# Patient Record
Sex: FEMALE | Race: WHITE | NOT HISPANIC OR LATINO | Employment: UNEMPLOYED | URBAN - NONMETROPOLITAN AREA
[De-identification: names, ages, dates, MRNs, and addresses within clinical notes are randomized per-mention and may not be internally consistent; named-entity substitution may affect disease eponyms.]

---

## 2017-06-14 ENCOUNTER — APPOINTMENT (EMERGENCY)
Dept: CT IMAGING | Facility: HOSPITAL | Age: 42
End: 2017-06-14

## 2017-06-14 ENCOUNTER — HOSPITAL ENCOUNTER (EMERGENCY)
Facility: HOSPITAL | Age: 42
Discharge: HOME | End: 2017-06-14
Attending: EMERGENCY MEDICINE | Admitting: EMERGENCY MEDICINE

## 2017-06-14 VITALS
HEART RATE: 67 BPM | RESPIRATION RATE: 17 BRPM | TEMPERATURE: 100.6 F | OXYGEN SATURATION: 99 % | BODY MASS INDEX: 28.73 KG/M2 | SYSTOLIC BLOOD PRESSURE: 118 MMHG | WEIGHT: 170 LBS | DIASTOLIC BLOOD PRESSURE: 74 MMHG

## 2017-06-14 DIAGNOSIS — N12 PYELONEPHRITIS: Primary | ICD-10-CM

## 2017-06-14 LAB
ALBUMIN SERPL BCP-MCNC: 3.4 G/DL (ref 3.5–5)
ALP SERPL-CCNC: 57 U/L (ref 46–116)
ALT SERPL W P-5'-P-CCNC: 18 U/L (ref 12–78)
ANION GAP SERPL CALCULATED.3IONS-SCNC: 9 MMOL/L (ref 4–13)
AST SERPL W P-5'-P-CCNC: 17 U/L (ref 5–45)
BACTERIA UR QL AUTO: ABNORMAL /HPF
BASOPHILS # BLD AUTO: 0.03 THOUSANDS/ΜL (ref 0–0.1)
BASOPHILS NFR BLD AUTO: 0 % (ref 0–1)
BILIRUB SERPL-MCNC: 0.4 MG/DL (ref 0.2–1)
BILIRUB UR QL STRIP: NEGATIVE
BUN SERPL-MCNC: 11 MG/DL (ref 5–25)
CALCIUM SERPL-MCNC: 8.5 MG/DL (ref 8.3–10.1)
CHLORIDE SERPL-SCNC: 107 MMOL/L (ref 100–108)
CLARITY UR: ABNORMAL
CO2 SERPL-SCNC: 26 MMOL/L (ref 21–32)
COLOR UR: ABNORMAL
CREAT SERPL-MCNC: 0.87 MG/DL (ref 0.6–1.3)
EOSINOPHIL # BLD AUTO: 0.1 THOUSAND/ΜL (ref 0–0.61)
EOSINOPHIL NFR BLD AUTO: 1 % (ref 0–6)
ERYTHROCYTE [DISTWIDTH] IN BLOOD BY AUTOMATED COUNT: 15 % (ref 11.6–15.1)
GFR SERPL CREATININE-BSD FRML MDRD: >60 ML/MIN/1.73SQ M
GLUCOSE SERPL-MCNC: 86 MG/DL (ref 65–140)
GLUCOSE UR STRIP-MCNC: NEGATIVE MG/DL
HCG UR QL: NEGATIVE
HCT VFR BLD AUTO: 39.7 % (ref 34.8–46.1)
HGB BLD-MCNC: 12.8 G/DL (ref 11.5–15.4)
HGB UR QL STRIP.AUTO: ABNORMAL
KETONES UR STRIP-MCNC: NEGATIVE MG/DL
LACTATE SERPL-SCNC: 0.8 MMOL/L (ref 0.5–2)
LEUKOCYTE ESTERASE UR QL STRIP: ABNORMAL
LIPASE SERPL-CCNC: 154 U/L (ref 73–393)
LYMPHOCYTES # BLD AUTO: 1.62 THOUSANDS/ΜL (ref 0.6–4.47)
LYMPHOCYTES NFR BLD AUTO: 14 % (ref 14–44)
MCH RBC QN AUTO: 27.6 PG (ref 26.8–34.3)
MCHC RBC AUTO-ENTMCNC: 32.2 G/DL (ref 31.4–37.4)
MCV RBC AUTO: 86 FL (ref 82–98)
MONOCYTES # BLD AUTO: 0.71 THOUSAND/ΜL (ref 0.17–1.22)
MONOCYTES NFR BLD AUTO: 6 % (ref 4–12)
NEUTROPHILS # BLD AUTO: 9.09 THOUSANDS/ΜL (ref 1.85–7.62)
NEUTS SEG NFR BLD AUTO: 79 % (ref 43–75)
NITRITE UR QL STRIP: NEGATIVE
NON-SQ EPI CELLS URNS QL MICRO: ABNORMAL /HPF
PH UR STRIP.AUTO: 7 [PH] (ref 4.5–8)
PLATELET # BLD AUTO: 234 THOUSANDS/UL (ref 149–390)
PMV BLD AUTO: 10.1 FL (ref 8.9–12.7)
POTASSIUM SERPL-SCNC: 3.4 MMOL/L (ref 3.5–5.3)
PROT SERPL-MCNC: 6.4 G/DL (ref 6.4–8.2)
PROT UR STRIP-MCNC: >=300 MG/DL
RBC # BLD AUTO: 4.64 MILLION/UL (ref 3.81–5.12)
RBC #/AREA URNS AUTO: ABNORMAL /HPF
SODIUM SERPL-SCNC: 142 MMOL/L (ref 136–145)
SP GR UR STRIP.AUTO: 1.02 (ref 1–1.03)
UROBILINOGEN UR QL STRIP.AUTO: 0.2 E.U./DL
WBC # BLD AUTO: 11.55 THOUSAND/UL (ref 4.31–10.16)
WBC #/AREA URNS AUTO: ABNORMAL /HPF

## 2017-06-14 PROCEDURE — 83690 ASSAY OF LIPASE: CPT | Performed by: EMERGENCY MEDICINE

## 2017-06-14 PROCEDURE — 80053 COMPREHEN METABOLIC PANEL: CPT | Performed by: EMERGENCY MEDICINE

## 2017-06-14 PROCEDURE — 83605 ASSAY OF LACTIC ACID: CPT | Performed by: EMERGENCY MEDICINE

## 2017-06-14 PROCEDURE — 99284 EMERGENCY DEPT VISIT MOD MDM: CPT

## 2017-06-14 PROCEDURE — 85025 COMPLETE CBC W/AUTO DIFF WBC: CPT | Performed by: EMERGENCY MEDICINE

## 2017-06-14 PROCEDURE — 81025 URINE PREGNANCY TEST: CPT | Performed by: EMERGENCY MEDICINE

## 2017-06-14 PROCEDURE — 81001 URINALYSIS AUTO W/SCOPE: CPT | Performed by: EMERGENCY MEDICINE

## 2017-06-14 PROCEDURE — 36415 COLL VENOUS BLD VENIPUNCTURE: CPT | Performed by: EMERGENCY MEDICINE

## 2017-06-14 PROCEDURE — 74176 CT ABD & PELVIS W/O CONTRAST: CPT

## 2017-06-14 RX ORDER — CEPHALEXIN 500 MG/1
500 CAPSULE ORAL 4 TIMES DAILY
Qty: 40 CAPSULE | Refills: 0 | Status: SHIPPED | OUTPATIENT
Start: 2017-06-14 | End: 2017-06-24

## 2017-06-14 RX ORDER — ONDANSETRON 2 MG/ML
4 INJECTION INTRAMUSCULAR; INTRAVENOUS ONCE
Status: DISCONTINUED | OUTPATIENT
Start: 2017-06-14 | End: 2017-06-14 | Stop reason: HOSPADM

## 2017-06-14 RX ORDER — PHENAZOPYRIDINE HYDROCHLORIDE 100 MG/1
100 TABLET, FILM COATED ORAL ONCE
Status: DISCONTINUED | OUTPATIENT
Start: 2017-06-14 | End: 2017-06-14 | Stop reason: HOSPADM

## 2017-06-14 RX ORDER — PHENAZOPYRIDINE HYDROCHLORIDE 200 MG/1
200 TABLET, FILM COATED ORAL 3 TIMES DAILY
Qty: 6 TABLET | Refills: 0 | Status: SHIPPED | OUTPATIENT
Start: 2017-06-14 | End: 2017-11-01 | Stop reason: ALTCHOICE

## 2017-06-26 ENCOUNTER — ALLSCRIPTS OFFICE VISIT (OUTPATIENT)
Dept: OTHER | Facility: OTHER | Age: 42
End: 2017-06-26

## 2017-10-24 ENCOUNTER — GENERIC CONVERSION - ENCOUNTER (OUTPATIENT)
Dept: OTHER | Facility: OTHER | Age: 42
End: 2017-10-24

## 2017-10-27 ENCOUNTER — GENERIC CONVERSION - ENCOUNTER (OUTPATIENT)
Dept: OTHER | Facility: OTHER | Age: 42
End: 2017-10-27

## 2017-11-01 ENCOUNTER — GENERIC CONVERSION - ENCOUNTER (OUTPATIENT)
Dept: OTHER | Facility: OTHER | Age: 42
End: 2017-11-01

## 2017-11-01 ENCOUNTER — HOSPITAL ENCOUNTER (EMERGENCY)
Facility: HOSPITAL | Age: 42
Discharge: HOME | End: 2017-11-02
Attending: EMERGENCY MEDICINE | Admitting: EMERGENCY MEDICINE
Payer: COMMERCIAL

## 2017-11-01 ENCOUNTER — ALLSCRIPTS OFFICE VISIT (OUTPATIENT)
Dept: PERINATAL CARE | Facility: CLINIC | Age: 42
End: 2017-11-01
Payer: COMMERCIAL

## 2017-11-01 VITALS
HEIGHT: 65 IN | SYSTOLIC BLOOD PRESSURE: 107 MMHG | RESPIRATION RATE: 18 BRPM | HEART RATE: 81 BPM | WEIGHT: 162 LBS | TEMPERATURE: 97.8 F | DIASTOLIC BLOOD PRESSURE: 64 MMHG | OXYGEN SATURATION: 99 % | BODY MASS INDEX: 26.99 KG/M2

## 2017-11-01 DIAGNOSIS — J06.9 UPPER RESPIRATORY TRACT INFECTION, UNSPECIFIED TYPE: Primary | ICD-10-CM

## 2017-11-01 PROCEDURE — 76801 OB US < 14 WKS SINGLE FETUS: CPT | Performed by: OBSTETRICS & GYNECOLOGY

## 2017-11-02 LAB
FLUAV AG SPEC QL IA: NEGATIVE
FLUAV AG SPEC QL: NORMAL
FLUBV AG SPEC QL IA: NEGATIVE
FLUBV AG SPEC QL: NORMAL
RSV B RNA SPEC QL NAA+PROBE: NORMAL

## 2017-11-02 PROCEDURE — 87798 DETECT AGENT NOS DNA AMP: CPT | Performed by: EMERGENCY MEDICINE

## 2017-11-02 PROCEDURE — 87400 INFLUENZA A/B EACH AG IA: CPT | Performed by: EMERGENCY MEDICINE

## 2017-11-02 PROCEDURE — 99283 EMERGENCY DEPT VISIT LOW MDM: CPT

## 2017-11-02 RX ORDER — ACETAMINOPHEN 325 MG/1
650 TABLET ORAL EVERY 4 HOURS PRN
Qty: 1 BOTTLE | Refills: 0 | Status: SHIPPED | OUTPATIENT
Start: 2017-11-02 | End: 2018-05-05 | Stop reason: HOSPADM

## 2017-11-02 RX ORDER — ALBUTEROL SULFATE 90 UG/1
AEROSOL, METERED RESPIRATORY (INHALATION)
Qty: 1 INHALER | Refills: 0 | Status: ON HOLD | OUTPATIENT
Start: 2017-11-02 | End: 2017-11-27 | Stop reason: ALTCHOICE

## 2017-11-02 RX ORDER — ALBUTEROL SULFATE 90 UG/1
2 AEROSOL, METERED RESPIRATORY (INHALATION) ONCE
Status: COMPLETED | OUTPATIENT
Start: 2017-11-02 | End: 2017-11-02

## 2017-11-02 RX ADMIN — ALBUTEROL SULFATE 2 PUFF: 90 AEROSOL, METERED RESPIRATORY (INHALATION) at 00:43

## 2017-11-02 NOTE — ED PROVIDER NOTES
History  Chief Complaint   Patient presents with    Cough     Patient states that her right ear and throat hurt, and she has a cough  patient is 11 weeks pregant and concerned that she may have the flu  Patient: Mario Alegria y o /female  YOB: 1975  MRN: [id number]  PCP: Erasmo Cohen  Date of evaluation: 17    (N B  Dictation software may have been used in the preparation of this document )    4d cough, right ear pain, throat pain  She is pregnant   with flu-like symptoms            Prior to Admission Medications   Prescriptions Last Dose Informant Patient Reported? Taking? Multiple Vitamins-Minerals (BARIATRIC FUSION PO)   Yes Yes   Sig: Take 1 tablet by mouth 4 (four) times a day  Prenatal MV-Min-Fe Fum-FA-DHA (PRENATAL 1 PO)   Yes Yes   Sig: Take by mouth   Pyridoxine HCl (VITAMIN B-6) 500 MG tablet   Yes Yes   Sig: Take 500 mg by mouth daily   Specialty Vitamins Products (MAGNESIUM, AMINO ACID CHELATE,) 133 MG tablet   Yes Yes   Sig: Take 1 tablet by mouth daily  calcium citrate (CALCITRATE) 950 MG tablet   Yes Yes   Sig: Take 2 tablets by mouth daily  cyanocobalamin (VITAMIN B-12) 1,000 mcg tablet   Yes Yes   Sig: Take 1,000 mcg by mouth daily      Facility-Administered Medications: None       Past Medical History:   Diagnosis Date    History of gastric bypass 2014       Past Surgical History:   Procedure Laterality Date     SECTION      CHOLECYSTECTOMY      GASTRIC BYPASS  2014       History reviewed  No pertinent family history  I have reviewed and agree with the history as documented  Social History   Substance Use Topics    Smoking status: Current Some Day Smoker     Packs/day: 0 25    Smokeless tobacco: Never Used    Alcohol use No        Review of Systems   Constitutional: Negative for chills and fever  HENT: Positive for ear pain and sore throat   Negative for ear discharge, hearing loss, trouble swallowing and voice change  Eyes: Negative for pain, redness and visual disturbance  Respiratory: Positive for cough  Negative for shortness of breath  Cardiovascular: Negative for chest pain and palpitations  Gastrointestinal: Negative for abdominal pain, constipation, diarrhea, nausea and vomiting  Genitourinary: Negative for dysuria, frequency, hematuria, urgency, vaginal bleeding and vaginal discharge  Musculoskeletal: Negative for back pain, gait problem and neck pain  Skin: Negative for color change and rash  Neurological: Negative for weakness and light-headedness  Psychiatric/Behavioral: Negative for confusion and decreased concentration  The patient is not nervous/anxious  All other systems reviewed and are negative  Physical Exam  ED Triage Vitals [11/01/17 2324]   Temperature Pulse Respirations Blood Pressure SpO2   97 8 °F (36 6 °C) 81 18 107/64 99 %      Temp Source Heart Rate Source Patient Position - Orthostatic VS BP Location FiO2 (%)   Temporal Monitor Lying Left arm --      Pain Score       2           Orthostatic Vital Signs  Vitals:    11/01/17 2324   BP: 107/64   Pulse: 81   Patient Position - Orthostatic VS: Lying       Physical Exam   Constitutional: She is oriented to person, place, and time  She appears well-developed and well-nourished  HENT:   Mouth/Throat: Oropharynx is clear and moist and mucous membranes are normal    Voice normal   Eyes: EOM are normal  Pupils are equal, round, and reactive to light  Cardiovascular: Normal rate and regular rhythm  Pulmonary/Chest: Effort normal    Abdominal: Soft  Bowel sounds are normal    Neurological: She is alert and oriented to person, place, and time  GCS eye subscore is 4  GCS verbal subscore is 5  GCS motor subscore is 6  Skin: Skin is warm and dry  Psychiatric: She has a normal mood and affect  Her speech is normal and behavior is normal    Nursing note and vitals reviewed        ED Medications  Medications   albuterol (PROVENTIL HFA,VENTOLIN HFA) inhaler 2 puff (2 puffs Inhalation Given 11/2/17 0043)       Diagnostic Studies  Results Reviewed     Procedure Component Value Units Date/Time    Influenza A/B and RSV by PCR (Indicated for patients > 2 mo of age) [[id number]]  (Normal) Collected:  11/02/17 0032    Lab Status:  Final result Specimen:  Nasopharyngeal from Nasopharyngeal Swab Updated:  11/02/17 0938     INFLU A PCR None Detected     INFLU B PCR None Detected     RSV PCR None Detected    Rapid Influenza Screen with Reflex PCR (indicated for patients <2mo of age) [[id number]]  (Normal) Collected:  11/02/17 0032    Lab Status:  Final result Specimen:  Nasopharyngeal from Nasopharyngeal Swab Updated:  11/02/17 0058     Rapid Influenza A Ag Negative     Rapid Influenza B Ag Negative                 No orders to display              Procedures  Procedures       Phone Contacts  ED Phone Contact    ED Course  ED Course                                MDM  CritCare Time    Disposition  Final diagnoses:   Upper respiratory tract infection, unspecified type     Time reflects when diagnosis was documented in both MDM as applicable and the Disposition within this note     Time User Action Codes Description Comment    11/2/2017 12:23 AM Liane HERNANDEZ Add [J06 9] Upper respiratory tract infection, unspecified type       ED Disposition     ED Disposition Condition Comment    Discharge  Alejandro Guardian discharge to home/self care  Condition at discharge: Good        Follow-up Information     Follow up With Specialties Details Why [address], [address] Call in 1 day Tell about this ER visit   [address] [place] [address] Rd  [phone]          Discharge Medication List as of 11/2/2017 12:25 AM      START taking these medications    Details   acetaminophen (TYLENOL) 325 mg tablet Take 2 tablets by mouth every 4 (four) hours as needed (pain), Starting Thu 11/2/2017, Print      albuterol (PROVENTIL HFA,VENTOLIN HFA) 90 mcg/act inhaler 2 puffs every 3-4 hours with spacer as needed for wheeze, cough, short of breath , Print      Saline (AYR NASAL MIST ALLERGY/SINUS) 2 65 % SOLN HyPERtonic  Use as directed on package, for nasal congestion, pressure, Print         CONTINUE these medications which have NOT CHANGED    Details   calcium citrate (CALCITRATE) 950 MG tablet Take 2 tablets by mouth daily  , Until Discontinued, Historical Med      cyanocobalamin (VITAMIN B-12) 1,000 mcg tablet Take 1,000 mcg by mouth daily, Until Discontinued, Historical Med      Multiple Vitamins-Minerals (BARIATRIC FUSION PO) Take 1 tablet by mouth 4 (four) times a day , Until Discontinued, Historical Med      Prenatal MV-Min-Fe Fum-FA-DHA (PRENATAL 1 PO) Take by mouth, Historical Med      Pyridoxine HCl (VITAMIN B-6) 500 MG tablet Take 500 mg by mouth daily, Until Discontinued, Historical Med      Specialty Vitamins Products (MAGNESIUM, AMINO ACID CHELATE,) 133 MG tablet Take 1 tablet by mouth daily  , Until Discontinued, Historical Med           No discharge procedures on file      ED Provider  Electronically Signed by           Scott Childers MD  11/03/17 9383

## 2017-11-02 NOTE — DISCHARGE INSTRUCTIONS

## 2017-11-04 ENCOUNTER — HOSPITAL ENCOUNTER (EMERGENCY)
Facility: HOSPITAL | Age: 42
Discharge: HOME | End: 2017-11-04
Admitting: EMERGENCY MEDICINE
Payer: COMMERCIAL

## 2017-11-04 ENCOUNTER — APPOINTMENT (EMERGENCY)
Dept: ULTRASOUND IMAGING | Facility: HOSPITAL | Age: 42
End: 2017-11-04
Payer: COMMERCIAL

## 2017-11-04 VITALS
DIASTOLIC BLOOD PRESSURE: 62 MMHG | BODY MASS INDEX: 27.38 KG/M2 | TEMPERATURE: 97.8 F | OXYGEN SATURATION: 100 % | SYSTOLIC BLOOD PRESSURE: 108 MMHG | HEART RATE: 62 BPM | WEIGHT: 162 LBS | RESPIRATION RATE: 16 BRPM

## 2017-11-04 DIAGNOSIS — O20.9 VAGINAL BLEEDING BEFORE 22 WEEKS GESTATION: Primary | ICD-10-CM

## 2017-11-04 LAB
ABO GROUP BLD: NORMAL
ALBUMIN SERPL BCP-MCNC: 3.1 G/DL (ref 3.5–5)
ALP SERPL-CCNC: 53 U/L (ref 46–116)
ALT SERPL W P-5'-P-CCNC: 19 U/L (ref 12–78)
ANION GAP SERPL CALCULATED.3IONS-SCNC: 6 MMOL/L (ref 4–13)
AST SERPL W P-5'-P-CCNC: 15 U/L (ref 5–45)
B-HCG SERPL-ACNC: ABNORMAL MIU/ML
BACTERIA UR QL AUTO: ABNORMAL /HPF
BASOPHILS # BLD AUTO: 0.01 THOUSANDS/ΜL (ref 0–0.1)
BASOPHILS NFR BLD AUTO: 0 % (ref 0–1)
BILIRUB SERPL-MCNC: 0.3 MG/DL (ref 0.2–1)
BILIRUB UR QL STRIP: NEGATIVE
BUN SERPL-MCNC: 7 MG/DL (ref 5–25)
CALCIUM SERPL-MCNC: 8.7 MG/DL (ref 8.3–10.1)
CHLORIDE SERPL-SCNC: 108 MMOL/L (ref 100–108)
CLARITY UR: CLEAR
CO2 SERPL-SCNC: 26 MMOL/L (ref 21–32)
COLOR UR: YELLOW
CREAT SERPL-MCNC: 0.59 MG/DL (ref 0.6–1.3)
EOSINOPHIL # BLD AUTO: 0.08 THOUSAND/ΜL (ref 0–0.61)
EOSINOPHIL NFR BLD AUTO: 1 % (ref 0–6)
ERYTHROCYTE [DISTWIDTH] IN BLOOD BY AUTOMATED COUNT: 13.9 % (ref 11.6–15.1)
GFR SERPL CREATININE-BSD FRML MDRD: 113 ML/MIN/1.73SQ M
GLUCOSE SERPL-MCNC: 88 MG/DL (ref 65–140)
GLUCOSE UR STRIP-MCNC: NEGATIVE MG/DL
HCT VFR BLD AUTO: 34.3 % (ref 34.8–46.1)
HGB BLD-MCNC: 11.7 G/DL (ref 11.5–15.4)
HGB UR QL STRIP.AUTO: ABNORMAL
KETONES UR STRIP-MCNC: NEGATIVE MG/DL
LEUKOCYTE ESTERASE UR QL STRIP: NEGATIVE
LYMPHOCYTES # BLD AUTO: 1.05 THOUSANDS/ΜL (ref 0.6–4.47)
LYMPHOCYTES NFR BLD AUTO: 18 % (ref 14–44)
MCH RBC QN AUTO: 29.8 PG (ref 26.8–34.3)
MCHC RBC AUTO-ENTMCNC: 34.1 G/DL (ref 31.4–37.4)
MCV RBC AUTO: 88 FL (ref 82–98)
MONOCYTES # BLD AUTO: 0.37 THOUSAND/ΜL (ref 0.17–1.22)
MONOCYTES NFR BLD AUTO: 6 % (ref 4–12)
NEUTROPHILS # BLD AUTO: 4.49 THOUSANDS/ΜL (ref 1.85–7.62)
NEUTS SEG NFR BLD AUTO: 75 % (ref 43–75)
NITRITE UR QL STRIP: NEGATIVE
NON-SQ EPI CELLS URNS QL MICRO: ABNORMAL /HPF
NRBC BLD AUTO-RTO: 0 /100 WBCS
PH UR STRIP.AUTO: 6.5 [PH] (ref 4.5–8)
PLATELET # BLD AUTO: 175 THOUSANDS/UL (ref 149–390)
PMV BLD AUTO: 10.3 FL (ref 8.9–12.7)
POTASSIUM SERPL-SCNC: 3.4 MMOL/L (ref 3.5–5.3)
PROT SERPL-MCNC: 6.5 G/DL (ref 6.4–8.2)
PROT UR STRIP-MCNC: NEGATIVE MG/DL
RBC # BLD AUTO: 3.92 MILLION/UL (ref 3.81–5.12)
RBC #/AREA URNS AUTO: ABNORMAL /HPF
RH BLD: POSITIVE
SODIUM SERPL-SCNC: 140 MMOL/L (ref 136–145)
SP GR UR STRIP.AUTO: 1.01 (ref 1–1.03)
UROBILINOGEN UR QL STRIP.AUTO: 0.2 E.U./DL
WBC # BLD AUTO: 6 THOUSAND/UL (ref 4.31–10.16)
WBC #/AREA URNS AUTO: ABNORMAL /HPF

## 2017-11-04 PROCEDURE — 36415 COLL VENOUS BLD VENIPUNCTURE: CPT | Performed by: PHYSICIAN ASSISTANT

## 2017-11-04 PROCEDURE — 86901 BLOOD TYPING SEROLOGIC RH(D): CPT | Performed by: EMERGENCY MEDICINE

## 2017-11-04 PROCEDURE — 99284 EMERGENCY DEPT VISIT MOD MDM: CPT

## 2017-11-04 PROCEDURE — 76801 OB US < 14 WKS SINGLE FETUS: CPT

## 2017-11-04 PROCEDURE — 80053 COMPREHEN METABOLIC PANEL: CPT | Performed by: PHYSICIAN ASSISTANT

## 2017-11-04 PROCEDURE — 85025 COMPLETE CBC W/AUTO DIFF WBC: CPT | Performed by: PHYSICIAN ASSISTANT

## 2017-11-04 PROCEDURE — 84702 CHORIONIC GONADOTROPIN TEST: CPT | Performed by: PHYSICIAN ASSISTANT

## 2017-11-04 PROCEDURE — 86900 BLOOD TYPING SEROLOGIC ABO: CPT | Performed by: EMERGENCY MEDICINE

## 2017-11-04 PROCEDURE — 81001 URINALYSIS AUTO W/SCOPE: CPT | Performed by: PHYSICIAN ASSISTANT

## 2017-11-04 RX ORDER — ACETAMINOPHEN 325 MG/1
650 TABLET ORAL ONCE
Status: COMPLETED | OUTPATIENT
Start: 2017-11-04 | End: 2017-11-04

## 2017-11-04 RX ADMIN — ACETAMINOPHEN 650 MG: 325 TABLET ORAL at 07:31

## 2017-11-04 NOTE — ED NOTES
Patient states has had no further vaginal bleeding       [place] Saldivar, RN  11/04/17 0832

## 2017-11-04 NOTE — ED NOTES
Patient medicated for c/o headache  States she has a cold    Informed US dept opens at [address] RN  11/04/17 2308

## 2017-11-04 NOTE — ED PROVIDER NOTES
History  Chief Complaint   Patient presents with    Vaginal Bleeding - Pregnant     11 weeks pregnant  "bright red when i wipe " "getting heavier "      Patient is a 51-year-old female G6P P2 A 3 who presents to the emergency department with the onset bright red vaginal bleeding that started approximately 2 hours ago after she had sexual intercourse with her   She does complain of mild lower abdominal tenderness  She otherwise denies fevers chills shortness of breath chest pain nausea vomiting headache dizziness  Patient is under care of   by she was seen by maternal fetal medicine 2 days ago had ultrasound which was negative  Patient has had 2 abortions spontaneously and a stillborn  Is supposed to be going for cervical cerclage next week  1045:  Patient's labs and Imaging reassuring pelvic exam reveals no vaginal laceration cervical os is closed small amount fresh dark brown blood vagina otherwise no acute findings  Case and findings were discussed with Dr Napoleon Olson agrees with outpatient treatment and will see patient as scheduled on  he recommends the patient abstain from sexual intercourse at this time  Return precautions and anticipatory guidance discussed  Prior to Admission Medications   Prescriptions Last Dose Informant Patient Reported? Taking? Multiple Vitamins-Minerals (BARIATRIC FUSION PO)   Yes Yes   Sig: Take 1 tablet by mouth 4 (four) times a day  Prenatal MV-Min-Fe Fum-FA-DHA (PRENATAL 1 PO)   Yes Yes   Sig: Take by mouth   Pyridoxine HCl (VITAMIN B-6) 500 MG tablet   Yes Yes   Sig: Take 500 mg by mouth daily   Specialty Vitamins Products (MAGNESIUM, AMINO ACID CHELATE,) 133 MG tablet   Yes Yes   Sig: Take 1 tablet by mouth daily     acetaminophen (TYLENOL) 325 mg tablet   No Yes   Sig: Take 2 tablets by mouth every 4 (four) hours as needed (pain)   albuterol (PROVENTIL HFA,VENTOLIN HFA) 90 mcg/act inhaler   No Yes   Si puffs every 3-4 hours with spacer as needed for wheeze, cough, short of breath  calcium citrate (CALCITRATE) 950 MG tablet   Yes Yes   Sig: Take 2 tablets by mouth daily  cyanocobalamin (VITAMIN B-12) 1,000 mcg tablet   Yes Yes   Sig: Take 1,000 mcg by mouth daily      Facility-Administered Medications: None       Past Medical History:   Diagnosis Date    History of gastric bypass 2014       Past Surgical History:   Procedure Laterality Date     SECTION      CHOLECYSTECTOMY      GASTRIC BYPASS  2014       History reviewed  No pertinent family history  I have reviewed and agree with the history as documented  Social History   Substance Use Topics    Smoking status: Current Some Day Smoker     Packs/day: 0 25    Smokeless tobacco: Never Used    Alcohol use No        Review of Systems   Constitutional: Negative  Negative for chills and fever  Respiratory: Negative  Negative for apnea, cough and wheezing  Cardiovascular: Negative  Negative for chest pain  Gastrointestinal: Positive for abdominal pain  Negative for diarrhea, nausea and vomiting  Genitourinary: Positive for pelvic pain and vaginal bleeding  Negative for hematuria, vaginal discharge and vaginal pain  Musculoskeletal: Negative for back pain, joint swelling, neck pain and neck stiffness  Skin: Negative for rash and wound  Neurological: Negative for dizziness, weakness and light-headedness  Hematological: Negative  Negative for adenopathy  Does not bruise/bleed easily  Psychiatric/Behavioral: Negative  All other systems reviewed and are negative        Physical Exam  ED Triage Vitals   Temperature Pulse Respirations Blood Pressure SpO2   17 0546 17 0546 17 0546 17 0546 17 0546   98 3 °F (36 8 °C) 72 18 122/56 100 %      Temp Source Heart Rate Source Patient Position - Orthostatic VS BP Location FiO2 (%)   17 0813 17 0546 17 0813 1746 --   Oral Monitor Lying Right arm Pain Score       11/04/17 0546       2           Orthostatic Vital Signs  Vitals:    11/04/17 0546 11/04/17 0713 11/04/17 0813   BP: 122/56 101/64 95/54   Pulse: 72 66 65   Patient Position - Orthostatic VS:   Lying       Physical Exam   Constitutional: She appears well-developed and well-nourished  HENT:   Head: Normocephalic and atraumatic  Eyes: EOM are normal  Pupils are equal, round, and reactive to light  Neck: Normal range of motion  Neck supple  Cardiovascular: Normal rate, regular rhythm and normal heart sounds  Exam reveals no gallop and no friction rub  No murmur heard  Pulmonary/Chest: Effort normal and breath sounds normal  No respiratory distress  She has no wheezes  She has no rales  She exhibits no tenderness  Abdominal: Soft  She exhibits no distension and no mass  There is tenderness  There is no rebound and no guarding  No hernia  Genitourinary: Rectal exam shows no external hemorrhoid, no internal hemorrhoid and no tenderness  Pelvic exam was performed with patient supine  No erythema, tenderness or bleeding in the vagina  No signs of injury around the vagina  No vaginal discharge found         ED Medications  Medications   acetaminophen (TYLENOL) tablet 650 mg (650 mg Oral Given 11/4/17 0731)       Diagnostic Studies  Results Reviewed     Procedure Component Value Units Date/Time    hCG, quantitative [[id number]]  (Abnormal) Collected:  11/04/17 0641    Lab Status:  Final result Specimen:  Blood from Arm, Right Updated:  11/04/17 0804     HCG, Quant 75,794 5 (H) mIU/mL     Narrative:          Expected Ranges:     Approximate               Approximate HCG  Gestation age          Concentration ( mIU/mL)  _____________          ______________________   Eric Tavares                      HCG values  0 2-1                       5-50  1-2                           2-3                         100-5000  3-4                         500-89437  4-5                         1000-91409  5-6 [id number]  6-8                         [id number]  8-12                        56207-568938    Comprehensive metabolic panel [[id number]]  (Abnormal) Collected:  11/04/17 0641    Lab Status:  Final result Specimen:  Blood from Arm, Right Updated:  11/04/17 0732     Sodium 140 mmol/L      Potassium 3 4 (L) mmol/L      Chloride 108 mmol/L      CO2 26 mmol/L      Anion Gap 6 mmol/L      BUN 7 mg/dL      Creatinine 0 59 (L) mg/dL      Glucose 88 mg/dL      Calcium 8 7 mg/dL      AST 15 U/L      ALT 19 U/L      Alkaline Phosphatase 53 U/L      Total Protein 6 5 g/dL      Albumin 3 1 (L) g/dL      Total Bilirubin 0 30 mg/dL      eGFR 113 ml/min/1 73sq m     Narrative:         National Kidney Disease Education Program recommendations are as follows:  GFR calculation is accurate only with a steady state creatinine  Chronic Kidney disease less than 60 ml/min/1 73 sq  meters  Kidney failure less than 15 ml/min/1 73 sq  meters      Urine Microscopic [[id number]]  (Abnormal) Collected:  11/04/17 0645    Lab Status:  Final result Specimen:  Urine from Urine, Clean Catch Updated:  11/04/17 0729     RBC, UA Innumerable (A) /hpf      WBC, UA None Seen /hpf      Epithelial Cells Occasional /hpf      Bacteria, UA Occasional /hpf     UA w Reflex to Microscopic w Reflex to Culture [[id number]]  (Abnormal) Collected:  11/04/17 0645    Lab Status:  Final result Specimen:  Urine from Urine, Clean Catch Updated:  11/04/17 0656     Color, UA Yellow     Clarity, UA Clear     Specific Gravity, UA 1 015     pH, UA 6 5     Leukocytes, UA Negative     Nitrite, UA Negative     Protein, UA Negative mg/dl      Glucose, UA Negative mg/dl      Ketones, UA Negative mg/dl      Urobilinogen, UA 0 2 E U /dl      Bilirubin, UA Negative     Blood, UA Large (A)    CBC and differential [[id number]]  (Abnormal) Collected:  11/04/17 0641    Lab Status:  Final result Specimen:  Blood from Arm, Right Updated:  11/04/17 0654     WBC 6 00 Thousand/uL      RBC 3 92 Million/uL      Hemoglobin 11 7 g/dL      Hematocrit 34 3 (L) %      MCV 88 fL      MCH 29 8 pg      MCHC 34 1 g/dL      RDW 13 9 %      MPV 10 3 fL      Platelets 978 Thousands/uL      nRBC 0 /100 WBCs      Neutrophils Relative 75 %      Lymphocytes Relative 18 %      Monocytes Relative 6 %      Eosinophils Relative 1 %      Basophils Relative 0 %      Neutrophils Absolute 4 49 Thousands/µL      Lymphocytes Absolute 1 05 Thousands/µL      Monocytes Absolute 0 37 Thousand/µL      Eosinophils Absolute 0 08 Thousand/µL      Basophils Absolute 0 01 Thousands/µL                  US OB < 14 weeks single or first gestation level 1   Final Result by Angelo Roy MD ( 1023)      Single live intrauterine gestation at 11 weeks 1 day (range +/- 1 week 0 days)  RENEE would be 2018  There is no evidence of subchorionic hemorrhage  This is within range of the previously established gestational age of 5 weeks 0 days and estimated due date of 2018, which is considered more accurate  Workstation performed: [id number]                    Procedures  Procedures       Phone Contacts  ED Phone Contact    ED Course  ED Course                                MDM  Number of Diagnoses or Management Options  Vaginal bleeding before 22 weeks gestation:   Diagnosis management comments: As noted in HPI 41-year-old female  a 3 presents to the emergency department with mild to moderate bright red vaginal bleeding with no clots that started approximately 2 hours ago after she had sexual intercourse with her   Will screen with labs type and screen and ultrasound to evaluate for threatened AB versus placenta previa  Patient otherwise at this time is clinically and hemodynamically stable      1030:  Patient's labs and imaging are unremarkable patient remains clinically and hemodynamically stable in emergency department with normal pelvic exam     Trae Time    Disposition  Final diagnoses:   Vaginal bleeding before 22 weeks gestation     Time reflects when diagnosis was documented in both MDM as applicable and the Disposition within this note     Time User Action Codes Description Comment    11/4/2017 10:31 AM Lola Bejarano Add [O20 9] Vaginal bleeding before 22 weeks gestation       ED Disposition     ED Disposition Condition Comment    Discharge  Kalina Estrin discharge to home/self care  Condition at discharge: Good        Follow-up Information     Follow up With Specialties Details Why Laura Camejo MD Obstetrics and Gynecology   [facility] [address]  [phone]          Patient's Medications   Discharge Prescriptions    No medications on file     No discharge procedures on file      ED Provider  Electronically Signed by           Dionisio Ruano PA-C  11/04/17 0107

## 2017-11-15 ENCOUNTER — GENERIC CONVERSION - ENCOUNTER (OUTPATIENT)
Dept: OTHER | Facility: OTHER | Age: 42
End: 2017-11-15

## 2017-11-15 ENCOUNTER — APPOINTMENT (OUTPATIENT)
Dept: PERINATAL CARE | Facility: CLINIC | Age: 42
End: 2017-11-15
Payer: COMMERCIAL

## 2017-11-15 PROCEDURE — 90686 IIV4 VACC NO PRSV 0.5 ML IM: CPT | Performed by: OBSTETRICS & GYNECOLOGY

## 2017-11-15 PROCEDURE — 76813 OB US NUCHAL MEAS 1 GEST: CPT | Performed by: OBSTETRICS & GYNECOLOGY

## 2017-11-15 PROCEDURE — 76801 OB US < 14 WKS SINGLE FETUS: CPT | Performed by: OBSTETRICS & GYNECOLOGY

## 2017-11-15 PROCEDURE — 99203 OFFICE O/P NEW LOW 30 MIN: CPT | Performed by: GENETIC COUNSELOR, MS

## 2017-11-15 PROCEDURE — 90686 IIV4 VACC NO PRSV 0.5 ML IM: CPT

## 2017-11-15 PROCEDURE — 97802 MEDICAL NUTRITION INDIV IN: CPT | Performed by: OBSTETRICS & GYNECOLOGY

## 2017-11-16 ENCOUNTER — GENERIC CONVERSION - ENCOUNTER (OUTPATIENT)
Dept: OTHER | Facility: OTHER | Age: 42
End: 2017-11-16

## 2017-11-22 ENCOUNTER — GENERIC CONVERSION - ENCOUNTER (OUTPATIENT)
Dept: OTHER | Facility: OTHER | Age: 42
End: 2017-11-22

## 2017-11-26 ENCOUNTER — ANESTHESIA EVENT (OUTPATIENT)
Dept: LABOR AND DELIVERY | Facility: HOSPITAL | Age: 42
End: 2017-11-26
Payer: COMMERCIAL

## 2017-11-27 ENCOUNTER — ANESTHESIA (OUTPATIENT)
Dept: LABOR AND DELIVERY | Facility: HOSPITAL | Age: 42
End: 2017-11-27
Payer: COMMERCIAL

## 2017-11-27 ENCOUNTER — HOSPITAL ENCOUNTER (OUTPATIENT)
Facility: HOSPITAL | Age: 42
Discharge: HOME | End: 2017-11-27
Attending: OBSTETRICS & GYNECOLOGY | Admitting: OBSTETRICS & GYNECOLOGY
Payer: COMMERCIAL

## 2017-11-27 VITALS
TEMPERATURE: 97.4 F | HEIGHT: 65 IN | DIASTOLIC BLOOD PRESSURE: 60 MMHG | RESPIRATION RATE: 16 BRPM | WEIGHT: 163 LBS | HEART RATE: 66 BPM | SYSTOLIC BLOOD PRESSURE: 101 MMHG | BODY MASS INDEX: 27.16 KG/M2

## 2017-11-27 DIAGNOSIS — Z87.51 HISTORY OF PRETERM DELIVERY: ICD-10-CM

## 2017-11-27 LAB
BASOPHILS # BLD AUTO: 0.01 THOUSANDS/ΜL (ref 0–0.1)
BASOPHILS NFR BLD AUTO: 0 % (ref 0–1)
EOSINOPHIL # BLD AUTO: 0.06 THOUSAND/ΜL (ref 0–0.61)
EOSINOPHIL NFR BLD AUTO: 1 % (ref 0–6)
ERYTHROCYTE [DISTWIDTH] IN BLOOD BY AUTOMATED COUNT: 14.3 % (ref 11.6–15.1)
HCT VFR BLD AUTO: 33.6 % (ref 34.8–46.1)
HGB BLD-MCNC: 11.3 G/DL (ref 11.5–15.4)
LYMPHOCYTES # BLD AUTO: 1.4 THOUSANDS/ΜL (ref 0.6–4.47)
LYMPHOCYTES NFR BLD AUTO: 19 % (ref 14–44)
MCH RBC QN AUTO: 29.6 PG (ref 26.8–34.3)
MCHC RBC AUTO-ENTMCNC: 33.6 G/DL (ref 31.4–37.4)
MCV RBC AUTO: 88 FL (ref 82–98)
MONOCYTES # BLD AUTO: 0.38 THOUSAND/ΜL (ref 0.17–1.22)
MONOCYTES NFR BLD AUTO: 5 % (ref 4–12)
NEUTROPHILS # BLD AUTO: 5.66 THOUSANDS/ΜL (ref 1.85–7.62)
NEUTS SEG NFR BLD AUTO: 75 % (ref 43–75)
NRBC BLD AUTO-RTO: 0 /100 WBCS
PLATELET # BLD AUTO: 209 THOUSANDS/UL (ref 149–390)
PMV BLD AUTO: 9.6 FL (ref 8.9–12.7)
RBC # BLD AUTO: 3.82 MILLION/UL (ref 3.81–5.12)
WBC # BLD AUTO: 7.53 THOUSAND/UL (ref 4.31–10.16)

## 2017-11-27 PROCEDURE — 85025 COMPLETE CBC W/AUTO DIFF WBC: CPT | Performed by: OBSTETRICS & GYNECOLOGY

## 2017-11-27 PROCEDURE — 99214 OFFICE O/P EST MOD 30 MIN: CPT

## 2017-11-27 RX ORDER — SODIUM CHLORIDE, SODIUM LACTATE, POTASSIUM CHLORIDE, CALCIUM CHLORIDE 600; 310; 30; 20 MG/100ML; MG/100ML; MG/100ML; MG/100ML
125 INJECTION, SOLUTION INTRAVENOUS CONTINUOUS
Status: DISCONTINUED | OUTPATIENT
Start: 2017-11-27 | End: 2017-11-27 | Stop reason: HOSPADM

## 2017-11-27 RX ORDER — FAMOTIDINE 20 MG
1 TABLET ORAL DAILY
COMMUNITY

## 2017-11-27 RX ADMIN — SODIUM CHLORIDE, SODIUM LACTATE, POTASSIUM CHLORIDE, AND CALCIUM CHLORIDE 1000 ML: .6; .31; .03; .02 INJECTION, SOLUTION INTRAVENOUS at 11:09

## 2017-11-27 RX ADMIN — SODIUM CHLORIDE, SODIUM LACTATE, POTASSIUM CHLORIDE, AND CALCIUM CHLORIDE 125 ML/HR: .6; .31; .03; .02 INJECTION, SOLUTION INTRAVENOUS at 11:39

## 2017-11-27 NOTE — ANESTHESIA PREPROCEDURE EVALUATION
Review of Systems/Medical History  Patient summary reviewed  Chart reviewed      Cardiovascular  Negative cardio ROS    Pulmonary  Negative pulmonary ROS ,        GI/Hepatic    GERD , Bariatric surgery,        Negative  ROS        Endo/Other  Negative endo/other ROS      GYN  Currently pregnant , Prior pregnancy/OB history : 1 Parity: 0,     Comment: Incompetent cervix     Hematology  Negative hematology ROS      Musculoskeletal  Back pain , chronic back pain and lumbar pain, Sciatica,        Neurology      Comment: Carpal tunnel syndrome Psychology   Anxiety,            Physical Exam    Airway    Mallampati score: II  TM Distance: >3 FB  Neck ROM: full     Dental   No notable dental hx     Cardiovascular  Comment: Negative ROS, Rhythm: regular, Rate: normal, Cardiovascular exam normal    Pulmonary  Pulmonary exam normal Breath sounds clear to auscultation,     Other Findings        Anesthesia Plan  ASA Score- 2       Anesthesia Type- spinal with ASA Monitors  Additional Monitors:   Airway Plan:           Induction-       Informed Consent- Anesthetic plan and risks discussed with patient  Recent labs personally reviewed:  Lab Results   Component Value Date    WBC 6 00 2017    HGB 11 7 2017     2017     Lab Results   Component Value Date     2017    K 3 4 (L) 2017    BUN 7 2017    CREATININE 0 59 (L) 2017    GLUCOSE 88 2017     No results found for: PTT   No results found for: INR    Blood type O    Lab Results   Component Value Date    HGBA1C 4 9 2015       I, Leonides Klinefelter, MD, have personally seen and evaluated the patient prior to anesthetic care  I have reviewed the pre-anesthetic record, and other medical records if appropriate to the anesthetic care  If a CRNA is involved in the case, I have reviewed the CRNA assessment, if present, and agree   Risks/benefits and alternatives discussed with patient including possible PONV, sore throat, and possibility of rare anesthetic and surgical emergencies

## 2017-11-27 NOTE — H&P
OBSTETRIC - HISTORY & PHYSICAL  Adan Sanchez 43 y o  female MRN: [id number]  Unit/Bed#: LD Triage 2 Encounter: [id number]    ASSESSMENT:  Adan Sanchez is a 43 y o , [id number], 14w1d here for history indicated cerclage  Patient is here today for scheduled cerclage placement  She has a history of prior 2nd trimester pregnancy loss in  and a prior cerclage in  for history indicated cerclage  She is a patient of Dr Lora Cheema Friday will be placing cerclage  PLAN:  #1  14 weeks gestation  Dopplers  Plan for cerclage in OR    #2  History indicated cerclage  Plan for OR  NPO  Peripheral IV  No antibiotics    FEN: IV fluids, NPO  PPx: Ambulate, SCDs  Pain: Per anesthesia team  Code: Full code  Type: Inpatient, med surg bed    D/w Dr Cheema Friday  _________________    SUBJECTIVE  Chief Complaint:   Here for scheduled cerclage    HPI: Adan Sanchez is a 43 y o  [id number] with an RENEE of 18 at 14w1d who is being admitted for history indicated cerclage  Prior pregnancies included a 16wk PPROM and a 35wk RLTCS w/ cerclage placed at 22 wks  OB ROS: no ctxns, no LOF, no VB, no vaginal discharge    Review of Systems   Constitutional: Negative  HENT: Negative  Respiratory: Negative  Cardiovascular: Negative  Gastrointestinal: Negative  Genitourinary: Negative  Musculoskeletal: Negative  Neurological: Negative          OBH:   1LTCS Term pregnancy Preeclampsia  9lb2oz     PPROM at 16 wks  2007 RLTCS 35 wks, w/ cerclage due to shortened cervix  Two prior SABs    PMH  Past Medical History:   Diagnosis Date    History of gastric bypass  History of morbid obesity 2014     [facility]  Past Surgical History:   Procedure Laterality Date     SECTION, x 2      CHOLECYSTECTOMY      GASTRIC BYPASS  2014     SH  History   Alcohol Use No     History   Drug Use No     History   Smoking Status    Current Some Day Smoker    Packs/day: 0 25   Smokeless Tobacco    Never Used Allergies  ASA  Codeine    Medications  {  Prescriptions Prior to Admission   Medication    acetaminophen (TYLENOL) 325 mg tablet    albuterol (PROVENTIL HFA,VENTOLIN HFA) 90 mcg/act inhaler    calcium citrate (CALCITRATE) 950 MG tablet    cyanocobalamin (VITAMIN B-12) 1,000 mcg tablet    Multiple Vitamins-Minerals (BARIATRIC FUSION PO)    Prenatal MV-Min-Fe Fum-FA-DHA (PRENATAL 1 PO)    Pyridoxine HCl (VITAMIN B-6) 500 MG tablet    Specialty Vitamins Products (MAGNESIUM, AMINO ACID CHELATE,) 133 MG tablet        OBJECTIVE  There were no vitals filed for this visit  There is no height or weight on file to calculate BMI  Physical Exam:  General: no acute distress  CV/Resp: nonlabored braething  Abdominal: soft, nontender, surgical scars at midline  Extremities: no edema    FHT: 150bpm by dopplers    Recent Labs:   No visits with results within 1 Day(s) from this visit     Latest known visit with results is:   Admission on 11/04/2017, Discharged on 11/04/2017   Component Date Value Ref Range Status    WBC 11/04/2017 6 00  4 31 - 10 16 Thousand/uL Final    RBC 11/04/2017 3 92  3 81 - 5 12 Million/uL Final    Hemoglobin 11/04/2017 11 7  11 5 - 15 4 g/dL Final    Hematocrit 11/04/2017 34 3* 34 8 - 46 1 % Final    MCV 11/04/2017 88  82 - 98 fL Final    MCH 11/04/2017 29 8  26 8 - 34 3 pg Final    MCHC 11/04/2017 34 1  31 4 - 37 4 g/dL Final    RDW 11/04/2017 13 9  11 6 - 15 1 % Final    MPV 11/04/2017 10 3  8 9 - 12 7 fL Final    Platelets 52/84/4069 175  149 - 390 Thousands/uL Final    nRBC 11/04/2017 0  /100 WBCs Final    Neutrophils Relative 11/04/2017 75  43 - 75 % Final    Lymphocytes Relative 11/04/2017 18  14 - 44 % Final    Monocytes Relative 11/04/2017 6  4 - 12 % Final    Eosinophils Relative 11/04/2017 1  0 - 6 % Final    Basophils Relative 11/04/2017 0  0 - 1 % Final    Neutrophils Absolute 11/04/2017 4 49  1 85 - 7 62 Thousands/µL Final    Lymphocytes Absolute 11/04/2017 1 05 0 60 - 4 47 Thousands/µL Final    Monocytes Absolute 11/04/2017 0 37  0 17 - 1 22 Thousand/µL Final    Eosinophils Absolute 11/04/2017 0 08  0 00 - 0 61 Thousand/µL Final    Basophils Absolute 11/04/2017 0 01  0 00 - 0 10 Thousands/µL Final    Sodium 11/04/2017 140  136 - 145 mmol/L Final    Potassium 11/04/2017 3 4* 3 5 - 5 3 mmol/L Final    Chloride 11/04/2017 108  100 - 108 mmol/L Final    CO2 11/04/2017 26  21 - 32 mmol/L Final    Anion Gap 11/04/2017 6  4 - 13 mmol/L Final    BUN 11/04/2017 7  5 - 25 mg/dL Final    Creatinine 11/04/2017 0 59* 0 60 - 1 30 mg/dL Final    Glucose 11/04/2017 88  65 - 140 mg/dL Final    Calcium 11/04/2017 8 7  8 3 - 10 1 mg/dL Final    AST 11/04/2017 15  5 - 45 U/L Final    ALT 11/04/2017 19  12 - 78 U/L Final    Alkaline Phosphatase 11/04/2017 53  46 - 116 U/L Final    Total Protein 11/04/2017 6 5  6 4 - 8 2 g/dL Final    Albumin 11/04/2017 3 1* 3 5 - 5 0 g/dL Final    Total Bilirubin 11/04/2017 0 30  0 20 - 1 00 mg/dL Final    eGFR 11/04/2017 113  ml/min/1 73sq m Final    Color, UA 11/04/2017 Yellow   Final    Clarity, UA 11/04/2017 Clear   Final    Specific Gravity, UA 11/04/2017 1 015  1 003 - 1 030 Final    pH, UA 11/04/2017 6 5  4 5 - 8 0 Final    Leukocytes, UA 11/04/2017 Negative  Negative Final    Nitrite, UA 11/04/2017 Negative  Negative Final    Protein, UA 11/04/2017 Negative  Negative mg/dl Final    Glucose, UA 11/04/2017 Negative  Negative mg/dl Final    Ketones, UA 11/04/2017 Negative  Negative mg/dl Final    Urobilinogen, UA 11/04/2017 0 2  0 2, 1 0 E U /dl E U /dl Final    Bilirubin, UA 11/04/2017 Negative  Negative Final    Blood, UA 11/04/2017 Large* Negative, Trace-Intact Final    HCG, Quant 11/04/2017 09124 5* <=6 mIU/mL Final    RBC, UA 11/04/2017 Innumerable* None Seen, 0-5 /hpf Final    WBC, UA 11/04/2017 None Seen  None Seen, 0-5, 5-55, 5-65 /hpf Final    Epithelial Cells 11/04/2017 Occasional  None Seen, Occasional /hpf Final    Bacteria, UA 11/04/2017 Occasional  None Seen, Occasional /hpf Final    ABO Grouping 11/04/2017 O   Final    Rh Factor 11/04/2017 Positive   Final     Prenatal Labs:   Blood type: O+  Antibody: negative  _______________    Signature/Title: Cruz Agrawal  Date: 11/27/2017  Time: 10:13 AM

## 2017-11-27 NOTE — PLAN OF CARE
Maintain pregnancy as long as maternal and/or fetal condition is stable Progressing      Maintain pregnancy as long as maternal and/or fetal condition is stable Progressing

## 2017-11-28 ENCOUNTER — GENERIC CONVERSION - ENCOUNTER (OUTPATIENT)
Dept: OTHER | Facility: OTHER | Age: 42
End: 2017-11-28

## 2017-11-29 ENCOUNTER — GENERIC CONVERSION - ENCOUNTER (OUTPATIENT)
Dept: OTHER | Facility: OTHER | Age: 42
End: 2017-11-29

## 2017-12-01 ENCOUNTER — GENERIC CONVERSION - ENCOUNTER (OUTPATIENT)
Dept: OTHER | Facility: OTHER | Age: 42
End: 2017-12-01

## 2017-12-01 ENCOUNTER — LAB CONVERSION - ENCOUNTER (OUTPATIENT)
Dept: OTHER | Facility: OTHER | Age: 42
End: 2017-12-01

## 2017-12-01 LAB
ABNORMAL MSS? (HISTORICAL): NO
ABNORMAL US? (HISTORICAL): NO
ADVANCED MATERNAL AGE? (HISTORICAL): YES
CHROMOSOME ANALYSIS (HISTORICAL): NORMAL
CHROMOSOME, BLOOD (HISTORICAL): NOT DETECTED
CLINICAL HISTORY (HISTORICAL): NO
COMMENTS: (HISTORICAL): NORMAL
FETAL FRACTION (HISTORICAL): NORMAL
GESTATIONAL AGE (HISTORICAL): 13
GESTATIONAL AGE (HISTORICAL): 6
INTERPRETATION (HISTORICAL): NORMAL
LIMITATIONS (HISTORICAL): NORMAL
METHODOLOGY (HISTORICAL): NORMAL
MICRODELETION (HISTORICAL): NORMAL
MICRODELETION INTERPR. (HISTORICAL): NORMAL
NUMBER OF FETUSES (HISTORICAL): 1
SPECIFICATIONS (HISTORICAL): NORMAL
TRISOMY 13 (T13) (HISTORICAL): NEGATIVE
TRISOMY 18 (T18) (HISTORICAL): NEGATIVE
TRISOMY 21 (T21) (HISTORICAL): NEGATIVE

## 2017-12-05 ENCOUNTER — GENERIC CONVERSION - ENCOUNTER (OUTPATIENT)
Dept: OTHER | Facility: OTHER | Age: 42
End: 2017-12-05

## 2017-12-06 ENCOUNTER — GENERIC CONVERSION - ENCOUNTER (OUTPATIENT)
Dept: OTHER | Facility: OTHER | Age: 42
End: 2017-12-06

## 2017-12-11 ENCOUNTER — HOSPITAL ENCOUNTER (OUTPATIENT)
Facility: HOSPITAL | Age: 42
Discharge: HOME | End: 2017-12-11
Attending: SPECIALIST | Admitting: SPECIALIST
Payer: COMMERCIAL

## 2017-12-11 VITALS
HEART RATE: 75 BPM | TEMPERATURE: 99 F | SYSTOLIC BLOOD PRESSURE: 115 MMHG | OXYGEN SATURATION: 99 % | DIASTOLIC BLOOD PRESSURE: 69 MMHG | RESPIRATION RATE: 17 BRPM

## 2017-12-11 DIAGNOSIS — Z87.51 HISTORY OF PRETERM DELIVERY: ICD-10-CM

## 2017-12-11 PROCEDURE — 99204 OFFICE O/P NEW MOD 45 MIN: CPT

## 2017-12-11 RX ORDER — NITROFURANTOIN 25; 75 MG/1; MG/1
100 CAPSULE ORAL 2 TIMES DAILY
COMMUNITY
End: 2018-02-15 | Stop reason: ALTCHOICE

## 2017-12-11 RX ORDER — ASPIRIN 81 MG/1
81 TABLET ORAL DAILY
COMMUNITY
End: 2018-05-05 | Stop reason: HOSPADM

## 2017-12-11 NOTE — PROGRESS NOTES
Progress Note - OB/GYN   Gracie Go 43 y o  female MRN: [id number]  Unit/Bed#: L&D 325-01 Encounter: [id number]    Assessment:  35yo [id number] @ 16 1wga here not PPROM  Likely urinary leakage  Plan:  D/C home with labor precautions  PO hydration  Tomorrow [facility] for history-indicated cervical cerclage  D/W Dr Lizzie Connelly   Chief Complaint: loss of fluid    Subjective:   -loss of fluid x1 episode - small  -after standing to sitting  -patient felt bladder pressure prior to the leakage, last voided 1hour prior  -patient recently treated for UTI on day6/7 of Macrobid  -denies cxtns, pelvic cramping, VB  -denies recent sexual intercourse  -reports low back pain    Pertinent hx:  -history of prior 2nd trimester pregnancy loss in 2003 - PPROM/PTL @ 16wga  -two prior C/S's    Objective:   -SSE: negative pooling, nitrazine, ferning; scant yellow-brown discharge noted  -Wet mount/KOH wnl  -Udip trace leuks, neg nitrites, blood, protein; SG 1 030  -TVUS: 2 6-2 9cm, single footling breech  -FHT 151bpm by TAUS  -TAUS: LVP 5cm, +fetal movement posterior fundal placenta    Vitals: Blood pressure 115/69, pulse 75, temperature 99 °F (37 2 °C), temperature source Oral, resp  rate 17, last menstrual period 05/23/2017, SpO2 99 %, unknown if currently breastfeeding  ,There is no height or weight on file to calculate BMI      No intake or output data in the 24 hours ending 12/11/17 1531    Invasive Devices          No matching active lines, drains, or airways

## 2017-12-12 ENCOUNTER — ANESTHESIA (OUTPATIENT)
Dept: LABOR AND DELIVERY | Facility: HOSPITAL | Age: 42
End: 2017-12-12
Payer: COMMERCIAL

## 2017-12-12 ENCOUNTER — ANESTHESIA EVENT (OUTPATIENT)
Dept: LABOR AND DELIVERY | Facility: HOSPITAL | Age: 42
End: 2017-12-12
Payer: COMMERCIAL

## 2017-12-12 ENCOUNTER — HOSPITAL ENCOUNTER (OUTPATIENT)
Facility: HOSPITAL | Age: 42
Setting detail: OUTPATIENT SURGERY
Discharge: HOME | End: 2017-12-12
Attending: SPECIALIST | Admitting: OBSTETRICS & GYNECOLOGY
Payer: COMMERCIAL

## 2017-12-12 VITALS
BODY MASS INDEX: 27.83 KG/M2 | OXYGEN SATURATION: 100 % | HEIGHT: 64 IN | TEMPERATURE: 98.3 F | WEIGHT: 163 LBS | DIASTOLIC BLOOD PRESSURE: 60 MMHG | HEART RATE: 72 BPM | SYSTOLIC BLOOD PRESSURE: 103 MMHG | RESPIRATION RATE: 20 BRPM

## 2017-12-12 DIAGNOSIS — Z3A.16 16 WEEKS GESTATION OF PREGNANCY: Primary | ICD-10-CM

## 2017-12-12 PROBLEM — O34.30: Status: ACTIVE | Noted: 2017-12-12

## 2017-12-12 PROBLEM — O09.529 ADVANCED MATERNAL AGE IN MULTIGRAVIDA: Status: ACTIVE | Noted: 2017-12-12

## 2017-12-12 PROBLEM — F17.200 SMOKER: Status: ACTIVE | Noted: 2017-12-12

## 2017-12-12 LAB
ABO GROUP BLD: NORMAL
BASOPHILS # BLD AUTO: 0.02 THOUSANDS/ΜL (ref 0–0.1)
BASOPHILS NFR BLD AUTO: 0 % (ref 0–1)
BLD GP AB SCN SERPL QL: NEGATIVE
EOSINOPHIL # BLD AUTO: 0.03 THOUSAND/ΜL (ref 0–0.61)
EOSINOPHIL NFR BLD AUTO: 0 % (ref 0–6)
ERYTHROCYTE [DISTWIDTH] IN BLOOD BY AUTOMATED COUNT: 14.6 % (ref 11.6–15.1)
HCT VFR BLD AUTO: 34.1 % (ref 34.8–46.1)
HGB BLD-MCNC: 11.4 G/DL (ref 11.5–15.4)
LYMPHOCYTES # BLD AUTO: 1.36 THOUSANDS/ΜL (ref 0.6–4.47)
LYMPHOCYTES NFR BLD AUTO: 17 % (ref 14–44)
MCH RBC QN AUTO: 29.5 PG (ref 26.8–34.3)
MCHC RBC AUTO-ENTMCNC: 33.4 G/DL (ref 31.4–37.4)
MCV RBC AUTO: 88 FL (ref 82–98)
MONOCYTES # BLD AUTO: 0.38 THOUSAND/ΜL (ref 0.17–1.22)
MONOCYTES NFR BLD AUTO: 5 % (ref 4–12)
NEUTROPHILS # BLD AUTO: 6.44 THOUSANDS/ΜL (ref 1.85–7.62)
NEUTS SEG NFR BLD AUTO: 78 % (ref 43–75)
NRBC BLD AUTO-RTO: 0 /100 WBCS
PLATELET # BLD AUTO: 208 THOUSANDS/UL (ref 149–390)
PMV BLD AUTO: 9.4 FL (ref 8.9–12.7)
RBC # BLD AUTO: 3.86 MILLION/UL (ref 3.81–5.12)
RH BLD: POSITIVE
SPECIMEN EXPIRATION DATE: NORMAL
WBC # BLD AUTO: 8.25 THOUSAND/UL (ref 4.31–10.16)

## 2017-12-12 PROCEDURE — 86901 BLOOD TYPING SEROLOGIC RH(D): CPT | Performed by: OBSTETRICS & GYNECOLOGY

## 2017-12-12 PROCEDURE — 86900 BLOOD TYPING SEROLOGIC ABO: CPT | Performed by: OBSTETRICS & GYNECOLOGY

## 2017-12-12 PROCEDURE — 86850 RBC ANTIBODY SCREEN: CPT | Performed by: OBSTETRICS & GYNECOLOGY

## 2017-12-12 PROCEDURE — 85025 COMPLETE CBC W/AUTO DIFF WBC: CPT | Performed by: OBSTETRICS & GYNECOLOGY

## 2017-12-12 RX ORDER — ONDANSETRON 2 MG/ML
4 INJECTION INTRAMUSCULAR; INTRAVENOUS EVERY 6 HOURS PRN
Status: DISCONTINUED | OUTPATIENT
Start: 2017-12-12 | End: 2017-12-13 | Stop reason: HOSPADM

## 2017-12-12 RX ORDER — ONDANSETRON 2 MG/ML
4 INJECTION INTRAMUSCULAR; INTRAVENOUS ONCE AS NEEDED
Status: DISCONTINUED | OUTPATIENT
Start: 2017-12-12 | End: 2017-12-13 | Stop reason: HOSPADM

## 2017-12-12 RX ORDER — NICOTINE 21 MG/24HR
1 PATCH, TRANSDERMAL 24 HOURS TRANSDERMAL DAILY
Status: DISCONTINUED | OUTPATIENT
Start: 2017-12-13 | End: 2017-12-12

## 2017-12-12 RX ORDER — SODIUM CHLORIDE 9 MG/ML
125 INJECTION, SOLUTION INTRAVENOUS CONTINUOUS
Status: DISCONTINUED | OUTPATIENT
Start: 2017-12-12 | End: 2017-12-12

## 2017-12-12 RX ORDER — ACETAMINOPHEN 325 MG/1
650 TABLET ORAL EVERY 6 HOURS PRN
Status: DISCONTINUED | OUTPATIENT
Start: 2017-12-12 | End: 2017-12-13 | Stop reason: HOSPADM

## 2017-12-12 RX ORDER — BUPIVACAINE HYDROCHLORIDE 7.5 MG/ML
INJECTION, SOLUTION INTRASPINAL AS NEEDED
Status: DISCONTINUED | OUTPATIENT
Start: 2017-12-12 | End: 2017-12-12 | Stop reason: SURG

## 2017-12-12 RX ORDER — PROPOFOL 10 MG/ML
INJECTION, EMULSION INTRAVENOUS AS NEEDED
Status: DISCONTINUED | OUTPATIENT
Start: 2017-12-12 | End: 2017-12-12 | Stop reason: SURG

## 2017-12-12 RX ORDER — SODIUM CHLORIDE 9 MG/ML
125 INJECTION, SOLUTION INTRAVENOUS CONTINUOUS
Status: DISCONTINUED | OUTPATIENT
Start: 2017-12-12 | End: 2017-12-13 | Stop reason: HOSPADM

## 2017-12-12 RX ORDER — THIAMINE MONONITRATE (VIT B1) 100 MG
100 TABLET ORAL DAILY
COMMUNITY
End: 2018-02-15 | Stop reason: SDUPTHER

## 2017-12-12 RX ADMIN — SODIUM CHLORIDE 999 ML/HR: 0.9 INJECTION, SOLUTION INTRAVENOUS at 13:52

## 2017-12-12 RX ADMIN — ACETAMINOPHEN 650 MG: 325 TABLET, FILM COATED ORAL at 17:07

## 2017-12-12 RX ADMIN — SODIUM CHLORIDE: 0.9 INJECTION, SOLUTION INTRAVENOUS at 15:33

## 2017-12-12 RX ADMIN — CEFAZOLIN SODIUM 1000 MG: 1 SOLUTION INTRAVENOUS at 15:47

## 2017-12-12 RX ADMIN — SODIUM CHLORIDE 125 ML/HR: 0.9 INJECTION, SOLUTION INTRAVENOUS at 15:24

## 2017-12-12 RX ADMIN — PROPOFOL 40 MG: 10 INJECTION, EMULSION INTRAVENOUS at 15:45

## 2017-12-12 RX ADMIN — BUPIVACAINE HYDROCHLORIDE IN DEXTROSE 1.2 ML: 7.5 INJECTION, SOLUTION SUBARACHNOID at 15:44

## 2017-12-12 NOTE — ANESTHESIA POSTPROCEDURE EVALUATION
Post-Op Assessment Note      CV Status:  Stable    Mental Status:  Alert and awake    Hydration Status:  Euvolemic    PONV Controlled:  Controlled    Airway Patency:  Patent    Post Op Vitals Reviewed: Yes          Staff: Anesthesiologist           BP 98/50 (12/12/17 1611)    Temp 97 9 °F (36 6 °C) (12/12/17 1611)    Pulse 63 (12/12/17 1611)   Resp 18 (12/12/17 1611)    SpO2 100 % (12/12/17 1611)

## 2017-12-12 NOTE — OP NOTE
OPERATIVE REPORT  PATIENT NAME: Burgess Caban    :  1975  MRN: [id number]  Pt Location: [facility] L&D OR ROOM 02    SURGERY DATE: 2017    Surgeon(s) and Role:     * Mary Hui MD - Primary    Preop Diagnosis:  Incompetent cervix [N88 3]    Post-Op Diagnosis Codes:     * Incompetent cervix [N88 3]    Procedure(s) (LRB):  CERCLAGE CERVICAL (N/A)    Specimen(s):  None    Estimated Blood Loss:   Minimal    Drains:       Anesthesia Type:   * No anesthesia type entered *    Operative Indications:  Incompetent cervix [N88 3]      Operative Findings:  Visually closed cervix, identifiable scar at the place of prior cerclage, normal vagina and external genitalia  Complications:   None    Procedure and Technique:  Patient was taken to the OR where a time-out was performed to confirm the correct patient and correct procedure  Patient was provided with 1 g of Ancef as preop prophylaxis  She received a spinal anesthesia and then was placed in the dorsal lithotomy position with legs supported by stirrups, she was prepped and draped in usual sterile fashion  Speedyieskt Vaginal retractors were used to expose the cervix which was grasped with a pair of ring forceps in its anterior and posterior lips  Gentle traction was exerted, 8 scar in the cervix was identified were a prior cervical cerclage was placed for her prior pregnancy, the vesico vaginal deflection was identified, the new cerclage was placed immediately anterior to this anatomic landmark and posterior to the prior scar  [place] 5  Suture was used to do a pursestring stitch starting at the 12 o'clock position coming out at the 9, 6 and 3 o'clock positions and coming back to the 12 o'clock position  Then 12 knots were done to tie both ends of the suture      The cerclage was noted to be satisfactorily placed posterior to the former location of the old cerclage that was removed several weeks ago at the beginning of this pregnancy  Good hemostasis was confirmed at the end of the procedure  Instrument, needle and sponge counts were correct x2  Patient tolerated the procedure well and was transferred to PACU in stable condition  Dr Sima Morocho was present and participated in all key portions of the procedure             Patient Disposition:  PACU     SIGNATURE: Kb Beavers  DATE: December 12, 2017  TIME: 4:26 PM

## 2017-12-12 NOTE — H&P
H&P Exam - Obstetrics   Mer Gold 43 y o  female MRN: [id number]  Unit/Bed#: LD PACU-03 Encounter: [id number]    Assessment/Plan     Assessment:  49-year-old [address] [facility] P 0 with advanced maternal age and history of prior  delivery with  premature rupture membranes and delivery at 16 weeks in   Here for history indicated prophylactic cervical cerclage    Plan:  Admit  IV access, type and screen and CBC  Anesthesia consult  Ancef 1 g for preop prophylaxis  SCDs for DVT prophylaxis  Indomethacin contraindicated given her history of prior gastric bypass    History of Present Illness   Chief Complaint:  Here for Prophylactic history indicated cerclage    HPI:  Mer Gold is a 43 y o  [address] [facility] female with an RENEE of 2018, by Ultrasound at 16w2d weeks gestation who is being admitted for prophylactic history indicated cervical cerclage  Her current obstetrical history is significant for advanced maternal age, history of gastric bypass, history of prior  delivery at 16 weeks secondary to  premature rupture membranes, current every day smoker, history of  section x2  Contractions: None  Leakage of fluid: None  Bleeding: None  Pregnancy complications:   As above mentioned  Review of Systems   Constitutional: Negative  Respiratory: Negative  Cardiovascular: Negative  Gastrointestinal: Negative  Endocrine: Negative  Genitourinary: Negative  Musculoskeletal: Negative  Neurological: Negative  Hematological: Negative          Historical Information   OB History    Para Term  AB Living   7 3 1 2 3 2   SAB TAB Ectopic Multiple Live Births   2 1   1 2      # Outcome Date GA Lbr Moy/2nd Weight Sex Delivery Anes PTL Lv   7A             7B Current            6  07 35w0d  2778 g (6 lb 2 oz) M CS-Unspec Spinal  QUITA      Complications: History of cervical cerclage   5  03 16w0d   M Vag-Spont  Y FD Complications: Premature rupture of membranes in second trimester,S/P dilation and curettage   4 Term 03/15/96 40w0d  907 g (2 lb) M CS-Unspec Spinal N QUITA      Complications: Failure to Progress in Second Stage,Preeclampsia,Chorioamnionitis   3 SAB            2 SAB            1 TAB                 Baby complications/comments:   Past Medical History:   Diagnosis Date    Anxiety     History of gastric bypass 2014     Past Surgical History:   Procedure Laterality Date     SECTION      CHOLECYSTECTOMY      GASTRIC BYPASS  2014     Social History   History   Alcohol Use No     History   Drug Use No     History   Smoking Status    Current Some Day Smoker    Packs/day: 0 25   Smokeless Tobacco    Never Used     Family History: non-contributory    Meds/Allergies   all medications and allergies reviewed  Allergies   Allergen Reactions    Aspirin      Could cause bleeding with gastric bi pas     Nsaids Other (See Comments)     Gastric bypass    Codeine Itching       Objective   Vitals: Blood pressure 96/60, pulse 63, temperature 98 °F (36 7 °C), temperature source Tympanic, resp  rate 16, height 5' 4" (1 626 m), weight 73 9 kg (163 lb), last menstrual period 2017, currently breastfeeding  Body mass index is 27 98 kg/m²  Invasive Devices          No matching active lines, drains, or airways          Physical Exam   Constitutional: She is oriented to person, place, and time  She appears well-developed and well-nourished  Neck: Normal range of motion  Neck supple  Cardiovascular: Normal rate, regular rhythm and normal heart sounds  Pulmonary/Chest: Effort normal and breath sounds normal    Abdominal: Soft  Bowel sounds are normal    Genitourinary:   Genitourinary Comments: DEFERRED   Musculoskeletal: Normal range of motion  Neurological: She is alert and oriented to person, place, and time  She has normal reflexes         Prenatal Labs:   Blood type:  O positive  Antibody screen: Negative  RPR:  Negative  Hepatitis-B:  Negative  HIV:  Negative  Rubella:  Immune  GC chlamydia:  Negative  Hepatitis-B:  Negative        Imaging, EKG, Pathology, and Other Studies: I have personally reviewed pertinent reports

## 2017-12-12 NOTE — ANESTHESIA PROCEDURE NOTES
Spinal Block    Patient location during procedure: OR  Start time: 12/12/2017 3:39 PM  End time: 12/12/2017 3:43 PM  Reason for block: procedure for pain and at surgeon's request  Staffing  Anesthesiologist: Cindy Topete  Performed: anesthesiologist   Preanesthetic Checklist  Completed: patient identified, site marked, surgical consent, pre-op evaluation, timeout performed, IV checked, risks and benefits discussed and monitors and equipment checked  Spinal Block  Patient position: sitting  Prep: ChloraPrep  Patient monitoring: frequent blood pressure checks and continuous pulse ox  Approach: midline  Location: L3-4  Injection technique: single-shot  Needle  Needle type: pencil-tip   Needle gauge: 25 G  Needle length: 10 cm  Assessment  Sensory level: [id number] Assessment:  negative aspiration for heme, no paresthesia on injection and positive aspiration for clear CSF    Post-procedure:  adhesive bandage applied, pressure dressing applied, secured with tape, site cleaned and sterile dressing applied

## 2017-12-12 NOTE — PLAN OF CARE
ANTEPARTUM     Maintain pregnancy as long as maternal and/or fetal condition is stable Progressing        DISCHARGE PLANNING     Discharge to home or other facility with appropriate resources Progressing        INFECTION - ADULT     Absence or prevention of progression during hospitalization Progressing     Absence of fever/infection during neutropenic period Progressing        Knowledge Deficit     Patient/family/caregiver demonstrates understanding of disease process, treatment plan, medications, and discharge instructions Progressing        PAIN - ADULT     Verbalizes/displays adequate comfort level or baseline comfort level Progressing        SAFETY ADULT     Patient will remain free of falls Progressing     Maintain or return to baseline ADL function Progressing     Maintain or return mobility status to optimal level Progressing

## 2017-12-12 NOTE — ANESTHESIA PREPROCEDURE EVALUATION
Review of Systems/Medical History  Patient summary reviewed        Cardiovascular  Negative cardio ROS    Pulmonary  Smoker cigarette smoker , Tobacco cessation counseling given, ,        GI/Hepatic  Negative GI/hepatic ROS   No GERD ,        Negative  ROS        Endo/Other  Negative endo/other ROS      GYN  Negative gynecology ROS          Hematology  Negative hematology ROS      Musculoskeletal  Negative musculoskeletal ROS Back pain , chronic back pain,        Neurology  Negative neurology ROS      Psychology   Anxiety,            Physical Exam    Airway    Mallampati score: I  TM Distance: >3 FB  Neck ROM: full     Dental   upper dentures and lower dentures,     Cardiovascular  Comment: Negative ROS, Cardiovascular exam normal    Pulmonary  Pulmonary exam normal     Other Findings        Anesthesia Plan  ASA Score- 2       Anesthesia Type- spinal with ASA Monitors  Additional Monitors:   Airway Plan:           Induction- intravenous  Informed Consent- Anesthetic plan and risks discussed with patient  I personally reviewed this patient with the CRNA  Discussed and agreed on the Anesthesia Plan with the CRNA  Leida Elder

## 2017-12-12 NOTE — DISCHARGE INSTRUCTIONS
Please call Dr  in the case of heavy vaginal bleeding, foul-smelling vaginal discharge, leakage of vaginal fluid, contractions, fever, chills  No sexual intercourse, and nothing in the vagina, unless cleared by your provider    Vaginal spotting for a couple of days is normal after a cerclage procedure, Mercy Berger  in view have heavy bleeding (need to change a pad every hour for 3 hours in a row)

## 2017-12-13 NOTE — PLAN OF CARE
ANTEPARTUM     Maintain pregnancy as long as maternal and/or fetal condition is stable Adequate for Discharge        DISCHARGE PLANNING     Discharge to home or other facility with appropriate resources Adequate for Discharge        INFECTION - ADULT     Absence or prevention of progression during hospitalization Adequate for Discharge     Absence of fever/infection during neutropenic period Adequate for Discharge        Knowledge Deficit     Patient/family/caregiver demonstrates understanding of disease process, treatment plan, medications, and discharge instructions Adequate for Discharge        PAIN - ADULT     Verbalizes/displays adequate comfort level or baseline comfort level Adequate for Discharge        SAFETY ADULT     Patient will remain free of falls Adequate for Discharge     Maintain or return to baseline ADL function Adequate for Discharge     Maintain or return mobility status to optimal level Adequate for Discharge

## 2017-12-13 NOTE — PROGRESS NOTES
Patient with spontaneous void for 250mL  Numbness continues to improve and she can perceive light touch sensation on her vulva  Patient ok to discharge to home      Minerva Browning MD  12/12/17  9:36 PM

## 2017-12-13 NOTE — PROGRESS NOTES
Called to bedside by RN for persistent numbness and 1x episode of fecal incontinence 5hr s/p spinal analgesia  Patient reports she continues to have numbness in the lower portion of her buttocks and vagina  She reports it was a little worse earlier and involved her entire buttocks but is improving now  She has been able to move and feel her legs for a little while now and has ambulated to restroom and back, also was able to  shower after incontinent episode  She has not yet been able to void  She notes she felt rectal pressure just prior to having episode of incontinence, but didn't feel herself passing stool  She has a hx of luan-en-y gastric bypass and noted that just prior to episode she had rapidly consumed a sandwich, but she notes that at home she routinely eats sweets/carbs and has never had dumping syndrome  She denies nausea or abdominal pains  Denies cramping  Notes post-procedure bleeding is minimal, only streaks on toilet tissue  Temp:  [97 9 °F (36 6 °C)-98 3 °F (36 8 °C)] 98 3 °F (36 8 °C)  HR:  [53-88] 72  Resp:  [16-22] 20  BP: ()/(50-60) 103/60    Physical Exam:  General: NAD, resting comfortably  Pulm: breathing comfortably on room air, no inc work of breathing  ABD: gravid but soft, NTND  Pelvic: normal labia majora and minora, no erythema or ecchymosis, vaginal walls are soft and non-tender, no palpable hematoma, patient notes she feels pressure with manipulation of labia and with digital exam but is unable to appreciate specific sensations    A/P: 43 y o  [id number] at 16w2d s/p history indicated cervical cerclage  Patient with continued numbness 5hr s/p spinal analgesia  Has not voided yet and also had 1 episode of fecal incontinence  No evidence of hematoma in vagina or injury to perineum  Anesthesia at bedside to aid in evaluation as well  Likely symptoms secondary to incomplete wearing off of spinal analgesia     1  Continue voiding trial -- if unable to void at 6hrs post-op, straight cath to drain bladder  2  Observe symptoms  3  Patient motivated to be discharged -- explained goals for discharge, including ensuring she can spontaneously void before going home      Minerva Browning MD  12/12/17  8:55 PM

## 2018-01-03 ENCOUNTER — GENERIC CONVERSION - ENCOUNTER (OUTPATIENT)
Dept: OTHER | Facility: OTHER | Age: 43
End: 2018-01-03

## 2018-01-03 ENCOUNTER — APPOINTMENT (OUTPATIENT)
Dept: PERINATAL CARE | Facility: CLINIC | Age: 43
End: 2018-01-03
Payer: COMMERCIAL

## 2018-01-03 PROCEDURE — 76817 TRANSVAGINAL US OBSTETRIC: CPT | Performed by: OBSTETRICS & GYNECOLOGY

## 2018-01-03 PROCEDURE — 76811 OB US DETAILED SNGL FETUS: CPT | Performed by: OBSTETRICS & GYNECOLOGY

## 2018-01-09 NOTE — MISCELLANEOUS
Message  Patient has apt schedule for her EMG with Dr Donna Mack in [facility] pass on 02/3/2016 @ 245pm  [facility] [place] 01/25/2016      Active Problems   1  Abdominal pain, RUQ (789 01) (R10 11)  2  Acute Left Rotator Cuff Sprain (Capsule) (840 4)  3  Acute sinusitis (461 9) (J01 90)  4  Acute sprain or strain of thoracic region (847 1)  5  Acute upper respiratory infection (465 9) (J06 9)  6  Allergic rhinitis (477 9) (J30 9)  7  Anxiety disorder (300 00) (F41 9)  8  Arthritis (716 90) (M19 90)  9  Backache  10  Benign paroxysmal vertigo, unspecified laterality (386 11) (H81 10)  11  Bloody stools (578 1) (K92 1)  12  Body aches (780 96) (R52)  13  Carpal tunnel syndrome (354 0) (G56 00)  14  Constipation (564 00) (K59 00)  15  Cough (786 2) (R05)  16  Dizziness (780 4) (R42)  17  Dysfunction of eustachian tube, unspecified laterality (381 81) (H69 80)  18  Earache (388 70) (H92 09)  19  Edema (782 3) (R60 9)  20  Encounter for PPD test (V74 1) (Z11 1)  21  Esophageal reflux (530 81) (K21 9)  22  Eustachian tube dysfunction (381 81) (H69 80)  23  Fatigue (780 79) (R53 83)  24  Flu-like symptoms (780 99) (R68 89)  25  Freckles (709 09) (L81 2)  26  Labyrinthitis (386 30) (H83 09)  27  Labyrinthitis, acute (386 30) (H83 09)  28  Lipoma of skin and subcutaneous tissue (214 1) (D17 30)  29  Low iron (280 9) (D50 9)  30  Lower back pain (724 2) (M54 5)  31  Obesity (278 00) (E66 9)  32  Otitis media, right (382 9) (H66 91)  33  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 3) (K91 2)  34  Prediabetes (790 29) (R73 09)  35  Preop cardiovascular exam (V72 81) (Z01 810)  36  Preop pulmonary/respiratory exam (V72 82) (Z01 811)  37  Restless legs syndrome (333 94) (G25 81)  38  S/P gastric bypass (V45 86) (Z98 84)  39  Sciatica (724 3) (M54 30)  40  Screening for cervical cancer (V76 2) (Z12 4)  41  Sinusitis (473 9) (J32 9)  42  Tingling (782 0) (R20 2)  43  Vertigo (780 4) (R42)  44  Viral illness (079 99) (B34 9)  45   Vitamin A deficiency (264 9) (E50 9)  46  Vitamin B 12 deficiency (266 2) (E53 8)  47  Well adult health check (V70 0) (Z00 00)    Current Meds  1  Biotin TABS; Therapy: (Recorded:95Ogt5938) to Recorded  2  Calcium Liquid 600-200 MG-IU CAPS; Therapy: (Recorded:15Qjm4663) to Recorded  3  Flintstones Complete CHEW;   Therapy: (Recorded:25Iak7829) to Recorded  4  Fluticasone Propionate 50 MCG/ACT Nasal Suspension; USE 2 SPRAYS IN EACH   NOSTRIL ONCE DAILY; Therapy: 33MBM6005 to (Last Rx:82Qrn0904)  Requested for: 17Bsc5354 Ordered  5  Iron TABS; Therapy: (Recorded:08Xsn4224) to Recorded  6  Magnesium CAPS; Therapy: (Recorded:20Jan2016) to Recorded  7  Meclizine HCl - 25 MG Oral Tablet; Take 1 three times daily as needed; Therapy: 24BXH5043 to (Last Rx:88Hcg4340)  Requested for: 77Gag5247 Ordered  8  Nasonex 50 MCG/ACT Nasal Suspension; INSTILL 2 SPRAYS IN EACH NOSTRIL   ONCE DAILY; Therapy: 56Ndp3390 to (Evaluate:92Vzy5600)  Requested for: 31Rik7269; Last   Rx:98Guk0304 Ordered  9  Omeprazole TBEC; Therapy: (Recorded:89Jcc5548) to Recorded  10  Potassium TABS; Therapy: (Thedora Ped) to Recorded  11  Thiamine HCl - 100 MG Oral Tablet Recorded  12  Vitamin B12 TABS; Therapy: (Recorded:26Yct3610) to Recorded    Allergies   1  Tramadol  2  Codeine Sulfate TABS  3  Aspirin TABS  4  Norvasc TABS  5  NSAIDs    Plan  Carpal tunnel syndrome    · EMG TWO EXTREMITIES WITH OR W/O RELATED PARASPINAL AREAS; Status:Active; Requested for:25Jan2016;   EXTREMITY TWO : RUE  EXTREMITY ONE : LUE  Health Maintenance, Low iron, Postgastrectomy malabsorption, S/P gastric bypass,  Vitamin A deficiency    · (1) CBC/ PLT (NO DIFF); Status:Active; Requested for:89Ytf3752;    · (1) FERRITIN; Status:Active; Requested for:28Knq2429;    · (1) VITAMIN A; Status:Active;  Requested for:25Apr2016;     Signatures   Electronically signed by : Marquise Pierce ; Jan 25 2016  3:22PM EST                       (Author)

## 2018-01-10 NOTE — CONSULTS
I had the pleasure of evaluating your patient, Volodymyr Velasquez  My full evaluation follows:      Chief Complaint  Here for ultrasound study      History of Present Illness  Please refer to the ultrasound report for additional information  Active Problems    1  Abdominal pain, RUQ (789 01) (R10 11)   2  Allergic rhinitis (477 9) (J30 9)   3  Anxiety disorder (300 00) (F41 9)   4  Arthritis (716 90) (M19 90)   5  Benign paroxysmal vertigo, unspecified laterality (386 11) (H81 10)   6  Bloody stools (578 1) (K92 1)   7  Calf pain (729 5) (M79 669)   8  Carpal tunnel syndrome (354 0) (G56 00)   9  Constipation (564 00) (K59 00)   10  Dizziness (780 4) (R42)   11  Edema (782 3) (R60 9)   12  Esophageal reflux (530 81) (K21 9)   13  Fatigue (780 79) (R53 83)   14  Flu vaccine need (V04 81) (Z23)   15  Freckles (709 09) (L81 2)   16  Grieving (309 0) (F43 20)   17  Labyrinthitis (386 30) (H83 09)   18  Lipoma of skin and subcutaneous tissue (214 1) (D17 30)   19  Low backache (724 2) (M54 5)   20  Low iron (280 9) (E61 1)   21  Lower back pain (724 2) (M54 5)   22  Neck muscle spasm (728 85) (M62 838)   23  Neck nodule (784 2) (R22 1)   24  Obesity (278 00) (E66 9)   25  Panniculus (278 1) (E65)   26  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 3) (K91 2,Z90 3)   27  Prediabetes (790 29) (R73 09)   28  Preop cardiovascular exam (V72 81) (Z01 810)   29  Preop pulmonary/respiratory exam (V72 82) (Z01 811)   30  Restless legs syndrome (333 94) (G25 81)   31  S/P gastric bypass (V45 86) (Z98 84)   32  Sciatica (724 3) (M54 30)   33  Screening for cervical cancer (V76 2) (Z12 4)   34  Tingling (782 0) (R20 2)   35  Vertigo (780 4) (R42)   36  Vitamin A deficiency (264 9) (E50 9)   37  Vitamin B 12 deficiency (266 2) (E53 8)   38   Well adult health check (V70 0) (Z00 00)    Past Medical History    · History of Acute bacterial sinusitis (461 9) (J01 90,B96 89)   · History of Acute frontal sinusitis, recurrence not specified (461 1) (J01 10)   · History of Acute Left Rotator Cuff Sprain (Capsule) (840 4)   · History of Acute otitis media, unspecified laterality   · Acute pharyngitis (462) (J02 9)   · History of Acute serous otitis media, unspecified laterality   · History of Acute sprain or strain of thoracic region (847 1)   · History of Acute upper respiratory infection (465 9) (J06 9)   · History of Acute upper respiratory infection (465 9) (J06 9)   · History of Allergic Reaction (995 3)   · History of Benign essential hypertension (401 1) (I10)   · History of Body aches (780 96) (R52)   · History of Convulsions (780 39) (R56 9)   · History of Cough (786 2) (R05)   · History of Diarrhea due to drug (787 91,E980 5) (K52 1)   · History of Dysfunction of Eustachian tube, unspecified laterality (381 81) (H69 80)   · History of Encounter for PPD test (V74 1) (Z11 1)   · History of Eustachian tube dysfunction (381 81) (H69 80)   · History of Flu-like symptoms (780 99) (R68 89)   · History of acute sinusitis (V12 69) (Z87 09)   · History of acute sinusitis (V12 69) (Z87 09)   · History of acute sinusitis (V12 69) (Z87 09)   · History of earache (V12 49) (Z86 69)   · History of labyrinthitis (V12 49) (Z86 69)   · History of sciatica (V12 49) (Z86 69)   · History of shortness of breath (V13 89) (L28 424)   · History of sinusitis (V12 69) (Z87 09)   · History of viral infection (V12 09) (Z86 19)   · History of Morbid or severe obesity due to excess calories (278 01) (E66 01)   · History of Otitis media, right (382 9) (H66 91)   · History of Viral pharyngitis (462) (J02 9)    Surgical History    · History of Cervical Cerclage During Pregnancy   · History of  Section   · History of Cholecystectomy Laparoscopic   · History of Gastric Surgery For Morbid Obesity Gastric Bypass   · History of Oral Surgery Tooth Extraction    Family History    · Family history of Myosarcoma Of The Soft Tissue   · Family history of Thyroid Surgery Total Thyroidectomy   · Family history of Thyroid Ultrasound Solid Mass ___cm    · Family history of Coronary Artery Disease (V17 49)    Social History    · Denied: History of Alcohol   · Caffeine Use   · 16 oz of ice tea daily   · Denied: History of Drug Use   · Former smoker (V15 82) (V35 652)   · smoked 10 years 10 cig daily  quit 2011    Current Meds   1  Biotin TABS; Therapy: (Recorded:78Avl1065) to Recorded   2  Calcium Citrate + Oral Tablet; Therapy: (Eugenia Mend) to Recorded   3  Calcium Liquid 600-200 MG-IU CAPS; Therapy: (Recorded:33Tal8659) to Recorded   4  Cyclobenzaprine HCl - 10 MG Oral Tablet; TAKE 1 TABLET DAILY AS NEEDED; Therapy: 93IKA9709 to (Evaluate:05Jan2017)  Requested for: 26CXS9560; Last   Rx:95Cyz7637 Ordered   5  Daily Multivitamin TABS; Therapy: (Eugenia Mend) to Recorded   6  Flintstones Complete CHEW;   Therapy: (Recorded:19Gsn8973) to Recorded   7  HydrOXYzine HCl - 25 MG Oral Tablet; take 1 tab 2x daily as needed; Therapy: 04Apr2016 to (Evaluate:08Jun2016)  Requested for: 91LZE4043; Last   Rx:15Rvk8520 Ordered   8  Iron TABS; Therapy: (Eugenia Mend) to Recorded   9  LORazepam 0 5 MG Oral Tablet; take 1 tablet every twelve hours; Therapy: 49Nfc7877 to (Evaluate:15Oct2016); Last Rx:88Zgi2245 Ordered   10  Magnesium CAPS; Therapy: (Recorded:20Jan2016) to Recorded   11  Omeprazole TBEC; Therapy: (Recorded:94Hvz7104) to Recorded   12  Potassimin TABS; Therapy: (Eugenia Mend) to Recorded   13  Potassium TABS; Therapy: (Recorded:41Jpo2310) to Recorded   14  Thiamine HCl - 100 MG Oral Tablet Recorded   15  Vitamin B12 TABS; Therapy: (Recorded:06Pkp4715) to Recorded   16  Vitamin B12 TABS; Therapy: (Eugenia Mend) to Recorded   17  Vitamin D TABS; Therapy: (Recorded:94Arm4114) to Recorded    Allergies    1  Tramadol   2  Codeine Sulfate TABS   3  Aspirin TABS   4  Norvasc TABS   5   NSAIDs    Vitals   Recorded: 40YCR4643 02:15PM Systolic 568   Diastolic 78   Height 5 ft 4 in   Weight 162 lb    BMI Calculated 27 81   BSA Calculated 1 79   Pain Scale 0     Results/Data  Exam description: first trimester ultrasound  Findings: Please refer to the ultrasound report for additional information  Discussion/Summary    Please refer to the ultrasound report for additional information  The patient was counseled regarding diagnostic results, instructions for management, prognosis, impressions  Thank you very much for allowing me to participate in the care of this patient  If you have any questions, please do not hesitate to contact me        Future Appointments    Signatures   Electronically signed by : DAVID Boggs ; Nov 4 2017 10:22AM EST                       (Author)

## 2018-01-10 NOTE — MISCELLANEOUS
Provider Comments  Provider Comments:     Dear Dakota Whitlock had a scheduled appointment at our office today but were unable to keep  We attempted to call you back but were unable to reach you  It is very important that you follow up with us so that we can assess your physical and nutritional safety after your surgery  Please call our office at [phone] to reschedule your appointment       Sincerely,     [facility]        Signatures   Electronically signed by : Jesus Morin [facility]; Feb 2 2016  3:42PM EST                       (Author)

## 2018-01-10 NOTE — RESULT NOTES
Results  (1) CBC/ PLT (NO DIFF) 20DWY1267 12:24PM Prema Ellison     Test Name Result Flag Reference   HEMATOCRIT 40 0 %  34 8-46 1   HEMOGLOBIN 12 8 g/dL  11 5-15 4   MCHC 32 0 g/dL  31 4-37 4   MCH 26 9 pg  26 8-34 3   MCV 84 fL  82-98   PLATELET COUNT 845 Thousands/uL  149-390   RBC COUNT 4 76 Million/uL  3 81-5 12   RDW 12 6 %  11 6-15 1   WBC COUNT 5 27 Thousand/uL  4 31-10 16   MPV 10 2 fL  8 9-12 7       Discussion/Summary   May labs reviewed which only included iron studies and vitamin A-not a compete set of labs  Your iron stores-ferritin is low at 6 but you are NOT anemic  Please take a high potency iron daily-we recommend Vitron C 65 mg which is available over-the counter-you should take this once a day for 2 weeks, then increase to twice a day for 2 weeks and if tolerated then increase to 3 times daily  iron can be constipating so either take a daily stool softener OR MIRALAX daily  Your level should be rechecked in 3 months  It looks like you are overdue for a follow-up in our office, so please schedule this at your earliest convenience       Signatures   Electronically signed by : Fe Wu, [facility]; May 16 2016  3:05PM EST                       (Author)

## 2018-01-10 NOTE — MISCELLANEOUS
Message  Pt approved for genetic testing from 11- to 12/31/2017  Authorization number [id number]  Pt aware trf mailed and instruction given  Pt to contact [facility] if questions  Active Problems    1  Abdominal pain, RUQ (789 01) (R10 11)   2  Allergic rhinitis (477 9) (J30 9)   3  Anxiety disorder (300 00) (F41 9)   4  Arthritis (716 90) (M19 90)   5  Benign paroxysmal vertigo, unspecified laterality (386 11) (H81 10)   6  Bloody stools (578 1) (K92 1)   7  Calf pain (729 5) (M79 669)   8  Carpal tunnel syndrome (354 0) (G56 00)   9  Constipation (564 00) (K59 00)   10  Dizziness (780 4) (R42)   11  Edema (782 3) (R60 9)   12  Elderly multigravida, first trimester (659 63) (O09 521)   15  Esophageal reflux (530 81) (K21 9)   14  Fatigue (780 79) (R53 83)   15  Flu vaccine need (V04 81) (Z23)   16  Freckles (709 09) (L81 2)   17  Grieving (309 0) (F43 20)   18  Labyrinthitis (386 30) (H83 09)   19  Lipoma of skin and subcutaneous tissue (214 1) (D17 30)   20  Low backache (724 2) (M54 5)   21  Low iron (280 9) (E61 1)   22  Lower back pain (724 2) (M54 5)   23  Neck muscle spasm (728 85) (M62 838)   24  Neck nodule (784 2) (R22 1)   25  Obesity (278 00) (E66 9)   26  Panniculus (278 1) (E65)   27  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 3) (K91 2,Z90 3)   28  Prediabetes (790 29) (R73 09)   29  Preop cardiovascular exam (V72 81) (Z01 810)   30  Preop pulmonary/respiratory exam (V72 82) (Z01 811)   31  Restless legs syndrome (333 94) (G25 81)   32  S/P gastric bypass (V45 86) (Z98 84)   33  Sciatica (724 3) (M54 30)   34  Screening for cervical cancer (V76 2) (Z12 4)   35  Tingling (782 0) (R20 2)   36  Vertigo (780 4) (R42)   37  Vitamin A deficiency (264 9) (E50 9)   38  Vitamin B 12 deficiency (266 2) (E53 8)   39  Well adult health check (V70 0) (Z00 00)    Current Meds   1  Biotin TABS; Therapy: (Recorded:56Ibm5015) to Recorded   2  Calcium Citrate + Oral Tablet;    Therapy: (Recorded:61Zjq3460) to Recorded   3  Calcium Liquid 600-200 MG-IU CAPS; Therapy: (Recorded:16Ldm6508) to Recorded   4  Cyclobenzaprine HCl - 10 MG Oral Tablet; TAKE 1 TABLET DAILY AS NEEDED; Therapy: 78GAS8995 to (Evaluate:05Jan2017)  Requested for: 22XYX3615; Last   Rx:58Llg9889 Ordered   5  Daily Multivitamin TABS; Therapy: (Zigmund Reges) to Recorded   6  Flintstones Complete CHEW;   Therapy: (Recorded:59Kdi1149) to Recorded   7  HydrOXYzine HCl - 25 MG Oral Tablet; take 1 tab 2x daily as needed; Therapy: 37Foq3463 to (Evaluate:08Jun2016)  Requested for: 35GAS8134; Last   Rx:54Ewc2564 Ordered   8  Iron TABS; Therapy: (Recorded:50Fmt1247) to Recorded   9  LORazepam 0 5 MG Oral Tablet; take 1 tablet every twelve hours; Therapy: 05Zgi7572 to (Evaluate:15Oct2016); Last Rx:14Mbm0260 Ordered   10  Magnesium CAPS; Therapy: (Recorded:22Ewy2614) to Recorded   11  Omeprazole TBEC; Therapy: (Recorded:80Oja3739) to Recorded   12  Potassimin TABS; Therapy: (Zigmund Reges) to Recorded   13  Potassium TABS; Therapy: (Recorded:28Lfi7298) to Recorded   14  Thiamine HCl - 100 MG Oral Tablet; Therapy: (Recorded:56Rxs6930) to Recorded   15  Vitamin B12 TABS; Therapy: (Recorded:33Ogx7733) to Recorded   16  Vitamin B12 TABS; Therapy: (Zigmund Reges) to Recorded   17  Vitamin D TABS; Therapy: (Recorded:77Vlr0137) to Recorded    Allergies    1  Tramadol   2  Codeine Sulfate TABS   3  Aspirin TABS   4  Norvasc TABS   5   NSAIDs    Signatures   Electronically signed by : Steve Palafox RN; Nov 16 2017  2:53PM EST                       (Author)

## 2018-01-11 ENCOUNTER — GENERIC CONVERSION - ENCOUNTER (OUTPATIENT)
Dept: OTHER | Facility: OTHER | Age: 43
End: 2018-01-11

## 2018-01-11 ENCOUNTER — APPOINTMENT (OUTPATIENT)
Dept: PERINATAL CARE | Facility: CLINIC | Age: 43
End: 2018-01-11
Payer: COMMERCIAL

## 2018-01-11 PROCEDURE — 76816 OB US FOLLOW-UP PER FETUS: CPT | Performed by: OBSTETRICS & GYNECOLOGY

## 2018-01-11 PROCEDURE — 76817 TRANSVAGINAL US OBSTETRIC: CPT | Performed by: OBSTETRICS & GYNECOLOGY

## 2018-01-11 NOTE — RESULT NOTES
Dear Aracelis Fernández,   Your test results have returned and are listed below:      Results  (1) VITAMIN B12 20Jan2016 09:11PM [facility]     Test Name Result Flag Reference   VITAMIN B12 809 pg/mL  100-900     (1) FERRITIN 20Jan2016 06:32PM [facility]     Test Name Result Flag Reference   FERRITIN 8 ng/mL  8-388     (1) FOLATE 33WDF2386 06:32PM [facility]     Test Name Result Flag Reference   FOLATE >20 0 ng/mL H 3 1-17 5     (1) IRON SATURATION %, TIBC 20Jan2016 06:32PM [employer]     Test Name Result Flag Reference   IRON SATURATION 9 %     TOTAL IRON BINDING CAPACITY 407 ug/dL  250-450   IRON 38 ug/dL L      (1) VITAMIN D 25-HYDROXY 20Jan2016 04:52PM [employer]     Test Name Result Flag Reference   VIT D 25-HYDROX 32 9 ng/mL  30 0-100 0     (1) PTH N-TERMINAL (INTACT) 20Jan2016 04:51PM [facility]     Test Name Result Flag Reference   PARATHYROID HORMONE INTACT 65 8 pg/mL  14 0-72 0     (1) COMPREHENSIVE METABOLIC PANEL 96AAB2289 19:53PE [facility] [place] Kidney Disease Education Program recommendations are as follows:  GFR calculation is accurate only with a steady state creatinine  Chronic Kidney disease less than 60 ml/min/1 73 sq  meters  Kidney failure less than 15 ml/min/1 73 sq  meters  Test Name Result Flag Reference   GLUCOSE,RANDM 90 mg/dL     If the patient is fasting, the ADA then defines impaired fasting glucose as > 100 mg/dL and diabetes as > or equal to 123 mg/dL     SODIUM 141 mmol/L  136-145   POTASSIUM 4 0 mmol/L  3 5-5 3   CHLORIDE 105 mmol/L  100-108   CARBON DIOXIDE 27 mmol/L  21-32   ANION GAP (CALC) 9 mmol/L  4-13   BLOOD UREA NITROGEN 18 mg/dL  5-25   CREATININE 0 69 mg/dL  0 60-1 30   Standardized to IDMS reference method   CALCIUM 8 7 mg/dL  8 3-10 1   BILI, TOTAL 0 43 mg/dL  0 20-1 00   ALK PHOSPHATAS 60 U/L     ALT (SGPT) 52 U/L  12-78   AST(SGOT) 24 U/L  5-45   ALBUMIN 3 9 g/dL  3 5-5 0   TOTAL PROTEIN 6 9 g/dL 6  4-8 2   eGFR Non-African American      >60 0 ml/min/1 73sq m     (1) LIPID PANEL, FASTING 20Jan2016 02:54PM Tia Palacios   Triglyceride:         Normal              <150 mg/dl       Borderline High    150-199 mg/dl       High               200-499 mg/dl       Very High          >499 mg/dl  Cholesterol:         Desirable        <200 mg/dl      Borderline High  200-239 mg/dl      High             >239 mg/dl  HDL Cholesterol:        High    >59 mg/dL      Low     <41 mg/dL  LDL CALCULATED:    This screening LDL is a calculated result  It does not have the accuracy of the Direct Measured LDL in the monitoring of patients with hyperlipidemia and/or statin therapy  Direct Measure LDL (GUK748) must be ordered separately in these patients       Test Name Result Flag Reference   CHOLESTEROL 124 mg/dL     HDL,DIRECT 48 mg/dL  40-60   LDL CHOLESTEROL CALCULATED 60 mg/dL  0-100   TRIGLYCERIDES 82 mg/dL  <=150     (1) CBC/ PLT (NO DIFF) 82XZY0901 01:42PM [facility]     Test Name Result Flag Reference   HEMATOCRIT 42 3 %  34 8-46 1   HEMOGLOBIN 13 6 g/dL  11 5-15 4   MCHC 32 2 g/dL  31 4-37 4   MCH 27 7 pg  26 8-34 3   MCV 86 2 fL  82 0-98 0   PLATELET COUNT 579 Thousands/uL  149-390   RBC COUNT 4 91 Million/uL  3 81-5 12   RDW 14 8 %  11 6-15 1   WBC COUNT 4 08 Thousand/uL L 4 31-10 16   MPV 11 1 fL  8 9-12 7     (1) VITAMIN A 20Jan2016 07:42AM Tia Palacios   Performed at:  [address]  [id number]  : Tommie Miguel MD, Phone:  [phone]     Test Name Result Flag Reference   VITAMIN A 9 ug/dL L 18 - 77     (1) VITAMIN B1, WHOLE BLOOD 16NUW8669 07:42AM Callie Snuffer   Performed at:  [address]  [id number]  : Tommie Miguel MD, Phone:  [phone]     Test Name Result Flag Reference   VITAMIN B1, WHOLE BLOOD 191 8 nmol/L  66 5 - 200 0       Plan   Carpal tunnel syndrome    · EMG TWO EXTREMITIES WITH OR W/O RELATED PARASPINAL  AREAS; Status:Active; Requested for:62Zef1620;   TWO : RUE  ONE : LUE  Health Maintenance, Low iron, Postgastrectomy malabsorption, S/P  gastric bypass, Vitamin A deficiency    · (1) CBC/ PLT (NO DIFF); Status:Active; Requested for:25Apr2016;    · (1) FERRITIN; Status:Active; Requested for:25Apr2016;    · (1) VITAMIN A; Status:Active; Requested for:25Apr2016;     Carpal tunnel syndrome (354 0) (G56 00)          Discussion/Summary  Your results show some abnormalities  1/20/15 labs reviewed  Your vitamin B1 is higher normal at 191  8-this could be higher because you started taking the extra supplement before you got your labs done, but since it is so high in the normal range, I do NOT think you have a deficiency  You can continue to take 100 mg of extra thiamine daily as this will not hurt you to do so  Your vitamin B12 is well within normal range at 809  You can decrease back to an extra 1000 mcg of vitamin B12 as it does NOT look like you have a deficiency of vitamins-this extra 1000 should be more than enough and cannot hurt you  Keep the planned follow-up with your PCP as I would think you may need nerve studies  Your vitamin A level is very low at 9-Your vitamin A level is very low, which can affect your night vision  IF there is any chance you could be pregnant, you should check a pregnancy test first since high dose vitamin A can be toxic to a baby/fetus  Recommend that you take 20,000 to 25,000 IU of retinyl acetate or retinyl palmitate ( Vitamin A) per day for 2 weeks  It is important that you take an actual vitamin A supplement for repletion, and not a carotene based supplement  After 2 weeks, decrease to taking 10,000 IU of retinyl acetate or retinyl plamitate ( Vitamin A) per day for 8 weeks  After 8 weeks, discontinue the vitamin A supplement and recheck your lab level  A lab slip is enclosed to recheck your level again in 3 months    Long term vitamin A supplementation can be toxic, so it is important to discontinue your vitamin A supplementation after 10 weeks, until your level can be reassessed  Your iron stores are very low normal at 8 and your serum iron is low at 38-    Iron is important for energy and to prevent anemia  Iron can be constipating, so also take a daily stool softener OR MIRALAX daily  Please start taking a high potency iron (we recommend Vitron C-65 mg) which is found over-the -counter  Take this for 2 weeks and if tolerated, then increase to twice a day  Take this 2 hours apart from any calcium since iron and calcium fight for absorption  If you are already taking this-then try to increase to three times per day (otherwise stay on twice a day if tolerated)    I will recheck your vitamin A and iron studies in 3 months-but stop the vitamin A when advised as noted above  If you have any questions, please don't hesitate to call the office     Sincerely,      Signatures   Electronically signed by : Tash Rajan, [facility]; Jan 25 2016  2:47PM EST                       (Author)    Electronically signed by : DAVID Peace ; Jan 26 2016  7:53AM EST                       (Co-author)

## 2018-01-11 NOTE — MISCELLANEOUS
Message  Patient had a question about vitamins and was transferred to the dietician  Active Problems    1  Abdominal pain, RUQ (789 01) (R10 11)   2  Allergic rhinitis (477 9) (J30 9)   3  Anxiety disorder (300 00) (F41 9)   4  Arthritis (716 90) (M19 90)   5  Benign paroxysmal vertigo, unspecified laterality (386 11) (H81 10)   6  Bloody stools (578 1) (K92 1)   7  Calf pain (729 5) (M79 669)   8  Carpal tunnel syndrome (354 0) (G56 00)   9  Constipation (564 00) (K59 00)   10  Dizziness (780 4) (R42)   11  Edema (782 3) (R60 9)   12  Esophageal reflux (530 81) (K21 9)   13  Fatigue (780 79) (R53 83)   14  Flu vaccine need (V04 81) (Z23)   15  Freckles (709 09) (L81 2)   16  Grieving (309 0) (F43 20)   17  Labyrinthitis (386 30) (H83 09)   18  Lipoma of skin and subcutaneous tissue (214 1) (D17 30)   19  Low backache (724 2) (M54 5)   20  Low iron (280 9) (E61 1)   21  Lower back pain (724 2) (M54 5)   22  Neck muscle spasm (728 85) (M62 838)   23  Neck nodule (784 2) (R22 1)   24  Obesity (278 00) (E66 9)   25  Panniculus (278 1) (E65)   26  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 3) (K91 2,Z90 3)   27  Prediabetes (790 29) (R73 09)   28  Preop cardiovascular exam (V72 81) (Z01 810)   29  Preop pulmonary/respiratory exam (V72 82) (Z01 811)   30  Restless legs syndrome (333 94) (G25 81)   31  S/P gastric bypass (V45 86) (Z98 84)   32  Sciatica (724 3) (M54 30)   33  Screening for cervical cancer (V76 2) (Z12 4)   34  Tingling (782 0) (R20 2)   35  Vertigo (780 4) (R42)   36  Vitamin A deficiency (264 9) (E50 9)   37  Vitamin B 12 deficiency (266 2) (E53 8)   38  Well adult health check (V70 0) (Z00 00)    Current Meds   1  Biotin TABS; Therapy: (Recorded:39Vsm1624) to Recorded   2  Calcium Citrate + Oral Tablet; Therapy: (Primus Brandy) to Recorded   3  Calcium Liquid 600-200 MG-IU CAPS; Therapy: (Recorded:65Kqd4869) to Recorded   4   Cyclobenzaprine HCl - 10 MG Oral Tablet; TAKE 1 TABLET DAILY AS NEEDED; Therapy: 44VWM8775 to (Evaluate:05Jan2017)  Requested for: 47JHT2652; Last   Rx:87Sjk3294 Ordered   5  Daily Multivitamin TABS; Therapy: (Horace Ling) to Recorded   6  Flintstones Complete CHEW;   Therapy: (Recorded:73Zhv2630) to Recorded   7  HydrOXYzine HCl - 25 MG Oral Tablet; take 1 tab 2x daily as needed; Therapy: 04Apr2016 to (Evaluate:08Jun2016)  Requested for: 37AYU7472; Last   Rx:39Fga7694 Ordered   8  Iron TABS; Therapy: (Horace Ling) to Recorded   9  LORazepam 0 5 MG Oral Tablet; take 1 tablet every twelve hours; Therapy: 81Ord6244 to (Evaluate:15Oct2016); Last Rx:50Mfl2192 Ordered   10  Magnesium CAPS; Therapy: (Recorded:20Jan2016) to Recorded   11  Omeprazole TBEC; Therapy: (Recorded:35Cvd7789) to Recorded   12  Potassimin TABS; Therapy: (Horace Ling) to Recorded   13  Potassium TABS; Therapy: (Recorded:70Zhq8308) to Recorded   14  Thiamine HCl - 100 MG Oral Tablet Recorded   15  Vitamin B12 TABS; Therapy: (Recorded:50Xab3664) to Recorded   16  Vitamin B12 TABS; Therapy: (Horace Ling) to Recorded   17  Vitamin D TABS; Therapy: (Recorded:72Jht2521) to Recorded    Allergies    1  Tramadol   2  Codeine Sulfate TABS   3  Aspirin TABS   4  Norvasc TABS   5   NSAIDs    Signatures   Electronically signed by : Tommie Watts, ; Oct 24 2017  3:36PM EST                       (Author)

## 2018-01-12 NOTE — PROGRESS NOTES
NOV 15 2017         RE: Khadijah Espana                                 To: Vanessa DAVID Rosario    MR#: [id number]   : BERT Bishop 139: [id number]                             Fax: [phone]   (Exam #: [id number])      The LMP of this 43year old,  [address] [facility], P1-1-4-2 patient was unknown, her   working RENEE is MAY 32 2018 and the current gestational age is 16 weeks 3   days by [address] [place] [address]  A sonographic examination was performed on NOV   15 2017 using real time equipment  The ultrasound examination was   performed using abdominal technique  The patient has a BMI of 27 6  Her   blood pressure today was 102/63  Earliest US on record:  10/23/18   9w1d   18 RENEE Multiple   longitudinal and transverse sections revealed a tena intrauterine   pregnancy  The placenta is posterior in implantation  Cardiac motion was observed at 162 bpm       INDICATIONS      advanced maternal age   previous  delivery   prior bariatric surgery   first trimester screening      Exam Types      Level I      RESULTS      Fetus # 1 of 1   Fetal growth appeared normal      MEASUREMENTS (* Included In Average GA)      CRL              6 5 cm        12 weeks 5 days*   Nuchal Trans    1 20 mm      THE AVERAGE GESTATIONAL AGE is 12 weeks 5 days +/- 7 days  ANATOMY COMMENTS      Anatomic detail is limited at this gestational age  The fetal cranium   appeared normal in shape and the nuchal translucency was normal in size   (1 2mm)  The nasal bone appears to be present  The intracranial anatomy   was unremarkable  Evaluation of the spine revealed no obvious evidence   for a neural tube defect  Anatomy of the fetal thorax appeared within   normal limits with a normal cardiac axis and first trimester three vessel   view  The cardiac rhythm was regular and documented with M-mode  Within   the abdomen, the stomach & bladder were visualized and the abdominal wall   appeared intact   A three vessel cord appears to be present  Active   movement of the fetal body & extremities was seen  There is no suspicion   of a subchorionic bleed  The placental cord insertion appeared normal      There is no suspicion of a uterine myoma  Free fluid is not seen in the   posterior cul-de-sac  ADNEXA      The left ovary appeared normal and measured 1 6 x 1 2 x 3 1 cm with a   volume of 3 1 cc  The right ovary appeared normal and measured 2 4 x 2 3 x   1 7 cm with a volume of 4 9 cc       AMNIOTIC FLUID         Largest Vertical Pocket = 3 8 cm      IMPRESSION      Arcos IUP   12 weeks and 5 days by this ultrasound  (RENEE=MAY 25 2018)   12 weeks and 3 days by 1st Tri Sono  (RENEE=MAY 27 2018)   Fetal growth appeared normal   Regular fetal heart rate of 162 bpm   Posterior placenta      GENERAL COMMENT      Ms Amber Unger is here for nuchal translucency  Her ultrasound was immediately   preceded by genetic counseling  She is of advanced maternal age, has a   history of PPROM, has 2 prior  deliveries, and has undergone   bariatric surgery  Please see comprehensive consult from Dr Alice Ott from   17  She is planned for history-indicated cerclage next week  There is a single live intrauterine pregnancy with size equivalent to   dates  No gross anomalies were identified on limited views  Amniotic fluid is within normal limits  Nuchal translucency measures 1 2mm which is within normal limits for this   crown-rump length  Evaluation and Management:   The patient was counseled regarding the above findings  A total of 10   minutes were spent in this encounter with >50% of the time spent in   face-to-face counseling and in coordination of care  The limitations of ultrasound and aneuploidy screening were explained to   her  Genetic screening and diagnostic testing options were discussed with   her  She has lab slip to obtain NIPT and is working on obtaining prior   authorization        She should return in 7 weeks for anatomy survey  As she is opting for   history indicated cerclage, I would not proceed with serial cervical   length screening but would instead do weekly injections of progesterone   beginning at 16-20 weeks  Should she change her mind or this surgery be   cancelled, she should undergo q2 week serial cervical length ultrasounds   with consideration for ultrasound-indicated cerclage placement should   shortening occur  I strongly recommended consideration of influenza vaccination in pregnancy   and this was administered prior to her departure today  We discussed her history of anxiet  She is currently using no medications   for support and feels her tobacco use is augmented by severe anxiety  We   discussed that there is abundant safety data on SSRI use in pregnancy and   we briefly discussed the risks and benefits of these medications in   pregnancy  We also reviewed the risks of untreated mental illness in   pregnancy  I encouraged her to discuss this further with her obstetrician  Finally, we reviewed her tobacco use today  She has cut down to 10   cigarettes per day, down from 20  I congratulated her on this   accomplishment and I encouraged her to continue cutting down further if   possible  We set a goal of her cutting down to between 5 and 7 cigarettes   per day by her next ultrasound visit  At the conclusion of today's encounter, all questions were answered to her   satisfaction  Thank you very much for this kind referral and please do   not hesitate to contact me with any further questions or concerns  KRISTINA Amaral S , DAVID Villegas     Maternal Fetal Medicine   Electronically signed 11/15/17 11:32

## 2018-01-12 NOTE — MISCELLANEOUS
Message  Cerclage scheduled for 11/27 at 1230 on L&D with Dr Annika Almaraz  Scheduled with Yaneth Handley on L&D  Dr Annika Almaraz notified, message left for patient on her voice mail  Requested confirmation of call      Active Problems    1  Abdominal pain, RUQ (789 01) (R10 11)   2  Allergic rhinitis (477 9) (J30 9)   3  Anxiety disorder (300 00) (F41 9)   4  Arthritis (716 90) (M19 90)   5  Benign paroxysmal vertigo, unspecified laterality (386 11) (H81 10)   6  Bloody stools (578 1) (K92 1)   7  Calf pain (729 5) (M79 669)   8  Carpal tunnel syndrome (354 0) (G56 00)   9  Constipation (564 00) (K59 00)   10  Dizziness (780 4) (R42)   11  Edema (782 3) (R60 9)   12  Elderly multigravida, first trimester (659 63) (O09 521)   15  Esophageal reflux (530 81) (K21 9)   14  Fatigue (780 79) (R53 83)   15  Flu vaccine need (V04 81) (Z23)   16  Freckles (709 09) (L81 2)   17  Grieving (309 0) (F43 20)   18  Labyrinthitis (386 30) (H83 09)   19  Lipoma of skin and subcutaneous tissue (214 1) (D17 30)   20  Low backache (724 2) (M54 5)   21  Low iron (280 9) (E61 1)   22  Lower back pain (724 2) (M54 5)   23  Neck muscle spasm (728 85) (M62 838)   24  Neck nodule (784 2) (R22 1)   25  Obesity (278 00) (E66 9)   26  Panniculus (278 1) (E65)   27  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 3) (K91 2,Z90 3)   28  Prediabetes (790 29) (R73 09)   29  Preop cardiovascular exam (V72 81) (Z01 810)   30  Preop pulmonary/respiratory exam (V72 82) (Z01 811)   31  Restless legs syndrome (333 94) (G25 81)   32  S/P gastric bypass (V45 86) (Z98 84)   33  Sciatica (724 3) (M54 30)   34  Screening for cervical cancer (V76 2) (Z12 4)   35  Tingling (782 0) (R20 2)   36  Vertigo (780 4) (R42)   37  Vitamin A deficiency (264 9) (E50 9)   38  Vitamin B 12 deficiency (266 2) (E53 8)   39  Well adult health check (V70 0) (Z00 00)    Current Meds   1  Biotin TABS; Therapy: (Recorded:91Que5959) to Recorded   2  Calcium Citrate + Oral Tablet;    Therapy: (Recorded:46Nvq4327) to Recorded   3  Calcium Liquid 600-200 MG-IU CAPS; Therapy: (Recorded:15Nov2017) to Recorded   4  Cyclobenzaprine HCl - 10 MG Oral Tablet; TAKE 1 TABLET DAILY AS NEEDED; Therapy: 60TEL0948 to (Evaluate:05Jan2017)  Requested for: 56WOS9368; Last   Rx:69Owq8987 Ordered   5  Daily Multivitamin TABS; Therapy: ([facility]) to Recorded   6  Flintstones Complete CHEW;   Therapy: (Recorded:15Nov2017) to Recorded   7  HydrOXYzine HCl - 25 MG Oral Tablet; take 1 tab 2x daily as needed; Therapy: 97Tqu9394 to (Evaluate:08Jun2016)  Requested for: 99PXX7271; Last   Rx:80Aax6959 Ordered   8  Iron TABS; Therapy: (Recorded:15Nov2017) to Recorded   9  LORazepam 0 5 MG Oral Tablet; take 1 tablet every twelve hours; Therapy: 21Hdn3546 to (Evaluate:15Oct2016); Last Rx:19Doz0304 Ordered   10  Magnesium CAPS; Therapy: (Recorded:87Wpd0525) to Recorded   11  Omeprazole TBEC; Therapy: (Recorded:55Czy6957) to Recorded   12  Potassimin TABS; Therapy: ([facility]) to Recorded   13  Potassium TABS; Therapy: (Recorded:61Mpm3189) to Recorded   14  Thiamine HCl - 100 MG Oral Tablet; Therapy: (Recorded:15Nov2017) to Recorded   15  Vitamin B12 TABS; Therapy: (Recorded:15Nov2017) to Recorded   16  Vitamin B12 TABS; Therapy: ([facility]) to Recorded   17  Vitamin D TABS; Therapy: (Recorded:13Yco9168) to Recorded    Allergies    1  Tramadol   2  Codeine Sulfate TABS   3  Aspirin TABS   4  Norvasc TABS   5   NSAIDs    Signatures   Electronically signed by : Doug Donovan OM; Nov 22 2017  2:18PM EST                       (Author)

## 2018-01-12 NOTE — MISCELLANEOUS
Provider Comments  Provider Comments:     Dear Celso Villalobos had a scheduled appointment at our office today that you called to cancel but did not reschedule for another day  It is very important that you follow up with us so that we can assess your physical and nutritional safety after your surgery  Please call our office at [phone] to reschedule your appointment       Sincerely,     [facility]          Signatures   Electronically signed by : Yesi Calero, ; Feb 15 2016  8:15AM EST                       (Author)    Electronically signed by : Adrian Mckinley, [facility]; Feb 15 2016  8:21AM EST                       (Author)

## 2018-01-12 NOTE — MISCELLANEOUS
Provider Comments  Provider Comments:   Dear Seth Winters had a scheduled appointment at our office for 06/26/2017 but were unable to keep  We attempted to call you back but were unable to reach you  It is very important that you follow up with us so that we can assess your physical and nutritional safety after your surgery  Please call our office at [phone] to reschedule your appointment         Sincerely,     [facility]          Signatures   Electronically signed by : Alyson Morales, ; Jun 26 2017  3:28PM EST                       (Author)

## 2018-01-13 VITALS
DIASTOLIC BLOOD PRESSURE: 78 MMHG | BODY MASS INDEX: 27.66 KG/M2 | WEIGHT: 162 LBS | SYSTOLIC BLOOD PRESSURE: 133 MMHG | HEIGHT: 64 IN

## 2018-01-13 NOTE — MISCELLANEOUS
Called pt to followup after cancelled cerclage yesterday  Partner answered phone, she was sleeping  Offered to call back tomorrow to check back in  Informed them of appointment time for TV[facility] next Wednesday at 3pm which works  with their schedule  Will try to call her back tomorrow  -MLB      Electronically signed by:Ivis DIXON    Nov 28 2017  2:48PM EST

## 2018-01-14 NOTE — RESULT NOTES
Message  pt called-completed high dose vitamin A and didn't know if she should continue a supplement  Advised she should discontinue extra vitamin A now and get repeat labs-will mail her the lab slip again as she misplaced it  will also order RBP  Plan  S/P gastric bypass, Vitamin A deficiency    · (Q) RETINOL BINDING PROTEIN; Status:Active; Requested for:26Apr2016;     Signatures   Electronically signed by : AMBERLY Ahmadi;  Apr 12 2016 12:52PM EST                       (Author)    Electronically signed by : DAVID Rosario ; Apr 12 2016  1:00PM EST                       (Co-author)

## 2018-01-15 NOTE — PROGRESS NOTES
Assessment    1  Tingling (782 0) (R20 2)   2  S/P gastric bypass (V45 86) (Z98 84)   3  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 3) (K91 2)   4  Vitamin B 12 deficiency (266 2) (E53 8)   5  Encounter for preventive health examination (V70 0) (Z00 00)    Plan  Allergic rhinitis    · ZyrTEC Allergy 10 MG Oral Tablet   Rx By: Sal Calderon; Dispense: 30 Days ; #:30 Tablet; Refill: 3; For: Allergic rhinitis; TRI = N; Verified Transmission to [facility]; Last Updated By: Mike Crook; 1/20/2016 9:32:26 AM    Discussion/Summary    Follow-up with PCP as scheduled  Get injection of Vitamin B12 today 1000 mcg by our LPN  Take 100 mg additional thiamine/vitamin B1 daily and increase vitamin B12 to 1500 mcg per day until I get your labs back and review them  Follow diet as previously advised  Continue to avoid all alcohol  Call our office if you have any problems with abdominal pain especially if associated with fever, chills, nausea, vomiting, or any other concerns  1 s/p Iglesia-en-y gastric bypass surgery 2  tingling 3  vitamin B12 (low ) prior labs    51-year-old female, status post laparoscopic Iglesia-en-Y gastric bypass surgery 12/16/14 by Dr Vance Person  She retruns to the office today with c/o some b/l paresthesias to b/l Lower arms with radiation into the hands b/l -R> L -radiates to 3rd, 4th and 5th digits  Symptoms started around one week ago-concerned that it was a vitamin deficiency  She has been ill intermittently with URI symptoms since the end of December-no overt fever  She was on a short course of steroids since that time  Pain has been burning at times to anterolateral aspect of the forearm-notes symptoms improved over the last couple days  No c/o weakness  Pain has been worse with working her smart phone (when elbows are bent to around a 45 degree angle and when she is leaning her elbows on a surface  Also improved if she shakes her hands out   She does note some slight neck discomfort but no overt pain to the area  She is eating and drinking adequately  She has been consistently taking her vitamin/mineral supplements and notes a couple weeks ago before the symptoms started she had switched back to bariatric vitamins and has been consistently taking additional 1000 mcg of Vitamin B12 daily-she denies any alcohol intakes  she has h/o lower vitamin B12 levels on prior labs  She was seen in [facility] ER for evaluation of same 3 days ago  Exam and findings noted  I doubt vitamin deficiency at this point but with her h/o low vitamin E20-yfcn have staff give her 1000 mcg injection of vitamin B12    Based on my findings today I suspect she has cubital tunnel syndrome or a cervical radiculopathy as the more likely diagnosis  Per ER evaluation also thought potentially carpal tunnel -but this did not appear evident on my exam today  Pt notes she has follow-up with her PCP early next week and I advised her to keep that appointment  she would likely benefit from EMG studies  Plan: Patient notes she just got her routine labs done which include her B vitamin levels-will await results  Advised to increase extra vitamin B12 to 1500 mcg daily and add 100 mg thiamine daily-If levels are low, will treat further as needed  Chief Complaint  Patient in office today due to tingling in hands  Post-Op  Post-Op Bariatric Surgery:   Radha Chandra is status post laparoscopic Iglesia-en-Y procedure, performed on 12/16/14   by Dr Ronnie Kerns  HPI: today's weight is 174 5 lb pounds, today's BMI is 29 5 and her total weight loss is 83% excess body weight loss pounds  The patient reports no nausea, no vomiting, no constipation, no diarrhea, no chest pain and no abdominal pain  Diet and Exercise: Diet history reviewed and discussed with the patient  Weight loss/gains to date discussed with the patient     Supplements: multivitamins, calcium and 1000 mcg vitamin B12 and just added 100 mg thiamine daily-started thiamine yesterday as advised  PE:   Abdominal exam: soft and no incisional hernia  Assessment:   Post-op, the patient see discussion  Plan: Activity restrictions: None  Instructions / Recommendations: vitamin supplement(s) recommended, mineral supplement(s) recommended and instructed to call the office for concerns, questions, or problems  The patient was instructed to follow up in 1 months  Review of Systems    Constitutional: + URI symptoms  Cardiovascular: no chest pain and no palpitations  Respiratory: no shortness of breath and no wheezing  Gastrointestinal: no abdominal pain  Musculoskeletal: c/o some neck discomfort  Psychiatric: no anxiety and no depression  Active Problems    1  Abdominal pain, RUQ (789 01) (R10 11)   2  Acute Left Rotator Cuff Sprain (Capsule) (840 4)   3  Acute sinusitis (461 9) (J01 90)   4  Acute sprain or strain of thoracic region (847 1)   5  Acute upper respiratory infection (465 9) (J06 9)   6  Allergic rhinitis (477 9) (J30 9)   7  Anxiety disorder (300 00) (F41 9)   8  Arthritis (716 90) (M19 90)   9  Backache   10  Benign paroxysmal vertigo, unspecified laterality (386 11) (H81 10)   11  Bloody stools (578 1) (K92 1)   12  Body aches (780 96) (R52)   13  Constipation (564 00) (K59 00)   14  Cough (786 2) (R05)   15  Dizziness (780 4) (R42)   16  Dysfunction of eustachian tube, unspecified laterality (381 81) (H69 80)   17  Earache (388 70) (H92 09)   18  Edema (782 3) (R60 9)   19  Encounter for PPD test (V74 1) (Z11 1)   20  Esophageal reflux (530 81) (K21 9)   21  Eustachian tube dysfunction (381 81) (H69 80)   22  Fatigue (780 79) (R53 83)   23  Flu-like symptoms (780 99) (R68 89)   24  Freckles (709 09) (L81 2)   25  Labyrinthitis (386 30) (H83 09)   26  Labyrinthitis, acute (386 30) (H83 09)   27  Lipoma of skin and subcutaneous tissue (214 1) (D17 30)   28  Lower back pain (724 2) (M54 5)   29  Obesity (278 00) (E66 9)   30   Otitis media, right (382  9) (H66 91)   31  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 3) (K91 2)   32  Prediabetes (790 29) (R73 09)   33  Preop cardiovascular exam (V72 81) (Z01 810)   34  Preop pulmonary/respiratory exam (V72 82) (Z01 811)   35  Restless legs syndrome (333 94) (G25 81)   36  S/P gastric bypass (V45 86) (Z98 84)   37  Sciatica (724 3) (M54 30)   38  Screening for cervical cancer (V76 2) (Z12 4)   39  Sinusitis (473 9) (J32 9)   40  Vertigo (780 4) (R42)   41  Viral illness (079 99) (B34 9)   42  Vitamin B 12 deficiency (266 2) (E53 8)   43  Well adult health check (V70 0) (Z00 00)    Social History    · Denied: History of Alcohol   · Caffeine Use   · 16 oz of ice tea daily   · Denied: History of Drug Use   · Former smoker (C20 82) (E27 121)   · smoked 10 years 10 cig daily  quit 2011  The social history was reviewed and updated today  Current Meds   1  Biotin TABS; Therapy: (Recorded:37Oep9090) to Recorded   2  Calcium Liquid 600-200 MG-IU CAPS; Therapy: (Recorded:15Kmv2714) to Recorded   3  Flintstones Complete CHEW;   Therapy: (Recorded:06Vwe9309) to Recorded   4  Fluticasone Propionate 50 MCG/ACT Nasal Suspension; USE 2 SPRAYS IN EACH   NOSTRIL ONCE DAILY; Therapy: 22AHH9034 to (Last Rx:72Kxt2655)  Requested for: 49Kez5105 Ordered   5  Iron TABS; Therapy: (Recorded:31Ntw9672) to Recorded   6  Magnesium CAPS; Therapy: (Recorded:20Jan2016) to Recorded   7  Meclizine HCl - 25 MG Oral Tablet; Take 1 three times daily as needed; Therapy: 91CEE0748 to (Last Rx:15Mab0055)  Requested for: 36Prm1170 Ordered   8  Nasonex 50 MCG/ACT Nasal Suspension; INSTILL 2 SPRAYS IN EACH NOSTRIL ONCE   DAILY; Therapy: 07Zex7268 to (Evaluate:63Nwd6963)  Requested for: 21Qdm8021; Last   Rx:95Boy1824 Ordered   9  Omeprazole TBEC; Therapy: (Recorded:33Xyj7622) to Recorded   10  Potassium TABS; Therapy: (Meme Gray) to Recorded   11  Thiamine HCl - 100 MG Oral Tablet Recorded   12  Vitamin B12 TABS;     Therapy: (Recorded:71Zmx4241) to Recorded   13  ZyrTEC Allergy 10 MG Oral Tablet; take 1 tablet daily as needed; Therapy: 20Apr2015 to (Evaluate:76Ywo7906)  Requested for: 20Apr2015; Last    Rx:33Ezc5677 Ordered    The medication list was reviewed and updated today  Allergies    1  Tramadol   2  Codeine Sulfate TABS   3  Aspirin TABS   4  Norvasc TABS   5  NSAIDs    Vitals   Recorded: 20Jan2016 09:35AM   Temperature 97 5 F   Heart Rate 60   Respiration 15   Systolic 191   Diastolic 70   Height 5 ft 4 5 in   Weight 174 lb 8 0 oz   BMI Calculated 29 49   BSA Calculated 1 86     Physical Exam    Constitutional   General appearance: No acute distress, well appearing and well nourished  Eyes b/l conjunctiva without pallor  Ears, Nose, Mouth, and Throat oral mucosa moist    Pulmonary   Respiratory effort: No increased work of breathing or signs of respiratory distress  Auscultation of lungs: Clear to auscultation  Cardiovascular   Auscultation of heart: Normal rate and rhythm, normal S1 and S2, without murmurs  Abdomen soft  Musculoskeletal   Gait and station: Normal     Psychiatric   Orientation to person, place, and time: Normal     Mood and affect: Normal     Additional Exam:  negative tinel's and negative phalan's b/l; + cubital tunnel compression R > L; equal senation to light touch b/l arms/hands          Future Appointments    Date/Time Provider Specialty Site   01/25/2016 02:15 PM Lee Muir[address]   02/02/2016 08:30 AM Annelise Matthew [facility] General Surgery [facility]     Signatures   Electronically signed by : Carlos Tom, [facility]; Jan 20 2016  1:39PM EST                       (Author)    Electronically signed by : DAVID Vincent ; Jan 21 2016  8:38AM EST                       (Co-author)

## 2018-01-16 NOTE — MISCELLANEOUS
Called Dr Estefani Angulo today to give update on patient and conveyed my recommendation to start vaginal progesterone (200mg PV qhs) until coverage for Jorge Korina is active, given prior  birth at 12 weeks  Called pt today to check  in however there was no answer so I left a message with the office telephone number  She is due to come back to the office Wednesday  Francisco Dior MD  Maternal-Fetal Medicine      Electronically signed by:Ivis DIXON    2017  4:43PM EST

## 2018-01-16 NOTE — PROGRESS NOTES
2017         RE: Faith Alston                                 To: Mickey Sal, M D    MR#: [id number]   : BERT Bishop 139: [id number]                             Fax: [phone]   (Exam #: [id number])      The LMP of this 43year old,  [address] [facility], -1-4-2 patient was unknown, her   working RENEE is MAY 32 2018 and the current gestational age is 9 weeks 3   days by 1st Trimester Sono  A sonographic examination was performed on 2017 using real time equipment  The ultrasound examination was performed   using abdominal technique  The patient has a BMI of 27 8  Her blood   pressure today was 133/78  Earliest US on record:  10/23/18   9w1d   18 RENEE Multiple   longitudinal and transverse sections revealed a tena intrauterine   pregnancy  The placenta is anterior in implantation  A normal gestational sac was documented  A normal fetal pole was   visualized  Cardiac motion was observed at 156 bpm  The yolk sac was seen,   measuring 0 50 cm  INDICATIONS      advanced maternal age   previous  delivery   prior bariatric surgery      Exam Types      Level I      RESULTS      Fetus # 1 of 1   Fetal growth appeared normal      MEASUREMENTS (* Included In Average GA)      CRL              3 5 cm        10 weeks 1 day *      THE AVERAGE GESTATIONAL AGE is 10 weeks 1 day +/- 5 days  ANATOMY COMMENTS      Anatomic detail is extremely limited at this gestational age  A discrete   fetal pole with cardiac motion was documented  Limb buds were documented   as well  The gestational sac is normal in appearance and located in the   fundus of the uterus  No gross abnormalities were noted on this   examination  Free fluid is not seen in the posterior cul-de-sac  There is   no suspicion of a subchorionic hematoma  The uterine contour is normal in   appearance  There is no suspicion of a uterine myoma        ADNEXA      The left ovary appeared normal and measured 2 0 x 1 3 x 1 7 cm with a   volume of 2 3 cc  The right ovary appeared normal and measured 2 4 x 2 2 x   1 8 cm with a volume of 5 0 cc  IMPRESSION      Arcos IUP   10 weeks and 1 day by this ultrasound  (RENEE=MAY 29 2018)   10 weeks and 3 days by 1st Tri Sono  (RENEE=MAY 27 2018)   Fetal growth appeared normal   Regular fetal heart rate of 156 bpm   Anterior placenta      CONSULT COMMENT      Thank you very much for your kind referral of Delia Blue to the   [facility]  in [facility] on 2017 for first trimester   ultrasound evaluation and MFM consult  Prosper Tejeda is a 17-year-old  10   para 0 white female who is currently at 10-3/7 weeks gestation by   earlier first trimester ultrasound dating  She presents for the   indications of advanced maternal age, history of prior spontaneous    birth, and prior bariatric surgery  Her early prenatal course so far has   been unremarkable  Prosper Tejeda has no complaints  She denies vaginal bleeding  Prosper Tejeda has a history of an initial delivery at term in  by    section any pregnancy, complicated by preeclampsia  She delivered a 9 lbs  2 oz  baby boy, currently healthy  Her next pregnancy was, complicated by    premature rupture the membranes and spontaneous  birth at   about 16 weeks gestation in   Prosper Tejeda delivered her next baby in   By history, at about 22 weeks gestation, cervical shortening was noted by   MFM at [facility] and a cervical cerclage was placed  She then   experienced  labor in the early third trimester, which was   successfully treated, though she eventually delivered by repeat    section at 35 weeks gestation secondary to recurrent  labor  Each   of her living children is currently healthy  She also has a history of 2   first trimester spontaneous pregnancy losses  Prosper Tejeda has a history of morbid obesity   Prior to bariatric surgery, she   had diagnoses of chronic hypertension and prediabetes  She had a Iglesia-en-Y   procedure performed with a subsequent 138 pound weight loss  Timoteo Gold has   not required medical management either hypertension or diabetes following   her surgery  She describes no complications related to the procedure  She   has not experienced dumping syndrome following her surgery  Her past   surgical history is otherwise significant for a cholecystectomy  Her past   medical history is otherwise unremarkable  Her daily medications currently   include a prenatal vitamin, vitamins B 12 and D,  and calcium  She has no   known drug allergy  Timoteo Gold smokes one half pack of cigarettes per day but   denies alcohol or illicit drug use during the pregnancy  The family   genetic history is negative with respect to genetic abnormalities, birth   defects, or mental retardation  The family medical history is negative   with respect to first degree relatives with diabetes, hypertension, or   venous thromboembolism  Today's ultrasound findings and suggested follow-up were discussed in   detail with Catherine  She will be 37years old at her estimated due date  At that age, her risk for a live born baby with Down syndrome is one in   48, with a risk for a live born baby with any chromosomal abnormality of   one in 28  We discussed that definitive prenatal diagnosis is possible   only through genetic amniocentesis or chorionic villus sampling  I   scheduled Catherine to meet with Rivera Colon for genetic counseling on   November 15, 2017 to discuss genetic testing options  Follow-up [facility]   ultrasound evaluation will be performed on that date  Level II ultrasound   evaluation will be performed at about 20 weeks gestation  Weekly nonstress   testing is recommended for the indication of advanced maternal age and an   associated increased risk for stillbirth beginning at 39 weeks gestation,   sooner if otherwise clinically indicated        Timoteo Gold discussed that her history of prior spontaneous  birth is   associated with a significant risk for recurrence in her current   pregnancy  I recommended treatment with weekly IM 17-P, 250 mg, between 16   and 36 completed weeks gestation, which will reduce her risk for   recurrence of sPTB by about 30%  We also discussed the role of cervical   surveillance and cervical cerclage based upon her history  Two options   were discussed  We discussed the option of serial cervical surveillance by   transvaginal ultrasound between 16 and 23 completed weeks gestation, with   cervical cerclage placement with cervical shortening to 25 mm or less   prior to 24 weeks gestation  The second option which was discussed, and   which Jim Fuentes would like to pursue based upon her history, is prophylactic   cerclage placement at about 13 weeks gestation  This is a reasonable   option  We discussed that tobacco use during pregnancy is associated with   an increased risk for adverse pregnancy outcomes, including    delivery, IUGR, abruption, and stillbirth  Tobacco use is a modifiable   risk factor for  birth  Enrollment in a smoking cessation program   should be considered  Obesity is associated with numerous adverse pregnancy outcomes, including   miscarriage, preeclampsia, gestational diabetes,  delivery,   stillbirth, and possibly congenital birth defects  After bariatric   surgery, the frequencies of many of these adverse outcomes are reduced   below those in obese women who have not undergone bariatric surgery  Observational studies consistently report a lower prevalence of   gestational diabetes among women who have had bariatric surgery than    among obese women who have not undergone this surgery    Although the   prevalence of gestational diabetes after bariatric surgery remains higher   than that of the general obstetrical population, women with a history of   type 2 diabetes prior to bariatric surgery may become euglycemic following   surgery, since weight loss improves peripheral insulin sensitivity  Women   with a history of bariatric surgery who develop gestational diabetes   appear to have similar  outcomes   as other women with   gestational diabetes  Most observational studies noted that women who have undergone bariatric   surgery have a lower rate of preeclampsia than controls consisting of   obese women who have not undergone a bariatric procedure, or the general   obstetrical population  Following surgery, the incidence of preeclampsia   may fall to  that of the general obstetrical population  Obese women are   at higher risk for medically indicated  birth than non-obese women,   and bariatric surgery may lower the frequency of these births  Observational studies have generally reported a reduction in mean birth   weight resulting in a larger proportion of appropriate for gestational age   infants among post bariatric surgery pregnancies, compared with those   obese women who have not undergone a bariatric procedure  Most   observational studies have found that post bariatric surgery reduction in   BMI reduces the risk of delivering an LGA infant  Observational studies   have reported increased rates of IUGR and small for gestational age   infants in post bariatric surgery patients  Hypotheses include both   substantial maternal weight loss and induced malabsorption from the   procedure accounting for the increase in IUGR  There appears to be no   direct correlation between prior bariatric  surgery and low APGAR scores,   meconium-stained amniotic fluid,   mortality rate, and congenital   malformations  Metabolic and nutritional abnormalities can occur after bariatric surgery,   particularly after malabsorptive procedures    Reduced oral intake and   alterations in digestive  anatomy results in malabsorption of various   micronutrients and minerals, particularly iron, folate, vitamin B 12, calcium, and vitamin D  To reduce the risk of complications from   micronutrient deficiency, specific supplementation regimens need to be   tailored to the individual patient and the type of bariatric procedure   performed  Commonly, micronutrient supplementation after Iglesia-en-Y gastric   bypass should include vitamin B1, vitamin D, vitamin K, zinc, biotin,   iron, folate, calcium citrate, and vitamin B 12  These daily requirements   can typically  be met with a prenatal vitamin in addition to calcium and   vitamin B12 supplementation  Additional iron and folate may also be   required  Screening for micronutrient deficiencies to individualize    therapy and adjust doses as needed is recommended  Suggested laboratory   studies early in pregnancy should include a CBC, ferritin, iron, vitamin B   12, thiamine, folate, calcium, and vitamin D levels  Identified   deficiencies  should be corrected and monitored with monthly assessments  Further surveillance of blood count, iron, ferritin, vitamin B 12,   calcium, and vitamin D should be performed every trimester  I have   scheduled an appointment for Pramod Velez to meet with one of the clinical   nutritionists in the [facility]  for further evaluation in this   regard on November 15, 2017  Given an increased risk for IUGR,  periodic evaluation of fetal growth is   recommended during the second half the pregnancy  Serial growth scans are   also indicated secondary to advanced maternal age and tobacco use  With   respect to screening for gestational diabetes, the glucose challenge test   used to screen for gestational diabetes is typically not well tolerated   for women with a prior Iglesia-en-Y gastric bypass due to dumping syndrome,   which occurs in about 50% of these patients  Given that Pramod Velez has not   experienced dumping syndrome following her surgery, it is reasonable to   evaluate her with standard gestational diabetes screening     Optimal weight gain during pregnancy in women who have undergone bariatric   surgery has not been studied  At present, weight gain recommendations are   based on [employer] guidelines, which are based on prepregnancy   BMI  Caloric restriction during pregnancy is not recommended, even if the   patient continues to be overweight after bariatric surgery, due to   concerns that caloric  restriction might impair fetal  growth     delivery is performed for the usual  obstetrical indications  Finally, I recommended that Tk Delgado initiate treatment with 81 mg of   aspirin a day, which will significantly reduce her risk for recurrence of   preeclampsia during this pregnancy  The face to face time, in addition to time spent discussing ultrasound   results, was approximately 15 minutes, greater than 50% of which was spent   during counseling and coordination of care  KRISTINA Valladares M D     Maternal-Fetal Medicine   Electronically signed 17 10:41

## 2018-01-16 NOTE — MISCELLANEOUS
Called Dr Jen Enriquez today to clarify what was happening with this patient's cerclage  When I saw her in the office on 11/15/17 (12 weeks 3 days), she stated she was anticipating cerclage with Dr Jen Enriquez for this (current)  week and had an office visit scheduled for Monday  Usual timing of history-indicated cerclages is 13-14 weeks  I called Dr Elana Escoto office to check on timing as she was anticipating the cerclage placement for (this) current week but did not yet have  an OR time  I was informed that while Dr Jen Enriquez was out of the office that the patient did indeed have an appointment for Monday 11/20/17  Today (gestational age 17 weeks 3 days) we were asked to schedule her history-indicated by Dr Jen Enriquez  Procedures are not being scheduled for tomorrow as this is Thanksgiving  Our office will work on trying to schedule her surgery as soon as possible however it may not be possible to achieve this in the time-frame of 13-14 weeks  Payton Nicholson MD  Maternal-Fetal Medicine      Electronically signed by:Ivis DIXON  Nov 22 2017  3:00PM EST      AMENDMENTS:  1  Called pt today to check in as  she is scheduled for Monday for cerclage  She just had NIPT drawn today and I explained that this test is unlikely to give a result by Monday  I explained it can be psychologically difficult to remove a cerclage at a later date if aneuploidy is detected  I offered delaying the cerclage until NIPT results and she does not want to reschedule  Reviewed risks, benefits and alternatives to history-indicated cerclage and explained that she could still end up with a periviable delivery  She voices understanding  and wishes to proceed as scheduled  -MLB    Electronically signed by:Ivis DIXON    Nov 24 2017  2:53PM EST

## 2018-01-17 NOTE — RESULT NOTES
Message   call and mail results-see note     Verified Results  (1) CBC/ PLT (NO DIFF) [id number] 12:24PM [facility]     Test Name Result Flag Reference   HEMATOCRIT 40 0 %  34 8-46 1   HEMOGLOBIN 12 8 g/dL  11 5-15 4   MCHC 32 0 g/dL  31 4-37 4   MCH 26 9 pg  26 8-34 3   MCV 84 fL  82-98   PLATELET COUNT 296 Thousands/uL  149-390   RBC COUNT 4 76 Million/uL  3 81-5 12   RDW 12 6 %  11 6-15 1   WBC COUNT 5 27 Thousand/uL  4 31-10 16   MPV 10 2 fL  8 9-12 7       Discussion/Summary   May labs reviewed which only included iron studies and vitamin A-not a compete set of labs  Your iron stores-ferritin is low at 6 but you are NOT anemic  Please take a high potency iron daily-we recommend Vitron C 65 mg which is available over-the counter-you should take this once a day for 2 weeks, then increase to twice a day for 2 weeks and if tolerated then increase to 3 times daily  iron can be constipating so either take a daily stool softener OR MIRALAX daily  Your level should be rechecked in 3 months  It looks like you are overdue for a follow-up in our office, so please schedule this at your earliest convenience       Signatures   Electronically signed by : Fortino Schmitz, [facility]; May 16 2016  3:05PM EST                       (Author)

## 2018-01-18 NOTE — RESULT NOTES
Verified Results  (1) CBC/ PLT (NO DIFF) [id number] 12:24PM [facility]     Test Name Result Flag Reference   HEMATOCRIT 40 0 %  34 8-46 1   HEMOGLOBIN 12 8 g/dL  11 5-15 4   MCHC 32 0 g/dL  31 4-37 4   MCH 26 9 pg  26 8-34 3   MCV 84 fL  82-98   PLATELET COUNT 535 Thousands/uL  149-390   RBC COUNT 4 76 Million/uL  3 81-5 12   RDW 12 6 %  11 6-15 1   WBC COUNT 5 27 Thousand/uL  4 31-10 16   MPV 10 2 fL  8 9-12 7     (1) FERRITIN [id number] 12:24PM [facility]     Test Name Result Flag Reference   FERRITIN 6 ng/mL L 8-388     (1) VITAMIN A [id number] 12:24PM [facility]     Test Name Result Flag Reference   VITAMIN A 64 ug/dL  20 - 65   **Please note reference interval change**  Performed at:  [address]  [id number]  : Adriana Ingram MD, Phone:  [phone]       Discussion/Summary   vitamin A is within normal range-no need to take high potency vitamin A now       Signatures   Electronically signed by : Mracia Aj, [facility]; May 16 2016  3:06PM EST                       (Author)

## 2018-01-18 NOTE — MISCELLANEOUS
Message  VM left for pt re: QNatal approval from [employer] valid from 11/15/2017 - 12/31/2017, code #[id number]  Explained to pt, take [facility] QNatal lab slip for blood draw, [facility] office will call with results in 7-10 business days  Office number provided for nay questions  Active Problems    1  Abdominal pain, RUQ (789 01) (R10 11)   2  Allergic rhinitis (477 9) (J30 9)   3  Anxiety disorder (300 00) (F41 9)   4  Arthritis (716 90) (M19 90)   5  Benign paroxysmal vertigo, unspecified laterality (386 11) (H81 10)   6  Bloody stools (578 1) (K92 1)   7  Calf pain (729 5) (M79 669)   8  Carpal tunnel syndrome (354 0) (G56 00)   9  Constipation (564 00) (K59 00)   10  Dizziness (780 4) (R42)   11  Edema (782 3) (R60 9)   12  Elderly multigravida, first trimester (659 63) (O09 521)   15  Esophageal reflux (530 81) (K21 9)   14  Fatigue (780 79) (R53 83)   15  Flu vaccine need (V04 81) (Z23)   16  Freckles (709 09) (L81 2)   17  Grieving (309 0) (F43 20)   18  Labyrinthitis (386 30) (H83 09)   19  Lipoma of skin and subcutaneous tissue (214 1) (D17 30)   20  Low backache (724 2) (M54 5)   21  Low iron (280 9) (E61 1)   22  Lower back pain (724 2) (M54 5)   23  Neck muscle spasm (728 85) (M62 838)   24  Neck nodule (784 2) (R22 1)   25  Obesity (278 00) (E66 9)   26  Panniculus (278 1) (E65)   27  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 3) (K91 2,Z90 3)   28  Prediabetes (790 29) (R73 09)   29  Preop cardiovascular exam (V72 81) (Z01 810)   30  Preop pulmonary/respiratory exam (V72 82) (Z01 811)   31  Restless legs syndrome (333 94) (G25 81)   32  S/P gastric bypass (V45 86) (Z98 84)   33  Sciatica (724 3) (M54 30)   34  Screening for cervical cancer (V76 2) (Z12 4)   35  Tingling (782 0) (R20 2)   36  Vertigo (780 4) (R42)   37  Vitamin A deficiency (264 9) (E50 9)   38  Vitamin B 12 deficiency (266 2) (E53 8)   39  Well adult health check (V70 0) (Z00 00)    Current Meds   1  Biotin TABS;    Therapy: (Recorded:09Upy2790) to Recorded   2  Calcium Citrate + Oral Tablet; Therapy: (Raisa Lyle) to Recorded   3  Calcium Liquid 600-200 MG-IU CAPS; Therapy: (Recorded:01Ofv5309) to Recorded   4  Cyclobenzaprine HCl - 10 MG Oral Tablet; TAKE 1 TABLET DAILY AS NEEDED; Therapy: 25ZFQ6885 to (Evaluate:05Jan2017)  Requested for: 89WLZ9449; Last   Rx:72Hrg1177 Ordered   5  Daily Multivitamin TABS; Therapy: (Raisa Lyle) to Recorded   6  Flintstones Complete CHEW;   Therapy: (Recorded:02Mcw3359) to Recorded   7  HydrOXYzine HCl - 25 MG Oral Tablet; take 1 tab 2x daily as needed; Therapy: 54Vnq6256 to (Evaluate:08Jun2016)  Requested for: 54PEG6666; Last   Rx:81Deh9737 Ordered   8  Iron TABS; Therapy: (Recorded:50Dpi4760) to Recorded   9  LORazepam 0 5 MG Oral Tablet; take 1 tablet every twelve hours; Therapy: 47Wjs1142 to (Evaluate:15Oct2016); Last Rx:15Asl5656 Ordered   10  Magnesium CAPS; Therapy: (Recorded:82Haz9932) to Recorded   11  Omeprazole TBEC; Therapy: (Recorded:08Mmx0553) to Recorded   12  Potassimin TABS; Therapy: (Raisa Lyle) to Recorded   13  Potassium TABS; Therapy: (Recorded:57Jfz3666) to Recorded   14  Thiamine HCl - 100 MG Oral Tablet; Therapy: (Recorded:10Oyh4904) to Recorded   15  Vitamin B12 TABS; Therapy: (Recorded:12Bga9322) to Recorded   16  Vitamin B12 TABS; Therapy: (Raisa Lyle) to Recorded   17  Vitamin D TABS; Therapy: (Recorded:29Zoc4900) to Recorded    Allergies    1  Tramadol   2  Codeine Sulfate TABS   3  Aspirin TABS   4  Norvasc TABS   5   NSAIDs    Signatures   Electronically signed by : Minnie Petty RN; Nov 16 2017  9:44AM EST                       (Author)

## 2018-01-22 VITALS
HEIGHT: 64 IN | WEIGHT: 161 LBS | SYSTOLIC BLOOD PRESSURE: 102 MMHG | DIASTOLIC BLOOD PRESSURE: 63 MMHG | BODY MASS INDEX: 27.49 KG/M2

## 2018-01-23 ENCOUNTER — ALLSCRIPTS OFFICE VISIT (OUTPATIENT)
Dept: OTHER | Facility: OTHER | Age: 43
End: 2018-01-23

## 2018-01-23 NOTE — RESULT NOTES
Verified Results  (Q) QNATAL (TM) ADVANCED [id number] 09:37AM [facility]   REPORT COMMENT:  FASTING:YES     Test Name Result Flag Reference   NUMBER OF FETUSES 1     ADVANCED MATERNAL AGE? YES     ABNORMAL KJ? NO     ABNORMAL US? NO     PERSONAL/FAM HISTORY? NO     INTERPRETATION SEE NOTE     This specimen showed expected representation of chromosome  21, 18, and 13 material  Microdeletion testing was not  performed per clinician request    TRISOMY 21 (T21) Negative     TRISOMY 18 (T18) Negative     TRISOMY 13 (T13) Negative     Y CHROMOSOME Not detected     Y CHR  INTERPRETATION SEE NOTE     Consistent with a female fetus  SEX CHROMOSOME No aneuploidy     SEX CHROMOSOME INTERP SEE NOTE     No apparent abnormality was detected  See "Limitations"  below  MICRODELETION Opted Out     MICRODELETION INTERP SEE NOTE     Microdeletion testing was not performed per clinician  request    GESTATION AGE (IN WEEKS) 13     GESTATIONAL AGE (IN DAYS) 6     FETAL FRACTION 10 10%     LABORATORY COMMENTS SEE NOTE     Laboratory results and submitted clinical information  reviewed by Oracio Merrill, Ph D , Emilia Correa  LIMITATIONS SEE NOTE     This test has been validated on women with a tnea  pregnancy that is >=10 weeks gestational age  As such, the  accuracy of this test for specimens drawn at less than 10  weeks gestation is unknown  In addition, there are limited  data available for the performance of this test in multiple  gestation pregnancies and for the detection of  microdeletions  Specimens are analyzed for aneuploidies  involving chromosomes 21, 18, 13, X, and Y, and  microdeletions of the specified regions only  The Y  chromosome is analyzed for the determination of fetal sex,  and the sensitivity and specificity of this analysis may be  less than that of the autosome analysis  Sex chromosome  aneuploidy analysis is not performed for multiple gestation  pregnancies   Aneuploidies involving chromosomes other than  those specified above or abnormalities involving chromosomal  regions other than those specified are not included in this  testing  While the results of this test are highly accurate,  not all of the chromosome abnormalities interrogated may be  detected due to maternal, placental, or fetal mosaicism, or  other unexplained causes  The accuracy of the test results  may also be affected by the presence of chromosome  abnormalities or copy number variations that are maternal in  origin, or by vanishing twin syndrome in a multiple  gestation pregnancy  Circulating cell-free fetal DNA  screening does not replace the precision of diagnosis using  chorionic villus sampling or amniocentesis  It does not  assess the risk of fetal anomalies such as neural tube  defects or ventral wall defects, and should not be  considered in isolation from other clinical findings and  laboratory test results  Management decisions, including  pregnancy termination, should not be based solely on the  results of cell-free DNA screening  A negative test result  does not ensure an unaffected pregnancy  The healthcare  provider is responsible for the use of this information in  the management of his/her patient  Health care providers, please contact your local [facility] genetic counselor or call [place] [facility] at [facility] ([phone]) for assistance with  interpretation of these results  SPECIFICATIONS SEE NOTE     Sensitivity         Specificity  T21        >99 9%               >99 9%     T18        >99 9%               >99 9%     T13        >99 9%               >99 9%               Accuracy  Y          >99 9%     Performance of the [facility] laboratory-developed test  (LDT) has been determined based on internal analytical  assessment  METHODOLOGY SEE NOTE     Circulating cell-free (cf) DNA was isolated from plasma  It  was then detected on a massively parallel sequencing  platform   Bioinformatic analysis was performed to determine  the representation of fetal DNA in the specimen, especially  fetal material from chromosomes 21, 18, and 13  The  representation of other fetal material, including the sex  chromosomes (X and Y) and select chromosomal regions (22q,  15q, 11q, 8q, 5p, 4p 1p), was also evaluated and will only  be reported as "Additional Chromosome Results" when an  abnormality is detected  This test was developed and its  performance characteristics have been determined by [employer], [facility] [address]  It has  not been cleared or approved by the [place]  Food and Drug  Administration  The FDA has determined that such clearance  or approval is not necessary  Performance characteristics  refer to the analytical performance of the test  This test  is performed pursuant to a license agreement with [employer]  This test was developed and its analytical performance  characteristics have been determined by [facility]  It has not been  cleared or approved by FDA  This assay has been validated  pursuant to the CLIA regulations and is used for clinical  purposes

## 2018-01-23 NOTE — MISCELLANEOUS
Message  Cerclage rescheduled for 12/12 at 2:30 with Dr Lazaro Man per patient request  Patient notified by phone and L&D notified (spoke with Tanvi Fuller )      Active Problems    1  Abdominal pain, RUQ (789 01) (R10 11)   2  Allergic rhinitis (477 9) (J30 9)   3  Anxiety disorder (300 00) (F41 9)   4  Arthritis (716 90) (M19 90)   5  Benign paroxysmal vertigo, unspecified laterality (386 11) (H81 10)   6  Bloody stools (578 1) (K92 1)   7  Calf pain (729 5) (M79 669)   8  Carpal tunnel syndrome (354 0) (G56 00)   9  Constipation (564 00) (K59 00)   10  Dizziness (780 4) (R42)   11  Edema (782 3) (R60 9)   12  Elderly multigravida, first trimester (659 63) (O09 521)   15  Esophageal reflux (530 81) (K21 9)   14  Fatigue (780 79) (R53 83)   15  Flu vaccine need (V04 81) (Z23)   16  Freckles (709 09) (L81 2)   17  Grieving (309 0) (F43 20)   18  Labyrinthitis (386 30) (H83 09)   19  Lipoma of skin and subcutaneous tissue (214 1) (D17 30)   20  Low backache (724 2) (M54 5)   21  Low iron (280 9) (E61 1)   22  Lower back pain (724 2) (M54 5)   23  Neck muscle spasm (728 85) (M62 838)   24  Neck nodule (784 2) (R22 1)   25  Obesity (278 00) (E66 9)   26  Panniculus (278 1) (E65)   27  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 3) (K91 2,Z90 3)   28  Prediabetes (790 29) (R73 09)   29  Preop cardiovascular exam (V72 81) (Z01 810)   30  Preop pulmonary/respiratory exam (V72 82) (Z01 811)   31  Restless legs syndrome (333 94) (G25 81)   32  S/P gastric bypass (V45 86) (Z98 84)   33  Sciatica (724 3) (M54 30)   34  Screening for cervical cancer (V76 2) (Z12 4)   35  Tingling (782 0) (R20 2)   36  Vertigo (780 4) (R42)   37  Vitamin A deficiency (264 9) (E50 9)   38  Vitamin B 12 deficiency (266 2) (E53 8)   39  Well adult health check (V70 0) (Z00 00)    Current Meds   1  Biotin TABS; Therapy: (Recorded:65Qnx4958) to Recorded   2  Calcium Citrate + Oral Tablet; Therapy: (Marguarite Puls) to Recorded   3   Calcium Liquid 600-200 MG-IU CAPS; Therapy: (Recorded:53Jqn1835) to Recorded   4  Cyclobenzaprine HCl - 10 MG Oral Tablet; TAKE 1 TABLET DAILY AS NEEDED; Therapy: 75TBE8481 to (Evaluate:05Jan2017)  Requested for: 22GVN4537; Last   Rx:79Pbv8008 Ordered   5  Daily Multivitamin TABS; Therapy: (Ermalinda Captain) to Recorded   6  Flintstones Complete CHEW;   Therapy: (Recorded:41Xqj1511) to Recorded   7  HydrOXYzine HCl - 25 MG Oral Tablet; take 1 tab 2x daily as needed; Therapy: 13Jax0945 to (Evaluate:08Jun2016)  Requested for: 44OFA5819; Last   Rx:89Wwk8485 Ordered   8  Iron TABS; Therapy: (Recorded:75Jgg6998) to Recorded   9  LORazepam 0 5 MG Oral Tablet; take 1 tablet every twelve hours; Therapy: 39Pmp6899 to (Evaluate:15Oct2016); Last Rx:39Vgv7979 Ordered   10  Magnesium CAPS; Therapy: (Recorded:54Nme9766) to Recorded   11  Omeprazole TBEC; Therapy: (Recorded:84Pjm5966) to Recorded   12  Potassimin TABS; Therapy: (Ermalinda Captain) to Recorded   13  Potassium TABS; Therapy: (Recorded:46Jxv0456) to Recorded   14  Thiamine HCl - 100 MG Oral Tablet; Therapy: (Recorded:55Elr0836) to Recorded   15  Vitamin B12 TABS; Therapy: (Recorded:36Chx3212) to Recorded   16  Vitamin B12 TABS; Therapy: (Ermalinda Captain) to Recorded   17  Vitamin D TABS; Therapy: (Recorded:88Qfl7225) to Recorded    Allergies    1  Tramadol   2  Codeine Sulfate TABS   3  Aspirin TABS   4  Norvasc TABS   5   NSAIDs    Signatures   Electronically signed by : Pardeep Blood OM; Dec  5 2017 10:45AM EST                       (Author)

## 2018-01-23 NOTE — MISCELLANEOUS
Message  patient called for gender reveal and is aware female  Explained MSAFP screen and when to go for blood work  patient verbalized understanding  Active Problems    1  Abdominal pain, RUQ (789 01) (R10 11)   2  Allergic rhinitis (477 9) (J30 9)   3  Anxiety disorder (300 00) (F41 9)   4  Arthritis (716 90) (M19 90)   5  Benign paroxysmal vertigo, unspecified laterality (386 11) (H81 10)   6  Bloody stools (578 1) (K92 1)   7  Calf pain (729 5) (M79 669)   8  Carpal tunnel syndrome (354 0) (G56 00)   9  Constipation (564 00) (K59 00)   10  Dizziness (780 4) (R42)   11  Edema (782 3) (R60 9)   12  Elderly multigravida, first trimester (659 63) (O09 521)   15  Esophageal reflux (530 81) (K21 9)   14  Fatigue (780 79) (R53 83)   15  Flu vaccine need (V04 81) (Z23)   16  Freckles (709 09) (L81 2)   17  Grieving (309 0) (F43 20)   18  Labyrinthitis (386 30) (H83 09)   19  Lipoma of skin and subcutaneous tissue (214 1) (D17 30)   20  Low backache (724 2) (M54 5)   21  Low iron (280 9) (E61 1)   22  Lower back pain (724 2) (M54 5)   23  Neck muscle spasm (728 85) (M62 838)   24  Neck nodule (784 2) (R22 1)   25  Obesity (278 00) (E66 9)   26  Panniculus (278 1) (E65)   27  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 3) (K91 2,Z90 3)   28  Prediabetes (790 29) (R73 09)   29  Preop cardiovascular exam (V72 81) (Z01 810)   30  Preop pulmonary/respiratory exam (V72 82) (Z01 811)   31  Restless legs syndrome (333 94) (G25 81)   32  S/P gastric bypass (V45 86) (Z98 84)   33  Sciatica (724 3) (M54 30)   34  Screening for cervical cancer (V76 2) (Z12 4)   35  Tingling (782 0) (R20 2)   36  Vertigo (780 4) (R42)   37  Vitamin A deficiency (264 9) (E50 9)   38  Vitamin B 12 deficiency (266 2) (E53 8)   39  Well adult health check (V70 0) (Z00 00)    Current Meds   1  Biotin TABS; Therapy: (Recorded:45Hjk9981) to Recorded   2  Calcium Citrate + Oral Tablet; Therapy: (Eva Osman) to Recorded   3   Calcium Liquid 600-200 MG-IU CAPS; Therapy: (Recorded:51Qus0529) to Recorded   4  Cyclobenzaprine HCl - 10 MG Oral Tablet; TAKE 1 TABLET DAILY AS NEEDED; Therapy: 06UTL9122 to (Evaluate:05Jan2017)  Requested for: 17KXR6674; Last   Rx:10Eey9235 Ordered   5  Daily Multivitamin TABS; Therapy: (Maryuri Harris) to Recorded   6  Flintstones Complete CHEW;   Therapy: (Recorded:57Thq3941) to Recorded   7  HydrOXYzine HCl - 25 MG Oral Tablet; take 1 tab 2x daily as needed; Therapy: 63Jok9055 to (Evaluate:08Jun2016)  Requested for: 38KAC4318; Last   Rx:80Eyp3180 Ordered   8  Iron TABS; Therapy: (Recorded:55Oaf8267) to Recorded   9  LORazepam 0 5 MG Oral Tablet; take 1 tablet every twelve hours; Therapy: 66Rhm5863 to (Evaluate:15Oct2016); Last Rx:25Luv8633 Ordered   10  Magnesium CAPS; Therapy: (Recorded:29Jpf7955) to Recorded   11  Omeprazole TBEC; Therapy: (Recorded:57Yjk2558) to Recorded   12  Potassimin TABS; Therapy: (Maryuri Harris) to Recorded   13  Potassium TABS; Therapy: (Recorded:16Jug2215) to Recorded   14  Thiamine HCl - 100 MG Oral Tablet; Therapy: (Recorded:98Mpy2092) to Recorded   15  Vitamin B12 TABS; Therapy: (Recorded:24Dim8152) to Recorded   16  Vitamin B12 TABS; Therapy: (Maryuri Harris) to Recorded   17  Vitamin D TABS; Therapy: (Recorded:18Ria8518) to Recorded    Allergies    1  Tramadol   2  Codeine Sulfate TABS   3  Aspirin TABS   4  Norvasc TABS   5   NSAIDs    Signatures   Electronically signed by : Natasha Roger, ; Dec  1 2017  3:12PM EST                       (Author)

## 2018-01-23 NOTE — MISCELLANEOUS
Message  Patient had been notified by L&D yesterday of time to arrive for cerclage scheduled for 12/7  Patient informed them that she could not have procedure this week due to taking off various days from work  L&D instructed her to call me  During our conversation, she stated that she had "missed too much time this week" and needed the procedure to be scheduled next week, in the afternoon and requested a different physician perform the cerclage  Stated that it was " a mess last week" and verbalized that she wanted a different MD  Dr Jan Foy notified and he was willing to perform cerclage  OR time and physician schedule resulted in scheduling procedure for Tuesday 12/12 at 2:30  Shelvy Mingle pm      Active Problems    1  Abdominal pain, RUQ (789 01) (R10 11)   2  Allergic rhinitis (477 9) (J30 9)   3  Anxiety disorder (300 00) (F41 9)   4  Arthritis (716 90) (M19 90)   5  Benign paroxysmal vertigo, unspecified laterality (386 11) (H81 10)   6  Bloody stools (578 1) (K92 1)   7  Calf pain (729 5) (M79 669)   8  Carpal tunnel syndrome (354 0) (G56 00)   9  Constipation (564 00) (K59 00)   10  Dizziness (780 4) (R42)   11  Edema (782 3) (R60 9)   12  Elderly multigravida, first trimester (659 63) (O09 521)   15  Esophageal reflux (530 81) (K21 9)   14  Fatigue (780 79) (R53 83)   15  Flu vaccine need (V04 81) (Z23)   16  Freckles (709 09) (L81 2)   17  Grieving (309 0) (F43 20)   18  Labyrinthitis (386 30) (H83 09)   19  Lipoma of skin and subcutaneous tissue (214 1) (D17 30)   20  Low backache (724 2) (M54 5)   21  Low iron (280 9) (E61 1)   22  Lower back pain (724 2) (M54 5)   23  Neck muscle spasm (728 85) (M62 838)   24  Neck nodule (784 2) (R22 1)   25  Obesity (278 00) (E66 9)   26  Panniculus (278 1) (E65)   27  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 3) (K91 2,Z90 3)   28  Prediabetes (790 29) (R73 09)   29  Preop cardiovascular exam (V72 81) (Z01 810)   30  Preop pulmonary/respiratory exam (V72 82) (Z01 811)   31   Restless legs syndrome (333 94) (G25 81)   32  S/P gastric bypass (V45 86) (Z98 84)   33  Sciatica (724 3) (M54 30)   34  Screening for cervical cancer (V76 2) (Z12 4)   35  Tingling (782 0) (R20 2)   36  Vertigo (780 4) (R42)   37  Vitamin A deficiency (264 9) (E50 9)   38  Vitamin B 12 deficiency (266 2) (E53 8)   39  Well adult health check (V70 0) (Z00 00)    Current Meds   1  Biotin TABS; Therapy: (Recorded:34Bze9058) to Recorded   2  Calcium Citrate + Oral Tablet; Therapy: (Logan Salvador) to Recorded   3  Calcium Liquid 600-200 MG-IU CAPS; Therapy: (Recorded:06Hcw4140) to Recorded   4  Cyclobenzaprine HCl - 10 MG Oral Tablet; TAKE 1 TABLET DAILY AS NEEDED; Therapy: 21PNE1972 to (Evaluate:05Jan2017)  Requested for: 16IJP9532; Last   Rx:61Cie7067 Ordered   5  Daily Multivitamin TABS; Therapy: (Logan Salvador) to Recorded   6  Flintstones Complete CHEW;   Therapy: (Recorded:98Npc2017) to Recorded   7  HydrOXYzine HCl - 25 MG Oral Tablet; take 1 tab 2x daily as needed; Therapy: 59Fib6703 to (Evaluate:08Jun2016)  Requested for: 54SHB1775; Last   Rx:84Olc1547 Ordered   8  Iron TABS; Therapy: (Recorded:02Fma8692) to Recorded   9  LORazepam 0 5 MG Oral Tablet; take 1 tablet every twelve hours; Therapy: 00Axw5327 to (Evaluate:15Oct2016); Last Rx:80Zsj2372 Ordered   10  Magnesium CAPS; Therapy: (Recorded:47Iuj4041) to Recorded   11  Omeprazole TBEC; Therapy: (Recorded:13Ikt9107) to Recorded   12  Potassimin TABS; Therapy: (Logan Salvador) to Recorded   13  Potassium TABS; Therapy: (Recorded:72Oye3853) to Recorded   14  Thiamine HCl - 100 MG Oral Tablet; Therapy: (Recorded:07Mck7630) to Recorded   15  Vitamin B12 TABS; Therapy: (Recorded:26Cug6256) to Recorded   16  Vitamin B12 TABS; Therapy: (Logan Salvador) to Recorded   17  Vitamin D TABS; Therapy: (Recorded:72Jln5117) to Recorded    Allergies    1  Tramadol   2  Codeine Sulfate TABS   3  Aspirin TABS   4   Norvasc TABS 5   NSAIDs    Signatures   Electronically signed by : Molly Chairez OM; Dec  6 2017  9:55AM EST                       (Author)

## 2018-01-23 NOTE — PROGRESS NOTES
CELINE 3 2018         RE: Edwar Santana                                 To: DAVID Smith    MR#: [id number]   : [facility] 139: [id number]                             Fax: [phone]   (Exam #: [id number])      The LMP of this 43year old,  [address] [facility], P1-1-4-2 patient was unknown, her   working RENEE is MAY 32 2018 and the current gestational age is 24 weeks 3   days by 1st Trimester Sono  A sonographic examination was performed on CELINE   3 2018 using real time equipment  The ultrasound examination was performed   using abdominal & vaginal techniques  The patient has a BMI of 29 0  Her   blood pressure today was 123/59  Earliest US on record:  10/23/18   9w1d   18 RENEE      Problem list   1  Advanced maternal age of 37  NIPT was normal   2  History of prior 35 week  birth after a cerclage was placed for   a short cervix at 22 weeks  She had a prophylactic cerclage placed   18 and is on weekly Kinney given to her by her   3   History of prior Iglesia-en-Y    4   History of a  x2   5  Prior 16 week  delivery   6  History of preeclampsia and macrosomia in her 1st baby at term    She   is on baby aspirin daily      Cardiac motion was observed at 153 bpm       INDICATIONS      advanced maternal age   previous  delivery   prior bariatric surgery   fetal anatomical survey   cerclage in place      Exam Types      LEVEL II      RESULTS      Fetus # 1 of 1   Vertex presentation   Fetal growth appeared normal   Placenta Location = Posterior   No placenta previa   Placenta Grade = I      MEASUREMENTS (* Included In Average GA)      AC              13 5 cm        18 weeks 4 days* (32%)   BPD              4 4 cm        19 weeks 2 days* (46%)   HC              16 9 cm        19 weeks 4 days* (49%)   Femur            3 2 cm        20 weeks 1 day * (56%)      Nuchal Fold      3 4 mm   NBL              5 7 mm      Humerus          3 0 cm        19 weeks 6 days (65%)      Cerebellum       2 0 cm        19 weeks 6 days   Biorbit          3 1 cm        20 weeks 0 days   CisternaMagna    6 2 mm      HC/AC           1 25   FL/AC           0 24   FL/BPD          0 73   EFW (Ac/Fl/Hc)   289 grams - 0 lbs 10 oz      THE AVERAGE GESTATIONAL AGE is 19 weeks 3 days +/- 10 days  AMNIOTIC FLUID         Largest Vertical Pocket = 4 2 cm   Amniotic Fluid: Normal      CERVICAL EVALUATION      The cervix appeared abnormal (Ultrasound Examination)  SUPINE      Cervical Length: 1 55 cm      OTHER TEST RESULTS           Funneling?: No             Dynamic Changes?: No        Resp  To TFP?: No                      Debris?: Yes      ANATOMY      Head                                    Abnormal   Face/Neck                               Normal   Th  Cav  Normal   Heart                                   Normal   Abd  Cav  Normal   Stomach                                 Normal   Right Kidney                            Normal   Left Kidney                             Normal   Bladder                                 Normal   Abd  Wall                               Normal   Spine                                   See Details   Extrems                                 Normal   Genitalia                               Normal   Placenta                                Normal   Umbl  Cord                              Normal   Uterus                                  Normal   PCI                                     Normal      ANATOMY DETAILS      Visualized Appearing Sonographically Normal:   HEAD: (Calvarium, BPD Level, Lateral Ventricles, Cerebellum, Cisterna   Magna);    FACE/NECK: (Neck, Nuchal Fold, Profile, Orbits, Nose/Lips,   Palate, Face);    TH  CAV  : (Lungs, Diaphragm);     HEART: (Four Chamber   View, Proximal Left Outflow, Proximal Right Outflow, 3VV, 3 Vessel   Trachea, Short Axis of Greater Vessels, Ductal Arch, Aortic Arch,   Interventricular Septum, Interatrial Septum, IVC, SVC, Cardiac Axis,   Cardiac Position);    ABD  CAV : (Liver);    STOMACH, RIGHT KIDNEY, LEFT   KIDNEY, BLADDER, ABD  WALL, EXTREMS: (Lt Humerus, Rt Humerus, Lt Forearm,   Rt Forearm, Lt Hand, Rt Hand, Lt Femur, Rt Femur, Lt Low Leg, Rt Low Leg,   Lt Foot, Rt Foot);    GENITALIA (Female), PLACENTA, UMBL  CORD, UTERUS, PCI      Suboptimally Visualized:   SPINE: (Cervical Spine, Thoracic Spine, Lumbar Spine, Sacrum)      Not Visualized:   HEAD: (Cavum)      Abnormal:   HEAD: (Choroid Plexus)      ANATOMY COMMENTS      A 1 cm left sided choroid plexus cyst is seen  Her survey of the fetal anatomy is not complete  No other fetal structural abnormality or ultrasound marker for aneuploidy   is identified on the Level II ultrasound study today  The missed or   limited views above are secondary to her skin thickness and fetal   position  Fetal growth and amniotic fluid volume appear normal   Active   movement of the fetal body & extremities was seen  There is no suspicion   of a subchorionic bleed  The placental cord insertion was normal       Her cervix abdominally appeared short prompting a transvaginal scan  ADNEXA      The left ovary was not visualized  The right ovary appeared normal and   measured 2 8 x 1 7 x 2 4 cm with a volume of 6 0 cc  IMPRESSION      Arcos IUP   19 weeks and 3 days by this ultrasound  (RENEE=MAY 27 2018)   19 weeks and 3 days by [facility] Tri Sono  (RENEE=MAY 27 2018)   Vertex presentation   Fetal growth appeared normal   Regular fetal heart rate of 153 bpm   Choroid plexus cyst   Posterior placenta   No placenta previa      GENERAL COMMENT      Thank you for allowing me to participate in the care of your patient  Ms Modesta Villegas was seen today for fetal anatomy  The patient was informed of the findings and counseled about the   limitations of the exam in detecting all forms of fetal congenital   abnormalities        She denies any vaginal bleeding or uterine cramping/contractions  She does   feel fetal movement  Exam shows she is comfortable and her abdomen is non tender  Her uterus is   soft and no contractions are appreciated  The patient was counseled that an association between choroid plexus cysts   and chromosomal abnormalities, especially trisomy 25, exists  This   usually occurs when associated anomalies are seen  In the absence of a   fetal structural malformation, early IUGR, polyhydramnios, or other marker   for aneuploidy,  the risk for trisomy 18 is small, likely 1% or less, and   likely does not warrant invasive prenatal diagnosis by amniocentesis  The   patient was advised that ultrasound cannot detect all fetal anomalies  She had NIPT which returned as normal which is reassuring  Follow up recommended:   1  Her cervix today appears short compared to her measurements pre   cerclage  This could be secondary to  labor or signs that she   truly does have an incompetent cervix and the cervix is open to the   stitch  She denies any signs or symptoms of labor and she is on weekly   Maricel  Will review her cervix length in one week to see if there is any   further progression  The face to face time, in addition to time spent discussing ultrasound   results, was approximately 25 minutes, greater than 50% of which was spent   during counseling and coordination of care  KRISTINA Franks M D     Maternal-Fetal Medicine   Electronically signed 18 18:04

## 2018-01-23 NOTE — MISCELLANEOUS
Message  Patient scheduled for cerclage on Thursday, 12/7 at 1230 with Dr Jasmina Chew  Voice message left for patient and Dr Jasmina Chew notified by task  Active Problems    1  Abdominal pain, RUQ (789 01) (R10 11)   2  Allergic rhinitis (477 9) (J30 9)   3  Anxiety disorder (300 00) (F41 9)   4  Arthritis (716 90) (M19 90)   5  Benign paroxysmal vertigo, unspecified laterality (386 11) (H81 10)   6  Bloody stools (578 1) (K92 1)   7  Calf pain (729 5) (M79 669)   8  Carpal tunnel syndrome (354 0) (G56 00)   9  Constipation (564 00) (K59 00)   10  Dizziness (780 4) (R42)   11  Edema (782 3) (R60 9)   12  Elderly multigravida, first trimester (659 63) (O09 521)   15  Esophageal reflux (530 81) (K21 9)   14  Fatigue (780 79) (R53 83)   15  Flu vaccine need (V04 81) (Z23)   16  Freckles (709 09) (L81 2)   17  Grieving (309 0) (F43 20)   18  Labyrinthitis (386 30) (H83 09)   19  Lipoma of skin and subcutaneous tissue (214 1) (D17 30)   20  Low backache (724 2) (M54 5)   21  Low iron (280 9) (E61 1)   22  Lower back pain (724 2) (M54 5)   23  Neck muscle spasm (728 85) (M62 838)   24  Neck nodule (784 2) (R22 1)   25  Obesity (278 00) (E66 9)   26  Panniculus (278 1) (E65)   27  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 3) (K91 2,Z90 3)   28  Prediabetes (790 29) (R73 09)   29  Preop cardiovascular exam (V72 81) (Z01 810)   30  Preop pulmonary/respiratory exam (V72 82) (Z01 811)   31  Restless legs syndrome (333 94) (G25 81)   32  S/P gastric bypass (V45 86) (Z98 84)   33  Sciatica (724 3) (M54 30)   34  Screening for cervical cancer (V76 2) (Z12 4)   35  Tingling (782 0) (R20 2)   36  Vertigo (780 4) (R42)   37  Vitamin A deficiency (264 9) (E50 9)   38  Vitamin B 12 deficiency (266 2) (E53 8)   39  Well adult health check (V70 0) (Z00 00)    Current Meds   1  Biotin TABS; Therapy: (Recorded:60Ytk4168) to Recorded   2  Calcium Citrate + Oral Tablet; Therapy: (Ryan Sainz) to Recorded   3   Calcium Liquid 600-200 MG-IU CAPS; Therapy: (Recorded:90Kfm0237) to Recorded   4  Cyclobenzaprine HCl - 10 MG Oral Tablet; TAKE 1 TABLET DAILY AS NEEDED; Therapy: 56YRK3200 to (Evaluate:05Jan2017)  Requested for: 61XIH2801; Last   Rx:10Wer4839 Ordered   5  Daily Multivitamin TABS; Therapy: (Annalise Rizvi) to Recorded   6  Flintstones Complete CHEW;   Therapy: (Recorded:96Lpr7679) to Recorded   7  HydrOXYzine HCl - 25 MG Oral Tablet; take 1 tab 2x daily as needed; Therapy: 39Boa8032 to (Evaluate:08Jun2016)  Requested for: 15CTL5886; Last   Rx:93Pss4591 Ordered   8  Iron TABS; Therapy: (Recorded:07Lul6111) to Recorded   9  LORazepam 0 5 MG Oral Tablet; take 1 tablet every twelve hours; Therapy: 31Pkr2409 to (Evaluate:15Oct2016); Last Rx:90Zxy9969 Ordered   10  Magnesium CAPS; Therapy: (Recorded:21Qak7115) to Recorded   11  Omeprazole TBEC; Therapy: (Recorded:30Esb5058) to Recorded   12  Potassimin TABS; Therapy: (Annalise Rizvi) to Recorded   13  Potassium TABS; Therapy: (Recorded:38Eeo9552) to Recorded   14  Thiamine HCl - 100 MG Oral Tablet; Therapy: (Recorded:14Ohh5275) to Recorded   15  Vitamin B12 TABS; Therapy: (Recorded:92Skz3641) to Recorded   16  Vitamin B12 TABS; Therapy: (Annalise Rizvi) to Recorded   17  Vitamin D TABS; Therapy: (Recorded:25Dhc0321) to Recorded    Allergies    1  Tramadol   2  Codeine Sulfate TABS   3  Aspirin TABS   4  Norvasc TABS   5   NSAIDs    Signatures   Electronically signed by : Billee Essex, OM; Dec  1 2017  1:56PM EST                       (Author)

## 2018-01-24 VITALS
WEIGHT: 173 LBS | SYSTOLIC BLOOD PRESSURE: 109 MMHG | HEIGHT: 64 IN | DIASTOLIC BLOOD PRESSURE: 54 MMHG | BODY MASS INDEX: 29.53 KG/M2

## 2018-01-24 VITALS
HEIGHT: 64 IN | WEIGHT: 169.2 LBS | DIASTOLIC BLOOD PRESSURE: 59 MMHG | BODY MASS INDEX: 28.89 KG/M2 | SYSTOLIC BLOOD PRESSURE: 123 MMHG

## 2018-01-24 NOTE — PROGRESS NOTES
Assessment   1  S/P gastric bypass (V45 86) (Z98 84)   2  Pregnancy complicated by previous bariatric surgery (649 20) (O99 840)   3  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 3) (K91 2,Z90 3)    Plan   Postgastrectomy malabsorption, Pregnancy complicated by previous bariatric surgery,    S/P gastric bypass    · (1) CBC/ PLT (NO DIFF); Status:Active; Requested for:58Hbr5354; Perform:[facility]; VMF:38CXO9963;WUPEGDT; For:Postgastrectomy malabsorption, Pregnancy complicated by previous bariatric surgery, S/P gastric bypass; Ordered By:Humaira Palacios;   · (1) COMPREHENSIVE METABOLIC PANEL; Status:Active; Requested for:25Fxq9881; Perform:[facility]; URI:34DTL6933;PZCMFUJ; For:Postgastrectomy malabsorption, Pregnancy complicated by previous bariatric surgery, S/P gastric bypass; Ordered By:Humaira Palacios;   · (1) FERRITIN; Status:Active; Requested for:27Rdw1317; Perform:[facility]; BNB:32YTS8992;WTXXRMT; For:Postgastrectomy malabsorption, Pregnancy complicated by previous bariatric surgery, S/P gastric bypass; Ordered By:Humaira Palacios;   · (1) FOLATE; Status:Active; Requested for:12Rxx5872; Perform:[facility]; VJP:77HBK0441;FLMLXUT; For:Postgastrectomy malabsorption, Pregnancy complicated by previous bariatric surgery, S/P gastric bypass; Ordered By:Humaira Palacios;   · (1) PTH N-TERMINAL (INTACT); Status:Active; Requested for:73Lyi4543; Perform:[facility]; MQ20WVZ8981;XXPBCWW; For:Postgastrectomy malabsorption, Pregnancy complicated by previous bariatric surgery, S/P gastric bypass; Ordered By:Humaira Palacios;   · (1) VITAMIN A; Status:Active; Requested for:21Pqu0761; Perform:[facility]; YEX:56PWV8311;OZPCLHQ; For:Postgastrectomy malabsorption, Pregnancy complicated by previous bariatric surgery, S/P gastric bypass; Ordered By:Humaira Palacios;   · (1) VITAMIN B1, WHOLE BLOOD; Status:Active;  Requested for:85Nvt1112; Perform:[facility]; [facility]:44HZA3799;LAPCVCO; For:Postgastrectomy malabsorption, Pregnancy complicated by previous bariatric surgery, S/P gastric bypass; Ordered By:Humaira Palacios;   · (1) VITAMIN B12; Status:Active; Requested for:54Ihc8156; Perform:[facility]; CQ94ZWV3091;UBGKAWS; For:Postgastrectomy malabsorption, Pregnancy complicated by previous bariatric surgery, S/P gastric bypass; Ordered By:Humaira Palacios;   · (1) VITAMIN D 25-HYDROXY; Status:Active; Requested for:18Iys2534; Perform:[facility]; SR60PQR1018;OGMAFAF; For:Postgastrectomy malabsorption, Pregnancy complicated by previous bariatric surgery, S/P gastric bypass; Ordered By:Humaira Palacios;  Unlinked    · Potassium TABS   Dispense: 0 Days ; #: Sufficient Tablet; Refill: 0; TRI = N; Record; Last Updated By: Jennifer Treadwell; 2018 3:10:00 PM    Discussion/Summary      Follow-up in 7 months  Follow diet as discussed  Get lab work done prior to your next office visit  Follow vitamin/mineral recommendations as reviewed with you  Exercise as tolerated  our office if you have any problems with abdominal pain especially if associated with fever, chills, nausea, vomiting, or any other concerns  lap Iglesia-en-y gastric bypass surgery 3 pregnancy year old female   status post laparoscopic Iglesia-en-Y gastric bypass surgery by Dr Angie Bowman she has been lost to follow-up in our office since 2016  She is currently pregnant  She is accompanied by her  to the visit today  She is unsure of actual first day of LMP but believes this was early 2017 as she notes she had a couple miscarriages in between but notes per OB estimated EDC of 2017    had contacted our RD earlier in her pregnancy and got vitamin recommendations-advised that she should follow those recommendations and that labs /vitamin levels are deferred to OB until she has her baby     advised her to eat small frequent meals snacks as tolerated with 30/30 minutes with liquids as tolerated which she is doing fairly well  she does drink at least 64 ounces of fluid  she is also following with RD at OB's office and encouraged her to continue with same  Malabsorption- pateint is at risk for malabsorption of vitamins/minerals secondary to malabsorption from her procedure and restriction of intakes current supplements and advised on same is unsure whether she plans to breast feed-I recommended we wait a couple months after she has her baby to get baseline vitamin/mineral levels for her- I will plan to check labs in July and see her back in August   advised her to stay on prenatal recommendations until she is done breast feeding  The patient has the current Goals: Weight gain with good nutrition intakes during pregnanty vitamin/mineral levels as tolerated      The patent has the current Barriers: None identified      Patient is able to Self-Care  Educational resources provided: N/a  Possible side effects of new medications were reviewed with the patient/guardian today  The treatment plan was reviewed with the patient/guardian  The patient/guardian understands and agrees with the treatment plan    She was advised to follow up due to malabsorption risks  Self Referrals: No      Chief Complaint   Patient is 23-24 weeks pregnant, said she tolerates diet well, exercises as tolerated, and takes vitamins daily  Post-Op   Post-Op Bariatric Surgery:      Madina Deal is status post laparoscopic Iglesia-en-Y procedure,-- performed on 12/16/2014--   by Dr Kodi Portillo  HPI: today's weight is 174 lb pounds,-- today's BMI is 29 4-- and-- her total weight loss is 84% excess body weight loss -skewed now with pregnancy pounds  The patient reports no nausea,-- no vomiting,-- no constipation,-- no diarrhea,-- no chest pain-- and-- no abdominal pain  Diet and Exercise: Diet history reviewed and discussed with the patient   Weight loss/gains to date discussed with the patient  Supplements: multivitamins,-- calcium,-- iron-- and-- vitamin D  PE:      Abdominal exam: soft-- and-- no incisional hernia  gravid abdomen    Assessment:      Post-op, the patient see discussion  Plan:      Instructions / Recommendations: vitamin supplement(s) recommended,-- mineral supplement(s) recommended,-- diet as discussed/follow RD recommendations as previously provided-- and-- instructed to call the office for concerns, questions, or problems  The patient was instructed to follow up in 7 months  Review of Systems        Constitutional: weight is stable from 2016 office visit/ reports pre-pregnancy weight of 160 lb/up 14 lb -intentional, but-- not feeling poorly  Cardiovascular: no chest pain-- and-- no palpitations  Respiratory: no shortness of breath-- and-- no wheezing  Gastrointestinal: no abdominal pain,-- no nausea,-- no vomiting,-- no constipation-- and-- no diarrhea  Psychiatric: no anxiety-- and-- no depression  Active Problems   1  Allergic rhinitis (477 9) (J30 9)   2  Anxiety disorder (300 00) (F41 9)   3  Arthritis (716 90) (M19 90)   4  Benign paroxysmal vertigo, unspecified laterality (386 11) (H81 10)   5  Carpal tunnel syndrome (354 0) (G56 00)   6  Cervical cerclage suture present in second trimester (654 53) (O34 32)   7  Elderly multigravida in second trimester (659 63) (O09 522)   8  Esophageal reflux (530 81) (K21 9)   9  Flu vaccine need (V04 81) (Z23)   10  Lipoma of skin and subcutaneous tissue (214 1) (D17 30)   11  Low iron (280 9) (E61 1)   12  Neck nodule (784 2) (R22 1)   13  Obesity (278 00) (E66 9)   14  Panniculus (278 1) (E65)   15  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 3) (K91 2,Z90 3)   16  Prediabetes (790 29) (R73 09)   17  Pregnancy complicated by previous  labor in second trimester (V23 41)      (O09 212)   18   Pregnancy complicated by previous  labor in third trimester (V23 41) (O09 213)   19  Restless legs syndrome (333 94) (G25 81)   20  S/P gastric bypass (V45 86) (Z98 84)   21  Sciatica (724 3) (M54 30)   22  Short cervix in second trimester, antepartum (649 73) (O26 872)   23  Vertigo (780 4) (R42)   24  Vitamin A deficiency (264 9) (E50 9)   25  Vitamin B 12 deficiency (266 2) (E53 8)   26  Well adult health check (V70 0) (Z00 00)    Social History    · Denied: History of Alcohol   · Caffeine Use   · 16 oz of ice tea daily   · Denied: History of Drug Use   · Former smoker (A53 95) (M42 465)   · smoked 10 years 10 cig daily  quit 2011  The social history was reviewed and updated today  Current Meds    1  Calcium Citrate + Oral Tablet; Therapy: (Recorded:84Kdw5428) to Recorded   2  Daily Multivitamin TABS; Therapy: (Josee Callas) to Recorded   3  Potassium TABS; Therapy: (Recorded:62Pai6526) to Recorded   4  Prenatal TABS; Therapy: ([phone]) to Recorded   5  Vitamin B Complex TABS; Therapy: ([phone]) to Recorded   6  Vitamin B12 TABS; Therapy: (Josee Callas) to Recorded   7  Vitamin D TABS; Therapy: (Recorded:41Xsg1077) to Recorded     The medication list was reviewed and updated today  Allergies   1  Tramadol   2  Codeine Sulfate TABS   3  Aspirin TABS   4  Norvasc TABS   5  NSAIDs    Vitals    Recorded: 11HJT0676 03:05PM   Temperature 97 9 F   Heart Rate 75   Respiration 17   Systolic 565   Diastolic 70   Height 5 ft 4 5 in   Weight 174 lb    BMI Calculated 29 41   BSA Calculated 1 85     Physical Exam        Constitutional      General appearance: No acute distress, well appearing and well nourished  Eyes bilateral conjunctiva without pallor  Ears, Nose, Mouth, and Throat mucous membranes moist       Pulmonary      Respiratory effort: No increased work of breathing or signs of respiratory distress  Auscultation of lungs: Clear to auscultation         Cardiovascular      Auscultation of heart: Normal rate and rhythm, normal S1 and S2, without murmurs  Abdomen soft, no incisional hernias appreciated; + gravid abdomen        Musculoskeletal      Gait and station: Normal        Psychiatric      Orientation to person, place, and time: Normal        Mood and affect: Normal           Future Appointments      Date/Time Provider Specialty Site   02/08/2018 03:00 PM Gestational Diabetic, Schedule  ST [address] [place] [address]    Electronically signed by : Thiago Zamarripa; Jan 23 2018  4:13PM EST                       (Author)     Electronically signed by : Thiago Zamarripa; Jan 23 2018  4:16PM EST                       (Author)     Electronically signed by : DAVID Barrera ; Jan 24 2018  9:15AM EST                       (Co-author)

## 2018-01-25 ENCOUNTER — ROUTINE PRENATAL (OUTPATIENT)
Dept: PERINATAL CARE | Facility: CLINIC | Age: 43
End: 2018-01-25
Payer: COMMERCIAL

## 2018-01-25 VITALS
SYSTOLIC BLOOD PRESSURE: 120 MMHG | HEIGHT: 65 IN | BODY MASS INDEX: 29.16 KG/M2 | HEART RATE: 75 BPM | DIASTOLIC BLOOD PRESSURE: 69 MMHG | WEIGHT: 175 LBS

## 2018-01-25 DIAGNOSIS — Z3A.22 22 WEEKS GESTATION OF PREGNANCY: ICD-10-CM

## 2018-01-25 DIAGNOSIS — O34.30: ICD-10-CM

## 2018-01-25 DIAGNOSIS — O99.842 BARIATRIC SURGERY STATUS COMPLICATING PREGNANCY, SECOND TRIMESTER: ICD-10-CM

## 2018-01-25 DIAGNOSIS — Z87.51 HISTORY OF PRETERM DELIVERY: Primary | ICD-10-CM

## 2018-01-25 DIAGNOSIS — F17.200 SMOKER: ICD-10-CM

## 2018-01-25 PROCEDURE — 99212 OFFICE O/P EST SF 10 MIN: CPT | Performed by: OBSTETRICS & GYNECOLOGY

## 2018-01-25 PROCEDURE — 76817 TRANSVAGINAL US OBSTETRIC: CPT | Performed by: OBSTETRICS & GYNECOLOGY

## 2018-01-25 NOTE — PATIENT INSTRUCTIONS
Labor   AMBULATORY CARE:    labor  labor occurs when the uterus contracts and your cervix opens earlier than normal  The cervix is the opening of your uterus  In  labor, contractions are strong enough and occur often enough to allow the cervix to open for delivery of your baby   labor happens after the 20th week of pregnancy but before the 37th week of pregnancy  An early labor could cause you to have your baby before he is ready to be born  Common signs and symptoms include the following: You may not know that you are having  labor  It is common to have  contractions (tightening and relaxing of the uterus) and not notice them  The following are signs and symptoms that suggest a  labor:  · Contractions that get stronger and closer together    · Changes in vaginal discharge, such as more discharge or discharge that is watery or bloody     · Low back pain     · Pressure in the lower abdomen     · Vaginal spotting or bleeding  Call 911 for any of the following:   · You see or feel like there is something in your vagina  Seek care immediately if:   · You have bright red, painless vaginal bleeding  · Your symptoms do not get better or they get worse  · Your water broke or you feel warm water gushing or trickling from your vagina  · You have contractions that get stronger and closer together for more than 1 hour  Contact your healthcare provider if:   · You notice a decrease in your baby's movement  · You have abdominal cramps, pressure, or tightening  · You have a change in vaginal discharge  · You have a fever  · You have burning when you urinate or you are urinating less than is normal for you  · You have questions or concerns about your condition or care  Treatment for  labor  may delay delivery  You may need any of the following:  · Bed rest  may be recommended   You may need to lie on your left side, which improves circulation to the uterus and baby  Your healthcare provider will tell you when it is okay to get out of bed  · Medicine  may be given to stop contractions if your baby is not ready to be born  You may also need certain medicines if your  labor cannot be stopped and your healthcare provider thinks you will have your baby early  These medicines help your baby's lungs, brain, and digestive organs mature  They also help decrease your baby's risk of being born with cerebral palsy  Antibiotics may be given to treat a bacterial infection, if needed  Self-care:   · Rest  as much as possible  You may need to lie on your left side to improve circulation to the uterus and baby  You may be able to prevent  labor by resting and reducing your physical activity  · Ask your healthcare provider about activities that are safe for you to do  Your healthcare provider or obstetrician may recommend that you avoid sexual intercourse  Ask your healthcare provider if exercise is safe  · Drink liquids as directed  Ask how much liquid to drink each day and which liquids are best for you  · Do not smoke  Your baby may not grow well and he may weigh less at birth if you smoke during pregnancy  Smoking also increases the risk that your baby will be born too early  Nicotine and other chemicals in cigarettes and cigars can cause lung damage  Ask your healthcare provider for information if you currently smoke and need help to quit  E-cigarettes or smokeless tobacco still contain nicotine  Talk to your healthcare provider before you use these products  · Do not drink alcohol  Alcohol may harm your unborn baby and cause  labor  · Maintain a healthy weight  A healthy weight may prevent  labor  Ask your healthcare provider how much weight you should gain during your pregnancy  Follow up with your healthcare provider as directed:  Write down your questions so you remember to ask them during your visits     © 2017 [facility] Information is for End User's use only and may not be sold, redistributed or otherwise used for commercial purposes  All illustrations and images included in CareNotes® are the copyrighted property of [employer]  or [employer]iss Land[employer]  The above information is an  only  It is not intended as medical advice for individual conditions or treatments  Talk to your doctor, nurse or pharmacist before following any medical regimen to see if it is safe and effective for you

## 2018-01-25 NOTE — ASSESSMENT & PLAN NOTE
Cervical length stable at 1 2 centimeters  Cervical cerclage in place  Recommend follow-up as clinically indicated   labor precautions discussed

## 2018-01-25 NOTE — PROGRESS NOTES
Maternal-Fetal Medicine:  Ms Modesta Villegas was seen today in the [id number] today for serial cervical length screening ultrasound  Please see ultrasound report under "OB Procedures" tab in EPIC   Thank you for the referral and please don't hesitate to contact our office with any concerns or questions  Bariatric surgery status complicating pregnancy, second trimester  Patient to get micronutrient bloodwork soon  Given rx from bariatic team       Incompetent cervix in pregnancy, antepartum, unspecified trimester  Cervical length stable at 1 2 centimeters  Cervical cerclage in place  Recommend follow-up as clinically indicated   labor precautions discussed  Please note, in addition to the time spent discussing the results of the ultrasound, I spent approximately 10 minutes of face-to-face time with the patient, greater than 50% of which was spent in counseling and the coordination of care for this patient      Sincerely,    Slick Hernandez MD  Attending Physician, Maternal-Fetal Medicine

## 2018-02-15 ENCOUNTER — HOSPITAL ENCOUNTER (EMERGENCY)
Facility: HOSPITAL | Age: 43
Discharge: HOME | End: 2018-02-15
Admitting: EMERGENCY MEDICINE
Payer: COMMERCIAL

## 2018-02-15 VITALS
OXYGEN SATURATION: 100 % | BODY MASS INDEX: 30.42 KG/M2 | HEART RATE: 68 BPM | DIASTOLIC BLOOD PRESSURE: 62 MMHG | TEMPERATURE: 97 F | RESPIRATION RATE: 19 BRPM | SYSTOLIC BLOOD PRESSURE: 96 MMHG | WEIGHT: 180 LBS

## 2018-02-15 DIAGNOSIS — K21.9 ACID REFLUX: ICD-10-CM

## 2018-02-15 DIAGNOSIS — Z34.90 PREGNANT: Primary | ICD-10-CM

## 2018-02-15 LAB
ALBUMIN SERPL BCP-MCNC: 3 G/DL (ref 3.5–5)
ALP SERPL-CCNC: 69 U/L (ref 46–116)
ALT SERPL W P-5'-P-CCNC: 24 U/L (ref 12–78)
ANION GAP SERPL CALCULATED.3IONS-SCNC: 11 MMOL/L (ref 4–13)
AST SERPL W P-5'-P-CCNC: 15 U/L (ref 5–45)
BASOPHILS # BLD AUTO: 0.01 THOUSANDS/ΜL (ref 0–0.1)
BASOPHILS NFR BLD AUTO: 0 % (ref 0–1)
BILIRUB SERPL-MCNC: 0.3 MG/DL (ref 0.2–1)
BILIRUB UR QL STRIP: NEGATIVE
BUN SERPL-MCNC: 7 MG/DL (ref 5–25)
CALCIUM SERPL-MCNC: 8.5 MG/DL (ref 8.3–10.1)
CHLORIDE SERPL-SCNC: 104 MMOL/L (ref 100–108)
CLARITY UR: CLEAR
CO2 SERPL-SCNC: 24 MMOL/L (ref 21–32)
COLOR UR: YELLOW
CREAT SERPL-MCNC: 0.56 MG/DL (ref 0.6–1.3)
EOSINOPHIL # BLD AUTO: 0.05 THOUSAND/ΜL (ref 0–0.61)
EOSINOPHIL NFR BLD AUTO: 1 % (ref 0–6)
ERYTHROCYTE [DISTWIDTH] IN BLOOD BY AUTOMATED COUNT: 13 % (ref 11.6–15.1)
GFR SERPL CREATININE-BSD FRML MDRD: 115 ML/MIN/1.73SQ M
GLUCOSE SERPL-MCNC: 83 MG/DL (ref 65–140)
GLUCOSE UR STRIP-MCNC: NEGATIVE MG/DL
HCT VFR BLD AUTO: 34.5 % (ref 34.8–46.1)
HGB BLD-MCNC: 11.4 G/DL (ref 11.5–15.4)
HGB UR QL STRIP.AUTO: NEGATIVE
HOLD SPECIMEN: NORMAL
KETONES UR STRIP-MCNC: ABNORMAL MG/DL
LEUKOCYTE ESTERASE UR QL STRIP: NEGATIVE
LIPASE SERPL-CCNC: 133 U/L (ref 73–393)
LYMPHOCYTES # BLD AUTO: 1.15 THOUSANDS/ΜL (ref 0.6–4.47)
LYMPHOCYTES NFR BLD AUTO: 14 % (ref 14–44)
MCH RBC QN AUTO: 29 PG (ref 26.8–34.3)
MCHC RBC AUTO-ENTMCNC: 33 G/DL (ref 31.4–37.4)
MCV RBC AUTO: 88 FL (ref 82–98)
MONOCYTES # BLD AUTO: 0.42 THOUSAND/ΜL (ref 0.17–1.22)
MONOCYTES NFR BLD AUTO: 5 % (ref 4–12)
NEUTROPHILS # BLD AUTO: 6.48 THOUSANDS/ΜL (ref 1.85–7.62)
NEUTS SEG NFR BLD AUTO: 80 % (ref 43–75)
NITRITE UR QL STRIP: NEGATIVE
PH UR STRIP.AUTO: 6 [PH] (ref 4.5–8)
PLATELET # BLD AUTO: 284 THOUSANDS/UL (ref 149–390)
PMV BLD AUTO: 9.6 FL (ref 8.9–12.7)
POTASSIUM SERPL-SCNC: 3.9 MMOL/L (ref 3.5–5.3)
PROT SERPL-MCNC: 7.2 G/DL (ref 6.4–8.2)
PROT UR STRIP-MCNC: NEGATIVE MG/DL
RBC # BLD AUTO: 3.93 MILLION/UL (ref 3.81–5.12)
SODIUM SERPL-SCNC: 139 MMOL/L (ref 136–145)
SP GR UR STRIP.AUTO: 1.01 (ref 1–1.03)
TROPONIN I SERPL-MCNC: <0.02 NG/ML
UROBILINOGEN UR QL STRIP.AUTO: 0.2 E.U./DL
WBC # BLD AUTO: 8.11 THOUSAND/UL (ref 4.31–10.16)

## 2018-02-15 PROCEDURE — 80053 COMPREHEN METABOLIC PANEL: CPT | Performed by: PHYSICIAN ASSISTANT

## 2018-02-15 PROCEDURE — 36415 COLL VENOUS BLD VENIPUNCTURE: CPT

## 2018-02-15 PROCEDURE — 84484 ASSAY OF TROPONIN QUANT: CPT | Performed by: PHYSICIAN ASSISTANT

## 2018-02-15 PROCEDURE — 96374 THER/PROPH/DIAG INJ IV PUSH: CPT

## 2018-02-15 PROCEDURE — 93005 ELECTROCARDIOGRAM TRACING: CPT

## 2018-02-15 PROCEDURE — 83690 ASSAY OF LIPASE: CPT | Performed by: PHYSICIAN ASSISTANT

## 2018-02-15 PROCEDURE — 99284 EMERGENCY DEPT VISIT MOD MDM: CPT

## 2018-02-15 PROCEDURE — 81003 URINALYSIS AUTO W/O SCOPE: CPT | Performed by: PHYSICIAN ASSISTANT

## 2018-02-15 PROCEDURE — 85025 COMPLETE CBC W/AUTO DIFF WBC: CPT | Performed by: PHYSICIAN ASSISTANT

## 2018-02-15 RX ORDER — ALUMINA, MAGNESIA, AND SIMETHICONE 2400; 2400; 240 MG/30ML; MG/30ML; MG/30ML
10 SUSPENSION ORAL EVERY 6 HOURS PRN
Qty: 355 ML | Refills: 0 | Status: SHIPPED | OUTPATIENT
Start: 2018-02-15 | End: 2018-05-18

## 2018-02-15 RX ORDER — MAGNESIUM HYDROXIDE/ALUMINUM HYDROXICE/SIMETHICONE 120; 1200; 1200 MG/30ML; MG/30ML; MG/30ML
15 SUSPENSION ORAL ONCE
Status: COMPLETED | OUTPATIENT
Start: 2018-02-15 | End: 2018-02-15

## 2018-02-15 RX ORDER — FAMOTIDINE 20 MG/1
20 TABLET, FILM COATED ORAL 2 TIMES DAILY
Qty: 30 TABLET | Refills: 0 | Status: SHIPPED | OUTPATIENT
Start: 2018-02-15 | End: 2018-02-15

## 2018-02-15 RX ORDER — MULTIVITAMIN
1 TABLET ORAL DAILY
COMMUNITY

## 2018-02-15 RX ORDER — ALUMINA, MAGNESIA, AND SIMETHICONE 2400; 2400; 240 MG/30ML; MG/30ML; MG/30ML
10 SUSPENSION ORAL EVERY 6 HOURS PRN
Qty: 355 ML | Refills: 0 | Status: SHIPPED | OUTPATIENT
Start: 2018-02-15 | End: 2018-02-15

## 2018-02-15 RX ORDER — FAMOTIDINE 20 MG/1
20 TABLET, FILM COATED ORAL 2 TIMES DAILY
Qty: 30 TABLET | Refills: 0 | Status: SHIPPED | OUTPATIENT
Start: 2018-02-15 | End: 2018-08-25 | Stop reason: ALTCHOICE

## 2018-02-15 RX ADMIN — ALUMINUM HYDROXIDE, MAGNESIUM HYDROXIDE, AND SIMETHICONE 15 ML: 200; 200; 20 SUSPENSION ORAL at 14:59

## 2018-02-15 RX ADMIN — FAMOTIDINE 20 MG: 10 INJECTION, SOLUTION INTRAVENOUS at 14:59

## 2018-02-15 NOTE — DISCHARGE INSTRUCTIONS
Gastroesophageal Reflux Disease   WHAT YOU NEED TO KNOW:   Gastroesophageal reflux occurs when acid and food in the stomach back up into the esophagus  Gastroesophageal reflux disease (GERD) is reflux that occurs more than twice a week for a few weeks  It usually causes heartburn and other symptoms  GERD can cause other health problems over time if it is not treated  DISCHARGE INSTRUCTIONS:   Return to the emergency department if:   · You feel full and cannot burp or vomit  · You have severe chest pain and sudden trouble breathing  · Your bowel movements are black, bloody, or tarry-looking  · Your vomit looks like coffee grounds or has blood in it  Contact your healthcare provider if:   · You vomit large amounts, or you vomit often  · You have trouble breathing after you vomit  · You have trouble swallowing, or pain with swallowing  · You are losing weight without trying  · Your symptoms get worse or do not improve with treatment  · You have questions or concerns about your condition or care  Medicines:   · Medicines  are used to decrease stomach acid  Medicine may also be used to help your lower esophageal sphincter and stomach contract (tighten) more  · Take your medicine as directed  Contact your healthcare provider if you think your medicine is not helping or if you have side effects  Tell him of her if you are allergic to any medicine  Keep a list of the medicines, vitamins, and herbs you take  Include the amounts, and when and why you take them  Bring the list or the pill bottles to follow-up visits  Carry your medicine list with you in case of an emergency  Manage GERD:   · Do not have foods or drinks that may increase heartburn  These include chocolate, peppermint, fried or fatty foods, drinks that contain caffeine, or carbonated drinks (soda)  Other foods include spicy foods, onions, tomatoes, and tomato-based foods   Do not have foods or drinks that can irritate your esophagus, such as citrus fruits, juices, and alcohol  · Do not eat large meals  When you eat a lot of food at one time, your stomach needs more acid to digest it  Eat 6 small meals each day instead of 3 large ones, and eat slowly  Do not eat meals 2 to 3 hours before bedtime  · Elevate the head of your bed  Place 6-inch blocks under the head of your bed frame  You may also use more than one pillow under your head and shoulders while you sleep  · Maintain a healthy weight  If you are overweight, weight loss may help relieve symptoms of GERD  · Do not smoke  Smoking weakens the lower esophageal sphincter and increases the risk of GERD  Ask your healthcare provider for information if you currently smoke and need help to quit  E-cigarettes or smokeless tobacco still contain nicotine  Talk to your healthcare provider before you use these products  · Do not wear clothing that is tight around your waist   Tight clothing can put pressure on your stomach and cause or worsen GERD symptoms  Follow up with your healthcare provider as directed:  Write down your questions so you remember to ask them during your visits  © 2017 [facility] Information is for End User's use only and may not be sold, redistributed or otherwise used for commercial purposes  All illustrations and images included in CareNotes® are the copyrighted property of [employer]  or [employer]iss Land[employer]  The above information is an  only  It is not intended as medical advice for individual conditions or treatments  Talk to your doctor, nurse or pharmacist before following any medical regimen to see if it is safe and effective for you

## 2018-02-15 NOTE — ED NOTES
Patient ambulated to bathroom without assistance   Patient states, "I am feeling better and the pain is going away "     Davida Schmidt, DEANNA  02/15/18 [id number]

## 2018-02-16 NOTE — ED PROVIDER NOTES
History  Chief Complaint   Patient presents with    Abdominal Pain     EPIGASTRIC PAIN AND DIARRHEA FOR 3 DAYS  PT IS 26 WEEKS PREGNANT     43 yr female with epigastric pain x3 days  Onset: gradual  Duration: 3 days  Location: epigastric  Frequency: constant, worse after eating  Character: burning aching deep  Severity: moderate/severe  Better with: nothing  Worse with: eating  Tx prior to arrival with: taking tums 3 times a day without relief  Assoc Sx: nausea no vomiting  Burping  "acid reflux" into throat  Also "mushy" not watery diarrhea x 3 days  +sick contact her son with "gi bug" at home currently  +pregnant ~26 weeks  Ob-boschi, MFM-[facility] no bleeding cramping discharge or pain  Has cerclage in since December no acute issues  Saw OB earlier this week for uti  +UTI last week was on keflex finished same  +gastric bypass in 367 no complications since, sees bariatrics annually saw them most recent 1 month ago  Is followed for vitamin levels  No current GERD tx  History provided by:  Patient, significant other and medical records      Prior to Admission Medications   Prescriptions Last Dose Informant Patient Reported? Taking? Multiple Vitamin (MULTIVITAMIN) tablet   Yes Yes   Sig: Take 1 tablet by mouth daily   PROGESTERONE IM   Yes Yes   Sig: Inject 1 mg into the shoulder, thigh, or buttocks once a week    Prenatal Multivit-Min-Fe-FA (PRE-VINAYAK PO)   Yes Yes   Sig: Take 1 tablet by mouth daily   Vitamin D, Cholecalciferol, 1000 units CAPS  Self Yes Yes   Sig: Take 1 tablet by mouth daily   acetaminophen (TYLENOL) 325 mg tablet  Self No Yes   Sig: Take 2 tablets by mouth every 4 (four) hours as needed (pain)   aspirin (ECOTRIN LOW STRENGTH) 81 mg EC tablet  Self Yes Yes   Sig: Take 81 mg by mouth daily   calcium citrate (CALCITRATE) 950 MG tablet  Self Yes Yes   Sig: Take 2 tablets by mouth daily     cyanocobalamin (VITAMIN B-12) 1,000 mcg tablet  Self Yes Yes   Sig: Take 1,000 mcg by mouth daily      Facility-Administered Medications: None       Past Medical History:   Diagnosis Date    Anxiety     History of gastric bypass 2014       Past Surgical History:   Procedure Laterality Date     SECTION      CHOLECYSTECTOMY      GASTRIC BYPASS  2014    IN REVISION OF CERVIX,NON OBSTETRICAL N/A 2017    Procedure: CERCLAGE CERVICAL;  Surgeon: Logan Peng MD;  Location: [facility];  Service: Obstetrics       Family History   Problem Relation Age of Onset    Cancer Mother     Cancer Father      I have reviewed and agree with the history as documented  Social History   Substance Use Topics    Smoking status: Current Some Day Smoker     Packs/day: 0 25    Smokeless tobacco: Never Used    Alcohol use No        Review of Systems   Constitutional: Negative for activity change, appetite change, chills, diaphoresis, fatigue, fever and unexpected weight change  HENT: Negative for congestion, ear pain, postnasal drip, rhinorrhea, sinus pressure and sore throat  Eyes: Negative for pain, discharge and redness  Respiratory: Negative for cough, chest tightness and shortness of breath  Cardiovascular: Negative for chest pain, palpitations and leg swelling  Gastrointestinal: Positive for abdominal pain, diarrhea and nausea  Negative for abdominal distention, anal bleeding, blood in stool, constipation and vomiting  Genitourinary: Negative for decreased urine volume, difficulty urinating, dysuria, flank pain, frequency, hematuria, urgency, vaginal bleeding, vaginal discharge and vaginal pain  Musculoskeletal: Negative for arthralgias, back pain and myalgias  Skin: Negative for color change, rash and wound  Allergic/Immunologic: Negative for immunocompromised state  Neurological: Negative for dizziness, tremors, syncope, weakness, numbness and headaches         Physical Exam  ED Triage Vitals   Temperature Pulse Respirations Blood Pressure SpO2   02/15/18 1353 02/15/18 1355 02/15/18 1353 02/15/18 1355 02/15/18 1355   (!) 97 °F (36 1 °C) 81 18 107/67 100 %      Temp Source Heart Rate Source Patient Position - Orthostatic VS BP Location FiO2 (%)   02/15/18 1353 02/15/18 1355 02/15/18 1355 02/15/18 1355 --   Temporal Right Sitting Right arm       Pain Score       02/15/18 1353       7           Orthostatic Vital Signs  Vitals:    02/15/18 1400 02/15/18 1430 02/15/18 1515 02/15/18 1700   BP: 111/62 103/59 101/62 96/62   Pulse: 82 70 75 68   Patient Position - Orthostatic VS:   Sitting        Physical Exam   Constitutional: She is oriented to person, place, and time  Vital signs are normal  She appears well-developed and well-nourished  Non-toxic appearance  No distress  HENT:   Head: Normocephalic and atraumatic  Right Ear: Tympanic membrane and external ear normal    Left Ear: Tympanic membrane and external ear normal    Nose: Nose normal  No rhinorrhea  Mouth/Throat: Uvula is midline and oropharynx is clear and moist    Eyes: Conjunctivae, EOM and lids are normal  Pupils are equal, round, and reactive to light  Right eye exhibits no discharge  Left eye exhibits no discharge  Neck: Normal range of motion and full passive range of motion without pain  Neck supple  No JVD present  No thyromegaly present  Cardiovascular: Normal rate, regular rhythm, normal heart sounds and intact distal pulses  No murmur heard  Pulmonary/Chest: Effort normal and breath sounds normal  No accessory muscle usage  No tachypnea  No respiratory distress  She has no wheezes  She has no rales  She exhibits no tenderness  Abdominal: Soft  Normal appearance and bowel sounds are normal  She exhibits mass (gravid uterus above umbilicus)  She exhibits no distension  There is no hepatosplenomegaly  There is no tenderness  There is no rebound, no guarding and no CVA tenderness  No hernia     Genitourinary:   Genitourinary Comments:  bpm by doppler   Musculoskeletal: Normal range of motion  She exhibits no edema or tenderness  Lymphadenopathy:     She has no cervical adenopathy  Neurological: She is alert and oriented to person, place, and time  No cranial nerve deficit or sensory deficit  Skin: Skin is warm, dry and intact  Capillary refill takes less than 2 seconds  No rash noted  She is not diaphoretic  No erythema  No pallor  Psychiatric: She has a normal mood and affect  Her speech is normal and behavior is normal    Nursing note and vitals reviewed  ED Medications  Medications   aluminum-magnesium hydroxide-simethicone (MYLANTA) 200-200-20 mg/5 mL oral suspension 15 mL (15 mL Oral Given 2/15/18 5169)   famotidine (PEPCID) injection 20 mg (20 mg Intravenous Given 2/15/18 1459)       Diagnostic Studies  Results Reviewed     Procedure Component Value Units Date/Time    Troponin I [[id number]]  (Normal) Collected:  02/15/18 1423    Lab Status:  Final result Specimen:  Blood from Arm, Right Updated:  02/15/18 1642     Troponin I <0 02 ng/mL     Narrative:         S[employer] Chemistry analyzer 99% cutoff is > 0 04 ng/mL in network labs    o cTnI 99% cutoff is useful only when applied to patients in the clinical setting of myocardial ischemia  o cTnI 99% cutoff should be interpreted in the context of clinical history, ECG findings and possibly cardiac imaging to establish correct diagnosis  o cTnI 99% cutoff may be suggestive but clearly not indicative of a coronary event without the clinical setting of myocardial ischemia  Ramona draw [[id number]] Collected:  02/15/18 1423    Lab Status: In process Specimen:  Blood Updated:  02/15/18 1601    Narrative: The following orders were created for panel order Ramona draw    Procedure                               Abnormality         Status                     ---------                               -----------         ------                     Prateek Garcia on VINNY[[id number]]                            Final result               Gold top on JNXO[[id number]]                                  In process                 Green / Yellow tube on KION[[id number]]                       Final result               Georgeanna Ryan / Black tube on MTCG[[id number]]                        Final result               Lavender Top 3 ml on ESVL[[id number]]                         Final result                 Please view results for these tests on the individual orders  UA w Reflex to Microscopic w Reflex to Culture [[id number]]  (Abnormal) Collected:  02/15/18 1514    Lab Status:  Final result Specimen:  Urine from Urine, Clean Catch Updated:  02/15/18 1521     Color, UA Yellow     Clarity, UA Clear     Specific Gravity, UA 1 015     pH, UA 6 0     Leukocytes, UA Negative     Nitrite, UA Negative     Protein, UA Negative mg/dl      Glucose, UA Negative mg/dl      Ketones, UA Trace (A) mg/dl      Urobilinogen, UA 0 2 E U /dl      Bilirubin, UA Negative     Blood, UA Negative    Comprehensive metabolic panel [[id number]]  (Abnormal) Collected:  02/15/18 1423    Lab Status:  Final result Specimen:  Blood from Arm, Right Updated:  02/15/18 1459     Sodium 139 mmol/L      Potassium 3 9 mmol/L      Chloride 104 mmol/L      CO2 24 mmol/L      Anion Gap 11 mmol/L      BUN 7 mg/dL      Creatinine 0 56 (L) mg/dL      Glucose 83 mg/dL      Calcium 8 5 mg/dL      AST 15 U/L      ALT 24 U/L      Alkaline Phosphatase 69 U/L      Total Protein 7 2 g/dL      Albumin 3 0 (L) g/dL      Total Bilirubin 0 30 mg/dL      eGFR 115 ml/min/1 73sq m     Narrative:         National Kidney Disease Education Program recommendations are as follows:  GFR calculation is accurate only with a steady state creatinine  Chronic Kidney disease less than 60 ml/min/1 73 sq  meters  Kidney failure less than 15 ml/min/1 73 sq  meters      Lipase [[id number]]  (Normal) Collected:  02/15/18 1423    Lab Status:  Final result Specimen:  Blood from Arm, Right Updated:  02/15/18 1459     Lipase 133 u/L     CBC and differential [[id number]]  (Abnormal) Collected:  02/15/18 1423    Lab Status:  Final result Specimen:  Blood from Arm, Right Updated:  02/15/18 1445     WBC 8 11 Thousand/uL      RBC 3 93 Million/uL      Hemoglobin 11 4 (L) g/dL      Hematocrit 34 5 (L) %      MCV 88 fL      MCH 29 0 pg      MCHC 33 0 g/dL      RDW 13 0 %      MPV 9 6 fL      Platelets 951 Thousands/uL      Neutrophils Relative 80 (H) %      Lymphocytes Relative 14 %      Monocytes Relative 5 %      Eosinophils Relative 1 %      Basophils Relative 0 %      Neutrophils Absolute 6 48 Thousands/µL      Lymphocytes Absolute 1 15 Thousands/µL      Monocytes Absolute 0 42 Thousand/µL      Eosinophils Absolute 0 05 Thousand/µL      Basophils Absolute 0 01 Thousands/µL                  No orders to display              Procedures  ECG 12 Lead Documentation  Date/Time: 2/15/2018 4:37 PM  Performed by: Bianca Pratt  Authorized by: Bianca Pratt     Indications / Diagnosis:  Epigastric pain  ECG reviewed by me, the ED Provider: yes    Patient location:  ED  Rate:     ECG rate:  67  Rhythm:     Rhythm: sinus rhythm    Ectopy:     Ectopy: none    QRS:     QRS axis:  Normal  Conduction:     Conduction: normal    ST segments:     ST segments:  Normal  T waves:     T waves: normal               Phone Contacts  ED Phone Contact    ED Course  ED Course as of Feb 18 1347   Thu Feb 15, 2018   1431  bpm    1525 WBC: 8 11   1525 Hemoglobin: (!) 11 4   1525 Hematocrit: (!) 34 5   1525 Platelets: 517   9316 Sodium: 139   1525 Potassium: 3 9   1525 Chloride: 104   1525 CO2: 24   1525 Anion Gap: 11   1525 BUN: 7   1525 Creatinine: (!) 0 56   1525 Glucose: 83   1525 AST: 15   1525 ALT: 24   1525 Alkaline Phosphatase: 69   1525 eGFR: 115   1525 Total Bilirubin: 0 30   1525 Lipase: 133   1525 Color, UA: Yellow   1525 Leukocytes, UA: Negative   1525 Nitrite, UA: Negative   1525 Ketones, UA: (!) Trace   1525 Bilirubin, UA: Negative   1525 Blood, UA: Negative   1610 Reassess patient reports sx relief with mylanta and pepcid    1610 Paged [facility] to discuss case and followup    1649 Troponin I: <0 02   1649 Spoke with office staff @ [facility] dr Ericka Sims not in office today, given pager # for dr Harman Holley 21  I spoke with dr Jerry Galloway on-call for [facility] reviewed clinical presentation, tx with gi cocktail and pepcid, normal labs, tolerating PO well, no lower abdominal pain bleeding, normal FHT and baby movement  Plan for d/c with PO pepcid x 2 weeks  Regular f/u with OB as well as MFM and bariatrics            HEART Risk Score    Flowsheet Row Most Recent Value   History  0 Filed at: 02/15/2018 1347   ECG  0 Filed at: 02/15/2018 1347   Age  0 Filed at: 02/15/2018 1347   Risk Factors  0 Filed at: 02/15/2018 1347   Troponin  0 Filed at: 02/15/2018 1347   Heart Score Risk Calculator   History  0 Filed at: 02/15/2018 1347   ECG  0 Filed at: 02/15/2018 1347   Age  0 Filed at: 02/15/2018 1347   Risk Factors  0 Filed at: 02/15/2018 1347   Troponin  0 Filed at: 02/15/2018 1347   HEART Score  0 Filed at: 02/15/2018 1347   HEART Score  0 Filed at: 02/15/2018 1347                            MDM  Number of Diagnoses or Management Options  Acid reflux: new and requires workup  Pregnant:      Amount and/or Complexity of Data Reviewed  Clinical lab tests: reviewed and ordered  Review and summarize past medical records: yes ([facility] and bariatrics visit notes)  Discuss the patient with other providers: yes    Patient Progress  Patient progress: stable    CritCare Time    Disposition  Final diagnoses:   Pregnant   Acid reflux     Time reflects when diagnosis was documented in both MDM as applicable and the Disposition within this note     Time User Action Codes Description Comment    2/15/2018  4:56 PM Mirian Sky [K29 70] Gastritis     2/15/2018  4:56 PM Magnolia Mcclain [J73 30] Pregnant     2/15/2018  4:56 PM Levraine Sendy [H76 68] Pregnant     2/15/2018  4:56 PM Ottoniel Vargas [K29 70] Gastritis     2/15/2018  4:56 PM German Bennett Add [K21 9] Acid reflux       ED Disposition     ED Disposition Condition Comment    Discharge  Brielle Elizabeth discharge to home/self care  Condition at discharge: Good        Follow-up Information     Follow up With Specialties Details Why Sharif Way MD Obstetrics and Gynecology Schedule an appointment as soon as possible for a visit in 1 day ER followup, OB followup  [facility] [address]  [phone]          Discharge Medication List as of 2/15/2018  5:06 PM      START taking these medications    Details   aluminum-magnesium hydroxide-simethicone (MAALOX MAX) 400-400-40 MG/5ML suspension Take 10 mL by mouth every 6 (six) hours as needed for indigestion or heartburn, Starting Thu 2/15/2018, Normal      famotidine (PEPCID) 20 mg tablet Take 1 tablet (20 mg total) by mouth 2 (two) times a day, Starting Thu 2/15/2018, Normal         CONTINUE these medications which have NOT CHANGED    Details   acetaminophen (TYLENOL) 325 mg tablet Take 2 tablets by mouth every 4 (four) hours as needed (pain), Starting u 2017, Print      aspirin (ECOTRIN LOW STRENGTH) 81 mg EC tablet Take 81 mg by mouth daily, Historical Med      calcium citrate (CALCITRATE) 950 MG tablet Take 2 tablets by mouth daily  , Historical Med      cyanocobalamin (VITAMIN B-12) 1,000 mcg tablet Take 1,000 mcg by mouth daily, Historical Med      Multiple Vitamin (MULTIVITAMIN) tablet Take 1 tablet by mouth daily, Historical Med      Prenatal Multivit-Min-Fe-FA (PRE- PO) Take 1 tablet by mouth daily, Historical Med      PROGESTERONE IM Inject 1 mg into the shoulder, thigh, or buttocks once a week , Historical Med      Vitamin D, Cholecalciferol, 1000 units CAPS Take 1 tablet by mouth daily, Historical Med           No discharge procedures on file      ED Provider  Electronically Signed by           Michel Felix PA-C  [phone]

## 2018-02-18 LAB
ATRIAL RATE: 67 BPM
P AXIS: 15 DEGREES
PR INTERVAL: 182 MS
QRS AXIS: 49 DEGREES
QRSD INTERVAL: 94 MS
QT INTERVAL: 394 MS
QTC INTERVAL: 416 MS
T WAVE AXIS: 41 DEGREES
VENTRICULAR RATE: 67 BPM

## 2018-02-18 PROCEDURE — 93010 ELECTROCARDIOGRAM REPORT: CPT | Performed by: INTERNAL MEDICINE

## 2018-02-19 ENCOUNTER — HOSPITAL ENCOUNTER (OUTPATIENT)
Facility: HOSPITAL | Age: 43
Discharge: HOME | End: 2018-02-19
Attending: SPECIALIST | Admitting: SPECIALIST
Payer: COMMERCIAL

## 2018-02-19 VITALS
RESPIRATION RATE: 16 BRPM | OXYGEN SATURATION: 98 % | DIASTOLIC BLOOD PRESSURE: 69 MMHG | BODY MASS INDEX: 30.16 KG/M2 | TEMPERATURE: 98.5 F | WEIGHT: 181 LBS | SYSTOLIC BLOOD PRESSURE: 104 MMHG | HEART RATE: 77 BPM | HEIGHT: 65 IN

## 2018-02-19 DIAGNOSIS — Z3A.26 26 WEEKS GESTATION OF PREGNANCY: ICD-10-CM

## 2018-02-19 PROCEDURE — 76817 TRANSVAGINAL US OBSTETRIC: CPT | Performed by: OBSTETRICS & GYNECOLOGY

## 2018-02-19 PROCEDURE — 99214 OFFICE O/P EST MOD 30 MIN: CPT

## 2018-02-20 NOTE — PROCEDURES
Laney Sanzpaul, mariella [id number] at 26w1d with an RENEE of 5/27/2018, by Ultrasound, was seen at [facility] for the following procedure(s): $Procedure Type: US - Transvaginal]                   Ultrasound Other  Fetal Presentation: Vertex  Cervical Length: 1 32-1 59  Funnel: No  Debris: No  Placenta Previa: No  Vasa Previa: No           Vertex presentation  Good fetal movement        Raysa Orr DO  OB/GYN, PGY2  2/19/2018, 10:15 PM

## 2018-02-20 NOTE — PROGRESS NOTES
L&D Triage Note - OB/GYN  Tonya Chiu 43 y o  female MRN: [id number]  Unit/Bed#: LD Triage  Encounter: [id number]      Assessment:  43 y o  [id number] at 26w1d, not in  labor, with abdominal pain  Known shortened cervix    Plan:  1  Signs and symptoms of labor reviewed with pt with precautions to return  Pt demonstrated understanding  2  Shortened cervix: Stable on TVUS today  Discussed stable finding with patient and attending  Decision was made to refrain from betamethasone administration given pt stable status without evidence of delivery within the next 7 days  Pt agreeable  3  UCx and GBS obtained today, results pending  4  Encouraged pelvic rest    5  Keep regularly scheduled prenatal appointments  6  Discharge to home    Discussed with Dr Flora Puente      ______________________________________________________________________      Chief Compliant: I had a lot of abdominal pain today    TIME: 190  Subjective:  43 y o  [id number] at 26w1d presents to triage with concern for  labor  Pt reports that at approximately 1000 or 1100 she started feeling intermittent abdominal tightening  She reports this occurred about once per hour until arrival to the hospital  It is described as a tightening that is occasionally painful, but usually just uncomfortable  She also occasionally feels pain in her groin associated with fetal movement  She is extremely anxious given her history of  delivery and cerclage placement this pregnancy in December  Pt does report recent intercourse yesterday evening  Endorses good fetal movement, "She's been moving like crazy all day today"  Denies LOF, VB  Complications in this pregnancy include: hx  delivery  Known shortened cervix this pregnancy with cerclage placement in December  Advanced maternal age  Tobacco abuse  Hx gastric bypass surgery        Objective:  Vitals:    18 1836   BP: 104/69   Pulse: 77   Resp: 16   Temp: 98 5 °F (36 9 °C)   SpO2: Physical Exam:    SVE: 0 / 50% / -4, cerclage easily palpated  FHT:  140 / Moderate 6 - 25 bpm / +accels, -decels  [place]: Quiet    SSE: Moderate amount of thin white discharge within the vaginal vault  No blood  Cerclage easily visualized and appears to be under no tension  Appears appropriately placed  Cervix visually closed  Wet [place]/KOH: Negative clue cells, trichomonads, hyphae  TVUS: Please see fetal testing note  Cervical length stable from previous examinations    Gen:  AaOx3, NAD, pleasant  Pulm: No increased work of breathing  Abd: Gravid, nontender, excess skin  Extremities: No edema, nontender, Negative Veronica's bilaterally        Araceli Covarrubias DO 2/19/2018 9:55 PM

## 2018-02-26 NOTE — PROGRESS NOTES
2018         RE: Jose Bell                                 To: DAVID Talavera    MR#: [id number]   : BERT Bishop 139: [id number]                             Fax: [phone]   (Exam #: [id number])      The LMP of this 43year old,  [facility], P1-1-4-2 patient was unknown, her   working RENEE is MAY 32 2018 and the current gestational age is 25 weeks 4   days by [address] [place] [address]  A sonographic examination was performed on 2018 using real time equipment  The ultrasound examination was   performed using abdominal technique  The patient has a BMI of 29 7  Her   blood pressure today was 109/54  Earliest US on record:  10/23/18   9w1d   18 RENEE      Problem list   1  Advanced maternal age of 37  NIPT was normal   2  History of prior 35 week  birth after a cerclage was placed for   a short cervix at 22 weeks  She had a prophylactic cerclage placed   18 and is on weekly [place] given to her by her   3   History of prior Iglesia-en-Y    4   History of a  x2   5  Prior 16 week  delivery   6  History of preeclampsia and macrosomia in her 1st baby at term  She   is on baby aspirin daily   7  CPC cysts noted at her 19 and 20 week scan   8    Post cerclage her cervix was short at 1 55 cms at 19 weeks      Cardiac motion was observed at 163 bpm       INDICATIONS      advanced maternal age   previous  delivery   prior bariatric surgery   cerclage in place   missed anatomy follow up      Exam Types      Level I      RESULTS      Fetus # 1 of 1   Breech presentation   Placenta Location = Posterior, fundal   No placenta previa   Placenta Grade = II      AMNIOTIC FLUID         Largest Vertical Pocket = 5 1 cm   Amniotic Fluid: Normal      CERVICAL EVALUATION      CERCLAGE     Type of Cerclage: [place]        GA of Placement: 16 0 weeks            Elective?: Yes      SUPINE      Cervical Length: 1 90 cm            Upper Cervix: 0 98 cm Lower Cervix: 1 24 cm      OTHER TEST RESULTS           Funneling?: No             Dynamic Changes?: No        Resp  To TFP?: No                      Debris?: No      ANATOMY DETAILS      Abnormal:   HEAD: (Choroid Plexus)      ANATOMY COMMENTS      The prior fetal anatomic survey was limited in the area of the  cavum and   the fetal spine which were seen today and appear normal   this completes   the fetal anatomical survey  Today the left-sided choroid plexus cyst is   not seen but there is one seen on the right side  IMPRESSION      Arcos IUP   20 weeks and 4 days by 1st Tri Sono  (RENEE=MAY 27 2018)   Breech presentation   Regular fetal heart rate of 163 bpm   Choroid plexus cyst   Posterior, fundal placenta   No placenta previa      GENERAL COMMENT      Thank you for allowing me to participate in the care of your patient  Ms Rebeca Dunham was seen today for  a repeat cervix length  to see if her cervix   length is decreasing further  Her cervix  today measured 1 8 centimeters   which is reassuring and very stable compared to a prior ultrasound one   week ago  The father of the baby is still anxious and requested   consideration for steroids at viability  He reports his research showed   that steroids can be given at 21 weeks  I explained that I have not seen   the research he mentioned  I quickly tried a Google search after he left   my office and found no articles suggesting steroids at 21 weeks  The patient was informed of the findings and counseled about the   limitations of the exam in detecting all forms of fetal congenital   abnormalities  She denies any vaginal bleeding or uterine cramping/contractions  She does   feel fetal movement  Exam shows she is comfortable and her abdomen is non tender  No   contractions were appreciated  Follow up recommended:   1   Since her cervix is stable over the last week but the FOB is anxious, I   recommended we review her cervix length one more time at 23 weeks to allow   me to reassure them  It would be reasonable to give steroids at 23-24   weeks if they would like and then only repeat them for one more course if   she develops signs of  labor  2   Recommend a follow-up growth scan at 29 and 34 weeks secondary to her   age and prior history of a Iglesia-en-Y    3   I reviewed the signs and symptoms of  labor and when to contact   her OB office  4   Recommend continuing her weekly [place] locally  The face to face time, in addition to time spent discussing ultrasound   results, was approximately 25 minutes, greater than 50% of which was spent   during counseling and coordination of care  KRISTINA Villagran M D     Maternal-Fetal Medicine   Electronically signed 18 21:48

## 2018-03-08 ENCOUNTER — ULTRASOUND (OUTPATIENT)
Dept: PERINATAL CARE | Facility: CLINIC | Age: 43
End: 2018-03-08
Payer: COMMERCIAL

## 2018-03-08 VITALS
WEIGHT: 185.4 LBS | HEIGHT: 65 IN | DIASTOLIC BLOOD PRESSURE: 62 MMHG | BODY MASS INDEX: 30.89 KG/M2 | HEART RATE: 88 BPM | SYSTOLIC BLOOD PRESSURE: 119 MMHG

## 2018-03-08 DIAGNOSIS — O34.30: ICD-10-CM

## 2018-03-08 DIAGNOSIS — F17.200 SMOKER: ICD-10-CM

## 2018-03-08 DIAGNOSIS — O09.523 ELDERLY MULTIGRAVIDA IN THIRD TRIMESTER: Primary | ICD-10-CM

## 2018-03-08 DIAGNOSIS — O99.842 BARIATRIC SURGERY STATUS COMPLICATING PREGNANCY, SECOND TRIMESTER: ICD-10-CM

## 2018-03-08 DIAGNOSIS — Z3A.28 28 WEEKS GESTATION OF PREGNANCY: ICD-10-CM

## 2018-03-08 PROCEDURE — 76816 OB US FOLLOW-UP PER FETUS: CPT | Performed by: OBSTETRICS & GYNECOLOGY

## 2018-03-08 PROCEDURE — 99212 OFFICE O/P EST SF 10 MIN: CPT | Performed by: OBSTETRICS & GYNECOLOGY

## 2018-03-08 NOTE — PATIENT INSTRUCTIONS

## 2018-03-20 LAB — EXTERNAL GROUP B STREP ANTIGEN: NEGATIVE

## 2018-03-24 ENCOUNTER — HOSPITAL ENCOUNTER (OUTPATIENT)
Facility: HOSPITAL | Age: 43
Discharge: HOME | End: 2018-03-25
Attending: SPECIALIST | Admitting: SPECIALIST
Payer: COMMERCIAL

## 2018-03-24 VITALS
HEIGHT: 65 IN | DIASTOLIC BLOOD PRESSURE: 73 MMHG | SYSTOLIC BLOOD PRESSURE: 119 MMHG | WEIGHT: 190 LBS | HEART RATE: 70 BPM | BODY MASS INDEX: 31.65 KG/M2 | TEMPERATURE: 98.2 F | OXYGEN SATURATION: 100 % | RESPIRATION RATE: 18 BRPM

## 2018-03-24 PROBLEM — Z3A.30 30 WEEKS GESTATION OF PREGNANCY: Status: ACTIVE | Noted: 2018-03-24

## 2018-03-25 PROCEDURE — 99214 OFFICE O/P EST MOD 30 MIN: CPT

## 2018-03-25 PROCEDURE — 76815 OB US LIMITED FETUS(S): CPT | Performed by: OBSTETRICS & GYNECOLOGY

## 2018-03-25 NOTE — DISCHARGE INSTRUCTIONS
Early Labor Signs   WHAT YOU SHOULD KNOW:   Early labor signs are changes in your body that allow your baby to pass through your birth canal   AFTER YOU LEAVE:   Signs and symptoms of early labor:   · Lightening  occurs when your baby drops inside your pelvis  You may feel increased pressure in your pelvis  This may happen a few weeks to a few hours before your labor begins  · Contractions  are cramps and tightening that occur in your uterus to help move the baby through your birth canal  Contractions occur regularly and more often each time  Each one lasts about 30 to 70 seconds, and gets stronger and more painful until you deliver your baby  Contractions do not go away with movement  They start in your lower back and move to the front in your abdomen  · Effacement  occurs when your cervix softens and thins, so it can easily open for the baby  Your primary healthcare provider [facility] or obstetrician will examine your cervix for effacement  · Dilation  is widening of your cervix, also for the baby's passage  Your PHP or obstetrician will examine your cervix for dilation  Your cervix will be fully opened and ready for delivery when it is dilated to 10 centimeters  · Increased discharge  from your vagina may occur  It may be pink, clear, or slightly bloody  This discharge may also be called bloody show  Bloody show is a mucus plug that forms and blocks your cervix during pregnancy  · Rupture of membranes  is a sudden release of clear fluid from your vagina  It is also known as when your water breaks  Your PHP or obstetrician may need to break your water if it does not break on its own  False labor: You may have false labor signs, which are also called Tyson Kowalski contractions  False labor is common and may happen several weeks or days before your actual labor  The contractions are not regular, and do not get closer together  The pain is usually mild, does not worsen, and is felt only in front  Tyson Kowalski contractions may happen later in the day, and stop after you change position, walk, or rest   Contact your PHP or obstetrician if:   · You have pain in your lower back or abdomen  · You have bloody mucus or show  · You have questions or concerns about your condition or care  Seek care immediately or call 911 if:   · You have regular, painful contractions that are less than 5 minutes apart, and last 30 to 70 seconds each  · You have heavy vaginal bleeding  · You have a constant trickle or sudden gush of clear fluid from your vagina  · You notice a sudden decrease in your baby's movement  © 2014 [address] is for End User's use only and may not be sold, redistributed or otherwise used for commercial purposes  All illustrations and images included in CareNotes® are the copyrighted property of [employer]  or Chriss Landers  The above information is an  only  It is not intended as medical advice for individual conditions or treatments  Talk to your doctor, nurse or pharmacist before following any medical regimen to see if it is safe and effective for you

## 2018-03-25 NOTE — PROGRESS NOTES
Triage Note - OB  Tino Sheriff 37 y o  female MRN: [id number]  Unit/Bed#:  PACU- Encounter: [id number]    OB TRIAGE NOTE  Tino Sheriff  [id number]  3/25/2018  7:56 PM  LD PACU/LD PACU-    ASSESS:  37 y o  [id number] 30w6d, not in  labor    PLAN  #1  Rule out  labor:   Patient is not in  labor  Cervical length of 1 31cm is consistent with most recent cervical length of 1 32-1 59cm on   Cervix closed upon digital exam  Patient was kept for 2 hour recheck but felt that her back pain was improving and no longer felt her contractions  She declined a two hour SVE  Decision was made to refrain from BTM administration given stable cervix  #2  Discharge:   Patient to follow up with Dr Vanda Norwood for regularly scheduled appointments  Encouraged to call with worsening CTX, Vb, LOF, decreased fetal movement  D/w Dr Vanda Norwood  ______________    SUBJECTIVE    RENEE: Estimated Date of Delivery: 18    HPI Chronology:  37 y o  [id number] 30w6d presents with pelvic cramping and back pain  She has had intermittent contractions throughout the day but feels that her back pain has worsened  She is anxious about this pregnancy, given her shortened cervix and cerclage in place, and would like to be evaluated for  labor  She denies leakage of fluid, vaginal bleeding, decreased fetal movement  Her pregnancy is complicated by a history of prior 35 week  birth after cerclage was placed for shortened cervix at 22 weeks  She had a prophylactic cerclage placed on 18 and is on weekly [place]  She also has a history of prior 16 week  delivery  Other complications include advanced maternal age at 37, history of prior Iglesia-en-Y, history of  delivery x 2, history of preeclampsia and macrosomia in 1st baby, and CPC cysts noted on  week scan      Last cervical length completed on  was 1 32-1 59cm       Vitals:   /73 (BP Location: Left arm)   Pulse 70   Temp 98 2 °F (36 8 °C) (Oral)   Resp 18   Ht 5' 4 5" (1 638 m)   Wt 86 2 kg (190 lb)   LMP 05/23/2017   SpO2 100%   BMI 32 11 kg/m²   Body mass index is 32 11 kg/m²  Review of Systems   Constitutional: Negative  Respiratory: Negative  Cardiovascular: Negative  Genitourinary: Positive for pelvic pain  Physical Exam   Constitutional: She is oriented to person, place, and time  She appears well-developed and well-nourished  Cardiovascular: Normal rate, regular rhythm and normal heart sounds  Pulmonary/Chest: Effort normal and breath sounds normal    Abdominal: Soft  Bowel sounds are normal    Gravid uterus   Neurological: She is alert and oriented to person, place, and time  Vitals reviewed  TVUS:  1 31cm, funneling noted  No debris, no placenta previa or vasa previa    SSE:  Cerclage in place  Mild physiologic leukorrhea noted  KOH/wet mount neg for clue cells, trichomonads, hyphae  Dry slide negative for arborization    SVE:  Closed    FHT:  Baseline Rate: 135 bpm  Variability: Moderate 6-25 bpm  Accelerations: 15 x 15 or greater  TOCO:   Contraction Frequency (minutes): absent    Labs: No results found for this or any previous visit (from the past 24 hour(s))  Lab, Imaging and other studies: I have personally reviewed pertinent reports          Vera Hoskins MD  3/25/2018  7:56 PM

## 2018-03-25 NOTE — PROCEDURES
Luna Chan, a [id number] at 31w0d with an RENEE of 5/27/2018, by Ultrasound, was seen at [address] for the following procedure(s): $Procedure Type: US - abdominal]                   Ultrasound Other  Cervical Length: 1 31  Funnel: Yes  Debris: No  Placenta Previa: No  Vasa Previa: No

## 2018-03-30 ENCOUNTER — HOSPITAL ENCOUNTER (OUTPATIENT)
Facility: HOSPITAL | Age: 43
Discharge: HOME | End: 2018-03-30
Attending: SPECIALIST | Admitting: SPECIALIST
Payer: COMMERCIAL

## 2018-03-30 VITALS
HEART RATE: 64 BPM | BODY MASS INDEX: 32.44 KG/M2 | SYSTOLIC BLOOD PRESSURE: 109 MMHG | WEIGHT: 190 LBS | RESPIRATION RATE: 16 BRPM | HEIGHT: 64 IN | TEMPERATURE: 98.2 F | DIASTOLIC BLOOD PRESSURE: 66 MMHG

## 2018-03-30 PROCEDURE — 99203 OFFICE O/P NEW LOW 30 MIN: CPT

## 2018-03-30 PROCEDURE — 76817 TRANSVAGINAL US OBSTETRIC: CPT | Performed by: STUDENT IN AN ORGANIZED HEALTH CARE EDUCATION/TRAINING PROGRAM

## 2018-03-30 RX ORDER — ACETAMINOPHEN 325 MG/1
650 TABLET ORAL EVERY 6 HOURS PRN
Status: DISCONTINUED | OUTPATIENT
Start: 2018-03-30 | End: 2018-03-30 | Stop reason: HOSPADM

## 2018-03-30 RX ORDER — BETAMETHASONE SODIUM PHOSPHATE AND BETAMETHASONE ACETATE 3; 3 MG/ML; MG/ML
12 INJECTION, SUSPENSION INTRA-ARTICULAR; INTRALESIONAL; INTRAMUSCULAR; SOFT TISSUE EVERY 24 HOURS
Status: DISCONTINUED | OUTPATIENT
Start: 2018-03-30 | End: 2018-03-30 | Stop reason: HOSPADM

## 2018-03-30 RX ADMIN — BETAMETHASONE SODIUM PHOSPHATE AND BETAMETHASONE ACETATE 12 MG: 3; 3 INJECTION, SUSPENSION INTRA-ARTICULAR; INTRALESIONAL; INTRAMUSCULAR at 18:39

## 2018-03-30 RX ADMIN — ACETAMINOPHEN 650 MG: 325 TABLET, FILM COATED ORAL at 18:39

## 2018-03-30 RX ADMIN — SODIUM CHLORIDE, SODIUM LACTATE, POTASSIUM CHLORIDE, AND CALCIUM CHLORIDE 1000 ML: .6; .31; .03; .02 INJECTION, SOLUTION INTRAVENOUS at 18:40

## 2018-03-30 NOTE — PROGRESS NOTES
L&D Triage Note - OB/GYN  Alejandro Guardian 37 y o  female MRN: [id number]  Unit/Bed#: L&D 329-02 Encounter: [id number]      Assessment:  37 y o  [id number] at 31w5d, dehydration vs   contractions vs   labor  Hx  labor  S/p Luan-en-Y gastric bypass    Plan:  1  Pt reports minimal PO intake today  Will provide 1L bolus of LR  2  Reevaluate in 1-2hr, recheck cervix if pt continues to be uncomfortable  3  Per Dr Raul Lilly, will give first dose of betamethasone tonight and second dose tomorrow given her history of  delivery despite cerclage placement and increasing discomfort    Discussed with Dr Raul Lilly      ______________________________________________________________________      Chief Compliant: I've been having contractions all day    TIME: 1630  Subjective:  37 y o  [id number] at 31w5d presents to triage with concern for  labor  Pt reports that she has been pauline since last night  She reports that they were irregular in both frequency and intensity  She remembers having two "big ones" around 1030 today, but since then they have been getting increasingly uncomfortable and frequent  She thinks they are q5-7m currently  She reports she "wasn't feeling all that great earlier" and has only had "a half a bottle of water and a few sips of iced tea today"  Endorses good fetal movement, but notes decrease in movement around time of contractions  Denies LOF, VB    Complications in this pregnancy include:  - AMA  - Hx  delivery at 35w with cerclage  - Hx luan-en-y  - Cerclage placed 2017 for short cervix      Objective:  Vitals:    18 1614   BP: 108/66   Pulse: 74   Resp: 16   Temp: 98 2 °F (36 8 °C)       Physical Exam:    SVE: Closed / Thin but difficult to evaluate / -2  FHT:  135 / Moderate 6 - 25 bpm / +accels, -decels  [place]: Irritable  SSE: Cervix well visualized  Cerclage in place and appears to be under no tension  No bleeding visualized within the vault   Cervix visually closed  Small amount of physiologic appearing discharge within the vault  Negative pooling, ferning, nitrazine  Wet Mount/KOH: Abundant leukocytes present  No clue cells, trichomonads, or hyphae appreciated  UDip: Trace leuks, negative nitrites, specific gravity 1 010  TVUS: Please see fetal testing note written by Dr Justyna Rand DO 3/30/2018 6:02 PM     Addendum:    After patient received 2L of fluid, she reported feeling better  After speaking again with Dr Sanya Berry, patient is ok for discharge at this time  Will receive second dose of BTM tomorrow evening, 24 hours after the first dose      Iesha Harris MD  03/30/18  8:50 PM

## 2018-03-30 NOTE — PROCEDURES
Jose D Matos, a [id number] at 31w5d with an RENEE of 5/27/2018, by Ultrasound, was seen at [facility] for the following procedure(s): $Procedure Type: US - Transvaginal]    Ultrasound Other  Fetal Presentation: Breech  Cervical Length: 1 21  Funnel: Yes  Funnel Measurement: 0 44  Debris: No    Completed with Dr Maru Pacheco MD  03/30/18  6:14 PM

## 2018-03-31 ENCOUNTER — HOSPITAL ENCOUNTER (OUTPATIENT)
Facility: HOSPITAL | Age: 43
Discharge: HOME | End: 2018-03-31
Attending: SPECIALIST | Admitting: SPECIALIST
Payer: COMMERCIAL

## 2018-03-31 PROCEDURE — 96372 THER/PROPH/DIAG INJ SC/IM: CPT

## 2018-03-31 RX ORDER — BETAMETHASONE SODIUM PHOSPHATE AND BETAMETHASONE ACETATE 3; 3 MG/ML; MG/ML
12 INJECTION, SUSPENSION INTRA-ARTICULAR; INTRALESIONAL; INTRAMUSCULAR; SOFT TISSUE ONCE
Status: COMPLETED | OUTPATIENT
Start: 2018-03-31 | End: 2018-03-31

## 2018-03-31 RX ADMIN — BETAMETHASONE SODIUM PHOSPHATE AND BETAMETHASONE ACETATE 12 MG: 3; 3 INJECTION, SUSPENSION INTRA-ARTICULAR; INTRALESIONAL; INTRAMUSCULAR at 14:30

## 2018-04-05 ENCOUNTER — ULTRASOUND (OUTPATIENT)
Dept: PERINATAL CARE | Facility: CLINIC | Age: 43
End: 2018-04-05
Payer: COMMERCIAL

## 2018-04-05 VITALS
SYSTOLIC BLOOD PRESSURE: 108 MMHG | BODY MASS INDEX: 32.87 KG/M2 | WEIGHT: 192.5 LBS | DIASTOLIC BLOOD PRESSURE: 70 MMHG | HEART RATE: 86 BPM | HEIGHT: 64 IN

## 2018-04-05 DIAGNOSIS — F17.200 SMOKER: ICD-10-CM

## 2018-04-05 DIAGNOSIS — Z3A.28 28 WEEKS GESTATION OF PREGNANCY: ICD-10-CM

## 2018-04-05 DIAGNOSIS — Z87.51 HISTORY OF PRETERM DELIVERY: ICD-10-CM

## 2018-04-05 DIAGNOSIS — Z3A.32 32 WEEKS GESTATION OF PREGNANCY: ICD-10-CM

## 2018-04-05 DIAGNOSIS — O34.30: ICD-10-CM

## 2018-04-05 DIAGNOSIS — O99.842 BARIATRIC SURGERY STATUS COMPLICATING PREGNANCY, SECOND TRIMESTER: ICD-10-CM

## 2018-04-05 DIAGNOSIS — O09.523 ELDERLY MULTIGRAVIDA IN THIRD TRIMESTER: Primary | ICD-10-CM

## 2018-04-05 PROCEDURE — 76816 OB US FOLLOW-UP PER FETUS: CPT | Performed by: OBSTETRICS & GYNECOLOGY

## 2018-04-05 PROCEDURE — 99212 OFFICE O/P EST SF 10 MIN: CPT | Performed by: OBSTETRICS & GYNECOLOGY

## 2018-04-06 ENCOUNTER — HOSPITAL ENCOUNTER (OUTPATIENT)
Facility: HOSPITAL | Age: 43
Discharge: HOME | End: 2018-04-06
Attending: SPECIALIST | Admitting: SPECIALIST
Payer: COMMERCIAL

## 2018-04-06 VITALS
BODY MASS INDEX: 31.82 KG/M2 | RESPIRATION RATE: 18 BRPM | TEMPERATURE: 98.2 F | HEART RATE: 73 BPM | DIASTOLIC BLOOD PRESSURE: 66 MMHG | WEIGHT: 191 LBS | SYSTOLIC BLOOD PRESSURE: 114 MMHG | HEIGHT: 65 IN

## 2018-04-06 PROCEDURE — 99213 OFFICE O/P EST LOW 20 MIN: CPT
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
## 2018-04-07 PROBLEM — Z3A.32 32 WEEKS GESTATION OF PREGNANCY: Status: ACTIVE | Noted: 2018-03-24

## 2018-04-10 ENCOUNTER — TELEPHONE (OUTPATIENT)
Dept: BARIATRICS | Facility: CLINIC | Age: 43
End: 2018-04-10

## 2018-04-10 DIAGNOSIS — K91.2 POSTSURGICAL MALABSORPTION: ICD-10-CM

## 2018-04-10 DIAGNOSIS — Z3A.32 32 WEEKS GESTATION OF PREGNANCY: ICD-10-CM

## 2018-04-10 DIAGNOSIS — O99.842 BARIATRIC SURGERY STATUS COMPLICATING PREGNANCY, SECOND TRIMESTER: Primary | ICD-10-CM

## 2018-04-10 DIAGNOSIS — D64.9 LOW HEMOGLOBIN AND LOW HEMATOCRIT: ICD-10-CM

## 2018-04-10 LAB
25(OH)D3 SERPL-MCNC: 44.29 NG/ML
BASOPHILS # BLD AUTO: 0 X3/UL (ref 0–0.3)
BASOPHILS # BLD AUTO: 0.3 % (ref 0–2)
CALCIUM SERPL-MCNC: 8.8 MG/DL (ref 8.6–10.5)
DEPRECATED RDW RBC AUTO: 14.8 % (ref 11.5–14.5)
EOSINOPHIL # BLD AUTO: 0.1 X3/UL (ref 0–0.5)
EOSINOPHIL NFR BLD AUTO: 0.9 % (ref 0–5)
FERRITIN SERPL-MCNC: 6.2 NG/ML (ref 22–322)
HCT VFR BLD AUTO: 31.3 % (ref 37–47)
HGB BLD-MCNC: 10.2 G/DL (ref 12–16)
IRON SERPL-MCNC: 39 UG/DL (ref 50–212)
LYMPHOCYTES # BLD AUTO: 1.3 X3/UL (ref 1.2–4.2)
LYMPHOCYTES NFR BLD AUTO: 14.7 % (ref 20.5–51.1)
MCH RBC QN AUTO: 26 PG (ref 26–34)
MCHC RBC AUTO-ENTMCNC: 32.5 G/DL (ref 31–36)
MCV RBC AUTO: 80.1 FL (ref 81–99)
MONOCYTES # BLD AUTO: 0.6 X3/UL (ref 0–1)
MONOCYTES NFR BLD AUTO: 6.4 % (ref 1.7–12)
NEUTROPHILS # BLD AUTO: 6.7 X3/UL (ref 1.4–6.5)
NEUTS SEG NFR BLD AUTO: 77.7 % (ref 42.2–75.2)
PLATELET # BLD AUTO: 269 X3/UL (ref 130–400)
PMV BLD AUTO: 8.2 FL (ref 8.6–11.7)
RBC # BLD AUTO: 3.91 X6/UL (ref 3.9–5.2)
VIT B12 SERPL-MCNC: 370 PG/ML (ref 180–914)
WBC # BLD AUTO: 8.7 X3/UL (ref 4.8–10.8)

## 2018-04-12 ENCOUNTER — HOSPITAL ENCOUNTER (OUTPATIENT)
Facility: HOSPITAL | Age: 43
Discharge: HOME | End: 2018-04-12
Attending: SPECIALIST | Admitting: SPECIALIST
Payer: COMMERCIAL

## 2018-04-12 VITALS
SYSTOLIC BLOOD PRESSURE: 118 MMHG | WEIGHT: 193 LBS | RESPIRATION RATE: 16 BRPM | BODY MASS INDEX: 32.15 KG/M2 | HEART RATE: 85 BPM | TEMPERATURE: 98.6 F | HEIGHT: 65 IN | DIASTOLIC BLOOD PRESSURE: 74 MMHG

## 2018-04-12 PROCEDURE — 99213 OFFICE O/P EST LOW 20 MIN: CPT

## 2018-04-12 PROCEDURE — 76815 OB US LIMITED FETUS(S): CPT | Performed by: OBSTETRICS & GYNECOLOGY

## 2018-04-13 NOTE — PROCEDURES
Christopher Mendez, a [id number] at 33w4d with an RENEE of 5/27/2018, by Ultrasound, was seen at [address] [facility] for the following procedure(s): $Procedure Type: BEKA]         4 Quadrant BEKA  BEKA Q1 (cm): 3 6 cm  BEKA Q2 (cm): 6 cm  BEKA Q3 (cm): 6 5 cm  BEKA Q4 (cm): 2 9 cm  BEKA TOTAL (cm): 19 cm  LVP (cm): 6 5 cm         Ultrasound Other  Fetal Presentation: Joy Armstrong DO

## 2018-04-13 NOTE — DISCHARGE INSTRUCTIONS
Pregnancy at 31 to 34 [address]:   You may continue to have symptoms such as shortness of breath, heartburn, contractions, or swelling of your ankles and feet  You may be gaining about 1 pound a week now  DISCHARGE INSTRUCTIONS:   Seek care immediately if:   · You develop a severe headache that does not go away  · You have new or increased vision changes, such as blurred or spotted vision  · You have new or increased swelling in your face or hands  · You have vaginal spotting or bleeding  · Your water broke or you feel warm water gushing or trickling from your vagina  Contact your healthcare provider if:   · You have more than 5 contractions in 1 hour  · You notice any changes in your baby's movements  · You have abdominal cramps, pressure, or tightening  · You have a change in vaginal discharge  · You have chills or a fever  · You have vaginal itching, burning, or pain  · You have yellow, green, white, or foul-smelling vaginal discharge  · You have pain or burning when you urinate, less urine than usual, or pink or bloody urine  · You have questions or concerns about your condition or care  How to care for yourself at this stage of your pregnancy:   · Eat a variety of healthy foods  Healthy foods include fruits, vegetables, whole-grain breads, low-fat dairy foods, beans, lean meats, and fish  Drink liquids as directed  Ask how much liquid to drink each day and which liquids are best for you  Limit caffeine to less than 200 milligrams each day  Limit your intake of fish to 2 servings each week  Choose fish low in mercury such as canned light tuna, shrimp, salmon, cod, or tilapia  Do not  eat fish high in mercury such as swordfish, tilefish, tomasz mackerel, and shark  · Manage heartburn  by eating 4 or 5 small meals each day instead of large meals  Avoid spicy food  · Manage swelling  by lying down and putting your feet up       · Take prenatal vitamins as directed  Your need for certain vitamins and minerals, such as folic acid, increases during pregnancy  Prenatal vitamins provide some of the extra vitamins and minerals you need  Prenatal vitamins may also help to decrease the risk of certain birth defects  · Talk to your healthcare provider about exercise  Moderate exercise can help you stay fit  Your healthcare provider will help you plan an exercise program that is safe for you during pregnancy  · Do not smoke  If you smoke, it is never too late to quit  Smoking increases your risk of a miscarriage and other health problems during your pregnancy  Smoking can cause your baby to be born too early or weigh less at birth  Ask your healthcare provider for information if you need help quitting  · Do not drink alcohol  Alcohol passes from your body to your baby through the placenta  It can affect your baby's brain development and cause fetal alcohol syndrome (FAS)  FAS is a group of conditions that causes mental, behavior, and growth problems  · Talk to your healthcare provider before you take any medicines  Many medicines may harm your baby if you take them when you are pregnant  Do not take any medicines, vitamins, herbs, or supplements without first talking to your healthcare provider  Never use illegal or street drugs (such as marijuana or cocaine) while you are pregnant  Safety tips during pregnancy:   · Avoid hot tubs and saunas  Do not use a hot tub or sauna while you are pregnant, especially during your first trimester  Hot tubs and saunas may raise your baby's temperature and increase the risk of birth defects  · Avoid toxoplasmosis  This is an infection caused by eating raw meat or being around infected cat feces  It can cause birth defects, miscarriages, and other problems  Wash your hands after you touch raw meat  Make sure any meat is well-cooked before you eat it  Avoid raw eggs and unpasteurized milk   Use gloves or ask someone else to clean your cat's litter box while you are pregnant  Changes that are happening with your baby:  By 34 weeks, your baby may weigh more than 5 pounds  Your baby will be about 12 ½ inches long from the top of the head to the rump (baby's bottom)  Your baby is gaining about ½ pound a week  Your baby's eyes open and close now  Your baby's kicks and movements are more forceful at this time  What you need to know about prenatal care: Your healthcare provider will check your blood pressure and weight  You may also need the following:  · A urine test  may also be done to check for sugar and protein  These can be signs of gestational diabetes or infection  Protein in your urine may also be a sign of preeclampsia  Preeclampsia is a condition that can develop during week 20 or later of your pregnancy  It causes high blood pressure, and it can cause problems with your kidneys and other organs  · A Tdap vaccine  may be recommended by your healthcare provider  · Fundal height  is a measurement of your uterus to check your baby's growth  This number is usually the same as the number of weeks that you have been pregnant  Your healthcare provider may also check your baby's position  · Your baby's heart rate  will be checked  © 2017 [address] Information is for End User's use only and may not be sold, redistributed or otherwise used for commercial purposes  All illustrations and images included in CareNotes® are the copyrighted property of [employer]  or Chriss Landers  The above information is an  only  It is not intended as medical advice for individual conditions or treatments  Talk to your doctor, nurse or pharmacist before following any medical regimen to see if it is safe and effective for you

## 2018-04-13 NOTE — PROGRESS NOTES
L&D Triage Note - OB/GYN  Deanne Cunningham 37 y o  female MRN: [id number]  Unit/Bed#: L&D 329-02 Encounter: [id number]      Assessment:  37 y o  [id number] at 33w4d with reactive NST and appropriate BEKA, with continued pelvic pain with intact cerclage    Plan:  1  Precautions reviewed  2  Fetal kick counts reviewed  3  For follow-up at next prenatal visit with Dr Nadege Medel, DO         ______________________________________________________________________      Chief Compliant: Decreased Fetal Movement    TIME: 2143  Subjective:  37 y o  [id number] at 33w4d presents for evaluation of decreased fetal movement  Patient reports overall less movement throughout the day compared to normal  Denies completing kick counts  Patient well known to triage, continuing to report pelvic discomfort  Denies vaginal bleeding, leakage of fluid  Pregnancy notable for hx prior  deliveries at 16 weeks and 35 weeks  History indicated cerclage placed in this pregnancy    Also notable for advanced maternal age,     Objective:  Vitals:    18 2147   BP: 118/74   Pulse: 85   Resp: 16   Temp: 98 6 °F (37 °C)       SVE: cervix visually closed on sterile speculum exam, cerclage remains in place  FHT:  130 / Moderate 6 - 25 bpm / + accelerations, no decelerations  [place]: irritable    BEKA: 18 96        Pooja Austin DO 2018 11:17 PM

## 2018-04-16 ENCOUNTER — TELEPHONE (OUTPATIENT)
Dept: BARIATRICS | Facility: CLINIC | Age: 43
End: 2018-04-16

## 2018-04-25 ENCOUNTER — ROUTINE PRENATAL (OUTPATIENT)
Dept: PERINATAL CARE | Facility: CLINIC | Age: 43
End: 2018-04-25
Payer: COMMERCIAL

## 2018-04-25 VITALS
WEIGHT: 201.2 LBS | HEIGHT: 64 IN | HEART RATE: 75 BPM | SYSTOLIC BLOOD PRESSURE: 121 MMHG | DIASTOLIC BLOOD PRESSURE: 63 MMHG | BODY MASS INDEX: 34.35 KG/M2

## 2018-04-25 DIAGNOSIS — Z3A.35 35 WEEKS GESTATION OF PREGNANCY: ICD-10-CM

## 2018-04-25 DIAGNOSIS — Z3A.28 28 WEEKS GESTATION OF PREGNANCY: ICD-10-CM

## 2018-04-25 DIAGNOSIS — O34.30: ICD-10-CM

## 2018-04-25 DIAGNOSIS — O99.842 BARIATRIC SURGERY STATUS COMPLICATING PREGNANCY, SECOND TRIMESTER: ICD-10-CM

## 2018-04-25 DIAGNOSIS — O09.523 ELDERLY MULTIGRAVIDA IN THIRD TRIMESTER: ICD-10-CM

## 2018-04-25 DIAGNOSIS — F17.200 SMOKER: ICD-10-CM

## 2018-04-25 PROCEDURE — 76815 OB US LIMITED FETUS(S): CPT | Performed by: OBSTETRICS & GYNECOLOGY

## 2018-04-25 PROCEDURE — 59025 FETAL NON-STRESS TEST: CPT | Performed by: OBSTETRICS & GYNECOLOGY

## 2018-04-26 PROBLEM — Z3A.35 35 WEEKS GESTATION OF PREGNANCY: Status: ACTIVE | Noted: 2018-03-24

## 2018-04-27 ENCOUNTER — OFFICE VISIT (OUTPATIENT)
Dept: HEMATOLOGY ONCOLOGY | Facility: CLINIC | Age: 43
End: 2018-04-27
Payer: COMMERCIAL

## 2018-04-27 ENCOUNTER — HOSPITAL ENCOUNTER (OUTPATIENT)
Dept: INFUSION CENTER | Facility: CLINIC | Age: 43
Discharge: HOME | End: 2018-04-27
Payer: COMMERCIAL

## 2018-04-27 VITALS
RESPIRATION RATE: 16 BRPM | SYSTOLIC BLOOD PRESSURE: 111 MMHG | TEMPERATURE: 96.6 F | HEART RATE: 76 BPM | DIASTOLIC BLOOD PRESSURE: 75 MMHG

## 2018-04-27 VITALS
HEART RATE: 80 BPM | WEIGHT: 201.5 LBS | SYSTOLIC BLOOD PRESSURE: 108 MMHG | OXYGEN SATURATION: 98 % | RESPIRATION RATE: 16 BRPM | TEMPERATURE: 97.2 F | DIASTOLIC BLOOD PRESSURE: 70 MMHG | BODY MASS INDEX: 33.57 KG/M2 | HEIGHT: 65 IN

## 2018-04-27 DIAGNOSIS — O99.842 BARIATRIC SURGERY STATUS COMPLICATING PREGNANCY, SECOND TRIMESTER: ICD-10-CM

## 2018-04-27 DIAGNOSIS — D50.8 IRON DEFICIENCY ANEMIA SECONDARY TO INADEQUATE DIETARY IRON INTAKE: Primary | ICD-10-CM

## 2018-04-27 DIAGNOSIS — K91.2 POSTSURGICAL MALABSORPTION: ICD-10-CM

## 2018-04-27 DIAGNOSIS — D64.9 LOW HEMOGLOBIN AND LOW HEMATOCRIT: ICD-10-CM

## 2018-04-27 DIAGNOSIS — Z3A.32 32 WEEKS GESTATION OF PREGNANCY: ICD-10-CM

## 2018-04-27 PROBLEM — D50.9 IRON DEFICIENCY ANEMIA, UNSPECIFIED: Status: ACTIVE | Noted: 2018-04-27

## 2018-04-27 PROCEDURE — 99204 OFFICE O/P NEW MOD 45 MIN: CPT | Performed by: PHYSICIAN ASSISTANT

## 2018-04-27 PROCEDURE — 96365 THER/PROPH/DIAG IV INF INIT: CPT

## 2018-04-27 PROCEDURE — 96372 THER/PROPH/DIAG INJ SC/IM: CPT

## 2018-04-27 RX ORDER — SODIUM CHLORIDE 9 MG/ML
20 INJECTION, SOLUTION INTRAVENOUS CONTINUOUS
Status: DISCONTINUED | OUTPATIENT
Start: 2018-04-27 | End: 2018-04-30 | Stop reason: HOSPADM

## 2018-04-27 RX ORDER — CYANOCOBALAMIN 1000 UG/ML
1000 INJECTION INTRAMUSCULAR; SUBCUTANEOUS ONCE
Status: COMPLETED | OUTPATIENT
Start: 2018-04-27 | End: 2018-04-27

## 2018-04-27 RX ADMIN — CYANOCOBALAMIN 1000 MCG: 1000 INJECTION, SOLUTION INTRAMUSCULAR at 14:12

## 2018-04-27 RX ADMIN — IRON SUCROSE 200 MG: 20 INJECTION, SOLUTION INTRAVENOUS at 14:07

## 2018-04-27 RX ADMIN — SODIUM CHLORIDE 20 ML/HR: 0.9 INJECTION, SOLUTION INTRAVENOUS at 14:07

## 2018-04-27 NOTE — PROGRESS NOTES
Pt tolerated venofer today without incident  Pt declined AVS but is aware of future appts at [facility]

## 2018-04-27 NOTE — PLAN OF CARE
Problem: Potential for Falls  Goal: Patient will remain free of falls  INTERVENTIONS:  - Assess patient frequently for physical needs  -  Identify cognitive and physical deficits and behaviors that affect risk of falls    -  [place] fall precautions as indicated by assessment   - Educate patient/family on patient safety including physical limitations  - Instruct patient to call for assistance with activity based on assessment  - Modify environment to reduce risk of injury  - Consider OT/PT consult to assist with strengthening/mobility   Outcome: Progressing

## 2018-04-27 NOTE — PROGRESS NOTES
Oncology Outpatient Consult Note  Donna Sherwood 37 y o  female MRN: @ Encounter: [id number]        Date:  2018      Assessment/ Plan:    1  OCHOA in a patient who is currently pregnant with estimated delivery date of 2018 and history of gastric bypass surgery  We discussed Venofer  Potential side effects of IV iron could include but may not be limited to:  change in taste, diarrhea, muscle cramps, nausea or vomiting, pain in the arms or legs, pain or burning sensation in the injection site, allergic reaction  The patient verbalized understanding and wishes to proceed  Reviewed that Venofer is deemed to be safe in pregnancy  She is in agreement to proceed  Venofer 200mg IV over 1 hour 1-2 week x 6 doses along with B12 1000mcg IM        HPI:  Donna Sherwood is seen for initial consultation accompanied by her  2018 at the referral of Catalino Peterson MD regarding anemia  She is status post laparoscopic Iglesia-en-Y gastric bypass surgery by Dr Rima Roy  2014  She is currently pregnant with RENEE 2018 but  likely mid May 2018  Labs from [facility]  4/10/2018 iron 39, ferritin 6 2, vitamin B12 370  Hemoglobin 10 2, hematocrit 31 3, MCV of 80, white blood cell count 8 7 with 77% neutrophils, 14% lymphocytes, 269 platelets     ferritin of 5 iron 56  Hemoglobin is 10 8 with MCV of 86  White blood cell count 9 2 and platelet count 269     2017 hemoglobin 11 9 with MCV of 86, white blood cell count 6 4 and platelet count 003  Ferritin 8 2    She has been very tired  She does have some dyspnea on exertion primarily going up stairs  She is aware of her heart beat in her ears  She denies Pica  She denies any bleeding    Denies any melena or hematochezia            Test Results:      Labs:   Lab Results   Component Value Date    HGB 11 4 (L) 02/15/2018    HCT 34 5 (L) 02/15/2018    MCV 88 02/15/2018     02/15/2018    WBC 8 11 02/15/2018    NRBC 0 2017     Lab Results   Component Value Date     02/15/2018    K 3 9 02/15/2018     02/15/2018    CO2 24 02/15/2018    ANIONGAP 11 02/15/2018    BUN 7 02/15/2018    CREATININE 0 56 (L) 02/15/2018    GLUCOSE 83 02/15/2018    CALCIUM 8 5 02/15/2018    AST 15 02/15/2018    ALT 24 02/15/2018    ALKPHOS 69 02/15/2018    PROT 7 2 02/15/2018    BILITOT 0 30 02/15/2018    EGFR 115 02/15/2018           Imaging:   Radiology Results    Result Date: 3/31/2018  Narrative: Ordered by an unspecified provider  ROS: As mentioned in HPI & Interval History otherwise 14 point ROS negative  Active Problems:   Patient Active Problem List   Diagnosis    History of  delivery    Incompetent cervix in pregnancy, antepartum, unspecified trimester    28 weeks gestation of pregnancy    Advanced maternal age in multigravida    Smoker    Bariatric surgery status complicating pregnancy, second trimester    35 weeks gestation of pregnancy       Past Medical History:   Past Medical History:   Diagnosis Date    Anxiety     History of gastric bypass 2014       Surgical History:   Past Surgical History:   Procedure Laterality Date     SECTION      CHOLECYSTECTOMY      GASTRIC BYPASS  2014    [place] REVISION OF CERVIX,NON OBSTETRICAL N/A 2017    Procedure: CERCLAGE CERVICAL;  Surgeon: Susan Ohara MD;  Location: [facility] ;  Service: Obstetrics       Family History:    Family History   Problem Relation Age of Onset    Cancer Mother     Cancer Father        Cancer-related family history includes Cancer in her father and mother  Social History:   Social History     Social History    Marital status: /Civil Union     Spouse name: N/A    Number of children: N/A    Years of education: N/A     Occupational History    Not on file       Social History Main Topics    Smoking status: Current Some Day Smoker     Packs/day: 0 25    Smokeless tobacco: Never Used    Alcohol use No    Drug use: No    Sexual activity: Yes     Partners: Male     Other Topics Concern    Not on file     Social History Narrative    No narrative on file       Current Medications:   Current Outpatient Prescriptions   Medication Sig Dispense Refill    acetaminophen (TYLENOL) 325 mg tablet Take 2 tablets by mouth every 4 (four) hours as needed (pain) 1 Bottle 0    aluminum-magnesium hydroxide-simethicone (MAALOX MAX) 400-400-40 MG/5ML suspension Take 10 mL by mouth every 6 (six) hours as needed for indigestion or heartburn 355 mL 0    aspirin (ECOTRIN LOW STRENGTH) 81 mg EC tablet Take 81 mg by mouth daily      calcium citrate (CALCITRATE) 950 MG tablet Take 2 tablets by mouth daily   cyanocobalamin (VITAMIN B-12) 1,000 mcg tablet Take 1,000 mcg by mouth daily      famotidine (PEPCID) 20 mg tablet Take 1 tablet (20 mg total) by mouth 2 (two) times a day 30 tablet 0    Multiple Vitamin (MULTIVITAMIN) tablet Take 1 tablet by mouth daily      Prenatal Multivit-Min-Fe-FA (PRE-VINAYAK PO) Take 1 tablet by mouth daily      PROGESTERONE IM Inject 1 mg into the shoulder, thigh, or buttocks once a week       Vitamin D, Cholecalciferol, 1000 units CAPS Take 1 tablet by mouth daily       No current facility-administered medications for this visit  Allergies: Allergies   Allergen Reactions    Aspirin Other (See Comments)     Could cause bleeding with gastric bi pas     Nsaids Other (See Comments)     Gastric bypass    Codeine Itching         Physical Exam:    There is no height or weight on file to calculate BSA      Wt Readings from Last 3 Encounters:   18 91 3 kg (201 lb 3 2 oz)   18 87 5 kg (193 lb)   18 86 6 kg (191 lb)        Temp Readings from Last 3 Encounters:   18 98 6 °F (37 °C) (Oral)   18 98 2 °F (36 8 °C) (Oral)   18 98 2 °F (36 8 °C) (Oral)        BP Readings from Last 3 Encounters:   18 121/63   18 118/74 04/06/18 114/66         Pulse Readings from Last 3 Encounters:   04/25/18 75   04/12/18 85   04/06/18 73         Physical Exam     Constitutional   General appearance: No acute distress, well appearing and well nourished     Eyes   Conjunctiva and lids: No swelling, erythema or discharge     Pupils and irises: Equal, round and reactive to light     Ears, Nose, Mouth, and Throat   External inspection of ears and nose: Normal     Nasal mucosa, septum, and turbinates: Normal without edema or erythema     Oropharynx: Normal with no erythema, edema, exudate or lesions     Pulmonary   Respiratory effort: No increased work of breathing or signs of respiratory distress     Auscultation of lungs: Clear to auscultation     Cardiovascular   Palpation of heart: Normal PMI, no thrills     Auscultation of heart: Normal rate and rhythm, normal S1 and S2, without murmurs  Examination of extremities for edema and/or varicosities: Normal     Abdomen   Abdomen: Non-tender, no masses     Liver and spleen: No hepatomegaly or splenomegaly     Lymphatic   Palpation of lymph nodes in neck: No lymphadenopathy     Musculoskeletal   Gait and station: Normal     Digits and nails: Normal without clubbing or cyanosis     Inspection/palpation of joints, bones, and muscles: Normal     Skin   Skin and subcutaneous tissue: Normal without rashes or lesions     Neurologic   Cranial nerves: Cranial nerves 2-12 grossly intact     Sensation: No sensory loss     Psychiatric   Orientation to person, place, and time: Normal     Mood and affect: Normal             Goals and Barriers:  Current Goal:  Further evaluate reason for consultation  Barriers: None  Patient's Capacity to Self Care:  Patient is able to self care        Emergency Contacts:    Alex Sarah ,

## 2018-04-30 ENCOUNTER — HOSPITAL ENCOUNTER (OUTPATIENT)
Dept: INFUSION CENTER | Facility: HOSPITAL | Age: 43
Discharge: HOME | End: 2018-04-30
Payer: COMMERCIAL

## 2018-04-30 VITALS
HEART RATE: 78 BPM | RESPIRATION RATE: 16 BRPM | SYSTOLIC BLOOD PRESSURE: 101 MMHG | TEMPERATURE: 98.8 F | OXYGEN SATURATION: 98 % | DIASTOLIC BLOOD PRESSURE: 58 MMHG

## 2018-04-30 PROCEDURE — 96365 THER/PROPH/DIAG IV INF INIT: CPT

## 2018-04-30 RX ORDER — SODIUM CHLORIDE 9 MG/ML
20 INJECTION, SOLUTION INTRAVENOUS CONTINUOUS
Status: DISCONTINUED | OUTPATIENT
Start: 2018-04-30 | End: 2018-05-03 | Stop reason: HOSPADM

## 2018-04-30 RX ORDER — CYANOCOBALAMIN 1000 UG/ML
1000 INJECTION INTRAMUSCULAR; SUBCUTANEOUS ONCE
Status: DISCONTINUED | OUTPATIENT
Start: 2018-04-30 | End: 2018-05-03 | Stop reason: HOSPADM

## 2018-04-30 RX ADMIN — IRON SUCROSE 200 MG: 20 INJECTION, SOLUTION INTRAVENOUS at 12:07

## 2018-04-30 RX ADMIN — SODIUM CHLORIDE 20 ML/HR: 0.9 INJECTION, SOLUTION INTRAVENOUS at 11:45

## 2018-05-02 ENCOUNTER — ROUTINE PRENATAL (OUTPATIENT)
Dept: PERINATAL CARE | Facility: CLINIC | Age: 43
End: 2018-05-02

## 2018-05-02 ENCOUNTER — ANESTHESIA EVENT (INPATIENT)
Dept: LABOR AND DELIVERY | Facility: HOSPITAL | Age: 43
DRG: 540 | End: 2018-05-02
Payer: COMMERCIAL

## 2018-05-02 ENCOUNTER — ULTRASOUND (OUTPATIENT)
Dept: PERINATAL CARE | Facility: CLINIC | Age: 43
End: 2018-05-02
Payer: COMMERCIAL

## 2018-05-02 ENCOUNTER — HOSPITAL ENCOUNTER (INPATIENT)
Facility: HOSPITAL | Age: 43
LOS: 3 days | Discharge: HOME | DRG: 540 | End: 2018-05-05
Attending: SPECIALIST | Admitting: SPECIALIST
Payer: COMMERCIAL

## 2018-05-02 VITALS
WEIGHT: 204 LBS | BODY MASS INDEX: 33.99 KG/M2 | HEART RATE: 75 BPM | SYSTOLIC BLOOD PRESSURE: 107 MMHG | HEIGHT: 65 IN | DIASTOLIC BLOOD PRESSURE: 60 MMHG

## 2018-05-02 DIAGNOSIS — O09.523 ELDERLY MULTIGRAVIDA IN THIRD TRIMESTER: ICD-10-CM

## 2018-05-02 DIAGNOSIS — F17.200 SMOKER: ICD-10-CM

## 2018-05-02 DIAGNOSIS — Z98.891 STATUS POST REPEAT LOW TRANSVERSE CESAREAN SECTION: Primary | ICD-10-CM

## 2018-05-02 DIAGNOSIS — Z3A.35 35 WEEKS GESTATION OF PREGNANCY: ICD-10-CM

## 2018-05-02 DIAGNOSIS — O34.30: ICD-10-CM

## 2018-05-02 DIAGNOSIS — O09.523 ELDERLY MULTIGRAVIDA IN THIRD TRIMESTER: Primary | ICD-10-CM

## 2018-05-02 DIAGNOSIS — O99.213 OBESITY AFFECTING PREGNANCY IN THIRD TRIMESTER: ICD-10-CM

## 2018-05-02 DIAGNOSIS — Z3A.36 36 WEEKS GESTATION OF PREGNANCY: ICD-10-CM

## 2018-05-02 DIAGNOSIS — O09.893 HX OF PRETERM DELIVERY, CURRENTLY PREGNANT, THIRD TRIMESTER: ICD-10-CM

## 2018-05-02 DIAGNOSIS — Z3A.28 28 WEEKS GESTATION OF PREGNANCY: ICD-10-CM

## 2018-05-02 DIAGNOSIS — O99.842 BARIATRIC SURGERY STATUS COMPLICATING PREGNANCY, SECOND TRIMESTER: ICD-10-CM

## 2018-05-02 PROBLEM — Z98.51 HISTORY OF BILATERAL TUBAL LIGATION: Status: ACTIVE | Noted: 2018-05-02

## 2018-05-02 PROBLEM — O09.529 ADVANCED MATERNAL AGE IN MULTIGRAVIDA: Status: RESOLVED | Noted: 2017-12-12 | Resolved: 2018-05-02

## 2018-05-02 PROBLEM — Z87.51 HISTORY OF PRETERM DELIVERY: Status: RESOLVED | Noted: 2017-11-27 | Resolved: 2018-05-02

## 2018-05-02 LAB
ABO GROUP BLD: NORMAL
BASE EXCESS BLDCOA CALC-SCNC: -2.7 MMOL/L (ref 3–11)
BASE EXCESS BLDCOV CALC-SCNC: -3.5 MMOL/L (ref 1–9)
BASOPHILS # BLD AUTO: 0.02 THOUSANDS/ΜL (ref 0–0.1)
BASOPHILS NFR BLD AUTO: 0 % (ref 0–1)
BLD GP AB SCN SERPL QL: NEGATIVE
EOSINOPHIL # BLD AUTO: 0.1 THOUSAND/ΜL (ref 0–0.61)
EOSINOPHIL NFR BLD AUTO: 1 % (ref 0–6)
ERYTHROCYTE [DISTWIDTH] IN BLOOD BY AUTOMATED COUNT: 16.6 % (ref 11.6–15.1)
HCO3 BLDCOA-SCNC: 23.5 MMOL/L (ref 17.3–27.3)
HCO3 BLDCOV-SCNC: 21.1 MMOL/L (ref 12.2–28.6)
HCT VFR BLD AUTO: 38.9 % (ref 34.8–46.1)
HGB BLD-MCNC: 12.3 G/DL (ref 11.5–15.4)
LYMPHOCYTES # BLD AUTO: 1.64 THOUSANDS/ΜL (ref 0.6–4.47)
LYMPHOCYTES NFR BLD AUTO: 18 % (ref 14–44)
MCH RBC QN AUTO: 26.3 PG (ref 26.8–34.3)
MCHC RBC AUTO-ENTMCNC: 31.6 G/DL (ref 31.4–37.4)
MCV RBC AUTO: 83 FL (ref 82–98)
MONOCYTES # BLD AUTO: 0.51 THOUSAND/ΜL (ref 0.17–1.22)
MONOCYTES NFR BLD AUTO: 6 % (ref 4–12)
NEUTROPHILS # BLD AUTO: 7.05 THOUSANDS/ΜL (ref 1.85–7.62)
NEUTS SEG NFR BLD AUTO: 75 % (ref 43–75)
NRBC BLD AUTO-RTO: 0 /100 WBCS
O2 CT VFR BLDCOA CALC: 8.4 ML/DL
OXYHGB MFR BLDCOA: 43 %
OXYHGB MFR BLDCOV: 72.9 %
PCO2 BLDCOA: 46.2 MM[HG] (ref 30–60)
PCO2 BLDCOV: 36.8 MM HG (ref 27–43)
PH BLDCOA: 7.33 [PH] (ref 7.23–7.43)
PH BLDCOV: 7.38 [PH] (ref 7.19–7.49)
PLATELET # BLD AUTO: 241 THOUSANDS/UL (ref 149–390)
PMV BLD AUTO: 10 FL (ref 8.9–12.7)
PO2 BLDCOA: 20.1 MM HG (ref 5–25)
PO2 BLDCOV: 31.3 MM HG (ref 15–45)
RBC # BLD AUTO: 4.67 MILLION/UL (ref 3.81–5.12)
RH BLD: POSITIVE
SAO2 % BLDCOV: 13.9 ML/DL
SPECIMEN EXPIRATION DATE: NORMAL
WBC # BLD AUTO: 9.32 THOUSAND/UL (ref 4.31–10.16)

## 2018-05-02 PROCEDURE — 86900 BLOOD TYPING SEROLOGIC ABO: CPT | Performed by: FAMILY MEDICINE

## 2018-05-02 PROCEDURE — 86592 SYPHILIS TEST NON-TREP QUAL: CPT | Performed by: OBSTETRICS & GYNECOLOGY

## 2018-05-02 PROCEDURE — 88302 TISSUE EXAM BY PATHOLOGIST: CPT | Performed by: PATHOLOGY

## 2018-05-02 PROCEDURE — 82805 BLOOD GASES W/O2 SATURATION: CPT | Performed by: SPECIALIST

## 2018-05-02 PROCEDURE — 99212 OFFICE O/P EST SF 10 MIN: CPT | Performed by: OBSTETRICS & GYNECOLOGY

## 2018-05-02 PROCEDURE — 99213 OFFICE O/P EST LOW 20 MIN: CPT

## 2018-05-02 PROCEDURE — 59025 FETAL NON-STRESS TEST: CPT | Performed by: OBSTETRICS & GYNECOLOGY

## 2018-05-02 PROCEDURE — 88307 TISSUE EXAM BY PATHOLOGIST: CPT | Performed by: PATHOLOGY

## 2018-05-02 PROCEDURE — 76816 OB US FOLLOW-UP PER FETUS: CPT | Performed by: OBSTETRICS & GYNECOLOGY

## 2018-05-02 PROCEDURE — 85025 COMPLETE CBC W/AUTO DIFF WBC: CPT | Performed by: OBSTETRICS & GYNECOLOGY

## 2018-05-02 PROCEDURE — 0UB70ZZ EXCISION OF BILATERAL FALLOPIAN TUBES, OPEN APPROACH: ICD-10-PCS | Performed by: SPECIALIST

## 2018-05-02 PROCEDURE — 86850 RBC ANTIBODY SCREEN: CPT | Performed by: FAMILY MEDICINE

## 2018-05-02 PROCEDURE — 86901 BLOOD TYPING SEROLOGIC RH(D): CPT | Performed by: FAMILY MEDICINE

## 2018-05-02 PROCEDURE — 0UCC0ZZ EXTIRPATION OF MATTER FROM CERVIX, OPEN APPROACH: ICD-10-PCS | Performed by: SPECIALIST

## 2018-05-02 PROCEDURE — 4A1HXCZ MONITORING OF PRODUCTS OF CONCEPTION, CARDIAC RATE, EXTERNAL APPROACH: ICD-10-PCS | Performed by: SPECIALIST

## 2018-05-02 RX ORDER — ACETAMINOPHEN 325 MG/1
650 TABLET ORAL EVERY 4 HOURS PRN
Status: DISCONTINUED | OUTPATIENT
Start: 2018-05-03 | End: 2018-05-05 | Stop reason: HOSPADM

## 2018-05-02 RX ORDER — ONDANSETRON 2 MG/ML
4 INJECTION INTRAMUSCULAR; INTRAVENOUS EVERY 4 HOURS PRN
Status: ACTIVE | OUTPATIENT
Start: 2018-05-02 | End: 2018-05-03

## 2018-05-02 RX ORDER — MORPHINE SULFATE 0.5 MG/ML
INJECTION, SOLUTION EPIDURAL; INTRATHECAL; INTRAVENOUS
Status: COMPLETED
Start: 2018-05-02 | End: 2018-05-02

## 2018-05-02 RX ORDER — DOCUSATE SODIUM 100 MG/1
100 CAPSULE, LIQUID FILLED ORAL 2 TIMES DAILY
Status: DISCONTINUED | OUTPATIENT
Start: 2018-05-02 | End: 2018-05-05 | Stop reason: HOSPADM

## 2018-05-02 RX ORDER — SODIUM CHLORIDE 9 MG/ML
20 INJECTION, SOLUTION INTRAVENOUS CONTINUOUS
Status: DISPENSED | OUTPATIENT
Start: 2018-05-04 | End: 2018-05-04

## 2018-05-02 RX ORDER — ONDANSETRON 2 MG/ML
4 INJECTION INTRAMUSCULAR; INTRAVENOUS ONCE AS NEEDED
Status: DISCONTINUED | OUTPATIENT
Start: 2018-05-02 | End: 2018-05-02

## 2018-05-02 RX ORDER — ACETAMINOPHEN 325 MG/1
650 TABLET ORAL EVERY 6 HOURS PRN
Status: ACTIVE | OUTPATIENT
Start: 2018-05-02 | End: 2018-05-03

## 2018-05-02 RX ORDER — OXYCODONE HYDROCHLORIDE AND ACETAMINOPHEN 5; 325 MG/1; MG/1
2 TABLET ORAL EVERY 4 HOURS PRN
Status: DISCONTINUED | OUTPATIENT
Start: 2018-05-03 | End: 2018-05-05 | Stop reason: HOSPADM

## 2018-05-02 RX ORDER — DIPHENHYDRAMINE HYDROCHLORIDE 50 MG/ML
25 INJECTION INTRAMUSCULAR; INTRAVENOUS EVERY 6 HOURS PRN
Status: ACTIVE | OUTPATIENT
Start: 2018-05-02 | End: 2018-05-03

## 2018-05-02 RX ORDER — MORPHINE SULFATE 0.5 MG/ML
INJECTION, SOLUTION EPIDURAL; INTRATHECAL; INTRAVENOUS AS NEEDED
Status: DISCONTINUED | OUTPATIENT
Start: 2018-05-02 | End: 2018-05-02 | Stop reason: SURG

## 2018-05-02 RX ORDER — DEXTROSE AND SODIUM CHLORIDE 5; .9 G/100ML; G/100ML
125 INJECTION, SOLUTION INTRAVENOUS CONTINUOUS
Status: DISCONTINUED | OUTPATIENT
Start: 2018-05-02 | End: 2018-05-02

## 2018-05-02 RX ORDER — FENTANYL CITRATE/PF 50 MCG/ML
25 SYRINGE (ML) INJECTION
Status: DISCONTINUED | OUTPATIENT
Start: 2018-05-02 | End: 2018-05-02

## 2018-05-02 RX ORDER — SODIUM CHLORIDE, SODIUM LACTATE, POTASSIUM CHLORIDE, CALCIUM CHLORIDE 600; 310; 30; 20 MG/100ML; MG/100ML; MG/100ML; MG/100ML
125 INJECTION, SOLUTION INTRAVENOUS CONTINUOUS
Status: DISCONTINUED | OUTPATIENT
Start: 2018-05-02 | End: 2018-05-02

## 2018-05-02 RX ORDER — OXYCODONE HYDROCHLORIDE AND ACETAMINOPHEN 5; 325 MG/1; MG/1
1 TABLET ORAL EVERY 4 HOURS PRN
Status: DISCONTINUED | OUTPATIENT
Start: 2018-05-03 | End: 2018-05-05 | Stop reason: HOSPADM

## 2018-05-02 RX ORDER — NALBUPHINE HCL 10 MG/ML
5 AMPUL (ML) INJECTION
Status: DISPENSED | OUTPATIENT
Start: 2018-05-02 | End: 2018-05-03

## 2018-05-02 RX ORDER — ONDANSETRON 2 MG/ML
4 INJECTION INTRAMUSCULAR; INTRAVENOUS EVERY 8 HOURS PRN
Status: DISCONTINUED | OUTPATIENT
Start: 2018-05-03 | End: 2018-05-05 | Stop reason: HOSPADM

## 2018-05-02 RX ORDER — ONDANSETRON 2 MG/ML
4 INJECTION INTRAMUSCULAR; INTRAVENOUS EVERY 8 HOURS PRN
Status: DISCONTINUED | OUTPATIENT
Start: 2018-05-02 | End: 2018-05-02

## 2018-05-02 RX ORDER — CYANOCOBALAMIN 1000 UG/ML
1000 INJECTION INTRAMUSCULAR; SUBCUTANEOUS ONCE
Status: DISCONTINUED | OUTPATIENT
Start: 2018-05-04 | End: 2018-05-07 | Stop reason: HOSPADM

## 2018-05-02 RX ORDER — BUPIVACAINE HYDROCHLORIDE 7.5 MG/ML
INJECTION, SOLUTION INTRASPINAL AS NEEDED
Status: DISCONTINUED | OUTPATIENT
Start: 2018-05-02 | End: 2018-05-02 | Stop reason: SURG

## 2018-05-02 RX ORDER — OXYTOCIN/RINGER'S LACTATE 30/500 ML
62.5 PLASTIC BAG, INJECTION (ML) INTRAVENOUS CONTINUOUS
Status: ACTIVE | OUTPATIENT
Start: 2018-05-02 | End: 2018-05-03

## 2018-05-02 RX ORDER — SODIUM CHLORIDE, SODIUM LACTATE, POTASSIUM CHLORIDE, CALCIUM CHLORIDE 600; 310; 30; 20 MG/100ML; MG/100ML; MG/100ML; MG/100ML
125 INJECTION, SOLUTION INTRAVENOUS CONTINUOUS
Status: DISCONTINUED | OUTPATIENT
Start: 2018-05-02 | End: 2018-05-05 | Stop reason: HOSPADM

## 2018-05-02 RX ORDER — DIPHENHYDRAMINE HCL 25 MG
25 TABLET ORAL EVERY 6 HOURS PRN
Status: DISCONTINUED | OUTPATIENT
Start: 2018-05-02 | End: 2018-05-05 | Stop reason: HOSPADM

## 2018-05-02 RX ORDER — SIMETHICONE 80 MG
80 TABLET,CHEWABLE ORAL 4 TIMES DAILY PRN
Status: DISCONTINUED | OUTPATIENT
Start: 2018-05-02 | End: 2018-05-05 | Stop reason: HOSPADM

## 2018-05-02 RX ORDER — NALOXONE HYDROCHLORIDE 0.4 MG/ML
0.1 INJECTION, SOLUTION INTRAMUSCULAR; INTRAVENOUS; SUBCUTANEOUS
Status: ACTIVE | OUTPATIENT
Start: 2018-05-02 | End: 2018-05-03

## 2018-05-02 RX ORDER — FAMOTIDINE 20 MG/1
20 TABLET, FILM COATED ORAL 2 TIMES DAILY
Status: DISCONTINUED | OUTPATIENT
Start: 2018-05-02 | End: 2018-05-05 | Stop reason: HOSPADM

## 2018-05-02 RX ORDER — DEXAMETHASONE SODIUM PHOSPHATE 10 MG/ML
8 INJECTION, SOLUTION INTRAMUSCULAR; INTRAVENOUS ONCE AS NEEDED
Status: DISPENSED | OUTPATIENT
Start: 2018-05-02 | End: 2018-05-03

## 2018-05-02 RX ORDER — METOCLOPRAMIDE HYDROCHLORIDE 5 MG/ML
5 INJECTION INTRAMUSCULAR; INTRAVENOUS EVERY 6 HOURS PRN
Status: ACTIVE | OUTPATIENT
Start: 2018-05-02 | End: 2018-05-03

## 2018-05-02 RX ORDER — OXYCODONE HYDROCHLORIDE AND ACETAMINOPHEN 5; 325 MG/1; MG/1
1 TABLET ORAL EVERY 4 HOURS PRN
Status: DISPENSED | OUTPATIENT
Start: 2018-05-02 | End: 2018-05-03

## 2018-05-02 RX ADMIN — DEXTROSE AND SODIUM CHLORIDE 125 ML/HR: 5; .9 INJECTION, SOLUTION INTRAVENOUS at 17:45

## 2018-05-02 RX ADMIN — SODIUM CHLORIDE, SODIUM LACTATE, POTASSIUM CHLORIDE, AND CALCIUM CHLORIDE 125 ML/HR: .6; .31; .03; .02 INJECTION, SOLUTION INTRAVENOUS at 18:19

## 2018-05-02 RX ADMIN — MORPHINE SULFATE 0.1 MG: 0.5 INJECTION, SOLUTION EPIDURAL; INTRATHECAL; INTRAVENOUS at 19:02

## 2018-05-02 RX ADMIN — SODIUM CHLORIDE, SODIUM LACTATE, POTASSIUM CHLORIDE, AND CALCIUM CHLORIDE: .6; .31; .03; .02 INJECTION, SOLUTION INTRAVENOUS at 19:12

## 2018-05-02 RX ADMIN — OXYTOCIN 250 MILLI-UNITS: 10 INJECTION, SOLUTION INTRAMUSCULAR; INTRAVENOUS at 19:55

## 2018-05-02 RX ADMIN — OXYTOCIN 250 MILLI-UNITS: 10 INJECTION, SOLUTION INTRAMUSCULAR; INTRAVENOUS at 19:27

## 2018-05-02 RX ADMIN — BUPIVACAINE HYDROCHLORIDE 1.6 ML: 7.5 INJECTION, SOLUTION INTRASPINAL at 19:02

## 2018-05-02 RX ADMIN — FAMOTIDINE 20 MG: 20 TABLET ORAL at 22:15

## 2018-05-02 RX ADMIN — CEFAZOLIN SODIUM 2000 MG: 10 INJECTION, POWDER, FOR SOLUTION INTRAVENOUS at 19:08

## 2018-05-02 RX ADMIN — ONDANSETRON HYDROCHLORIDE 4 MG: 2 INJECTION, SOLUTION INTRAVENOUS at 19:07

## 2018-05-02 RX ADMIN — DEXAMETHASONE SODIUM PHOSPHATE 8 MG: 10 INJECTION, SOLUTION INTRAMUSCULAR; INTRAVENOUS at 22:37

## 2018-05-02 RX ADMIN — OXYCODONE HYDROCHLORIDE AND ACETAMINOPHEN 1 TABLET: 5; 325 TABLET ORAL at 22:33

## 2018-05-02 NOTE — PLAN OF CARE
BIRTH - VAGINAL/ SECTION     Fetal and maternal status remain reassuring during the birth process Completed     Emotionally satisfying birthing experience for mother/fetus Completed          POSTPARTUM     Experiences normal postpartum course Progressing     Appropriate maternal -  bonding Progressing     Establishment of infant feeding pattern Progressing     Incision(s), wounds(s) or drain site(s) healing without S/S of infection Progressing

## 2018-05-02 NOTE — ANESTHESIA PROCEDURE NOTES
Spinal Block    Patient location during procedure: OR  Start time: 5/2/2018 7:00 PM  End time: 5/2/2018 7:05 PM  Reason for block: procedure for pain and at surgeon's request  Staffing  Anesthesiologist: Alaina Coleman  Performed: anesthesiologist   Preanesthetic Checklist  Completed: patient identified, site marked, surgical consent, pre-op evaluation, timeout performed, IV checked, risks and benefits discussed and monitors and equipment checked  Spinal Block  Patient position: sitting  Prep: ChloraPrep  Patient monitoring: cardiac monitor and frequent blood pressure checks  Approach: midline  Location: L3-4  Injection technique: single-shot  Needle  Needle type: pencil-tip   Needle gauge: 25 G  Needle length: 5 cm  Assessment  Sensory level: T4  Injection Assessment:  negative aspiration for heme, no paresthesia on injection and positive aspiration for clear CSF    Post-procedure:  adhesive bandage applied, pressure dressing applied, secured with tape, site cleaned and sterile dressing applied

## 2018-05-02 NOTE — PROGRESS NOTES
Please refer to the [facility] ultrasound report in Ob Procedures for additional information regarding the visit to the [facility]  today

## 2018-05-02 NOTE — NURSING NOTE
1856 out of patient room  1858 in 1800 [facility] reese placed  1912 abdominal prep  1917 time out  1918 incision  1927 baby]  1929 placenta

## 2018-05-02 NOTE — ANESTHESIA PREPROCEDURE EVALUATION
Review of Systems/Medical History  Patient summary reviewed        Cardiovascular  Negative cardio ROS    Pulmonary  Negative pulmonary ROS        GI/Hepatic      Comment: S/P gastric bypass       Negative  ROS        Endo/Other  Negative endo/other ROS      GYN  Negative gynecology ROS          Hematology  Anemia ,     Musculoskeletal  Negative musculoskeletal ROS        Neurology  Negative neurology ROS      Psychology   Anxiety,              Physical Exam    Airway    Mallampati score: II  TM Distance: >3 FB       Dental   No notable dental hx     Cardiovascular  Comment: Negative ROS, Rhythm: regular, Rate: normal, Cardiovascular exam normal    Pulmonary  Pulmonary exam normal Breath sounds clear to auscultation,     Other Findings        Anesthesia Plan  ASA Score- 2     Anesthesia Type- spinal with ASA Monitors  Additional Monitors:   Airway Plan:         Plan Factors-    Induction-     Postoperative Plan- Plan for postoperative opioid use  Informed Consent- Anesthetic plan and risks discussed with patient

## 2018-05-03 LAB
ANISOCYTOSIS BLD QL SMEAR: PRESENT
BASOPHILS # BLD MANUAL: 0 THOUSAND/UL (ref 0–0.1)
BASOPHILS NFR MAR MANUAL: 0 % (ref 0–1)
EOSINOPHIL # BLD MANUAL: 0 THOUSAND/UL (ref 0–0.4)
EOSINOPHIL NFR BLD MANUAL: 0 % (ref 0–6)
ERYTHROCYTE [DISTWIDTH] IN BLOOD BY AUTOMATED COUNT: 16.2 % (ref 11.6–15.1)
HCT VFR BLD AUTO: 30 % (ref 34.8–46.1)
HGB BLD-MCNC: 9.3 G/DL (ref 11.5–15.4)
LG PLATELETS BLD QL SMEAR: PRESENT
LYMPHOCYTES # BLD AUTO: 0.53 THOUSAND/UL (ref 0.6–4.47)
LYMPHOCYTES # BLD AUTO: 5 % (ref 14–44)
MCH RBC QN AUTO: 26 PG (ref 26.8–34.3)
MCHC RBC AUTO-ENTMCNC: 31 G/DL (ref 31.4–37.4)
MCV RBC AUTO: 84 FL (ref 82–98)
MONOCYTES # BLD AUTO: 0.11 THOUSAND/UL (ref 0–1.22)
MONOCYTES NFR BLD: 1 % (ref 4–12)
NEUTROPHILS # BLD MANUAL: 10.03 THOUSAND/UL (ref 1.85–7.62)
NEUTS BAND NFR BLD MANUAL: 1 % (ref 0–8)
NEUTS SEG NFR BLD AUTO: 93 % (ref 43–75)
NRBC BLD AUTO-RTO: 0 /100 WBCS
PLATELET # BLD AUTO: 202 THOUSANDS/UL (ref 149–390)
PLATELET BLD QL SMEAR: ADEQUATE
PMV BLD AUTO: 10.3 FL (ref 8.9–12.7)
RBC # BLD AUTO: 3.58 MILLION/UL (ref 3.81–5.12)
RPR SER QL: NORMAL
TOTAL CELLS COUNTED SPEC: 100
WBC # BLD AUTO: 10.67 THOUSAND/UL (ref 4.31–10.16)

## 2018-05-03 PROCEDURE — 85007 BL SMEAR W/DIFF WBC COUNT: CPT | Performed by: OBSTETRICS & GYNECOLOGY

## 2018-05-03 PROCEDURE — 85027 COMPLETE CBC AUTOMATED: CPT | Performed by: OBSTETRICS & GYNECOLOGY

## 2018-05-03 RX ADMIN — OXYCODONE HYDROCHLORIDE AND ACETAMINOPHEN 1 TABLET: 5; 325 TABLET ORAL at 03:00

## 2018-05-03 RX ADMIN — OXYCODONE HYDROCHLORIDE AND ACETAMINOPHEN 1 TABLET: 5; 325 TABLET ORAL at 22:38

## 2018-05-03 RX ADMIN — OXYCODONE HYDROCHLORIDE AND ACETAMINOPHEN 1 TABLET: 5; 325 TABLET ORAL at 11:39

## 2018-05-03 RX ADMIN — DOCUSATE SODIUM 100 MG: 100 CAPSULE, LIQUID FILLED ORAL at 08:58

## 2018-05-03 RX ADMIN — NALBUPHINE HYDROCHLORIDE 5 MG: 10 INJECTION, SOLUTION INTRAMUSCULAR; INTRAVENOUS; SUBCUTANEOUS at 01:01

## 2018-05-03 RX ADMIN — OXYCODONE HYDROCHLORIDE AND ACETAMINOPHEN 1 TABLET: 5; 325 TABLET ORAL at 07:01

## 2018-05-03 RX ADMIN — HYDROMORPHONE HYDROCHLORIDE 0.5 MG: 1 INJECTION, SOLUTION INTRAMUSCULAR; INTRAVENOUS; SUBCUTANEOUS at 19:25

## 2018-05-03 RX ADMIN — FAMOTIDINE 20 MG: 20 TABLET ORAL at 18:17

## 2018-05-03 RX ADMIN — FAMOTIDINE 20 MG: 20 TABLET ORAL at 08:58

## 2018-05-03 RX ADMIN — OXYCODONE HYDROCHLORIDE AND ACETAMINOPHEN 1 TABLET: 5; 325 TABLET ORAL at 11:41

## 2018-05-03 RX ADMIN — OXYCODONE HYDROCHLORIDE AND ACETAMINOPHEN 1 TABLET: 5; 325 TABLET ORAL at 16:23

## 2018-05-03 RX ADMIN — DOCUSATE SODIUM 100 MG: 100 CAPSULE, LIQUID FILLED ORAL at 18:17

## 2018-05-03 NOTE — DISCHARGE SUMMARY
Discharge Summary - OB/GYN   Nicole Manuel 37 y o  female MRN: [id number]  Unit/Bed#: L&D 311-01 Encounter: [id number]      Admission Date: 2018     Discharge Date: 2018    Admitting Diagnosis:   Pregnancy at 36w3d   labor  History of prior  section x 2  Desire for permanent tubal sterilization  Advanced maternal age  Anemia  Anxiety  Tobacco use  History of gastric bypass     Discharge Diagnosis:   Same, delivered    Procedures: Repeat low transverse  section ; bilateral tubal ligation ; cerclage removal    Attending: Ayana Marrufo MD    Hospital Course:     Nicole Manuel is a 37 y o  [id number] at 36w3d wks who was initially admitted for  labor in the setting of prior  section x 2  She was taken for repeat  section  She delivered a viable female  on 18 at 1927  Weight 5lbs 11 9oz via primary  section, low transverse incision  Apgars were 8 (1 min) and 9 (5 min)   was transferred to  nursery  Patient tolerated the procedure well and was transferred to recovery in stable condition  Her post-operative course was uncomplicated  Preoperative hemaglobin was 12 3, postoperative was 9 3  Her postoperative pain was well controlled with oral analgesics  On day of discharge, she was ambulating and able to reasonably perform all ADLs  She was voiding and had appropriate bowel function  Pain was well controlled  She was discharged home on post-operative day #3 without complications  Patient was instructed to follow up with her OB as an outpatient and was given appropriate warnings to call provider if she develops signs of infection or uncontrolled pain  Complications: none apparent    Condition at discharge: stable     Provisions for Follow-Up Care:  See after visit summary for information related to follow-up care and any pertinent home health orders        Disposition: Home    Planned Readmission: No    Discharge Medications:   Prenatal vitamin daily for 6 months or the duration of nursing whichever is longer  Motrin 600 mg orally every 6 hours as needed for pain  Tylenol (over the counter) per bottle directions as needed for pain  Percocet 5-235mg     Discharge instructions :   -Do not place anything (no partner, tampons or douche) in your vagina for 6 weeks  -You may walk for exercise for the first 6 weeks then gradually return to your usual activities    -Please do not drive for 1 week if you have no stitches and for 2 weeks if you have stitches or underwent a  delivery     -You may take baths or shower per your preference    -Please look at your bust (breasts) in the mirror daily and call for redness or tenderness or increased warmth  - If you have had a  please look at your incision daily as well and call us for increasing redness or steady drainage from the incision    -Please call us for temperature > 100 4*F or 38* C, worsening pain or a foul discharge

## 2018-05-03 NOTE — OP NOTE
OPERATIVE REPORT  PATIENT NAME: Zafar Briseno    :  1975  MRN: [id number]  Pt Location: [facility] L&D OR ROOM 01    SURGERY DATE: 2018    Surgeon(s) and Role:     * Dilip Grant MD - Primary     * Beltran Chao MD - Assisting    Preop Diagnosis:  Pregnancy at 36w3d   labor  History of prior  section x 2  Desire for permanent tubal sterilization  Advanced maternal age  Anemia  Anxiety  Tobacco use  History of gastric bypass     Procedure(s) :  REPEAT LOW TRANSVERSE  SECTION  BILATERAL TUBAL LIGATION  CERCLAGE REMOVAL    Specimen(s):  ID Type Source Tests Collected by Time Destination   1 :  Tissue Fallopian Tube, Left TISSUE EXAM Dilip Grant MD 2018    2 :  Tissue Fallopian Tube, Right TISSUE EXAM Dilip Grant MD 2018    3 :  Tissue (Placenta on Hold) OB Only Placenta TISSUE EXAM OB (PLACENTA) ONLY Dilip Grant MD 2018 1933    A :  Cord Blood Cord BLOOD GAS, VENOUS, CORD Dilip Grant MD 2018    B :  Cord Blood Cord BLOOD GAS, ARTERIAL, CORD Dilip Grant MD 2018 190        Estimated Blood Loss:   500 mL    IV Fluids:   1700 mL    Urine output:   400mL    Drains:  Urethral Catheter (Active)   Site Assessment Clean;Skin intact 2018  8:35 PM   Collection Container Standard drainage bag 2018  8:35 PM   Securement Method Securing device (Describe) 2018  8:35 PM   Number of days: 0       Anesthesia Type:   Spinal    Operative Indications:   labor  History of prior  section x 2  Desire for permanent tubal sterilization    Operative findings:  1  Delivery of viable female on 18 at 1927, weight 5lbs 11 9oz;  Apgar scores of 8 at one minute and 9 at five minutes  2  Blood gases: Arterial pH7 325, Base excess -2 7 ; Venous pH7 377, Base excess -3 5  3  Normal appearing placenta with centrally-inserted 3 vessel cord  4  Clear amniotic fluid  5   Minimal adhesive disease of the bilateral ovaries to the distal tube ; several small subserosal fibroids measuring approximately 2x2cm each in size on the posterior wall of the uterus  Brief labor course:  Patient presented to Triage w/ regular contractions, in the setting cerclage in place and history of prior  section x 2  Due to worsening contractions, decision was made to proceed to OR for RLTCS, BTL and cerclage removal     Procedure and Technique:  The patient was taken to the operating room where a time out was performed to confirm correct patient, Perico Nassar, and correct procedures: repeat low transverse  section, bilateral tubal ligation and cerclage removal  Spinal anesthesia was adequately established in the operating room and 2 gm Ancef was given for preoperative infection prophylaxis  The patient was then placed in the dorsal supine position  Warm blankets were used maintain control of core body temperature  The vagina and perineum were prepped using Betadine solution and a urinary catheter was inserted using aseptic technique  The abdomen was then prepped with Chlorhexidine solution and draped in the usual sterile fashion for a transverse skin incision  An incision was made in the skin with a surgical scalpel and sharp dissection was carried down to the fascia  The fascia was incised at the midline and the fascial incision was extended bilaterally using the curved Remy scissors  The superior edge of the fascia was grasped with Kocher clamps, tented up and the underlying rectus muscles were dissected off using blunt and sharp dissection  The inferior edge was cleared in a similar fashion  The rectus muscles were then divided at midline and the peritoneum was identified, carefully entered using Metzenbaum scissors and extended using blunt dissection  The bladder blade was inserted and the vesicouterine peritoneum was identified, grasped with forceps and extended laterally using the Metzenbaum scissors   A bladder blade was reinserted and a transverse incision was made in the lower uterine segment using the scalpel  The uterine incision was extended cephalocaudally using blunt dissection  The amniotic sac was entered sharply and the amniotic fluid was noted to be clear  The surgeon's hand was placed into the uterine cavity  The fetus was noted to be in the vertex presentation  With the assistance of fundal pressure the fetus was delivered through the hysterotomy  A single loose nuchal was noted and was easily reduced  The infant's oral and nasal passages were bulb suctioned  The cord was doubly clamped and cut  The infant was then passed off the table to the awaiting  staff  Venous and arterial blood gas, cord blood, and portion of cord were obtained and sent to the lab  The placenta delivered spontaneously and was noted to be intact with a centrally inserted three-vessel cord  Oxytocin was administered by IV infusion to enhance uterine contraction  The uterus was exteriorized and cleared of all clots and remaining products of conception  The uterine incision was closed using a 0 Vicryl in a running locked fashion  A second vertical imbricating stitch with 0 Vicryl was applied  The bladder flap was then reapproximated using 2-0 vicryl in a running stitch  The uterine incision was examined and noted to be hemostatic  At this point, our attention turned to the adnexa  The bilateral Fallopian tubes were identified  First the right Fallopian tube was identified and grasped with a Ela Juarez in an avascular area and tented up  Using a 2-0 Plain Gut suture, this tube was doubly ligated in a standard modified [place] technique  Good hemostasis was noted from this tube  The left Fallopian tube was identified and doubly ligated in the same manner  Good hemostasis was noted on this side  Portions of bilateral tube were sent to pathology for routine evaluation      The posterior cul-de-sac was cleared of all clots and products of conception  The uterus was replaced into the abdomen and the pericolic gutters were cleared of all clots and products of conception  The bilateral tubal sites were inspected and noted to be hemostatic  The peritoneum was then re approximated using 2-0 Vicryl  The muscle was then re approximated using 0 vicryl in a running stitch  The fascia was reapproximated using 0 Vicryl in a running nonlocked fashion  The subcutaneous tissue was irrigated and cleared of all clots and debris  The skin incision was closed using running absorbable suture with 4-0 monocryl  Steristrips was placed on top of the skin incision and dressing was placed  Good hemostasis was noted  Attention was then turned to the vagina for cerclage removal  A speculum was inserted into the vagina and the cerclage knots were noted at the 12 o'clock position  The knots were grasped, one suture was ligated and the cerclage was removed in its entirety intact  Patient tolerated the procedure well  All needle, sponge, and instrument counts were noted to be correct x 3 at the end of the procedure  Patient was transferred to the recovery room in stable condition  Dr David Lebron was present for the entire procedure       Complications:   None    Patient Disposition:  PACU     SIGNATURE: Sarah Tejeda MD  DATE: May 2, 2018  TIME: 8:49 PM

## 2018-05-03 NOTE — LACTATION NOTE
This note was copied from a baby's chart  CONSULT - LACTATION  Baby Girl  Miguel Horn) Adina Duverney 1 days female MRN: [id number]    [address] [place] [address] NURSERY Room / Bed: L&D 311(N)/L&D 311(N) Encounter: [id number]    Maternal Information     MOTHER:  Dima FOLEY  Maternal Age: 37 y o    OB History: #: 1, Date: None, Sex: None, Weight: None, GA: None, Delivery: None, Apgar1: None, Apgar5: None, Living: None, Birth Comments: None    #: 2, Date: None, Sex: None, Weight: None, GA: None, Delivery: None, Apgar1: None, Apgar5: None, Living: None, Birth Comments: None    #: 3, Date: None, Sex: None, Weight: None, GA: None, Delivery: None, Apgar1: None, Apgar5: None, Living: None, Birth Comments: None    #: 4, Date: [birth year], Sex: Male, Weight: 907 g (2 lb), GA: 40w0d, Delivery: , Unspecified, Apgar1: None, Apgar5: None, Living: Living, Birth Comments: None    #: 5, Date: 03, Sex: Male, Weight: None, GA: 16w0d, Delivery: Vaginal, Spontaneous Delivery, Apgar1: None, Apgar5: None, Living: Fetal Demise, Birth Comments: None    #: 6, Date: 07, Sex: Male, Weight: 2778 g (6 lb 2 oz), GA: 35w0d, Delivery: , Unspecified, Apgar1: None, Apgar5: None, Living: Living, Birth Comments: None    #: 7, Date: 18, Sex: Female, Weight: 2605 g (5 lb 11 9 oz), GA: 36w3d, Delivery: , Low Transverse, Apgar1: 8, Apgar5: 9, Living: Living, Birth Comments: None   Previouse breast reduction surgery? No    Lactation history:   Has patient previously breast fed: No   How long had patient previously breast fed:     Previous breast feeding complications:       Past Surgical History:   Procedure Laterality Date     SECTION       SECTION N/A 2018    Procedure:  SECTION ();   Surgeon: Abdulkadir Jameson MD;  Location: [facility] ;  Service: Obstetrics    CHOLECYSTECTOMY      GASTRIC BYPASS  2014    [place] REVISION OF CERVIX,NON OBSTETRICAL N/A 2017 Procedure: CERCLAGE CERVICAL;  Surgeon: Rj Alvarez MD;  Location: [facility];  Service: Obstetrics       Birth information:  YOB: 2018   Time of birth: 7:27 PM   Sex: female   Delivery type: , Low Transverse   Birth Weight: 2605 g (5 lb 11 9 oz)   Percent of Weight Change: -1%     Gestational Age: 43w3d   [unfilled]    Assessment     Breast and nipple assessment: normal assessment    [place] Assessment: did not assess at this time    Feeding assessment: feeding well  LATCH:  Latch: Grasps breast, tongue down, lips flanged, rhythmic sucking   Audible Swallowing: Spontaneous and intermittent (24 hours old)   Type of Nipple: Everted (After stimulation)   Comfort (Breast/Nipple): Soft/non-tender   Hold (Positioning): Full assist, staff holds infant at breast   LATCH Score: 8          Feeding recommendations:  breast feed on demand     Met with mother  Provided mother with Ready, Set, Baby booklet  Discussed Skin to Skin contact an benefits to mom and baby  Talked about the delay of the first bath until baby has adjusted  Spoke about the benefits of rooming in  Feeding on cue and what that means for recognizing infant's hunger  Avoidance of pacifiers for the first month discussed  Talked about exclusive breastfeeding for the first 6 months  Positioning and latch reviewed as well as showing images of other feeding positions  Discussed the properties of a good latch in any position  Reviewed hand/manual expression  Discussed s/s that baby is getting enough milk and some s/s that breastfeeding dyad may need further help  Gave information on common concerns, what to expect the first few weeks after delivery, preparing for other caregivers, and how partners can help  Resources for support also provided  Father of baby present and received education       Kathleen Closs, RN 5/3/2018 8:49 AM

## 2018-05-03 NOTE — PROGRESS NOTES
POD#0 RLTCS, BTL, Cerclage removal  She is doing well postoperatively, asymptomatic without complaints  Pain is well controlled, vitals stable, urine output adequate  Uterus firm @ the umbilicus, lochia WNL  Continue routine plan of care

## 2018-05-03 NOTE — NURSING NOTE
Cervical cerclage taken out by Dr Salazar Novoa in [address] after

## 2018-05-03 NOTE — PROGRESS NOTES
S: nurses called to c/o minimal bleeding at incision site    O:     Physical exam:   General: No Acute Distress   Abdomen: non-tender, no rebound or guarding; Incision: sutures: clean, intact; minimal bleeding 2-3 cm on either side of midline; more noticeable on expression   Neuro: Alert, cooperative      Vitals:    05/03/18 0300 05/03/18 0500 05/03/18 0701 05/03/18 0739   BP: 90/60 100/62 100/58    BP Location: Left arm Right arm Right arm    Pulse: 72 78 57    Resp: 16 18 18    Temp: 98 3 °F (36 8 °C) 98 3 °F (36 8 °C) 98 °F (36 7 °C)    TempSrc: Oral Oral Oral    SpO2: 99% 100%  97%   Weight:       Height:           Results from last 7 days  Lab Units 05/03/18  0549 05/02/18  1748   HEMOGLOBIN g/dL 9 3* 12 3     A: 38 yo PPD#1 s/p RLTCS + BTL, cerclage removal    P:   Incision: steristrips removed, incision site cleaned, new steristrips + telfa pad + abd pad applied via adhesive tape, 1L IVF bag secured to incision via abdominal binder; nurses instructed to remove pressure dressing at noon

## 2018-05-03 NOTE — PROGRESS NOTES
Progress Note - OB/GYN   Alejandro Trivedi 37 y o  female MRN: [id number]  Unit/Bed#: L&D 311-01 Encounter: [id number]    Assessment:  Postop Day #1 s/p RLTCS + BTL, cerclage removal, stable  History of gastric bypass: no NSAIDs    Plan:  1) Labs   Preop Hgb 12 3g/dL --> follow up AM CBC    2) Urinary voids   UO 270cc/hr, 2 9cc/kg/hr   Adequate voids overnight, reese removed this morning   Follow up voiding trial    3) Anxiety   No medications    4) Tobacco use   Continue nicoderm use    5) Continue routine post partum/post op care   Encourage ambulation   Encourage breastfeeding   Anticipate discharge 5/5/2018    Subjective/Objective   Chief Complaint:     Post delivery  Patient is feeling well  Lochia WNL  Pain well controlled  Subjective:     Pain: yes, incisional, improved with meds  Tolerating PO: yes  Voiding: yes  Flatus: yes  BM: no  Ambulating: yes  Breastfeeding:  yes  Chest pain: no  Shortness of breath: no  Leg pain: no  Lochia: minimal    Objective:     Vitals: /62 (BP Location: Right arm)   Pulse 78   Temp 98 3 °F (36 8 °C) (Oral)   Resp 18   Ht 5' 4 5" (1 638 m)   Wt 92 5 kg (204 lb)   LMP 05/23/2017   SpO2 100%   Breastfeeding?  Yes   BMI 34 48 kg/m²       Intake/Output Summary (Last 24 hours) at 05/03/18 0614  Last data filed at 05/03/18 0500   Gross per 24 hour   Intake             1700 ml   Output             3300 ml   Net            -1600 ml       Physical Exam:     AAOx3, NAD  CV, RRR  CTA b/l, no WRR  Soft, non-tender, non-distended, no rebound or guarding, no CVA tenderness  Uterine fundus firm and non-tender, at the umbilicus   Incision clean/dry/intact without signs of inflammation   sutures: clean, dry, and intact  Non tender      Lab Results   Component Value Date    WBC 9 32 05/02/2018    HGB 12 3 05/02/2018    HCT 38 9 05/02/2018    MCV 83 05/02/2018     05/02/2018                         Oleg Fitzpatrick MD  5/3/2018  6:14 AM

## 2018-05-03 NOTE — ANESTHESIA POSTPROCEDURE EVALUATION
Post-Op Assessment Note      CV Status:  Stable    Mental Status:  Alert and awake    Hydration Status:  Euvolemic    PONV Controlled:  Controlled    Airway Patency:  Patent    Post Op Vitals Reviewed: Yes          Staff: Anesthesiologist           /62 (05/02/18 2105)    Temp      Pulse 85 (05/02/18 2105)   Resp 18 (05/02/18 2105)    SpO2 99 % (05/02/18 2105)

## 2018-05-03 NOTE — DISCHARGE INSTRUCTIONS
Breast Care for the Breast Feeding Mother   WHAT YOU SHOULD KNOW:   Your breasts will go through normal changes while you are breastfeeding  Sometimes breast and nipple problems can develop while you are breastfeeding  Learn about changes that are normal and those that may be a problem  Breast care can help you prevent and manage problems so you and your baby can enjoy the benefits of breastfeeding  INSTRUCTIONS:   Breast changes while you are breastfeeding:   · For the first few days after your baby is born, your body makes a small amount of breast milk (colostrum)  Within about 2 to 5 days, your body will begin making mature milk  It may take up to 10 days or longer for mature milk to come in  When your mature milk comes in, your breasts will become full and firm  They may feel tender  · Breastfeeding your baby will decrease the full feeling in your breasts  You may feel a tingly sensation during feedings as milk is released from your breasts  This is called the milk let-down reflex  After 7 or more days, the fullness may feel like it has decreased  Your nipples should look the same as they did before you started breastfeeding  Breasts that feel full before and empty after breastfeeding are signs that breastfeeding is going well  Breast problems that can occur while you are breastfeeding:   · Nipple soreness  may occur when you begin to breastfeed your baby  You may also have nipple soreness if your baby is not latched on to your breast correctly  Correct positioning and latch-on may decrease or stop the pain in your nipples  Work with your caregivers to help your baby latch on correctly  It may also be helpful to place warm, wet compresses on your nipples to help decrease pain  · Plugged milk ducts  may cause painful breast lumps  Plugged ducts may be caused by not emptying your breasts completely during feedings   When your baby pauses during breastfeeding, massage and gently squeeze your breast  Gentle massage may unplug a blocked milk duct  Pump out any milk left in your breasts after your baby is done breastfeeding  Avoid wearing tight tops, tight bras, or under-wire bras, because they may put pressure on your breasts  · Engorgement  may occur as your milk comes in soon after you begin breastfeeding  Engorgement may cause your breasts to become swollen and painful  Your breasts may also become engorged if you miss a feeding or you do not breastfeed on demand  The best way to decrease engorgement symptoms is to empty your breasts by feeding your baby often  Engorgement can make it hard for your baby to latch on to your breast  If this happens, express a small amount of milk and then have your baby latch on  Cold compresses, gel packs, or ice packs on your breasts can help decrease pain and swelling  Ask your caregiver how often and how long you should use cold, or ice packs  · A breast infection called mastitis  can develop if you have plugged milk ducts or engorgement  Mastitis causes your breasts to become red, swollen, and painful  You may also have flu-like symptoms, such as chills and a fever  Place heat on your breasts to help decrease the pain  You may want to place a moist, warm cloth on the painful breast or both of your breasts  Ask how often to do this  Your primary healthcare provider [facility]) may suggest that you take an NSAID, such as ibuprofen, to decrease pain and swelling  He may also order antibiotics to treat mastitis  Ask about feeding your baby when you have a breast infection  How to help prevent or manage breast problems while you are breastfeeding:   · Learn how to position your baby and latch him on correctly  To latch your baby correctly to your breast, make sure that his mouth covers most of your areola (dark area around your nipple)  He should not be attached only to the nipple  Your baby is latched on well if you feel comfortable and do not feel pain   A correct latch helps him get enough milk and can help to prevent sore nipples and other breast problems  There are several breastfeeding positions that you can try  Find the position that works best for you and your baby  Ask your caregiver for more information about how to hold and breastfeed your baby  · Prevent biting  Your baby may get teeth at about 1to 3months of age  To help prevent biting, break his suction once he is finished or if he has fallen asleep  To break his suction, slip a finger into the side of his mouth  If your baby bites you, respond with surprise or unhappiness  Offer praise when he does not bite you  · Breastfeed your baby regularly  Feed your baby 8 to 12 times a day  You may need to wake up your baby at night to feed him  It is okay to feed from 1 or both breasts at each feeding  Your baby should breastfeed from both breasts equally over the course of a day  If your baby only feeds from 1 side during a feeding, offer your other breast to him first for the next feeding  · Schedule and keep follow-up visits  Talk to your baby's pediatrician or your PHP during follow-up visits if you have breast problems  Caregivers may suggest that you, or you and your partner, attend classes on breastfeeding  You also may want to join a breastfeeding support group  Caregivers may suggest that you see a lactation consultant  This is a caregiver who can help you with breastfeeding  Contact your PHP if:   · You have a fever and chills  · You have body aches and you feel like you do not have any energy  · One or both of your breasts is red, swollen or hard, painful, and feels warm or hot  · You have breast engorgement that does not get better within 24 hours  · You see or feel a lump in your breast that hurts when you touch it  · You have nipple pain during breastfeeding or between feedings  · Your nipples are red, dry, cracked, bleeding, or they have scabs on them       · You have questions or concerns about your condition or care  2014 [address] is for End User's use only and may not be sold, redistributed or otherwise used for commercial purposes  All illustrations and images included in CareNotes® are the copyrighted property of [employer]  or Chriss Landers  The above information is an  only  It is not intended as medical advice for individual conditions or treatments  Talk to your doctor, nurse or pharmacist before following any medical regimen to see if it is safe and effective for you  Postpartum Bleeding   WHAT YOU NEED TO KNOW:   Postpartum bleeding is vaginal bleeding after childbirth  This bleeding is normal, whether your baby was born vaginally or by   It contains blood and the tissue that lined the inside of your uterus when you were pregnant  DISCHARGE INSTRUCTIONS:   What to expect with postpartum bleeding:  Postpartum bleeding usually lasts at least 10 days, and may last longer than 6 weeks  Your bleeding may range from light (barely staining a pad) to heavy (soaking a pad in 1 hour)  Usually, you have heavier bleeding right after childbirth, which slows over the next few weeks until it stops  The bleeding is red or dark brown with clots for the first 1 to 3 days  It then turns pink for several days, and then becomes a white or yellow discharge until it ends  Follow up with your obstetrician as directed:  Do not have sex until your obstetrician says it is okay  Write down your questions so you remember to ask them during your visits  Contact your healthcare provider or obstetrician if:   · Your bleeding increases, or you have heavy bleeding that soaks a pad in 1 hour for 2 hours in a row  · You pass large blood clots  · You are breathing faster than normal, or your heart is beating faster than normal     · You are urinating less than usual, or not at all  · You feel dizzy      · You have questions or concerns about your condition or care  Seek immediate care or call 911 if:   · You are suddenly short of breath and feel lightheaded  · You have sudden chest pain  © 2017 [employer]  Information is for End User's use only and may not be sold, redistributed or otherwise used for commercial purposes  All illustrations and images included in CareNotes® are the copyrighted property of A D A M , Inc  or Chriss Landers  The above information is an  only  It is not intended as medical advice for individual conditions or treatments  Talk to your doctor, nurse or pharmacist before following any medical regimen to see if it is safe and effective for you     WHAT YOU NEED TO KNOW:   A , or  section, is abdominal surgery to deliver your baby  DISCHARGE INSTRUCTIONS:   Call 911 for any of the following:   · You feel lightheaded, short of breath, and have chest pain  · You cough up blood  Seek care immediately if:   · Blood soaks through your bandage  · Your stitches come apart  · Your arm or leg feels warm, tender, and painful  It may look swollen and red  Contact your OB if:   · You have heavy vaginal bleeding that fills 1 or more sanitary pads in 1 hour  · You have a fever  · Your incision is swollen, red, or draining pus  · You have questions or concerns about yourself or your baby  Medicines: You may  need any of the following:  · Prescription pain medicine  may be given  Ask how to take this medicine safely  · Acetaminophen  decreases pain and fever  It is available without a doctor's order  Ask how much to take and how often to take it  Follow directions  Acetaminophen can cause liver damage if not taken correctly  · NSAIDs , such as ibuprofen, help decrease swelling, pain, and fever  NSAIDs can cause stomach bleeding or kidney problems in certain people   If you take blood thinner medicine, always ask your healthcare provider if NSAIDs are safe for you  Always read the medicine label and follow directions  · Take your medicine as directed  Contact your healthcare provider if you think your medicine is not helping or if you have side effects  Tell him or her if you are allergic to any medicine  Keep a list of the medicines, vitamins, and herbs you take  Include the amounts, and when and why you take them  Bring the list or the pill bottles to follow-up visits  Carry your medicine list with you in case of an emergency  Wound care:  Carefully wash your wound with soap and water every day  Keep your wound clean and dry  Wear loose, comfortable clothes that do not rub against your wound  Ask your OB about bathing and showering  Limit activity as directed:   · Ask when it is safe for you to drive, walk up stairs, lift heavy objects, and have sex  · Ask when it is okay to exercise, and what types of exercise to do  Start slowly and do more as you get stronger  Drink liquids as directed:  Liquids help keep you hydrated after your procedure and decrease your risk for a blood clot  Ask how much liquid to drink each day and which liquids are best for you  Follow up with your OB as directed: You may need to return to have your stitches or staples removed  Write down your questions so you remember to ask them during your visits  © 2017 [address] Information is for End User's use only and may not be sold, redistributed or otherwise used for commercial purposes  All illustrations and images included in CareNotes® are the copyrighted property of A D A M , Inc  or Chriss Landers  The above information is an  only  It is not intended as medical advice for individual conditions or treatments  Talk to your doctor, nurse or pharmacist before following any medical regimen to see if it is safe and effective for you

## 2018-05-04 ENCOUNTER — HOSPITAL ENCOUNTER (OUTPATIENT)
Dept: INFUSION CENTER | Facility: HOSPITAL | Age: 43
Discharge: HOME | End: 2018-05-04

## 2018-05-04 RX ORDER — FERROUS SULFATE 325(65) MG
325 TABLET ORAL
Status: DISCONTINUED | OUTPATIENT
Start: 2018-05-04 | End: 2018-05-05 | Stop reason: HOSPADM

## 2018-05-04 RX ADMIN — OXYCODONE HYDROCHLORIDE AND ACETAMINOPHEN 1 TABLET: 5; 325 TABLET ORAL at 11:46

## 2018-05-04 RX ADMIN — FAMOTIDINE 20 MG: 20 TABLET ORAL at 07:46

## 2018-05-04 RX ADMIN — OXYCODONE HYDROCHLORIDE AND ACETAMINOPHEN 2 TABLET: 5; 325 TABLET ORAL at 07:48

## 2018-05-04 RX ADMIN — OXYCODONE HYDROCHLORIDE AND ACETAMINOPHEN 1 TABLET: 5; 325 TABLET ORAL at 15:48

## 2018-05-04 RX ADMIN — OXYCODONE HYDROCHLORIDE AND ACETAMINOPHEN 1 TABLET: 5; 325 TABLET ORAL at 02:32

## 2018-05-04 RX ADMIN — FAMOTIDINE 20 MG: 20 TABLET ORAL at 17:52

## 2018-05-04 RX ADMIN — OXYCODONE HYDROCHLORIDE AND ACETAMINOPHEN 2 TABLET: 5; 325 TABLET ORAL at 20:01

## 2018-05-04 RX ADMIN — DOCUSATE SODIUM 100 MG: 100 CAPSULE, LIQUID FILLED ORAL at 17:52

## 2018-05-04 RX ADMIN — DOCUSATE SODIUM 100 MG: 100 CAPSULE, LIQUID FILLED ORAL at 07:46

## 2018-05-04 RX ADMIN — FERROUS SULFATE TAB 325 MG (65 MG ELEMENTAL FE) 325 MG: 325 (65 FE) TAB at 09:50

## 2018-05-04 NOTE — PLAN OF CARE
Problem: POSTPARTUM  Goal: Experiences normal postpartum course  INTERVENTIONS:  - Monitor maternal vital signs  - Assess uterine involution and lochia   Outcome: Progressing    Goal: Appropriate maternal -  bonding  INTERVENTIONS:  - Identify family support  - Assess for appropriate maternal/infant bonding   -Encourage maternal/infant bonding opportunities  - Referral to  or  as needed   Outcome: Progressing    Goal: Establishment of infant feeding pattern  INTERVENTIONS:  - Assess breast/bottle feeding  - Refer to lactation as needed   Outcome: Progressing    Goal: Incision(s), wounds(s) or drain site(s) healing without S/S of infection  INTERVENTIONS  - Assess and document risk factors for skin impairment   - Assess and document dressing, incision, wound bed, drain sites and surrounding tissue  - Initiate Nutrition services consult and/or wound management as needed   Outcome: Progressing      Problem: PAIN - ADULT  Goal: Verbalizes/displays adequate comfort level or baseline comfort level  Interventions:  - Encourage patient to monitor pain and request assistance  - Assess pain using appropriate pain scale  - Administer analgesics based on type and severity of pain and evaluate response  - Implement non-pharmacological measures as appropriate and evaluate response  - Consider cultural and social influences on pain and pain management  - Notify physician/advanced practitioner if interventions unsuccessful or patient reports new pain   Outcome: Progressing      Problem: INFECTION - ADULT  Goal: Absence or prevention of progression during hospitalization  INTERVENTIONS:  - Assess and monitor for signs and symptoms of infection  - Monitor lab/diagnostic results  - Monitor all insertion sites, i e  indwelling lines, tubes, and drains  - Monitor endotracheal (as able) and nasal secretions for changes in amount and color  - [place] appropriate cooling/warming therapies per order  - Administer medications as ordered  - Instruct and encourage patient and family to use good hand hygiene technique  - Identify and instruct in appropriate isolation precautions for identified infection/condition   Outcome: Progressing      Problem: SAFETY ADULT  Goal: Patient will remain free of falls  INTERVENTIONS:  - Assess patient frequently for physical needs  -  Identify cognitive and physical deficits and behaviors that affect risk of falls    -  [place] fall precautions as indicated by assessment   - Educate patient/family on patient safety including physical limitations  - Instruct patient to call for assistance with activity based on assessment  - Modify environment to reduce risk of injury  - Consider OT/PT consult to assist with strengthening/mobility   Outcome: Progressing    Goal: Maintain or return to baseline ADL function  INTERVENTIONS:  -  Assess patient's ability to carry out ADLs; assess patient's baseline for ADL function and identify physical deficits which impact ability to perform ADLs (bathing, care of mouth/teeth, toileting, grooming, dressing, etc )  - Assess/evaluate cause of self-care deficits   - Assess range of motion  - Assess patient's mobility; develop plan if impaired  - Assess patient's need for assistive devices and provide as appropriate  - Encourage maximum independence but intervene and supervise when necessary  ¯ Involve family in performance of ADLs  ¯ Assess for home care needs following discharge   ¯ Request OT consult to assist with ADL evaluation and planning for discharge  ¯ Provide patient education as appropriate   Outcome: Progressing    Goal: Maintain or return mobility status to optimal level  INTERVENTIONS:  - Assess patient's baseline mobility status (ambulation, transfers, stairs, etc )    - Identify cognitive and physical deficits and behaviors that affect mobility  - Identify mobility aids required to assist with transfers and/or ambulation (gait belt, sit-to-stand, lift, walker, cane, etc )  - [place] fall precautions as indicated by assessment  - Record patient progress and toleration of activity level on Mobility SBAR; progress patient to next Phase/Stage  - Instruct patient to call for assistance with activity based on assessment  - Request Rehabilitation consult to assist with strengthening/weightbearing, etc    Outcome: Progressing

## 2018-05-04 NOTE — PROGRESS NOTES
Progress Note - OB/GYN   Zara Marquez 37 y o  female MRN: [id number]  Unit/Bed#: L&D 311-01 Encounter: [id number]    Assessment:  Postop Day #2 s/p RLTCS+BTL, cerclage removal, stable  Spontaneously voiding    Plan:  1) Acute blood loss anemia              Preop Hgb 12 3g/dL --> 9 3g/dL   Will start ferrous sulfate supplementation     2) Anxiety              No medications     3) Tobacco use              Continue nicoderm use     4) Continue routine post partum/post op care              Encourage ambulation              Encourage breastfeeding              Anticipate discharge 5/5/2018    Subjective/Objective   Chief Complaint:     Post delivery  Patient is feeling well  Lochia WNL  Pain well controlled  Subjective:     Pain: yes, incisional, improved with meds  Tolerating PO: yes  Voiding: yes  Flatus: yes  BM: no  Ambulating: yes  Breastfeeding:  yes  Chest pain: no  Shortness of breath: no  Leg pain: no  Lochia: minimal    Objective:     Vitals: /60 (BP Location: Right arm)   Pulse 78   Temp 98 3 °F (36 8 °C) (Oral)   Resp 18   Ht 5' 4 5" (1 638 m)   Wt 92 5 kg (204 lb)   LMP 05/23/2017   SpO2 97%   Breastfeeding? Yes   BMI 34 48 kg/m²       Intake/Output Summary (Last 24 hours) at 05/04/18 0628  Last data filed at 05/04/18 0000   Gross per 24 hour   Intake             1400 ml   Output             1900 ml   Net             -500 ml       Physical Exam:     AAOx3, NAD  CV, RRR  CTA b/l, no WRR  Soft, non-tender, non-distended, no rebound or guarding, no CVA tenderness  Uterine fundus firm and non-tender, -1 cm below the umbilicus   Incision clean and intact without signs of inflammation  There is small amount of midline incisional drainage, serosanguinous  Pressure dressing in place     sutures: clean, dry, and intact  Non tender      Lab Results   Component Value Date    WBC 10 67 (H) 05/03/2018    HGB 9 3 (L) 05/03/2018    HCT 30 0 (L) 05/03/2018    MCV 84 05/03/2018     05/03/2018 Cathy Barrera MD  5/4/2018  6:28 AM

## 2018-05-04 NOTE — NURSING NOTE
TC- advised Dr Raffaele Santoyo of pts incision status: Telfa and ABD dressing saturated with active Sanguineous drainage  MD advised me to place Pressure dressing on incision with 1 liter fluid bag secured with Binder  No other orders

## 2018-05-04 NOTE — PLAN OF CARE
Problem: POSTPARTUM  Goal: Experiences normal postpartum course  INTERVENTIONS:  - Monitor maternal vital signs  - Assess uterine involution and lochia   Outcome: Progressing    Goal: Appropriate maternal -  bonding  INTERVENTIONS:  - Identify family support  - Assess for appropriate maternal/infant bonding   -Encourage maternal/infant bonding opportunities  - Referral to  or  as needed   Outcome: Progressing    Goal: Establishment of infant feeding pattern  INTERVENTIONS:  - Assess breast/bottle feeding  - Refer to lactation as needed   Outcome: Progressing    Goal: Incision(s), wounds(s) or drain site(s) healing without S/S of infection  INTERVENTIONS  - Assess and document risk factors for skin impairment   - Assess and document dressing, incision, wound bed, drain sites and surrounding tissue  - Initiate Nutrition services consult and/or wound management as needed   Outcome: Progressing      Problem: PAIN - ADULT  Goal: Verbalizes/displays adequate comfort level or baseline comfort level  Interventions:  - Encourage patient to monitor pain and request assistance  - Assess pain using appropriate pain scale  - Administer analgesics based on type and severity of pain and evaluate response  - Implement non-pharmacological measures as appropriate and evaluate response  - Consider cultural and social influences on pain and pain management  - Notify physician/advanced practitioner if interventions unsuccessful or patient reports new pain   Outcome: Progressing      Problem: INFECTION - ADULT  Goal: Absence or prevention of progression during hospitalization  INTERVENTIONS:  - Assess and monitor for signs and symptoms of infection  - Monitor lab/diagnostic results  - Monitor all insertion sites, i e  indwelling lines, tubes, and drains  - Monitor endotracheal (as able) and nasal secretions for changes in amount and color  - [place] appropriate cooling/warming therapies per order  - Administer medications as ordered  - Instruct and encourage patient and family to use good hand hygiene technique  - Identify and instruct in appropriate isolation precautions for identified infection/condition   Outcome: Progressing      Problem: SAFETY ADULT  Goal: Patient will remain free of falls  INTERVENTIONS:  - Assess patient frequently for physical needs  -  Identify cognitive and physical deficits and behaviors that affect risk of falls    -  [place] fall precautions as indicated by assessment   - Educate patient/family on patient safety including physical limitations  - Instruct patient to call for assistance with activity based on assessment  - Modify environment to reduce risk of injury  - Consider OT/PT consult to assist with strengthening/mobility   Outcome: Progressing    Goal: Maintain or return to baseline ADL function  INTERVENTIONS:  -  Assess patient's ability to carry out ADLs; assess patient's baseline for ADL function and identify physical deficits which impact ability to perform ADLs (bathing, care of mouth/teeth, toileting, grooming, dressing, etc )  - Assess/evaluate cause of self-care deficits   - Assess range of motion  - Assess patient's mobility; develop plan if impaired  - Assess patient's need for assistive devices and provide as appropriate  - Encourage maximum independence but intervene and supervise when necessary  ¯ Involve family in performance of ADLs  ¯ Assess for home care needs following discharge   ¯ Request OT consult to assist with ADL evaluation and planning for discharge  ¯ Provide patient education as appropriate   Outcome: Progressing    Goal: Maintain or return mobility status to optimal level  INTERVENTIONS:  - Assess patient's baseline mobility status (ambulation, transfers, stairs, etc )    - Identify cognitive and physical deficits and behaviors that affect mobility  - Identify mobility aids required to assist with transfers and/or ambulation (gait belt, sit-to-stand, lift, walker, cane, etc )  - [place] fall precautions as indicated by assessment  - Record patient progress and toleration of activity level on Mobility SBAR; progress patient to next Phase/Stage  - Instruct patient to call for assistance with activity based on assessment  - Request Rehabilitation consult to assist with strengthening/weightbearing, etc    Outcome: Progressing

## 2018-05-05 VITALS
DIASTOLIC BLOOD PRESSURE: 66 MMHG | RESPIRATION RATE: 18 BRPM | OXYGEN SATURATION: 98 % | HEART RATE: 76 BPM | TEMPERATURE: 97.9 F | BODY MASS INDEX: 33.99 KG/M2 | HEIGHT: 65 IN | SYSTOLIC BLOOD PRESSURE: 100 MMHG | WEIGHT: 204 LBS

## 2018-05-05 RX ORDER — ONDANSETRON 4 MG/1
4 TABLET, ORALLY DISINTEGRATING ORAL EVERY 6 HOURS PRN
Qty: 20 TABLET | Refills: 0 | Status: SHIPPED | OUTPATIENT
Start: 2018-05-05 | End: 2018-08-25 | Stop reason: ALTCHOICE

## 2018-05-05 RX ORDER — ONDANSETRON 4 MG/1
4 TABLET, ORALLY DISINTEGRATING ORAL EVERY 6 HOURS PRN
Qty: 20 TABLET | Refills: 0 | Status: SHIPPED | OUTPATIENT
Start: 2018-05-05 | End: 2018-05-18

## 2018-05-05 RX ORDER — ONDANSETRON 4 MG/1
4 TABLET, ORALLY DISINTEGRATING ORAL EVERY 6 HOURS PRN
Status: DISCONTINUED | OUTPATIENT
Start: 2018-05-05 | End: 2018-05-05 | Stop reason: HOSPADM

## 2018-05-05 RX ORDER — ONDANSETRON 4 MG/1
4 TABLET, ORALLY DISINTEGRATING ORAL EVERY 6 HOURS PRN
Status: DISCONTINUED | OUTPATIENT
Start: 2018-05-05 | End: 2018-05-05 | Stop reason: CLARIF

## 2018-05-05 RX ADMIN — ONDANSETRON 4 MG: 4 TABLET, ORALLY DISINTEGRATING ORAL at 10:58

## 2018-05-05 RX ADMIN — FERROUS SULFATE TAB 325 MG (65 MG ELEMENTAL FE) 325 MG: 325 (65 FE) TAB at 07:23

## 2018-05-05 RX ADMIN — DOCUSATE SODIUM 100 MG: 100 CAPSULE, LIQUID FILLED ORAL at 07:23

## 2018-05-05 RX ADMIN — OXYCODONE HYDROCHLORIDE AND ACETAMINOPHEN 1 TABLET: 5; 325 TABLET ORAL at 01:59

## 2018-05-05 RX ADMIN — FAMOTIDINE 20 MG: 20 TABLET ORAL at 07:23

## 2018-05-05 RX ADMIN — OXYCODONE HYDROCHLORIDE AND ACETAMINOPHEN 2 TABLET: 5; 325 TABLET ORAL at 06:05

## 2018-05-05 RX ADMIN — OXYCODONE HYDROCHLORIDE AND ACETAMINOPHEN 1 TABLET: 5; 325 TABLET ORAL at 10:26

## 2018-05-05 NOTE — PLAN OF CARE
INFECTION - ADULT     Absence or prevention of progression during hospitalization Progressing        PAIN - ADULT     Verbalizes/displays adequate comfort level or baseline comfort level Progressing        POSTPARTUM     Experiences normal postpartum course Progressing     Appropriate maternal -  bonding Progressing     Establishment of infant feeding pattern Progressing     Incision(s), wounds(s) or drain site(s) healing without S/S of infection Progressing        SAFETY ADULT     Patient will remain free of falls Progressing     Maintain or return to baseline ADL function Progressing     Maintain or return mobility status to optimal level Progressing

## 2018-05-05 NOTE — PROGRESS NOTES
Progress Note - OB/GYN   Christopher Mendez 37 y o  female MRN: [id number]  Unit/Bed#: L&D 311-01 Encounter: [id number]      Christopher Mendez is a patient of Dr Jose Eduardo Gomez:  Post operative day #3 s/p  RLTCS and cerclage removal, stable, and doing well today    Plan:  1  Midline incision site bleeding/ooziness   - Redressed today with pressure dressing and steri strips  2  Hx gastric bypass   - No NSAIDs  3  Tobacco use   - Nicoderm   - Counseled on smoking cessation  4  Hemodynamically stable after acute blood loss anemia   - Pre-op Hb 12 3 --> post-op Hb 9 3   - Vitals WNL, currently asymptomatic  5  Anxiety   - No meds, pt states she feels stable  6  Continue routine post partum care   - Encourage ambulation   - Encourage breastfeeding  7  Continue current meds   - See list below   - Pain adequately controlled with PO analgesics  8  Disposition   - Stable   - Anticipate discharge home today      Subjective/Objective     Chief Complaint:     Post operative day #3 from a RLTCS with cerclage removal with no complaints today    Subjective:     Pain: no  Tolerating Oral Intake: yes  Voiding: yes  Flatus: yes  Bowel Movement: no  Ambulating: yes  Breastfeeding: Breastfeeding  Chest Pain: no  Shortness of Breath: no  Leg Pain/Discomfort: no  Lochia: minimal    Objective:   Vitals: /66 (BP Location: Right arm)   Pulse 76   Temp 97 9 °F (36 6 °C) (Oral)   Resp 18   Ht 5' 4 5" (1 638 m)   Wt 92 5 kg (204 lb)   LMP 05/23/2017   SpO2 98%   Breastfeeding?  Yes   BMI 34 48 kg/m²     No intake or output data in the 24 hours ending 05/05/18 0800    Lab Results   Component Value Date    WBC 10 67 (H) 05/03/2018    HGB 9 3 (L) 05/03/2018    HCT 30 0 (L) 05/03/2018    MCV 84 05/03/2018     05/03/2018       Meds/Allergies   Current Facility-Administered Medications   Medication Dose Route Frequency    acetaminophen (TYLENOL) tablet 650 mg  650 mg Oral Q4H PRN    diphenhydrAMINE (BENADRYL) tablet 25 mg  25 mg Oral Q6H PRN    docusate sodium (COLACE) capsule 100 mg  100 mg Oral BID    famotidine (PEPCID) tablet 20 mg  20 mg Oral BID    ferrous sulfate tablet 325 mg  325 mg Oral Daily With Breakfast    lactated ringers infusion  125 mL/hr Intravenous Continuous    nicotine (NICODERM CQ) 7 mg/24hr TD 24 hr patch 7 mg  7 mg Transdermal Daily PRN    ondansetron (ZOFRAN) injection 4 mg  4 mg Intravenous Q8H PRN    oxyCODONE-acetaminophen (PERCOCET) 5-325 mg per tablet 1 tablet  1 tablet Oral Q4H PRN    oxyCODONE-acetaminophen (PERCOCET) 5-325 mg per tablet 2 tablet  2 tablet Oral Q4H PRN    simethicone (MYLICON) chewable tablet 80 mg  80 mg Oral 4x Daily PRN     Facility-Administered Medications Ordered in Other Encounters   Medication Dose Route Frequency    cyanocobalamin injection 1,000 mcg  1,000 mcg Intramuscular Once    iron sucrose (VENOFER) 200 mg in sodium chloride 0 9 % 100 mL IVPB  200 mg Intravenous Once       Physical Exam:  General: in no apparent distress and well developed and well nourished  Cardiovascular: Cor RRR  Lungs: clear to auscultation bilaterally  Abdomen: abdomen is soft without significant tenderness, masses, organomegaly or guarding; incsion clean, no erythema or edema, there is some ooziness and small degree of separation <3mm at the incision, will repair with steri strips and pressure binding (this was noted yday as well and pressure binding was placed)  Fundus: Firm and non-tender, 1 cm below the umbilicus  Lower extremeties: nontender, SCDs in place b/l    Labs/Tests:   No results found for this or any previous visit (from the past 24 hour(s))      MEDS:   Current Facility-Administered Medications   Medication Dose Route Frequency    acetaminophen (TYLENOL) tablet 650 mg  650 mg Oral Q4H PRN    diphenhydrAMINE (BENADRYL) tablet 25 mg  25 mg Oral Q6H PRN    docusate sodium (COLACE) capsule 100 mg  100 mg Oral BID    famotidine (PEPCID) tablet 20 mg  20 mg Oral BID    ferrous sulfate tablet 325 mg  325 mg Oral Daily With Breakfast    lactated ringers infusion  125 mL/hr Intravenous Continuous    nicotine (NICODERM CQ) 7 mg/24hr TD 24 hr patch 7 mg  7 mg Transdermal Daily PRN    ondansetron (ZOFRAN) injection 4 mg  4 mg Intravenous Q8H PRN    oxyCODONE-acetaminophen (PERCOCET) 5-325 mg per tablet 1 tablet  1 tablet Oral Q4H PRN    oxyCODONE-acetaminophen (PERCOCET) 5-325 mg per tablet 2 tablet  2 tablet Oral Q4H PRN    simethicone (MYLICON) chewable tablet 80 mg  80 mg Oral 4x Daily PRN     Facility-Administered Medications Ordered in Other Encounters   Medication Dose Route Frequency    cyanocobalamin injection 1,000 mcg  1,000 mcg Intramuscular Once    iron sucrose (VENOFER) 200 mg in sodium chloride 0 9 % 100 mL IVPB  200 mg Intravenous Once     Invasive Devices          No matching active lines, drains, or airways          Katy Bautista DO  PGY-1 OB/GYN   5/5/2018 8:00 AM

## 2018-05-07 NOTE — CASE MANAGEMENT
Notification of Maternity Inpatient Admission/Maternity Inpatient Authorization Request  This is a Notification of Maternity Inpatient Admission/Maternity Inpatient Authorization Request to our facility [facility]  Please be advised that this patient is currently in our facility under Inpatient Status  Below you will find the Birth/Mex[place] Summary, Attending Physician and Facilitys information including NPI# and contact for the Utilization  assigned to the [facility] where the patient is receiving services  Please feel free to contact the Utilization Review Department with any questions  Mothers Information:  Harris Odom  MRN: [id number]  YOB: 1975  Admission Date: 2018  1:02 PM  Discharge Date: 2018 12:45 PM  Disposition: Home/Self Care  Admitting Diagnosis:   O82  DELIVERY  Abdominal pain complicating pregnancy [G10 133, R10 9]  Mexia Information:  Estimated Date of Delivery: 18  Information for the patient's :  Ryan Cassidypaul Girl  Catieeddie Hernandez) [[id number]]      Delivery Information:  Sex: female  Delivered 2018 7:27 PM by , Low Transverse; Gestational Age: 43w3d     Measurements:  Weight: 5 lb 11 9 oz (2605 g); Height: 19"    APGAR 1 minute 5 minutes 10 minutes   Totals: 8 9      OB History      Para Term  AB Living    7 3 1 2 4 3    SAB TAB Ectopic Multiple Live Births    3 1   0 3        Attending Physician:  DAVID Fulton    Specialty- Obstetrics and Gynecology  [facility] ID- [id number]  Primary Office:  [address] [place] [address], [address] [facility]  Phone 1: [phone]  Fax: [phone]    Facility: [address], [facility]  [phone]  Tax ID: [id number]  NPI: [id number]    [address] in the [facility] by Chriss Landers for 2017  Network Utilization Review Department  Phone: [phone]; Fax [phone]  ATTENTION: The Network Utilization Review Department is now centralized for our 7 Facilities  Make a note that we have a new phone and fax numbers for our Department  Please call with any questions or concerns to [phone] and carefully follow the prompts so that you are directed to the right person  All voicemails are confidential  Fax any determinations, approvals, denials, and requests for initial or continue stay review clinical to [phone]  **Due to HIGH CALL volume, it would be easier if you could please send daily logs  This will expedite your requests and in part, help us provide discharge notifications faster   **

## 2018-05-18 ENCOUNTER — OFFICE VISIT (OUTPATIENT)
Dept: FAMILY MEDICINE CLINIC | Facility: CLINIC | Age: 43
End: 2018-05-18
Payer: COMMERCIAL

## 2018-05-18 VITALS
SYSTOLIC BLOOD PRESSURE: 124 MMHG | WEIGHT: 193 LBS | RESPIRATION RATE: 16 BRPM | HEART RATE: 61 BPM | BODY MASS INDEX: 32.15 KG/M2 | HEIGHT: 65 IN | OXYGEN SATURATION: 98 % | DIASTOLIC BLOOD PRESSURE: 72 MMHG | TEMPERATURE: 96.8 F

## 2018-05-18 DIAGNOSIS — H10.13 ALLERGIC CONJUNCTIVITIS OF BOTH EYES: ICD-10-CM

## 2018-05-18 PROCEDURE — T1015 CLINIC SERVICE: HCPCS | Performed by: FAMILY MEDICINE

## 2018-05-18 RX ORDER — CROMOLYN SODIUM 40 MG/ML
1 SOLUTION/ DROPS OPHTHALMIC 4 TIMES DAILY
Qty: 10 ML | Refills: 0 | Status: SHIPPED | OUTPATIENT
Start: 2018-05-18 | End: 2018-10-04

## 2018-05-18 RX ORDER — DULOXETIN HYDROCHLORIDE 20 MG/1
20 CAPSULE, DELAYED RELEASE ORAL DAILY
Qty: 30 CAPSULE | Refills: 1 | Status: SHIPPED | OUTPATIENT
Start: 2018-05-18 | End: 2018-08-30

## 2018-05-18 RX ORDER — LORATADINE 10 MG/1
10 TABLET ORAL DAILY
Qty: 30 TABLET | Refills: 0 | Status: SHIPPED | OUTPATIENT
Start: 2018-05-18 | End: 2018-08-30

## 2018-05-18 NOTE — PROGRESS NOTES
OFFICE VISIT  Becca Tejeda 37 y o  female MRN: [id number]      Assessment / Plan:  Diagnoses and all orders for this visit:    Postpartum depression  -     DULoxetine (CYMBALTA) 20 mg capsule; Take 1 capsule (20 mg total) by mouth daily    Allergic conjunctivitis of both eyes  -     cromolyn (OPTICROM) 4 % ophthalmic solution; Administer 1 drop to both eyes 4 (four) times a day  -     loratadine (CLARITIN) 10 mg tablet; Take 1 tablet (10 mg total) by mouth daily          Reason For Visit / Chief Complaint  Chief Complaint   Patient presents with    Sinus Problem     +1 week    Earache     right ear     Postpartum Care        HPI:  Becca Tejeda is a 37 y o  female presents today for postpardum depression  She reports her obgyn wanting to place her on medication during pregnancy  She is currently not breastfeeding  She reports no pleasure in daily activiites  She has eye irritation, pain to right ear, with PND  She used tyleno sinus without relief  Historical Information   Past Medical History:   Diagnosis Date    Anxiety     History of gastric bypass 2014     Past Surgical History:   Procedure Laterality Date     SECTION       SECTION N/A 2018    Procedure:  SECTION ();   Surgeon: Micky Giles MD;  Location: [facility];  Service: Obstetrics    CHOLECYSTECTOMY      GASTRIC BYPASS  2014    CA REVISION OF CERVIX,NON OBSTETRICAL N/A 2017    Procedure: CERCLAGE CERVICAL;  Surgeon: Evangelina Asencio MD;  Location: [facility];  Service: Obstetrics     Social History   History   Alcohol Use No     History   Drug Use No     History   Smoking Status    Current Some Day Smoker    Packs/day: 0 25   Smokeless Tobacco    Never Used     Family History   Problem Relation Age of Onset    Cancer Mother     Cancer Father        Meds/Allergies   Allergies   Allergen Reactions    Aspirin Other (See Comments)     Could cause bleeding with gastric bi pas     Nsaids Other (See Comments)     Gastric bypass    Codeine Itching       Meds:    Current Outpatient Prescriptions:     calcium citrate (CALCITRATE) 950 MG tablet, Take 2 tablets by mouth daily  , Disp: , Rfl:     cyanocobalamin (VITAMIN B-12) 1,000 mcg tablet, Take 1,000 mcg by mouth daily, Disp: , Rfl:     famotidine (PEPCID) 20 mg tablet, Take 1 tablet (20 mg total) by mouth 2 (two) times a day, Disp: 30 tablet, Rfl: 0    Iron-Vitamin C  MG TABS, Take by mouth, Disp: , Rfl:     Multiple Vitamin (MULTIVITAMIN) tablet, Take 1 tablet by mouth daily, Disp: , Rfl:     ondansetron (ZOFRAN-ODT) 4 mg disintegrating tablet, Take 1 tablet (4 mg total) by mouth every 6 (six) hours as needed for nausea or vomiting, Disp: 20 tablet, Rfl: 0    Vitamin D, Cholecalciferol, 1000 units CAPS, Take 1 tablet by mouth daily, Disp: , Rfl:     cromolyn (OPTICROM) 4 % ophthalmic solution, Administer 1 drop to both eyes 4 (four) times a day, Disp: 10 mL, Rfl: 0    DULoxetine (CYMBALTA) 20 mg capsule, Take 1 capsule (20 mg total) by mouth daily, Disp: 30 capsule, Rfl: 1    loratadine (CLARITIN) 10 mg tablet, Take 1 tablet (10 mg total) by mouth daily, Disp: 30 tablet, Rfl: 0      REVIEW OF SYSTEMS  A comprehensive review of systems was negative except for: Eyes: positive for irritation and redness  Ears, nose, mouth, throat, and face: positive for nasal congestion      Current Vitals:   Blood Pressure: 124/72 (05/18/18 1056)  Pulse: 61 (05/18/18 1056)  Temperature: (!) 96 8 °F (36 °C) (05/18/18 1056)  Temp Source: Tympanic (05/18/18 1056)  Respirations: 16 (05/18/18 1056)  Height: 5' 4 5" (163 8 cm) (05/18/18 1056)  Weight - Scale: 87 5 kg (193 lb) (05/18/18 1056)  SpO2: 98 % (05/18/18 1056)  [unfilled]    PHYSICAL EXAMS:  General appearance: alert and oriented, in no acute distress  Head: Normocephalic, without obvious abnormality, atraumatic  Eyes: positive findings: sclera dry, irritated   Ears: normal TM's and external ear canals both ears  Nose: Nares normal  Septum midline  Mucosa normal  No drainage or sinus tenderness  Throat: lips, mucosa, and tongue normal; teeth and gums normal  Lungs: clear to auscultation bilaterally  Heart: regular rate and rhythm, S1, S2 normal, no murmur, click, rub or [id number]        Follow up at this office in 6-8 weeks     Counseling / Coordination of Care  Total floor / unit time spent today 20 minutes  Greater than 50% of total time was spent with the patient and / or family counseling and / or coordination of care

## 2018-07-23 DIAGNOSIS — Z98.84 BARIATRIC SURGERY STATUS: ICD-10-CM

## 2018-07-23 DIAGNOSIS — O99.840 BARIATRIC SURGERY STATUS COMPLICATING PREGNANCY, UNSPECIFIED TRIMESTER: ICD-10-CM

## 2018-07-23 DIAGNOSIS — K91.2 POSTSURGICAL MALABSORPTION, NOT ELSEWHERE CLASSIFIED (CODE): ICD-10-CM

## 2018-07-30 ENCOUNTER — TELEPHONE (OUTPATIENT)
Dept: BARIATRICS | Facility: CLINIC | Age: 43
End: 2018-07-30

## 2018-08-25 ENCOUNTER — HOSPITAL ENCOUNTER (EMERGENCY)
Facility: HOSPITAL | Age: 43
Discharge: HOME | End: 2018-08-25
Attending: FAMILY MEDICINE | Admitting: EMERGENCY MEDICINE
Payer: COMMERCIAL

## 2018-08-25 VITALS
TEMPERATURE: 99.3 F | BODY MASS INDEX: 34.15 KG/M2 | HEIGHT: 64 IN | OXYGEN SATURATION: 100 % | DIASTOLIC BLOOD PRESSURE: 64 MMHG | SYSTOLIC BLOOD PRESSURE: 108 MMHG | WEIGHT: 200 LBS | RESPIRATION RATE: 18 BRPM | HEART RATE: 62 BPM

## 2018-08-25 DIAGNOSIS — G43.909 MIGRAINE: Primary | ICD-10-CM

## 2018-08-25 PROCEDURE — 96374 THER/PROPH/DIAG INJ IV PUSH: CPT

## 2018-08-25 PROCEDURE — 96375 TX/PRO/DX INJ NEW DRUG ADDON: CPT

## 2018-08-25 PROCEDURE — 99283 EMERGENCY DEPT VISIT LOW MDM: CPT

## 2018-08-25 PROCEDURE — 96360 HYDRATION IV INFUSION INIT: CPT

## 2018-08-25 RX ORDER — KETOROLAC TROMETHAMINE 30 MG/ML
30 INJECTION, SOLUTION INTRAMUSCULAR; INTRAVENOUS ONCE
Status: COMPLETED | OUTPATIENT
Start: 2018-08-25 | End: 2018-08-25

## 2018-08-25 RX ORDER — ONDANSETRON 2 MG/ML
4 INJECTION INTRAMUSCULAR; INTRAVENOUS ONCE
Status: COMPLETED | OUTPATIENT
Start: 2018-08-25 | End: 2018-08-25

## 2018-08-25 RX ORDER — DIPHENHYDRAMINE HYDROCHLORIDE 50 MG/ML
25 INJECTION INTRAMUSCULAR; INTRAVENOUS ONCE
Status: COMPLETED | OUTPATIENT
Start: 2018-08-25 | End: 2018-08-25

## 2018-08-25 RX ADMIN — DIPHENHYDRAMINE HYDROCHLORIDE 25 MG: 50 INJECTION INTRAMUSCULAR; INTRAVENOUS at 18:48

## 2018-08-25 RX ADMIN — SODIUM CHLORIDE 1000 ML: 0.9 INJECTION, SOLUTION INTRAVENOUS at 18:41

## 2018-08-25 RX ADMIN — ONDANSETRON 4 MG: 2 INJECTION INTRAMUSCULAR; INTRAVENOUS at 18:47

## 2018-08-25 RX ADMIN — KETOROLAC TROMETHAMINE 30 MG: 30 INJECTION, SOLUTION INTRAMUSCULAR; INTRAVENOUS at 18:47

## 2018-08-25 NOTE — DISCHARGE INSTRUCTIONS
Migraine Headache   WHAT YOU SHOULD KNOW:   A migraine is a severe headache  The pain can be so severe that it interferes with your daily activities  A migraine can last a few hours up to several days  The exact cause of migraines is not known  It may be caused by changes in your body chemicals and extra sensitive nerves in your brain  AFTER YOU LEAVE:   Medicines:  Take medicine as soon as you feel a migraine begin  · Pain medicine: You may need medicine to take away or decrease pain  You may need a doctor's order for this medicine  Do not wait until the pain is severe before you take your medicine  · Migraine medicines: These are used to help prevent a migraine or stop it once it starts  · Antinausea medicine: This medicine may be given to calm your stomach and to help prevent vomiting  They can also help relieve pain  · Take your medicine as directed  Call your healthcare provider if you think your medicine is not helping or if you have side effects  Tell him if you are allergic to any medicine  Keep a list of the medicines, vitamins, and herbs you take  Include the amounts, and when and why you take them  Bring the list or the pill bottles to follow-up visits  Carry your medicine list with you in case of an emergency  Manage your symptoms:   · Rest:  Rest in a dark, quiet room  This will help decrease your pain  · Ice:  Ice helps decrease pain  Use an ice pack or put crushed ice in a plastic bag  Cover the ice pack with a towel and place it on your head where it hurts for 15 to 20 minutes every hour  · Heat:  Heat helps decrease pain and muscle spasms  Use a small towel dampened with warm water or a heating pad, or sit in a warm bath  Apply heat on the area for 20 to 30 minutes every 2 hours  You may alternate heat and ice  Keep a headache diary:  Write down when your migraines start and stop  Include your symptoms and what you were doing when a migraine began   Record what you ate or drank for 24 hours before the migraine started  Describe the pain and where it hurts  Keep track of what you did to treat your migraine and whether it worked  Follow up with your primary healthcare provider or neurologist as directed:  Bring your headache diary with you when you see your primary healthcare provider  Write down your questions so you remember to ask them during your visits  Prevent another migraine:   · Do not smoke: If you smoke, it is never too late to quit  Tobacco smoke can trigger a migraine  It can also cause heart disease, lung disease, cancer, and other health problems  Quitting smoking will improve your health and the health of those around you  If you smoke, ask for information about how to stop  · Do not drink alcohol:  Alcohol can trigger a migraine  It can also interfere with the medicines used to treat your migraine  · Get regular exercise:  Exercise may help prevent migraines  Talk to your primary healthcare provider about the best exercise plan for you  · Manage stress:  Stress may trigger a migraine  Learn new ways to relax, such as deep breathing  · Stick to a sleep schedule:  Go to bed and get up at the same time each day  · Eat regular meals:  Include healthy foods such as include fruit, vegetables, whole-grain breads, low-fat dairy products, beans, lean meat, and fish  Avoid trigger foods like chocolate, hard cheese, and red wine  Foods that contain gluten, nitrates, MSG, or artificial sweeteners may also trigger migraines  Caffeine, which is often used to treat migraines, can also trigger them  Contact your primary healthcare provider or neurologist if:   · You have a fever  · Your migraines interfere with your daily activities  · Your medicines or treatments stop working  · You have questions about your condition or care    Seek care immediately or call 911 if:   · You have a headache that seems different or much worse than your usual migraine headache  · You have a severe headache with a fever or a stiff neck  · You have new problems with speech, vision, balance, or movement  · You feel like you are going to faint, you become confused, or you have a seizure  © 2014 [address] is for End User's use only and may not be sold, redistributed or otherwise used for commercial purposes  All illustrations and images included in CareNotes® are the copyrighted property of A D [employer] M [employer]  or Chriss Landers  The above information is an  only  It is not intended as medical advice for individual conditions or treatments  Talk to your doctor, nurse or pharmacist before following any medical regimen to see if it is safe and effective for you

## 2018-08-25 NOTE — ED PROVIDER NOTES
History  Chief Complaint   Patient presents with    Migraine     Started yesterday with her shoulder and neck then radiated to her head  History provided by:  Patient   used: No    Migraine   Location:  Right side head   Severity:  Moderate  Onset quality:  Gradual  Duration:  2 days  Timing:  Constant  Progression:  Waxing and waning  Chronicity:  New  Relieved by:  Nothing   Worsened by: Movement   Ineffective treatments:  Tylenol  Associated symptoms: congestion, ear pain, headaches and nausea    Associated symptoms: no abdominal pain, no chest pain, no cough, no diarrhea, no fatigue, no fever, no loss of consciousness, no myalgias, no rash, no rhinorrhea, no shortness of breath, no sore throat, no vomiting and no wheezing        Prior to Admission Medications   Prescriptions Last Dose Informant Patient Reported? Taking? DULoxetine (CYMBALTA) 20 mg capsule Past Month at Unknown time Self No Yes   Sig: Take 1 capsule (20 mg total) by mouth daily   Iron-Vitamin C  MG TABS Past Week at Unknown time Self Yes Yes   Sig: Take by mouth   Multiple Vitamin (MULTIVITAMIN) tablet 8/25/2018 at Unknown time Self Yes Yes   Sig: Take 1 tablet by mouth daily   Vitamin D, Cholecalciferol, 1000 units CAPS 8/25/2018 at Unknown time Self Yes Yes   Sig: Take 1 tablet by mouth daily   calcium citrate (CALCITRATE) 950 MG tablet Past Week at Unknown time Self Yes Yes   Sig: Take 2 tablets by mouth daily     cromolyn (OPTICROM) 4 % ophthalmic solution 8/24/2018 at Unknown time Self No Yes   Sig: Administer 1 drop to both eyes 4 (four) times a day   cyanocobalamin (VITAMIN B-12) 1,000 mcg tablet 8/25/2018 at Unknown time Self Yes Yes   Sig: Take 1,000 mcg by mouth daily   loratadine (CLARITIN) 10 mg tablet Past Week at Unknown time Self No Yes   Sig: Take 1 tablet (10 mg total) by mouth daily      Facility-Administered Medications: None       Past Medical History:   Diagnosis Date    Anxiety     Arthritis     Carpal tunnel syndrome     Heartburn     History of gastric bypass 2014    Migraine     Postgastrectomy malabsorption     Restless leg syndrome     Seizures (HCC)        Past Surgical History:   Procedure Laterality Date     SECTION       SECTION N/A 2018    Procedure:  SECTION (); Surgeon: Abdulkadir Jameson MD;  Location: [facility];  Service: Obstetrics    CHOLECYSTECTOMY      GASTRIC BYPASS  2014    GA REVISION OF CERVIX,NON OBSTETRICAL N/A 2017    Procedure: CERCLAGE CERVICAL;  Surgeon: Liz Panda MD;  Location: [facility];  Service: Obstetrics       Family History   Problem Relation Age of Onset    Thyroid disease Mother     Coronary artery disease Father      I have reviewed and agree with the history as documented  Social History   Substance Use Topics    Smoking status: Former Smoker     Packs/day: 0 25    Smokeless tobacco: Never Used    Alcohol use No        Review of Systems   Constitutional: Negative for chills, fatigue and fever  HENT: Positive for congestion and ear pain  Negative for rhinorrhea and sore throat  Eyes: Negative for visual disturbance  Respiratory: Negative for cough, shortness of breath and wheezing  Cardiovascular: Negative for chest pain and leg swelling  Gastrointestinal: Positive for nausea  Negative for abdominal pain, diarrhea and vomiting  Genitourinary: Negative for dysuria  Musculoskeletal: Negative for back pain and myalgias  Skin: Negative for rash  Neurological: Positive for headaches  Negative for dizziness and loss of consciousness  Psychiatric/Behavioral: Negative for confusion  All other systems reviewed and are negative  Physical Exam  Physical Exam   Constitutional: She is oriented to person, place, and time  She appears well-developed and well-nourished  HENT:   Nose: Nose normal    Mouth/Throat: Oropharynx is clear and moist  No oropharyngeal exudate  Eyes: Conjunctivae and EOM are normal  Pupils are equal, round, and reactive to light  No scleral icterus  Neck: Normal range of motion  Neck supple  No JVD present  No tracheal deviation present  Cardiovascular: Normal rate, regular rhythm and normal heart sounds  No murmur heard  Pulmonary/Chest: Effort normal and breath sounds normal  No respiratory distress  She has no wheezes  She has no rales  Abdominal: Soft  Bowel sounds are normal  There is no tenderness  There is no guarding  Musculoskeletal: Normal range of motion  She exhibits no edema or tenderness  Neurological: She is alert and oriented to person, place, and time  No cranial nerve deficit or sensory deficit  She exhibits normal muscle tone  5/5 motor, nl sens   Skin: Skin is warm and dry  Psychiatric: She has a normal mood and affect  Her behavior is normal    Nursing note and vitals reviewed        Vital Signs  ED Triage Vitals [08/25/18 1824]   Temperature Pulse Respirations Blood Pressure SpO2   99 3 °F (37 4 °C) 83 20 151/92 100 %      Temp Source Heart Rate Source Patient Position - Orthostatic VS BP Location FiO2 (%)   Temporal Monitor Sitting Left arm --      Pain Score       6           Vitals:    08/25/18 1824   BP: 151/92   Pulse: 83   Patient Position - Orthostatic VS: Sitting       Visual Acuity  Visual Acuity      Most Recent Value   L Pupil Size (mm)  2   R Pupil Size (mm)  2   L Pupil Shape  Round   R Pupil Shape  Round          ED Medications  Medications   sodium chloride 0 9 % bolus 1,000 mL (not administered)   ketorolac (TORADOL) injection 30 mg (not administered)   diphenhydrAMINE (BENADRYL) injection 25 mg (not administered)   ondansetron (ZOFRAN) injection 4 mg (not administered)       Diagnostic Studies  Results Reviewed     None                 No orders to display              Procedures  Procedures       Phone Contacts  ED Phone Contact    ED Course  ED Course as of Aug 25 1837   Sat Aug 25, 2018   1228 Patient is being transferred to dr Tammie Werner  Please refer to his chart for further documentation                                MDM  CritCare Time    Disposition  Final diagnoses:   Migraine     Time reflects when diagnosis was documented in both MDM as applicable and the Disposition within this note     Time User Action Codes Description Comment    8/25/2018  6:33 PM Genie Patelt Add [G43 [id number]] Migraine       ED Disposition     None      Follow-up Information    None         Patient's Medications   Discharge Prescriptions    No medications on file     No discharge procedures on file      ED Provider  Electronically Signed by           Lita Virgen MD  08/25/18 5934

## 2018-08-25 NOTE — ED PROVIDER NOTES
History  Chief Complaint   Patient presents with    Migraine     Started yesterday with her shoulder and neck then radiated to her head  HPI    Prior to Admission Medications   Prescriptions Last Dose Informant Patient Reported? Taking? DULoxetine (CYMBALTA) 20 mg capsule Past Month at Unknown time Self No Yes   Sig: Take 1 capsule (20 mg total) by mouth daily   Iron-Vitamin C  MG TABS Past Week at Unknown time Self Yes Yes   Sig: Take by mouth   Multiple Vitamin (MULTIVITAMIN) tablet 2018 at Unknown time Self Yes Yes   Sig: Take 1 tablet by mouth daily   Vitamin D, Cholecalciferol, 1000 units CAPS 2018 at Unknown time Self Yes Yes   Sig: Take 1 tablet by mouth daily   calcium citrate (CALCITRATE) 950 MG tablet Past Week at Unknown time Self Yes Yes   Sig: Take 2 tablets by mouth daily  cromolyn (OPTICROM) 4 % ophthalmic solution 2018 at Unknown time Self No Yes   Sig: Administer 1 drop to both eyes 4 (four) times a day   cyanocobalamin (VITAMIN B-12) 1,000 mcg tablet 2018 at Unknown time Self Yes Yes   Sig: Take 1,000 mcg by mouth daily   loratadine (CLARITIN) 10 mg tablet Past Week at Unknown time Self No Yes   Sig: Take 1 tablet (10 mg total) by mouth daily      Facility-Administered Medications: None       Past Medical History:   Diagnosis Date    Anxiety     Arthritis     Carpal tunnel syndrome     Heartburn     History of gastric bypass 2014    Migraine     Postgastrectomy malabsorption     Restless leg syndrome     Seizures ([facility] [address] )        Past Surgical History:   Procedure Laterality Date     SECTION       SECTION N/A 2018    Procedure:  SECTION ();   Surgeon: Peter Johnson MD;  Location: [facility];  Service: Obstetrics    CHOLECYSTECTOMY      GASTRIC BYPASS  2014    OR REVISION OF CERVIX,NON OBSTETRICAL N/A 2017    Procedure: CERCLAGE CERVICAL;  Surgeon: Lydia Garcia MD;  Location: [facility];  Service: Obstetrics       Family History   Problem Relation Age of Onset    Thyroid disease Mother     Coronary artery disease Father      I have reviewed and agree with the history as documented  Social History   Substance Use Topics    Smoking status: Former Smoker     Packs/day: 0 25    Smokeless tobacco: Never Used    Alcohol use No        Review of Systems    Physical Exam  Physical Exam    Vital Signs  ED Triage Vitals [08/25/18 1824]   Temperature Pulse Respirations Blood Pressure SpO2   99 3 °F (37 4 °C) 83 20 151/92 100 %      Temp Source Heart Rate Source Patient Position - Orthostatic VS BP Location FiO2 (%)   Temporal Monitor Sitting Left arm --      Pain Score       6           Vitals:    08/25/18 1824   BP: 151/92   Pulse: 83   Patient Position - Orthostatic VS: Sitting       Visual Acuity  Visual Acuity      Most Recent Value   L Pupil Size (mm)  3   R Pupil Size (mm)  3   L Pupil Shape  Round   R Pupil Shape  Round          ED Medications  Medications   sodium chloride 0 9 % bolus 1,000 mL (1,000 mL Intravenous New Bag 8/25/18 1841)   ketorolac (TORADOL) injection 30 mg (30 mg Intravenous Given 8/25/18 1847)   diphenhydrAMINE (BENADRYL) injection 25 mg (25 mg Intravenous Given 8/25/18 1848)   ondansetron (ZOFRAN) injection 4 mg (4 mg Intravenous Given 8/25/18 1847)       Diagnostic Studies  Results Reviewed     None                 No orders to display              Procedures  Procedures       Phone Contacts  ED Phone Contact    ED Course                               MDM  Number of Diagnoses or Management Options  Migraine:   Diagnosis management comments: This 40-year-old female who presents to the emergency department with a headache was initially evaluated by Dr Ashley Perez, the outgoing emergency department physician reference is made to the medical record composed by Dr Marly Cobos injuries to the record by this emergency department physician or for the purposes of evaluation and disposition    At 7:30 pm  following injection of Benadryl Toradol the patient experienced significant improvement improvement in the headache with diminished pain  She has remained hemodynamically and clinically stable with no evidence of any intracranial process  She parenthetically adds that she has had on and off flank pain for some time  She has a follow-up appointment scheduled later in the week  This is not part of her presenting complaint and she denies any other associated symptoms at this point time  The patient is appropriate for discharge and therefore is referred to family doctor for follow-up and follow-up appointment scheduled and discharged accordingly  CritCare Time    Disposition  Final diagnoses:   Migraine     Time reflects when diagnosis was documented in both MDM as applicable and the Disposition within this note     Time User Action Codes Description Comment    8/25/2018  6:33 PM Leo Sandoval Add [[id number]] Migraine       ED Disposition     None      Follow-up Information    None         Patient's Medications   Discharge Prescriptions    No medications on file     No discharge procedures on file      ED Provider  Electronically Signed by           Chiquita Mckeon MD  08/25/18 9556

## 2018-08-26 ENCOUNTER — OFFICE VISIT (OUTPATIENT)
Dept: URGENT CARE | Facility: CLINIC | Age: 43
End: 2018-08-26
Payer: COMMERCIAL

## 2018-08-26 VITALS
HEART RATE: 65 BPM | SYSTOLIC BLOOD PRESSURE: 116 MMHG | RESPIRATION RATE: 16 BRPM | DIASTOLIC BLOOD PRESSURE: 69 MMHG | OXYGEN SATURATION: 100 % | TEMPERATURE: 97.9 F

## 2018-08-26 DIAGNOSIS — J01.90 ACUTE SINUSITIS, RECURRENCE NOT SPECIFIED, UNSPECIFIED LOCATION: ICD-10-CM

## 2018-08-26 DIAGNOSIS — R31.9 URINARY TRACT INFECTION WITH HEMATURIA, SITE UNSPECIFIED: Primary | ICD-10-CM

## 2018-08-26 DIAGNOSIS — N39.0 URINARY TRACT INFECTION WITH HEMATURIA, SITE UNSPECIFIED: Primary | ICD-10-CM

## 2018-08-26 LAB
SL AMB  POCT GLUCOSE, UA: ABNORMAL
SL AMB LEUKOCYTE ESTERASE,UA: ABNORMAL
SL AMB POCT BILIRUBIN,UA: ABNORMAL
SL AMB POCT BLOOD,UA: ABNORMAL
SL AMB POCT CLARITY,UA: CLEAR
SL AMB POCT COLOR,UA: ABNORMAL
SL AMB POCT KETONES,UA: ABNORMAL
SL AMB POCT NITRITE,UA: ABNORMAL
SL AMB POCT PH,UA: 5
SL AMB POCT SPECIFIC GRAVITY,UA: 1.02
SL AMB POCT URINE PROTEIN: ABNORMAL
SL AMB POCT UROBILINOGEN: 0.2

## 2018-08-26 PROCEDURE — G0382 LEV 3 HOSP TYPE B ED VISIT: HCPCS | Performed by: PHYSICIAN ASSISTANT

## 2018-08-26 PROCEDURE — 99283 EMERGENCY DEPT VISIT LOW MDM: CPT | Performed by: PHYSICIAN ASSISTANT

## 2018-08-26 PROCEDURE — 81002 URINALYSIS NONAUTO W/O SCOPE: CPT | Performed by: PHYSICIAN ASSISTANT

## 2018-08-26 PROCEDURE — 87086 URINE CULTURE/COLONY COUNT: CPT | Performed by: PHYSICIAN ASSISTANT

## 2018-08-26 RX ORDER — SULFAMETHOXAZOLE AND TRIMETHOPRIM 800; 160 MG/1; MG/1
1 TABLET ORAL EVERY 12 HOURS SCHEDULED
Qty: 20 TABLET | Refills: 0 | Status: SHIPPED | OUTPATIENT
Start: 2018-08-26 | End: 2018-09-05

## 2018-08-26 RX ORDER — PREDNISONE 10 MG/1
TABLET ORAL
Qty: 26 TABLET | Refills: 0 | Status: SHIPPED | OUTPATIENT
Start: 2018-08-26 | End: 2018-10-04

## 2018-08-26 NOTE — PATIENT INSTRUCTIONS
Start antibiotic take as directed  Take prednisone to help reduce inflammation  Will send urine for culture  If back pain is worsening, go to the emergency room  Follow up with PCP this week

## 2018-08-26 NOTE — PROGRESS NOTES
[facility]    NAME: Joseph Paez is a 37 y o  female  : 1975    MRN: [id number]  DATE: 2018  TIME: 3:05 PM    Assessment and Plan   Urinary tract infection with hematuria, site unspecified [N39 0, R31 9]  1  Urinary tract infection with hematuria, site unspecified  POCT urine dip    Urine culture    sulfamethoxazole-trimethoprim (BACTRIM DS) 800-160 mg per tablet   2  Acute sinusitis, recurrence not specified, unspecified location  predniSONE 10 mg tablet       Patient Instructions     Patient Instructions   Start antibiotic take as directed  Take prednisone to help reduce inflammation  Will send urine for culture  If back pain is worsening, go to the emergency room  Follow up with PCP this week  Chief Complaint     Chief Complaint   Patient presents with    Headache     with sinus pain, was in ED yesterday for migraine and treated  Sees PCP 18       History of Present Illness   43-year-old female here with complaint of ongoing headache  Patient was seen and evaluated in the emergency room yesterday for migraine and tension headache  Was given medications which did help a little bit  She is concerned because she has increasing nasal congestion and pressure on the right side of her face  She is concerned that she has a sinus infection that she was not treated for  Also has some mild discomfort on her right lower back  Denies any urinary complaints  She has been urinating a lot since being in the emergency room because they did flush her with a lot of fluids  Denies any dysuria or burning with urination  Review of Systems   Review of Systems   Constitutional: Negative for activity change, appetite change, chills, diaphoresis, fatigue, fever and unexpected weight change  HENT: Positive for congestion, sinus pressure and sore throat  Negative for dental problem, hearing loss, sneezing, tinnitus, trouble swallowing and voice change      Eyes: Negative for photophobia, redness and visual disturbance  Respiratory: Positive for cough  Negative for apnea, chest tightness, shortness of breath, wheezing and stridor  Cardiovascular: Negative for chest pain, palpitations and leg swelling  Gastrointestinal: Positive for abdominal pain  Negative for abdominal distention, blood in stool, constipation, diarrhea, nausea and vomiting  Endocrine: Negative for cold intolerance, heat intolerance, polydipsia, polyphagia and polyuria  Genitourinary: Positive for flank pain  Negative for difficulty urinating, dysuria, frequency, hematuria and urgency  Musculoskeletal: Negative for arthralgias, back pain, gait problem, joint swelling, myalgias, neck pain and neck stiffness  Skin: Negative for pallor, rash and wound  Neurological: Negative for dizziness, tremors, seizures, speech difficulty, weakness and headaches  Hematological: Negative for adenopathy  Does not bruise/bleed easily  Psychiatric/Behavioral: Negative for agitation, confusion, dysphoric mood and sleep disturbance  The patient is not nervous/anxious  All other systems reviewed and are negative  Current Medications     Current Outpatient Prescriptions:     calcium citrate (CALCITRATE) 950 MG tablet, Take 2 tablets by mouth daily  , Disp: , Rfl:     cromolyn (OPTICROM) 4 % ophthalmic solution, Administer 1 drop to both eyes 4 (four) times a day, Disp: 10 mL, Rfl: 0    cyanocobalamin (VITAMIN B-12) 1,000 mcg tablet, Take 1,000 mcg by mouth daily, Disp: , Rfl:     DULoxetine (CYMBALTA) 20 mg capsule, Take 1 capsule (20 mg total) by mouth daily, Disp: 30 capsule, Rfl: 1    Iron-Vitamin C  MG TABS, Take by mouth, Disp: , Rfl:     loratadine (CLARITIN) 10 mg tablet, Take 1 tablet (10 mg total) by mouth daily, Disp: 30 tablet, Rfl: 0    Multiple Vitamin (MULTIVITAMIN) tablet, Take 1 tablet by mouth daily, Disp: , Rfl:     Vitamin D, Cholecalciferol, 1000 units CAPS, Take 1 tablet by mouth daily, Disp: , Rfl:     predniSONE 10 mg tablet, Take 3 tabs BID X 2 days, 2 tabs BID X 2 days, 1 tab BID X 2 days, 1 tab daily X 2 days, Disp: 26 tablet, Rfl: 0    sulfamethoxazole-trimethoprim (BACTRIM DS) 800-160 mg per tablet, Take 1 tablet by mouth every 12 (twelve) hours for 10 days, Disp: 20 tablet, Rfl: 0  No current facility-administered medications for this visit  Current Allergies     Allergies as of 2018 - Reviewed 2018   Allergen Reaction Noted    Aspirin Other (See Comments) 2016    Nsaids Other (See Comments) 2017    Tramadol Seizures 2016    Codeine Itching 2016          The following portions of the patient's history were reviewed and updated as appropriate: allergies, current medications, past family history, past medical history, past social history, past surgical history and problem list    Past Medical History:   Diagnosis Date    Anxiety     Arthritis     Carpal tunnel syndrome     Heartburn     History of gastric bypass 2014    Migraine     Postgastrectomy malabsorption     Restless leg syndrome     Seizures ([facility] [address] )      Past Surgical History:   Procedure Laterality Date     SECTION       SECTION N/A 2018    Procedure:  SECTION (); Surgeon: Padmini Kumar MD;  Location: [place] ;  Service: Obstetrics    CHOLECYSTECTOMY      GASTRIC BYPASS  2014    ME REVISION OF CERVIX,NON OBSTETRICAL N/A 2017    Procedure: CERCLAGE CERVICAL;  Surgeon: Asher Davis MD;  Location: [facility];  Service: Obstetrics     Family History   Problem Relation Age of Onset    Thyroid disease Mother     Coronary artery disease Father      Social History     Social History    Marital status: /Civil Union     Spouse name: N/A    Number of children: N/A    Years of education: N/A     Occupational History    Not on file       Social History Main Topics    Smoking status: Former Smoker     Packs/day: 0 25  Smokeless tobacco: Never Used    Alcohol use No    Drug use: No    Sexual activity: Yes     Partners: Male     Other Topics Concern    Not on file     Social History Narrative    No narrative on file     Medications have been verified  Objective   /69   Pulse 65   Temp 97 9 °F (36 6 °C) (Tympanic)   Resp 16   SpO2 100%      Physical Exam   Physical Exam   Constitutional: She appears well-developed and well-nourished  No distress  HENT:   Head: Normocephalic  Right Ear: Tympanic membrane and external ear normal    Left Ear: Tympanic membrane and external ear normal    Nose: Mucosal edema present  Right sinus exhibits maxillary sinus tenderness  Mouth/Throat: Posterior oropharyngeal erythema present  No oropharyngeal exudate  Neck: Normal range of motion  Neck supple  Cardiovascular: Normal rate, regular rhythm and normal heart sounds  No murmur heard  Pulmonary/Chest: Effort normal and breath sounds normal  No respiratory distress  She has no wheezes  She has no rales  Abdominal: Soft  Bowel sounds are normal  There is no tenderness  There is no CVA tenderness  Musculoskeletal: Normal range of motion  Lymphadenopathy:     She has no cervical adenopathy  Skin: Skin is warm  No rash noted  Vitals reviewed

## 2018-08-27 LAB — BACTERIA UR CULT: NORMAL

## 2018-08-30 ENCOUNTER — OFFICE VISIT (OUTPATIENT)
Dept: FAMILY MEDICINE CLINIC | Facility: CLINIC | Age: 43
End: 2018-08-30
Payer: COMMERCIAL

## 2018-08-30 ENCOUNTER — TELEPHONE (OUTPATIENT)
Dept: HEMATOLOGY ONCOLOGY | Facility: CLINIC | Age: 43
End: 2018-08-30

## 2018-08-30 VITALS
SYSTOLIC BLOOD PRESSURE: 110 MMHG | TEMPERATURE: 97.9 F | RESPIRATION RATE: 16 BRPM | HEIGHT: 64 IN | OXYGEN SATURATION: 97 % | BODY MASS INDEX: 33.8 KG/M2 | WEIGHT: 198 LBS | DIASTOLIC BLOOD PRESSURE: 64 MMHG | HEART RATE: 88 BPM

## 2018-08-30 DIAGNOSIS — F41.9 ANXIETY: Primary | ICD-10-CM

## 2018-08-30 DIAGNOSIS — J30.1 SEASONAL ALLERGIC RHINITIS DUE TO POLLEN: ICD-10-CM

## 2018-08-30 PROBLEM — O34.32 CERVICAL CERCLAGE SUTURE PRESENT IN SECOND TRIMESTER: Status: ACTIVE | Noted: 2018-01-03

## 2018-08-30 PROBLEM — O99.840 PREGNANCY COMPLICATED BY PREVIOUS BARIATRIC SURGERY: Status: ACTIVE | Noted: 2018-01-23

## 2018-08-30 PROCEDURE — T1015 CLINIC SERVICE: HCPCS | Performed by: FAMILY MEDICINE

## 2018-08-30 RX ORDER — FLUTICASONE PROPIONATE 50 MCG
1 SPRAY, SUSPENSION (ML) NASAL DAILY
Qty: 16 G | Refills: 0 | Status: SHIPPED | OUTPATIENT
Start: 2018-08-30 | End: 2018-10-13 | Stop reason: SDUPTHER

## 2018-08-30 RX ORDER — BUSPIRONE HYDROCHLORIDE 5 MG/1
5 TABLET ORAL 3 TIMES DAILY
Qty: 60 TABLET | Refills: 0 | Status: SHIPPED | OUTPATIENT
Start: 2018-08-30 | End: 2018-12-30

## 2018-08-30 NOTE — PROGRESS NOTES
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, she was started on cymbalta, she stopped the medication on her own  She feels stressed, anxious, recently quit her job  Her daughter is 1 months old, not sleeping through night  Recent ED visit for migraine and UTI  Historical Information   Past Medical History:   Diagnosis Date    Anxiety     Arthritis     Carpal tunnel syndrome     Heartburn     History of gastric bypass 2014    Migraine     Postgastrectomy malabsorption     Restless leg syndrome     Seizures (HCC)      Past Surgical History:   Procedure Laterality Date     SECTION       SECTION N/A 2018    Procedure:  SECTION ();   Surgeon: Shilpi Treadwell MD;  Location: [facility];  Service: Obstetrics    CHOLECYSTECTOMY      GASTRIC BYPASS  2014    AK REVISION OF CERVIX,NON OBSTETRICAL N/A 2017    Procedure: CERCLAGE CERVICAL;  Surgeon: Rolando Catalan MD;  Location: [facility];  Service: Obstetrics     Social History   History   Alcohol Use No     History   Drug Use No     History   Smoking Status    Former Smoker    Packs/day: 0 25   Smokeless Tobacco    Never Used     Family History   Problem Relation Age of Onset    Thyroid disease Mother     Coronary artery disease Father        Meds/Allergies   Allergies   Allergen Reactions    Aspirin Other (See Comments)     Could cause bleeding with gastric bi pas     Nsaids Other (See Comments)     Gastric bypass    Codeine Itching       Meds:    Current Outpatient Prescriptions:     calcium citrate (CALCITRATE) 950 MG tablet, Take 2 tablets by mouth daily  , Disp: , Rfl:     cromolyn (OPTICROM) 4 % ophthalmic solution, Administer 1 drop to both eyes 4 (four) times a day, Disp: 10 mL, Rfl: 0    cyanocobalamin (VITAMIN B-12) 1,000 mcg tablet, Take 1,000 mcg by mouth daily, Disp: , Rfl:     Multiple Vitamin (MULTIVITAMIN) tablet, Take 1 tablet by mouth daily, Disp: , Rfl:     predniSONE 10 mg tablet, Take 3 tabs BID X 2 days, 2 tabs BID X 2 days, 1 tab BID X 2 days, 1 tab daily X 2 days, Disp: 26 tablet, Rfl: 0    sulfamethoxazole-trimethoprim (BACTRIM DS) 800-160 mg per tablet, Take 1 tablet by mouth every 12 (twelve) hours for 10 days, Disp: 20 tablet, Rfl: 0    Vitamin D, Cholecalciferol, 1000 units CAPS, Take 1 tablet by mouth daily, Disp: , Rfl:     busPIRone (BUSPAR) 5 mg tablet, Take 1 tablet (5 mg total) by mouth 3 (three) times a day, Disp: 60 tablet, Rfl: 0    fluticasone (FLONASE) 50 mcg/act nasal spray, 1 spray into each nostril daily, Disp: 16 g, Rfl: 0      REVIEW OF SYSTEMS  Review of Systems   Constitutional: Negative for activity change, chills, fatigue and fever  HENT: Negative for tinnitus and trouble swallowing  Eyes: Negative for photophobia, pain, discharge, itching and visual disturbance  Respiratory: Negative for cough, chest tightness, shortness of breath and wheezing  Cardiovascular: Negative for chest pain and leg swelling  Gastrointestinal: Negative for abdominal pain  Endocrine: Negative for polydipsia, polyphagia and polyuria  Genitourinary: Negative for dysuria and frequency  Musculoskeletal: Negative for arthralgias, myalgias, neck pain and neck stiffness  Skin: Negative for color change     Neurological: Negative for dizziness, syncope, weakness, numbness and headaches  Hematological: Does not bruise/bleed easily  Psychiatric/Behavioral: Negative for confusion, sleep disturbance and suicidal ideas  Current Vitals:   Blood Pressure: 110/64 (08/30/18 1305)  Pulse: 88 (08/30/18 1305)  Temperature: 97 9 °F (36 6 °C) (08/30/18 1305)  Temp Source: Tympanic (08/30/18 1305)  Respirations: 16 (08/30/18 1305)  Height: 5' 4" (162 6 cm) (08/30/18 1305)  Weight - Scale: 89 8 kg (198 lb) (08/30/18 1305)  SpO2: 97 % (08/30/18 1305)  [unfilled]    PHYSICAL EXAMS:  Physical Exam   Constitutional: She is oriented to person, place, and time  She appears well-developed and well-nourished  HENT:   Head: Normocephalic  Right Ear: External ear normal    Left Ear: External ear normal    Mouth/Throat: Oropharynx is clear and moist    Eyes: Conjunctivae are normal  Pupils are equal, round, and reactive to light  Neck: Neck supple  Cardiovascular: Normal rate and regular rhythm  Pulmonary/Chest: Effort normal and breath sounds normal    Abdominal: Soft  Bowel sounds are normal  She exhibits no distension  There is no tenderness  Musculoskeletal: Normal range of motion  Neurological: She is alert and oriented to person, place, and time  Skin: Skin is warm and dry  Psychiatric: She has a normal mood and affect  Follow up at this office in 6 weeks     Counseling / Coordination of Care  Total floor / unit time spent today 20 minutes  Greater than 50% of total time was spent with the patient and / or family counseling and / or coordination of care

## 2018-08-30 NOTE — TELEPHONE ENCOUNTER
Called patient regarding missing labs for her appointment with Enrico Fraire tomorrow  She forgot about the appointment and rescheduled because she would not have time before that appointment to get her labs done  Our call dropped shortly while I was looking for another appointment for her  I called her back to give her the new appointment date and time, but I got her voicemail  I left her a message with the appointment info and our phone number  I asked her to call back if she had any problems with the new appointment time

## 2018-10-04 ENCOUNTER — HOSPITAL ENCOUNTER (EMERGENCY)
Facility: HOSPITAL | Age: 43
Discharge: HOME | End: 2018-10-04
Attending: EMERGENCY MEDICINE | Admitting: EMERGENCY MEDICINE
Payer: COMMERCIAL

## 2018-10-04 VITALS
DIASTOLIC BLOOD PRESSURE: 67 MMHG | HEART RATE: 70 BPM | OXYGEN SATURATION: 100 % | TEMPERATURE: 98 F | BODY MASS INDEX: 34.33 KG/M2 | SYSTOLIC BLOOD PRESSURE: 161 MMHG | WEIGHT: 200 LBS | RESPIRATION RATE: 18 BRPM

## 2018-10-04 DIAGNOSIS — L30.9 DERMATITIS: ICD-10-CM
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
## 2018-10-04 DIAGNOSIS — S40.861A INSECT BITE OF RIGHT UPPER EXTREMITY, INITIAL ENCOUNTER: Primary | ICD-10-CM

## 2018-10-04 PROCEDURE — 99281 EMR DPT VST MAYX REQ PHY/QHP: CPT

## 2018-10-04 RX ORDER — DOXYCYCLINE HYCLATE 100 MG/1
100 CAPSULE ORAL ONCE
Status: DISCONTINUED | OUTPATIENT
Start: 2018-10-04 | End: 2018-10-04

## 2018-10-04 RX ORDER — FERROUS SULFATE 325(65) MG
325 TABLET ORAL
COMMUNITY

## 2018-10-04 RX ORDER — DOXYCYCLINE HYCLATE 100 MG/1
100 CAPSULE ORAL ONCE
Status: COMPLETED | OUTPATIENT
Start: 2018-10-04 | End: 2018-10-04

## 2018-10-04 RX ORDER — DOXYCYCLINE HYCLATE 100 MG/1
100 CAPSULE ORAL 2 TIMES DAILY
Qty: 14 CAPSULE | Refills: 0 | Status: SHIPPED | OUTPATIENT
Start: 2018-10-04 | End: 2018-10-18

## 2018-10-04 RX ADMIN — DOXYCYCLINE HYCLATE 100 MG: 100 CAPSULE ORAL at 22:13

## 2018-10-05 NOTE — ED PROVIDER NOTES
History  Chief Complaint   Patient presents with   Karolyn Diehl 83     Patient states that three days ago she thinks she was bit by a spider on her right forarm and today her entire arm is sore  59-year-old female presents with bite to the right forearm which occurred 3 days ago  She states that she was bitten by something she looked down and swatted away  She states that a short time later she looked down and noticed it was on her arm again  Patient is unsure of what sort of insect this is  Patient states that she now has a small amount of swelling to the right dorsal forearm where this occurred  There has been no red rings  There has been no signs or symptoms of tick bite  Patient states that her tetanus is up-to-date as she just had a baby        History provided by:  Patient  Rash   Location: Small erythematous dime-sized area to the right mid forearm  Quality: painful and redness    Quality: not blistering, not bruising, not draining, not dry and not itchy    Pain details:     Quality:  Aching    Severity:  No pain    Onset quality:  Sudden    Duration:  3 days    Timing:  Constant    Progression:  Waxing and waning  Severity:  Mild  Timing:  Constant  Progression:  Worsening  Context: not animal contact, not chemical exposure, not diapers and not eggs    Relieved by:  Nothing  Worsened by:  Nothing  Ineffective treatments:  None tried  Associated symptoms: no abdominal pain, no diarrhea, no fatigue, no fever, no headaches and no joint pain        Prior to Admission Medications   Prescriptions Last Dose Informant Patient Reported? Taking?    Multiple Vitamin (MULTIVITAMIN) tablet  Self Yes Yes   Sig: Take 1 tablet by mouth daily   Potassium 99 MG TABS  Self Yes Yes   Sig: Take 99 mg by mouth   Vitamin D, Cholecalciferol, 1000 units CAPS  Self Yes Yes   Sig: Take 1 tablet by mouth daily   busPIRone (BUSPAR) 5 mg tablet   No Yes   Sig: Take 1 tablet (5 mg total) by mouth 3 (three) times a day   calcium citrate (CALCITRATE) 950 MG tablet  Self Yes Yes   Sig: Take 2 tablets by mouth daily  cyanocobalamin (VITAMIN B-12) 1,000 mcg tablet  Self Yes Yes   Sig: Take 1,000 mcg by mouth daily   ferrous sulfate 325 (65 Fe) mg tablet  Self Yes Yes   Sig: Take 325 mg by mouth daily with breakfast   fluticasone (FLONASE) 50 mcg/act nasal spray   No Yes   Si spray into each nostril daily      Facility-Administered Medications: None       Past Medical History:   Diagnosis Date    Anxiety     Arthritis     Carpal tunnel syndrome     Heartburn     History of gastric bypass 2014    Migraine     Postgastrectomy malabsorption     Restless leg syndrome     Seizures (HCC)        Past Surgical History:   Procedure Laterality Date     SECTION       SECTION N/A 2018    Procedure:  SECTION (); Surgeon: Sammi Abdullahi MD;  Location: [facility];  Service: Obstetrics    CHOLECYSTECTOMY      GASTRIC BYPASS  2014    AK REVISION OF CERVIX,NON OBSTETRICAL N/A 2017    Procedure: CERCLAGE CERVICAL;  Surgeon: Dipti Restrepo MD;  Location: [facility];  Service: Obstetrics       Family History   Problem Relation Age of Onset    Thyroid disease Mother     Coronary artery disease Father      I have reviewed and agree with the history as documented  Social History   Substance Use Topics    Smoking status: Former Smoker     Packs/day: 0 25    Smokeless tobacco: Never Used    Alcohol use No        Review of Systems   Constitutional: Negative for fatigue and fever  HENT: Negative for congestion, dental problem and drooling  Eyes: Negative for pain, discharge and itching  Respiratory: Negative for apnea, choking and chest tightness  Cardiovascular: Negative for chest pain and leg swelling  Gastrointestinal: Negative for abdominal pain and diarrhea  Endocrine: Negative for cold intolerance, heat intolerance and polydipsia     Genitourinary: Negative for difficulty urinating, dyspareunia and dysuria  Musculoskeletal: Negative for arthralgias, back pain and gait problem  Skin: Positive for rash and wound  Allergic/Immunologic: Negative for environmental allergies and food allergies  Neurological: Negative for dizziness, facial asymmetry and headaches  Hematological: Negative for adenopathy  Psychiatric/Behavioral: Negative for agitation, behavioral problems and confusion  Physical Exam  Physical Exam   Constitutional: She appears well-developed and well-nourished  HENT:   Head: Normocephalic  Right Ear: External ear normal    Left Ear: External ear normal    Eyes: Pupils are equal, round, and reactive to light  Right eye exhibits no discharge  Left eye exhibits no discharge  Neck: Normal range of motion  No tracheal deviation present  No thyromegaly present  Cardiovascular: Normal rate, regular rhythm and normal heart sounds  Pulmonary/Chest: Effort normal  No respiratory distress  She has no wheezes  She has no rales  Abdominal: Soft  She exhibits no distension  There is no tenderness  Musculoskeletal: Normal range of motion  She exhibits no edema  Neurological: She displays normal reflexes  No cranial nerve deficit  She exhibits normal muscle tone  Coordination normal    Skin: Capillary refill takes less than 2 seconds  Dime-size area of minimal induration to the right mid forearm dorsal surface, no red streaks no adenopathy   Psychiatric: She has a normal mood and affect  Her behavior is normal    Vitals reviewed        Vital Signs  ED Triage Vitals [10/04/18 2141]   Temperature Pulse Respirations Blood Pressure SpO2   98 °F (36 7 °C) 72 18 167/67 100 %      Temp Source Heart Rate Source Patient Position - Orthostatic VS BP Location FiO2 (%)   Temporal Monitor Sitting Left arm --      Pain Score       7           Vitals:    10/04/18 2141   BP: 167/67   Pulse: 72   Patient Position - Orthostatic VS: Sitting       Visual Acuity      ED Medications  Medications   doxycycline hyclate (VIBRAMYCIN) capsule 100 mg (not administered)   doxycycline hyclate (VIBRAMYCIN) capsule 100 mg (not administered)       Diagnostic Studies  Results Reviewed     None                 No orders to display              Procedures  Procedures       Phone Contacts  ED Phone Contact    ED Course                               MDM  CritCare Time    Disposition  Final diagnoses:   Insect bite of right upper extremity, initial encounter   Dermatitis     Time reflects when diagnosis was documented in both MDM as applicable and the Disposition within this note     Time User Action Codes Description Comment    10/4/2018  9:57 PM Brayan Lee Add [P25 607B,  W57  XXXA] Insect bite of right upper extremity, initial encounter     10/4/2018  9:58 PM Brayan Lee Add [L30 9] Dermatitis       ED Disposition     ED Disposition Condition Comment    Discharge  Taylor Esperanza discharge to home/self care  Condition at discharge: Good        Follow-up Information    None         Patient's Medications   Discharge Prescriptions    DOXYCYCLINE HYCLATE (VIBRAMYCIN) 100 MG CAPSULE    Take 1 capsule (100 mg total) by mouth 2 (two) times a day for 14 days       Start Date: 10/4/2018 End Date: 10/18/2018       Order Dose: 100 mg       Quantity: 14 capsule    Refills: 0     No discharge procedures on file      ED Provider  Electronically Signed by           Madina Patel DO  10/04/18 4699

## 2018-10-05 NOTE — DISCHARGE INSTRUCTIONS
Dermatitis   WHAT YOU NEED TO KNOW:   Dermatitis is skin inflammation  You may have an itchy rash, redness, or swelling  You may also have bumps or blisters that crust over or ooze clear fluid  Dermatitis can be caused by allergens such as dust mites, pet dander, pollen, and certain foods  It can also develop when something touches your skin and irritates it or causes an allergic reaction  Examples include soaps, chemicals, latex, and poison ivy  DISCHARGE INSTRUCTIONS:   Call 911 if you have any of the following symptoms of anaphylaxis:   · Sudden trouble breathing    · Throat swelling and tightness    · Dizziness, lightheadedness, fainting, or confusion  Return to the emergency department if:   · You develop a fever or have red streaks going up your arm or leg  · Your rash gets more swollen, red, or hot  Contact your healthcare provider if:   · Your skin blisters, oozes white or yellow pus, or has a foul-smelling discharge  · Your rash spreads or does not get better, even after treatment  · You have questions or concerns about your condition or care  Medicines:   · Medicines  help decrease itching and inflammation, or treat a bacterial infection  They may be given as a topical cream, shot, or a pill  · Take your medicine as directed  Contact your healthcare provider if you think your medicine is not helping or if you have side effects  Tell him of her if you are allergic to any medicine  Keep a list of the medicines, vitamins, and herbs you take  Include the amounts, and when and why you take them  Bring the list or the pill bottles to follow-up visits  Carry your medicine list with you in case of an emergency  Apply a cool compress to your rash: This will help soothe your skin  Keep your skin moist:  Rub unscented cream or lotion on your skin to prevent dryness and itching  Do this right after a lukewarm bath or shower when your skin is still damp    Avoid skin irritants:  Do not use skin irritants, such as makeup, hair products, soaps, and cleansers  Use products that do not contain fragrances or dye  Follow up with your healthcare provider as directed:  Write down your questions so you remember to ask them during your visits  © 2017 [employer] Information is for End User's use only and may not be sold, redistributed or otherwise used for commercial purposes  All illustrations and images included in CareNotes® are the copyrighted property of [employer]  or [facility]  The above information is an  only  It is not intended as medical advice for individual conditions or treatments  Talk to your doctor, nurse or pharmacist before following any medical regimen to see if it is safe and effective for you  Dermatitis   WHAT YOU NEED TO KNOW:   What is dermatitis? Dermatitis is skin inflammation  You may have an itchy rash, redness, or swelling  You may also have bumps or blisters that crust over or ooze clear fluid  What causes dermatitis? Dermatitis may be caused by allergens such as dust mites, pet dander, pollen, and certain foods  Dermatitis can also develop when something touches your skin and irritates it or causes an allergic reaction  Examples include soaps, chemicals, latex, and poison ivy  How is dermatitis diagnosed? Your healthcare provider will examine your skin  He will ask questions about your rash and any other symptoms you have  Tell him if you noticed anything trigger your rash, such as a certain food or activity  Tell him about any medicines you are taking or any allergies or medical conditions you have  How is dermatitis treated? Treatment depends on the cause of your rash  You may need medicines to help decrease itching and inflammation or treat a bacterial infection  They may be given as a topical cream, shot, or a pill  How can I manage my symptoms? · Apply a cool compress to your rash    This will help soothe your skin      · Keep your skin moist   Rub unscented cream or lotion on your skin to prevent dryness and itching  Do this right after a lukewarm bath or shower when your skin is still damp  · Avoid skin irritants  Do not use skin irritants, such as makeup, hair products, soaps, and cleansers  Use products that do not contain fragrances or dye  Call 911 if you have any of the following symptoms of anaphylaxis:   · Sudden trouble breathing    · Throat swelling and tightness    · Dizziness, lightheadedness, fainting, or confusion  When should I seek immediate care? · You develop a fever or have red streaks going up your arm or leg  · Your rash gets more swollen, red, or hot  When should I contact my healthcare provider? · Your skin blisters, oozes white or yellow pus, or has a foul-smelling discharge  · Your rash spreads or does not get better, even after treatment  · You have questions or concerns about your condition or care  CARE AGREEMENT:   You have the right to help plan your care  Learn about your health condition and how it may be treated  Discuss treatment options with your caregivers to decide what care you want to receive  You always have the right to refuse treatment  The above information is an  only  It is not intended as medical advice for individual conditions or treatments  Talk to your doctor, nurse or pharmacist before following any medical regimen to see if it is safe and effective for you  © 2017 [employer] Information is for End User's use only and may not be sold, redistributed or otherwise used for commercial purposes  All illustrations and images included in CareNotes® are the copyrighted property of [employer]  or Chriss Land[employer]

## 2018-10-13 DIAGNOSIS — J30.1 SEASONAL ALLERGIC RHINITIS DUE TO POLLEN: ICD-10-CM

## 2018-10-15 RX ORDER — FLUTICASONE PROPIONATE 50 MCG
SPRAY, SUSPENSION (ML) NASAL
Qty: 1 BOTTLE | Refills: 3 | Status: SHIPPED | OUTPATIENT
Start: 2018-10-15

## 2018-10-31 ENCOUNTER — TRANSCRIBE ORDERS (OUTPATIENT)
Dept: ADMINISTRATIVE | Facility: HOSPITAL | Age: 43
End: 2018-10-31

## 2018-10-31 DIAGNOSIS — R10.2 ADNEXAL TENDERNESS, RIGHT: Primary | ICD-10-CM

## 2018-11-08 ENCOUNTER — APPOINTMENT (OUTPATIENT)
Dept: LAB | Facility: HOSPITAL | Age: 43
End: 2018-11-08
Attending: SPECIALIST
Payer: COMMERCIAL

## 2018-11-08 ENCOUNTER — TRANSCRIBE ORDERS (OUTPATIENT)
Dept: ADMINISTRATIVE | Facility: HOSPITAL | Age: 43
End: 2018-11-08

## 2018-11-08 ENCOUNTER — HOSPITAL ENCOUNTER (OUTPATIENT)
Dept: ULTRASOUND IMAGING | Facility: HOSPITAL | Age: 43
Discharge: HOME | End: 2018-11-08
Attending: SPECIALIST
Payer: COMMERCIAL

## 2018-11-08 DIAGNOSIS — N91.2 AMENORRHEA: ICD-10-CM

## 2018-11-08 DIAGNOSIS — R10.9 ABDOMINAL PAIN, UNSPECIFIED ABDOMINAL LOCATION: ICD-10-CM

## 2018-11-08 DIAGNOSIS — R10.2 ADNEXAL TENDERNESS, RIGHT: ICD-10-CM

## 2018-11-08 DIAGNOSIS — R10.9 ABDOMINAL PAIN, UNSPECIFIED ABDOMINAL LOCATION: Primary | ICD-10-CM

## 2018-11-08 LAB
ALBUMIN SERPL BCP-MCNC: 3.9 G/DL (ref 3.5–5.7)
ALP SERPL-CCNC: 100 U/L (ref 40–150)
ALT SERPL W P-5'-P-CCNC: 114 U/L (ref 7–52)
ANION GAP SERPL CALCULATED.3IONS-SCNC: 6 MMOL/L (ref 4–13)
AST SERPL W P-5'-P-CCNC: 43 U/L (ref 13–39)
BILIRUB SERPL-MCNC: 0.3 MG/DL (ref 0.2–1)
BUN SERPL-MCNC: 10 MG/DL (ref 7–25)
CALCIUM SERPL-MCNC: 9 MG/DL (ref 8.6–10.5)
CHLORIDE SERPL-SCNC: 108 MMOL/L (ref 98–107)
CO2 SERPL-SCNC: 27 MMOL/L (ref 21–31)
CREAT SERPL-MCNC: 0.71 MG/DL (ref 0.6–1.2)
ERYTHROCYTE [DISTWIDTH] IN BLOOD BY AUTOMATED COUNT: 16.2 % (ref 11.6–15.1)
ESTRADIOL SERPL-MCNC: 131 PG/ML
FSH SERPL-ACNC: 4.6 MIU/ML
GFR SERPL CREATININE-BSD FRML MDRD: 105 ML/MIN/1.73SQ M
GLUCOSE P FAST SERPL-MCNC: 81 MG/DL (ref 65–99)
HCG SERPL QL: NEGATIVE
HCT VFR BLD AUTO: 37.1 % (ref 37–47)
HGB BLD-MCNC: 11.7 G/DL (ref 11.5–15.4)
LH SERPL-ACNC: 4.1 MIU/ML
MCH RBC QN AUTO: 24.8 PG (ref 26.8–34.3)
MCHC RBC AUTO-ENTMCNC: 31.5 G/DL (ref 31.4–37.4)
MCV RBC AUTO: 79 FL (ref 82–98)
PLATELET # BLD AUTO: 263 THOUSANDS/UL (ref 149–390)
PMV BLD AUTO: 7.7 FL (ref 8.9–12.7)
POTASSIUM SERPL-SCNC: 4.8 MMOL/L (ref 3.5–5.5)
PROT SERPL-MCNC: 6.1 G/DL (ref 6.4–8.9)
RBC # BLD AUTO: 4.72 MILLION/UL (ref 3.81–5.12)
SODIUM SERPL-SCNC: 141 MMOL/L (ref 134–143)
WBC # BLD AUTO: 3.9 THOUSAND/UL (ref 4.31–10.16)

## 2018-11-08 PROCEDURE — 85027 COMPLETE CBC AUTOMATED: CPT

## 2018-11-08 PROCEDURE — 83002 ASSAY OF GONADOTROPIN (LH): CPT

## 2018-11-08 PROCEDURE — 83001 ASSAY OF GONADOTROPIN (FSH): CPT

## 2018-11-08 PROCEDURE — 36415 COLL VENOUS BLD VENIPUNCTURE: CPT

## 2018-11-08 PROCEDURE — 82670 ASSAY OF TOTAL ESTRADIOL: CPT

## 2018-11-08 PROCEDURE — 80053 COMPREHEN METABOLIC PANEL: CPT

## 2018-11-08 PROCEDURE — 76856 US EXAM PELVIC COMPLETE: CPT

## 2018-11-08 PROCEDURE — 84703 CHORIONIC GONADOTROPIN ASSAY: CPT

## 2018-11-28 ENCOUNTER — TELEPHONE (OUTPATIENT)
Dept: SURGERY | Facility: CLINIC | Age: 43
End: 2018-11-28

## 2018-12-28 ENCOUNTER — OFFICE VISIT (OUTPATIENT)
Dept: URGENT CARE | Facility: CLINIC | Age: 43
End: 2018-12-28
Payer: COMMERCIAL

## 2018-12-28 VITALS
SYSTOLIC BLOOD PRESSURE: 119 MMHG | TEMPERATURE: 97.2 F | DIASTOLIC BLOOD PRESSURE: 77 MMHG | HEART RATE: 65 BPM | BODY MASS INDEX: 34.15 KG/M2 | OXYGEN SATURATION: 100 % | HEIGHT: 64 IN | RESPIRATION RATE: 16 BRPM | WEIGHT: 200 LBS

## 2018-12-28 DIAGNOSIS — R31.29 MICROSCOPIC HEMATURIA: ICD-10-CM

## 2018-12-28 DIAGNOSIS — M54.5 LOW BACK PAIN, UNSPECIFIED BACK PAIN LATERALITY, UNSPECIFIED CHRONICITY, WITH SCIATICA PRESENCE UNSPECIFIED: ICD-10-CM

## 2018-12-28 DIAGNOSIS — J01.90 ACUTE SINUSITIS, RECURRENCE NOT SPECIFIED, UNSPECIFIED LOCATION: Primary | ICD-10-CM

## 2018-12-28 LAB
SL AMB  POCT GLUCOSE, UA: ABNORMAL
SL AMB LEUKOCYTE ESTERASE,UA: ABNORMAL
SL AMB POCT BILIRUBIN,UA: ABNORMAL
SL AMB POCT BLOOD,UA: ABNORMAL
SL AMB POCT CLARITY,UA: CLEAR
SL AMB POCT COLOR,UA: YELLOW
SL AMB POCT KETONES,UA: ABNORMAL
SL AMB POCT NITRITE,UA: ABNORMAL
SL AMB POCT PH,UA: 7.5
SL AMB POCT SPECIFIC GRAVITY,UA: 1
SL AMB POCT URINE PROTEIN: ABNORMAL
SL AMB POCT UROBILINOGEN: 1

## 2018-12-28 PROCEDURE — 99283 EMERGENCY DEPT VISIT LOW MDM: CPT | Performed by: PHYSICIAN ASSISTANT

## 2018-12-28 PROCEDURE — 81002 URINALYSIS NONAUTO W/O SCOPE: CPT | Performed by: PHYSICIAN ASSISTANT

## 2018-12-28 PROCEDURE — 87086 URINE CULTURE/COLONY COUNT: CPT | Performed by: PHYSICIAN ASSISTANT

## 2018-12-28 PROCEDURE — G0382 LEV 3 HOSP TYPE B ED VISIT: HCPCS | Performed by: PHYSICIAN ASSISTANT

## 2018-12-28 RX ORDER — CEFUROXIME AXETIL 500 MG/1
500 TABLET ORAL EVERY 12 HOURS SCHEDULED
Qty: 20 TABLET | Refills: 0 | Status: SHIPPED | OUTPATIENT
Start: 2018-12-28 | End: 2019-01-07

## 2018-12-28 NOTE — PROGRESS NOTES
[facility]    NAME: Migel Walker is a 37 y o  female  : 1975    MRN: [id number]  DATE: 2018  TIME: 12:50 PM    Assessment and Plan   Acute sinusitis, recurrence not specified, unspecified location [J01 90]  1  Acute sinusitis, recurrence not specified, unspecified location  cefuroxime (CEFTIN) 500 mg tablet   2  Low back pain, unspecified back pain laterality, unspecified chronicity, with sciatica presence unspecified  POCT urine dip auto non-scope    Urine culture   3  Microscopic hematuria         Patient Instructions     Patient Instructions   I have prescribed an antibiotic for the infection  Please take the antibiotic as prescribed and finish the entire prescription  I recommend that the patient takes an over the counter probiotic or eats yogurt with live cultures in it [place]) to keep good bacteria in the gut and help prevent diarrhea  Wash hands frequently to prevent the spread of infection  Can use over the counter cough and cold medications to help with symptoms  Ibuprofen and/or tylenol as needed for pain or fever  If not improving over the next 7-10 days, follow up with PCP  Recommend increasing fluids and hydration  Follow up with PCP in the next 10-14 days to make sure hematuria resolves  If back pain is getting worse go to the emergency room  Chief Complaint     Chief Complaint   Patient presents with    Sinusitis    Back Pain     low       History of Present Illness   44-year-old female here with complaint of sinus pressure on the right side of her head  States she has pain also behind her right ear  Nasal congestion and cough  Symptoms have been present for the last week and been getting progressively worse  Also having issues with right-sided and bilateral low back pain  Denies any burning with urination and urinary frequency          Review of Systems   Review of Systems   Constitutional: Negative for activity change, appetite change, chills, diaphoresis, fatigue, fever and unexpected weight change  HENT: Positive for congestion, sinus pressure and sore throat  Negative for dental problem, hearing loss, sneezing, tinnitus, trouble swallowing and voice change  Eyes: Negative for photophobia, redness and visual disturbance  Respiratory: Positive for cough  Negative for apnea, chest tightness, shortness of breath, wheezing and stridor  Cardiovascular: Negative for chest pain, palpitations and leg swelling  Gastrointestinal: Negative for abdominal distention, abdominal pain, blood in stool, constipation, diarrhea, nausea and vomiting  Endocrine: Negative for cold intolerance, heat intolerance, polydipsia, polyphagia and polyuria  Genitourinary: Negative for difficulty urinating, dysuria, flank pain, frequency, hematuria and urgency  Musculoskeletal: Positive for back pain  Negative for arthralgias, gait problem, joint swelling, myalgias, neck pain and neck stiffness  Skin: Negative for pallor, rash and wound  Neurological: Negative for dizziness, tremors, seizures, speech difficulty, weakness and headaches  Hematological: Negative for adenopathy  Does not bruise/bleed easily  Psychiatric/Behavioral: Negative for agitation, confusion, dysphoric mood and sleep disturbance  The patient is not nervous/anxious  All other systems reviewed and are negative  Current Medications     Current Outpatient Prescriptions:     busPIRone (BUSPAR) 5 mg tablet, Take 1 tablet (5 mg total) by mouth 3 (three) times a day, Disp: 60 tablet, Rfl: 0    calcium citrate (CALCITRATE) 950 MG tablet, Take 2 tablets by mouth daily  , Disp: , Rfl:     cyanocobalamin (VITAMIN B-12) 1,000 mcg tablet, Take 1,000 mcg by mouth daily, Disp: , Rfl:     ferrous sulfate 325 (65 Fe) mg tablet, Take 325 mg by mouth daily with breakfast, Disp: , Rfl:     fluticasone (FLONASE) 50 mcg/act nasal spray, SPRAY 1 SPRAY INTO EACH NOSTRIL EVERY DAY, Disp: 1 Bottle, Rfl: 3    Multiple Vitamin (MULTIVITAMIN) tablet, Take 1 tablet by mouth daily, Disp: , Rfl:     Potassium 99 MG TABS, Take 99 mg by mouth, Disp: , Rfl:     Vitamin D, Cholecalciferol, 1000 units CAPS, Take 1 tablet by mouth daily, Disp: , Rfl:     cefuroxime (CEFTIN) 500 mg tablet, Take 1 tablet (500 mg total) by mouth every 12 (twelve) hours for 10 days, Disp: 20 tablet, Rfl: 0    Current Allergies     Allergies as of 2018 - Reviewed 2018   Allergen Reaction Noted    Aspirin Other (See Comments) 2016    Nsaids Other (See Comments) 2017    Codeine Itching 2016          The following portions of the patient's history were reviewed and updated as appropriate: allergies, current medications, past family history, past medical history, past social history, past surgical history and problem list    Past Medical History:   Diagnosis Date    Anxiety     Arthritis     Carpal tunnel syndrome     Heartburn     History of gastric bypass 2014    Migraine     Postgastrectomy malabsorption     Restless leg syndrome     Seizures ([facility] [address] )      Past Surgical History:   Procedure Laterality Date     SECTION       SECTION N/A 2018    Procedure:  SECTION (); Surgeon: Cintia Oliver MD;  Location: [place] ;  Service: Obstetrics    CHOLECYSTECTOMY      GASTRIC BYPASS  2014    PA REVISION OF CERVIX,NON OBSTETRICAL N/A 2017    Procedure: CERCLAGE CERVICAL;  Surgeon: Anand Farrar MD;  Location: [facility];  Service: Obstetrics     Family History   Problem Relation Age of Onset    Thyroid disease Mother     Coronary artery disease Father      Social History     Social History    Marital status: /Civil Union     Spouse name: N/A    Number of children: N/A    Years of education: N/A     Occupational History    Not on file       Social History Main Topics    Smoking status: Former Smoker     Packs/day: 0 25    Smokeless tobacco: Never Used    Alcohol use No    Drug use: No    Sexual activity: Yes     Partners: Male     Other Topics Concern    Not on file     Social History Narrative    No narrative on file     Medications have been verified  Objective   /77   Pulse 65   Temp (!) 97 2 °F (36 2 °C)   Resp 16   Ht 5' 4" (1 626 m)   Wt 90 7 kg (200 lb)   SpO2 100%   BMI 34 33 kg/m²      Physical Exam   Physical Exam   Constitutional: She appears well-developed and well-nourished  No distress  HENT:   Head: Normocephalic  Right Ear: Tympanic membrane and external ear normal    Left Ear: Tympanic membrane and external ear normal    Nose: Mucosal edema present  Right sinus exhibits maxillary sinus tenderness  Mouth/Throat: Posterior oropharyngeal erythema present  No oropharyngeal exudate  Tender enlarged postauricular lymph node  Neck: Normal range of motion  Neck supple  Cardiovascular: Normal rate, regular rhythm and normal heart sounds  No murmur heard  Pulmonary/Chest: Effort normal and breath sounds normal  No respiratory distress  She has no wheezes  She has no rales  Abdominal: Soft  Bowel sounds are normal  There is no tenderness  There is no CVA tenderness  Musculoskeletal: Normal range of motion  Lumbar back: She exhibits normal range of motion, no tenderness, no bony tenderness, no swelling and no spasm  Lymphadenopathy:     She has no cervical adenopathy  Skin: Skin is warm  No rash noted  Nursing note and vitals reviewed

## 2018-12-28 NOTE — PATIENT INSTRUCTIONS
I have prescribed an antibiotic for the infection  Please take the antibiotic as prescribed and finish the entire prescription  I recommend that the patient takes an over the counter probiotic or eats yogurt with live cultures in it [place]) to keep good bacteria in the gut and help prevent diarrhea  Wash hands frequently to prevent the spread of infection  Can use over the counter cough and cold medications to help with symptoms  Ibuprofen and/or tylenol as needed for pain or fever  If not improving over the next 7-10 days, follow up with PCP  Recommend increasing fluids and hydration  Follow up with PCP in the next 10-14 days to make sure hematuria resolves  If back pain is getting worse go to the emergency room

## 2018-12-30 ENCOUNTER — APPOINTMENT (EMERGENCY)
Dept: CT IMAGING | Facility: HOSPITAL | Age: 43
End: 2018-12-30
Payer: COMMERCIAL

## 2018-12-30 ENCOUNTER — HOSPITAL ENCOUNTER (EMERGENCY)
Facility: HOSPITAL | Age: 43
Discharge: HOME | End: 2018-12-30
Admitting: EMERGENCY MEDICINE
Payer: COMMERCIAL

## 2018-12-30 VITALS
HEART RATE: 60 BPM | DIASTOLIC BLOOD PRESSURE: 67 MMHG | WEIGHT: 199.96 LBS | OXYGEN SATURATION: 99 % | RESPIRATION RATE: 18 BRPM | TEMPERATURE: 97.8 F | SYSTOLIC BLOOD PRESSURE: 121 MMHG | HEIGHT: 64 IN | BODY MASS INDEX: 34.14 KG/M2

## 2018-12-30 DIAGNOSIS — M54.50 LOW BACK PAIN: Primary | ICD-10-CM

## 2018-12-30 DIAGNOSIS — N39.0 UTI (URINARY TRACT INFECTION): ICD-10-CM

## 2018-12-30 DIAGNOSIS — J32.9 SINUSITIS: ICD-10-CM

## 2018-12-30 LAB
ALBUMIN SERPL BCP-MCNC: 4.3 G/DL (ref 3.5–5.7)
ALP SERPL-CCNC: 103 U/L (ref 40–150)
ALT SERPL W P-5'-P-CCNC: 35 U/L (ref 7–52)
ANION GAP SERPL CALCULATED.3IONS-SCNC: 6 MMOL/L (ref 4–13)
APTT PPP: 37 SECONDS (ref 26–38)
AST SERPL W P-5'-P-CCNC: 21 U/L (ref 13–39)
BACTERIA UR CULT: NORMAL
BACTERIA UR QL AUTO: ABNORMAL /HPF
BASOPHILS # BLD AUTO: 0 THOUSANDS/ΜL (ref 0–0.1)
BASOPHILS NFR BLD AUTO: 1 % (ref 0–2)
BILIRUB SERPL-MCNC: 0.4 MG/DL (ref 0.2–1)
BILIRUB UR QL STRIP: NEGATIVE
BUN SERPL-MCNC: 12 MG/DL (ref 7–25)
CALCIUM SERPL-MCNC: 9.5 MG/DL (ref 8.6–10.5)
CHLORIDE SERPL-SCNC: 108 MMOL/L (ref 98–107)
CLARITY UR: ABNORMAL
CO2 SERPL-SCNC: 26 MMOL/L (ref 21–31)
COLOR UR: YELLOW
CREAT SERPL-MCNC: 0.79 MG/DL (ref 0.6–1.2)
EOSINOPHIL # BLD AUTO: 0 THOUSAND/ΜL (ref 0–0.61)
EOSINOPHIL NFR BLD AUTO: 1 % (ref 0–5)
ERYTHROCYTE [DISTWIDTH] IN BLOOD BY AUTOMATED COUNT: 15.5 % (ref 11.5–14.5)
GFR SERPL CREATININE-BSD FRML MDRD: 92 ML/MIN/1.73SQ M
GLUCOSE SERPL-MCNC: 93 MG/DL (ref 65–99)
GLUCOSE UR STRIP-MCNC: NEGATIVE MG/DL
HCT VFR BLD AUTO: 40.6 % (ref 34.8–46.1)
HGB BLD-MCNC: 13.1 G/DL (ref 12–16)
HGB UR QL STRIP.AUTO: ABNORMAL
INR PPP: 1.12 (ref 0.9–1.5)
KETONES UR STRIP-MCNC: NEGATIVE MG/DL
LEUKOCYTE ESTERASE UR QL STRIP: NEGATIVE
LYMPHOCYTES # BLD AUTO: 0.7 THOUSANDS/ΜL (ref 0.6–4.47)
LYMPHOCYTES NFR BLD AUTO: 23 % (ref 21–51)
MCH RBC QN AUTO: 25.1 PG (ref 26–34)
MCHC RBC AUTO-ENTMCNC: 32.2 G/DL (ref 31–37)
MCV RBC AUTO: 78 FL (ref 81–99)
MONOCYTES # BLD AUTO: 0.3 THOUSAND/ΜL (ref 0.17–1.22)
MONOCYTES NFR BLD AUTO: 8 % (ref 2–12)
NEUTROPHILS # BLD AUTO: 2 THOUSANDS/ΜL (ref 1.4–6.5)
NEUTS SEG NFR BLD AUTO: 67 % (ref 42–75)
NITRITE UR QL STRIP: NEGATIVE
NON-SQ EPI CELLS URNS QL MICRO: ABNORMAL /HPF
NRBC BLD AUTO-RTO: 0 /100 WBCS
PH UR STRIP.AUTO: 5.5 [PH] (ref 5–8)
PLATELET # BLD AUTO: 220 THOUSANDS/UL (ref 149–390)
PMV BLD AUTO: 7.4 FL (ref 8.6–11.7)
POTASSIUM SERPL-SCNC: 4 MMOL/L (ref 3.5–5.5)
PROT SERPL-MCNC: 7.3 G/DL (ref 6.4–8.9)
PROT UR STRIP-MCNC: NEGATIVE MG/DL
PROTHROMBIN TIME: 13 SECONDS (ref 10.2–13)
RBC # BLD AUTO: 5.21 MILLION/UL (ref 3.9–5.2)
RBC #/AREA URNS AUTO: ABNORMAL /HPF
SODIUM SERPL-SCNC: 140 MMOL/L (ref 134–143)
SP GR UR STRIP.AUTO: 1.02 (ref 1–1.03)
UROBILINOGEN UR QL STRIP.AUTO: 0.2 E.U./DL
WBC # BLD AUTO: 3 THOUSAND/UL (ref 4.8–10.8)
WBC #/AREA URNS AUTO: ABNORMAL /HPF

## 2018-12-30 PROCEDURE — 74176 CT ABD & PELVIS W/O CONTRAST: CPT

## 2018-12-30 PROCEDURE — 36415 COLL VENOUS BLD VENIPUNCTURE: CPT | Performed by: PHYSICIAN ASSISTANT

## 2018-12-30 PROCEDURE — 80053 COMPREHEN METABOLIC PANEL: CPT | Performed by: PHYSICIAN ASSISTANT

## 2018-12-30 PROCEDURE — 85025 COMPLETE CBC W/AUTO DIFF WBC: CPT | Performed by: PHYSICIAN ASSISTANT

## 2018-12-30 PROCEDURE — 81001 URINALYSIS AUTO W/SCOPE: CPT | Performed by: PHYSICIAN ASSISTANT

## 2018-12-30 PROCEDURE — 85730 THROMBOPLASTIN TIME PARTIAL: CPT | Performed by: PHYSICIAN ASSISTANT

## 2018-12-30 PROCEDURE — 81003 URINALYSIS AUTO W/O SCOPE: CPT | Performed by: PHYSICIAN ASSISTANT

## 2018-12-30 PROCEDURE — 85610 PROTHROMBIN TIME: CPT | Performed by: PHYSICIAN ASSISTANT

## 2018-12-30 PROCEDURE — 99283 EMERGENCY DEPT VISIT LOW MDM: CPT

## 2018-12-30 RX ORDER — SULFAMETHOXAZOLE AND TRIMETHOPRIM 800; 160 MG/1; MG/1
1 TABLET ORAL 2 TIMES DAILY
Qty: 14 TABLET | Refills: 0 | Status: SHIPPED | OUTPATIENT
Start: 2018-12-30 | End: 2019-01-04

## 2018-12-30 RX ORDER — PREDNISONE 20 MG/1
40 TABLET ORAL DAILY
Qty: 14 TABLET | Refills: 0 | Status: SHIPPED | OUTPATIENT
Start: 2018-12-30 | End: 2019-01-06

## 2018-12-30 NOTE — DISCHARGE INSTRUCTIONS
Acute Low Back Pain, Ambulatory Care   GENERAL INFORMATION:   Acute low back pain  is discomfort in your lower back area that lasts for less than 12 weeks  The word acute is used to describe pain that starts suddenly, worsens quickly, and lasts for a short time  Common symptoms include the following:   · Back stiffness or spasms    · Pain down the back or side of one leg    · Holding yourself in an unusual position or posture to decrease your back pain    · Not being able to find a sitting position that is comfortable    · Slow increase in your pain for 24 to 48 hours after you stress your back    · Tenderness on your lower back or severe pain when you move your back  Seek immediate care for the following symptoms:   · Severe pain    · Sudden stiffness and heaviness in both buttocks down to both legs    · Numbness or weakness in one leg, or pain in both legs    · Numbness in your genital area or across your lower back    · Unable to control your urine or bowel movements  Treatment for acute low back pain  may include any of the following:  · Medicines:      ¨ NSAIDs  help decrease swelling and pain or fever  This medicine is available with or without a doctor's order  NSAIDs can cause stomach bleeding or kidney problems in certain people  If you take blood thinner medicine, always ask your healthcare provider if NSAIDs are safe for you  Always read the medicine label and follow directions  ¨ Muscle relaxers  help decrease muscle spasms pain  ¨ Prescription pain medicine  may be given  Ask how to take this medicine safely  · Surgery  may be needed if your pain is severe and other treatments do not work  Surgery may be needed for conditions of the lumbar spine, such as herniated disc or spinal stenosis  Manage your symptoms:   · Sleep on a firm mattress  If you do not have a firm mattress, have someone move your mattress to the floor for a few days   A piece of plywood under your mattress can also help make it firmer  · Apply ice  on your lower back for 15 to 20 minutes every hour or as directed  Use an ice pack, or put crushed ice in a plastic bag  Cover it with a towel  Ice helps prevent tissue damage and decreases swelling and pain  You can alternate ice and heat  · Apply heat  on your lower back for 20 to 30 minutes every 2 hours for as many days as directed  Heat helps decrease pain and muscle spasms  · Go to physical therapy  A physical therapist teaches you exercises to help improve movement and strength, and to decrease pain  Prevent acute low back pain:   · Use proper body mechanics  ¨ Bend at the hips and knees when you  objects  Do not bend from the waist  Use your leg muscles as you lift the load  Do not use your back  Keep the object close to your chest as you lift it  Try not to twist or lift anything above your waist     ¨ Change your position often when you stand for long periods of time  Rest one foot on a small box or footrest, and then switch to the other foot often  ¨ Try not to sit for long periods of time  When you do, sit in a straight-backed chair with your feet flat on the floor  Never reach, pull, or push while you are sitting  · Exercise regularly  Warm up before you exercise  Do exercises that strengthen your back muscles  Ask about the best exercise plan for you  · Maintain a healthy weight  Ask your healthcare provider how much you should weigh  Ask him to help you create a weight loss plan if you are overweight  Follow up with your healthcare provider as directed:  Return for a follow-up visit if you still have pain after 1 to 3 weeks of treatment  You may need to visit an orthopedist if your back pain lasts more than 6 to 12 weeks  Write down your questions so you remember to ask them during your visits  CARE AGREEMENT:   You have the right to help plan your care  Learn about your health condition and how it may be treated   Discuss treatment options with your caregivers to decide what care you want to receive  You always have the right to refuse treatment  The above information is an  only  It is not intended as medical advice for individual conditions or treatments  Talk to your doctor, nurse or pharmacist before following any medical regimen to see if it is safe and effective for you  © 2014 [address] is for End User's use only and may not be sold, redistributed or otherwise used for commercial purposes  All illustrations and images included in CareNotes® are the copyrighted property of A [employer]  or Chriss Landers  Urinary Tract Infection in Women, Ambulatory Care   GENERAL INFORMATION:   A urinary tract infection (UTI)  is caused by bacteria that get inside your urinary tract  Most bacteria that enter your urinary tract are expelled when you urinate  If the bacteria stay in your urinary tract, you may get an infection  Your urinary tract includes your kidneys, ureters, bladder, and urethra  Urine is made in your kidneys, and it flows from the ureters to the bladder  Urine leaves the bladder through the urethra  A UTI is more common in your lower urinary tract, which includes your bladder and urethra  Common symptoms include the following:   · Urinating more often or waking from sleep to urinate    · Pain or burning when you urinate    · Pain or pressure in your lower abdomen     · Urine that smells bad    · Blood in your urine    · Leaking urine  Seek immediate care for the following symptoms:   · Urinating very little or not at all    · Vomiting    · Shaking chills with a fever    · Side or back pain that gets worse  Treatment for a UTI  may include medicines to treat a bacterial infection  You may also need medicines to decrease pain and burning, or decrease the urge to urinate often  Prevent a UTI:   · Urinate when you feel the urge  Do not hold your urine   Urinate as soon as you feel you have to  · Drink liquids as directed  Ask how much liquid to drink each day and which liquids are best for you  You may need to drink more fluids than usual to help flush out the bacteria  Do not drink alcohol, caffeine, and citrus juices  These can irritate your bladder and increase your symptoms  · Apply heat  on your abdomen for 20 to 30 minutes every 2 hours for as many days as directed  Heat helps decrease discomfort and pressure in your bladder  Follow up with your healthcare provider as directed:  Write down your questions so you remember to ask them during your visits  CARE AGREEMENT:   You have the right to help plan your care  Learn about your health condition and how it may be treated  Discuss treatment options with your caregivers to decide what care you want to receive  You always have the right to refuse treatment  The above information is an  only  It is not intended as medical advice for individual conditions or treatments  Talk to your doctor, nurse or pharmacist before following any medical regimen to see if it is safe and effective for you  © 2014 [address] is for End User's use only and may not be sold, redistributed or otherwise used for commercial purposes  All illustrations and images included in CareNotes® are the copyrighted property of [employer]  or Chriss Landers

## 2018-12-30 NOTE — ED PROVIDER NOTES
History  Chief Complaint   Patient presents with    Sore Throat     sore throat with congestion  complains of bld in urine  pt is on an antibotic since visit to urgent care earlier this week  Patient is a 45-year-old female who was seen at urgent care on 12/28 diagnosed with acute sinusitis and possibly urinary tract infection due to blood in her urine she was prescribed Ceftin and she has been taking it daily for the last 4-5 days  She presents today due to persistent sinus pressure but more importantly left-sided flank pain that radiates into her abdomen and left groin  She denies previous history of renal calculi in addition to nausea vomiting diarrhea chest pain shortness of breath fevers chills dysuria frequency hematuria  Prior to Admission Medications   Prescriptions Last Dose Informant Patient Reported? Taking? Multiple Vitamin (MULTIVITAMIN) tablet  Self Yes No   Sig: Take 1 tablet by mouth daily   Potassium 99 MG TABS  Self Yes No   Sig: Take 99 mg by mouth   Vitamin D, Cholecalciferol, 1000 units CAPS  Self Yes No   Sig: Take 1 tablet by mouth daily   calcium citrate (CALCITRATE) 950 MG tablet  Self Yes No   Sig: Take 2 tablets by mouth daily     cefuroxime (CEFTIN) 500 mg tablet   No No   Sig: Take 1 tablet (500 mg total) by mouth every 12 (twelve) hours for 10 days   cyanocobalamin (VITAMIN B-12) 1,000 mcg tablet  Self Yes No   Sig: Take 1,000 mcg by mouth daily   ferrous sulfate 325 (65 Fe) mg tablet  Self Yes No   Sig: Take 325 mg by mouth daily with breakfast   fluticasone (FLONASE) 50 mcg/act nasal spray   No No   Sig: SPRAY 1 SPRAY INTO EACH NOSTRIL EVERY DAY      Facility-Administered Medications: None       Past Medical History:   Diagnosis Date    Anxiety     Arthritis     Carpal tunnel syndrome     Heartburn     History of gastric bypass 12/2014    Migraine     Postgastrectomy malabsorption     Restless leg syndrome     Seizures (HCC)        Past Surgical History: Procedure Laterality Date     SECTION       SECTION N/A 2018    Procedure:  SECTION (); Surgeon: Merna Hunt MD;  Location[facility];  Service: Obstetrics    CHOLECYSTECTOMY      GASTRIC BYPASS  2014    AR REVISION OF CERVIX,NON OBSTETRICAL N/A 2017    Procedure: CERCLAGE CERVICAL;  Surgeon: Devika Sutton MD;  Location: [facility];  Service: Obstetrics       Family History   Problem Relation Age of Onset    Thyroid disease Mother     Coronary artery disease Father      I have reviewed and agree with the history as documented  Social History   Substance Use Topics    Smoking status: Former Smoker     Packs/day: 0 25    Smokeless tobacco: Never Used    Alcohol use No        Review of Systems   Constitutional: Negative for chills, diaphoresis, fatigue and fever  HENT: Positive for sinus pain and sinus pressure  Negative for congestion, ear pain, rhinorrhea, sneezing and sore throat  Respiratory: Negative for cough, shortness of breath, wheezing and stridor  Cardiovascular: Negative for chest pain, palpitations and leg swelling  Gastrointestinal: Positive for abdominal pain  Negative for abdominal distention, blood in stool, constipation, diarrhea, nausea and vomiting  Left flank pain   Genitourinary: Negative for difficulty urinating, dysuria, frequency, hematuria and urgency  Musculoskeletal: Positive for back pain  Negative for gait problem, myalgias and neck pain  Skin: Negative for rash  Neurological: Negative for dizziness, syncope, weakness, light-headedness and headaches  All other systems reviewed and are negative  Physical Exam  Physical Exam   Constitutional: She is oriented to person, place, and time  She appears well-developed and well-nourished  No distress  HENT:   Head: Normocephalic and atraumatic  Cardiovascular: Normal rate, regular rhythm, normal heart sounds and intact distal pulses    Exam reveals no gallop and no friction rub  No murmur heard  Pulmonary/Chest: Effort normal and breath sounds normal  No respiratory distress  She has no wheezes  She has no rales  She exhibits no tenderness  Abdominal: Soft  Bowel sounds are normal  She exhibits no distension and no mass  There is tenderness  There is no rebound and no guarding  No hernia    abdominal pain  Mild left CVA tenderness along suprapubic and left lower   Musculoskeletal: Normal range of motion  She exhibits no edema, tenderness or deformity  Lymphadenopathy:     She has no cervical adenopathy  Neurological: She is alert and oriented to person, place, and time  Skin: She is not diaphoretic  Psychiatric: She has a normal mood and affect  Her behavior is normal    Nursing note and vitals reviewed        Vital Signs  ED Triage Vitals [12/30/18 1323]   Temperature Pulse Respirations Blood Pressure SpO2   97 8 °F (36 6 °C) 79 16 121/62 94 %      Temp Source Heart Rate Source Patient Position - Orthostatic VS BP Location FiO2 (%)   Temporal -- -- Left arm --      Pain Score       4           Vitals:    12/30/18 1323 12/30/18 1602   BP: 121/62 121/67   Pulse: 79 60       Visual Acuity      ED Medications  Medications - No data to display    Diagnostic Studies  Results Reviewed     Procedure Component Value Units Date/Time    Protime-INR [[id number]]  (Normal) Collected:  12/30/18 1419    Lab Status:  Final result Specimen:  Blood from Arm, Left Updated:  12/30/18 1456     Protime 13 0 seconds      INR 1 12    APTT [[id number]]  (Normal) Collected:  12/30/18 1419    Lab Status:  Final result Specimen:  Blood from Arm, Left Updated:  12/30/18 1456     PTT 37 seconds     Comprehensive metabolic panel [[id number]]  (Abnormal) Collected:  12/30/18 1419    Lab Status:  Final result Specimen:  Blood from Arm, Left Updated:  12/30/18 1444     Sodium 140 mmol/L      Potassium 4 0 mmol/L      Chloride 108 (H) mmol/L      CO2 26 mmol/L      ANION GAP 6 mmol/L BUN 12 mg/dL      Creatinine 0 79 mg/dL      Glucose 93 mg/dL      Calcium 9 5 mg/dL      AST 21 U/L      ALT 35 U/L      Alkaline Phosphatase 103 U/L      Total Protein 7 3 g/dL      Albumin 4 3 g/dL      Total Bilirubin 0 40 mg/dL      eGFR 92 ml/min/1 73sq m     Narrative:         National Kidney Disease Education Program recommendations are as follows:  GFR calculation is accurate only with a steady state creatinine  Chronic Kidney disease less than 60 ml/min/1 73 sq  meters  Kidney failure less than 15 ml/min/1 73 sq  meters      Urine Microscopic [[id number]]  (Abnormal) Collected:  12/30/18 1419    Lab Status:  Final result Specimen:  Urine from Urine, Clean Catch Updated:  12/30/18 1435     RBC, UA 2-4 (A) /hpf      WBC, UA 4-10 (A) /hpf      Epithelial Cells Moderate (A) /hpf      Bacteria, UA Occasional /hpf     CBC and differential [[id number]]  (Abnormal) Collected:  12/30/18 1419    Lab Status:  Final result Specimen:  Blood from Arm, Left Updated:  12/30/18 1432     WBC 3 00 (L) Thousand/uL      RBC 5 21 (H) Million/uL      Hemoglobin 13 1 g/dL      Hematocrit 40 6 %      MCV 78 (L) fL      MCH 25 1 (L) pg      MCHC 32 2 g/dL      RDW 15 5 (H) %      MPV 7 4 (L) fL      Platelets 553 Thousands/uL      nRBC 0 /100 WBCs      Neutrophils Relative 67 %      Lymphocytes Relative 23 %      Monocytes Relative 8 %      Eosinophils Relative 1 %      Basophils Relative 1 %      Neutrophils Absolute 2 00 Thousands/µL      Lymphocytes Absolute 0 70 Thousands/µL      Monocytes Absolute 0 30 Thousand/µL      Eosinophils Absolute 0 00 Thousand/µL      Basophils Absolute 0 00 Thousands/µL     UA w Reflex to Microscopic w Reflex to Culture [[id number]]  (Abnormal) Collected:  12/30/18 1419    Lab Status:  Final result Specimen:  Urine from Urine, Clean Catch Updated:  12/30/18 1432     Color, UA Yellow     Clarity, UA Hazy     Specific Allen, UA 1 025     pH, UA 5 5     Leukocytes, UA Negative     Nitrite, UA Negative     Protein, UA Negative mg/dl      Glucose, UA Negative mg/dl      Ketones, UA Negative mg/dl      Urobilinogen, UA 0 2 E U /dl      Bilirubin, UA Negative     Blood, UA 1+ (A)                 CT abdomen pelvis wo contrast   Final Result by Ira Campbell (12/30 1535)   No acute CT abnormality of the abdomen or pelvis on this unenhanced   examination  Signed by Ira Campbell MD                 Procedures  Procedures       Phone Contacts  ED Phone Contact    ED Course                               MDM  CritCare Time    Disposition  Final diagnoses:   Low back pain   UTI (urinary tract infection)   Sinusitis     Time reflects when diagnosis was documented in both MDM as applicable and the Disposition within this note     Time User Action Codes Description Comment    12/30/2018  3:41 PM Reida Snide Add [M54 5] Low back pain     12/30/2018  3:42 PM Reida Snide Add [N39 0] UTI (urinary tract infection)     12/30/2018  3:44 PM Reida Snide Add [J32 9] Sinusitis       ED Disposition     ED Disposition Condition Comment    Discharge  Yrn Nieves discharge to home/self care      Condition at discharge: Good        Follow-up Information     Follow up With Specialties Details Why [address], [address] [facility], Nurse Practitioner In 3 days  [address]  [phone]            Discharge Medication List as of 12/30/2018  3:45 PM      START taking these medications    Details   predniSONE 20 mg tablet Take 2 tablets (40 mg total) by mouth daily for 7 days, Starting Sun 12/30/2018, Until Sun 1/6/2019, Print      sulfamethoxazole-trimethoprim (BACTRIM DS) 800-160 mg per tablet Take 1 tablet by mouth 2 (two) times a day for 5 days smx-tmp DS (BACTRIM) 800-160 mg tabs (1tab q12 D10), Starting Sun 12/30/2018, Until Fri 1/4/2019, Print         CONTINUE these medications which have NOT CHANGED    Details   calcium citrate (CALCITRATE) 950 MG tablet Take 2 tablets by mouth daily  , Historical Med      cefuroxime (CEFTIN) 500 mg tablet Take 1 tablet (500 mg total) by mouth every 12 (twelve) hours for 10 days, Starting Fri 12/28/2018, Until Mon 1/7/2019, Normal      cyanocobalamin (VITAMIN B-12) 1,000 mcg tablet Take 1,000 mcg by mouth daily, Historical Med      ferrous sulfate 325 (65 Fe) mg tablet Take 325 mg by mouth daily with breakfast, Historical Med      fluticasone (FLONASE) 50 mcg/act nasal spray SPRAY 1 SPRAY INTO EACH NOSTRIL EVERY DAY, Normal      Multiple Vitamin (MULTIVITAMIN) tablet Take 1 tablet by mouth daily, Historical Med      Potassium 99 MG TABS Take 99 mg by mouth, Historical Med      Vitamin D, Cholecalciferol, 1000 units CAPS Take 1 tablet by mouth daily, Historical Med           No discharge procedures on file      ED Provider  Electronically Signed by           Coleen Rivero PA-C  12/30/18 5964

## 2019-01-10 ENCOUNTER — LAB (OUTPATIENT)
Dept: LAB | Facility: CLINIC | Age: 44
End: 2019-01-10
Payer: COMMERCIAL

## 2019-01-10 ENCOUNTER — TRANSCRIBE ORDERS (OUTPATIENT)
Dept: LAB | Facility: CLINIC | Age: 44
End: 2019-01-10

## 2019-01-10 ENCOUNTER — OFFICE VISIT (OUTPATIENT)
Dept: INTERNAL MEDICINE CLINIC | Facility: CLINIC | Age: 44
End: 2019-01-10
Payer: COMMERCIAL

## 2019-01-10 VITALS
WEIGHT: 201.6 LBS | HEIGHT: 64 IN | BODY MASS INDEX: 34.42 KG/M2 | HEART RATE: 97 BPM | OXYGEN SATURATION: 98 % | TEMPERATURE: 98.4 F | DIASTOLIC BLOOD PRESSURE: 62 MMHG | SYSTOLIC BLOOD PRESSURE: 104 MMHG

## 2019-01-10 DIAGNOSIS — Z12.39 SCREENING FOR BREAST CANCER: ICD-10-CM

## 2019-01-10 DIAGNOSIS — F41.9 ANXIETY AND DEPRESSION: ICD-10-CM

## 2019-01-10 DIAGNOSIS — E55.9 VITAMIN D DEFICIENCY: ICD-10-CM

## 2019-01-10 DIAGNOSIS — K91.2 POSTGASTRECTOMY MALABSORPTION: ICD-10-CM

## 2019-01-10 DIAGNOSIS — Z13.6 SCREENING FOR CARDIOVASCULAR CONDITION: ICD-10-CM

## 2019-01-10 DIAGNOSIS — Z90.3 POSTGASTRECTOMY MALABSORPTION: ICD-10-CM

## 2019-01-10 DIAGNOSIS — F32.A ANXIETY AND DEPRESSION: ICD-10-CM

## 2019-01-10 DIAGNOSIS — E50.9 VITAMIN A DEFICIENCY: ICD-10-CM

## 2019-01-10 DIAGNOSIS — E53.8 VITAMIN B 12 DEFICIENCY: ICD-10-CM

## 2019-01-10 DIAGNOSIS — R31.9 HEMATURIA, UNSPECIFIED TYPE: ICD-10-CM

## 2019-01-10 DIAGNOSIS — D50.8 IRON DEFICIENCY ANEMIA SECONDARY TO INADEQUATE DIETARY IRON INTAKE: ICD-10-CM

## 2019-01-10 DIAGNOSIS — J02.9 PHARYNGITIS, UNSPECIFIED ETIOLOGY: ICD-10-CM

## 2019-01-10 DIAGNOSIS — D50.8 IRON DEFICIENCY ANEMIA SECONDARY TO INADEQUATE DIETARY IRON INTAKE: Primary | ICD-10-CM

## 2019-01-10 PROBLEM — Z3A.36 36 WEEKS GESTATION OF PREGNANCY: Status: RESOLVED | Noted: 2018-03-24 | Resolved: 2019-01-10

## 2019-01-10 LAB
25(OH)D3 SERPL-MCNC: 29.2 NG/ML (ref 30–100)
ALBUMIN SERPL BCP-MCNC: 3.6 G/DL (ref 3.5–5)
ALP SERPL-CCNC: 93 U/L (ref 46–116)
ALT SERPL W P-5'-P-CCNC: 54 U/L (ref 12–78)
ANION GAP SERPL CALCULATED.3IONS-SCNC: 5 MMOL/L (ref 4–13)
AST SERPL W P-5'-P-CCNC: 19 U/L (ref 5–45)
BASOPHILS # BLD AUTO: 0.02 THOUSANDS/ΜL (ref 0–0.1)
BASOPHILS NFR BLD AUTO: 0 % (ref 0–1)
BILIRUB SERPL-MCNC: 0.4 MG/DL (ref 0.2–1)
BUN SERPL-MCNC: 13 MG/DL (ref 5–25)
CALCIUM SERPL-MCNC: 8.8 MG/DL (ref 8.3–10.1)
CHLORIDE SERPL-SCNC: 104 MMOL/L (ref 100–108)
CO2 SERPL-SCNC: 28 MMOL/L (ref 21–32)
CREAT SERPL-MCNC: 0.79 MG/DL (ref 0.6–1.3)
EOSINOPHIL # BLD AUTO: 0.11 THOUSAND/ΜL (ref 0–0.61)
EOSINOPHIL NFR BLD AUTO: 2 % (ref 0–6)
ERYTHROCYTE [DISTWIDTH] IN BLOOD BY AUTOMATED COUNT: 14.6 % (ref 11.6–15.1)
FERRITIN SERPL-MCNC: 11 NG/ML (ref 8–388)
GFR SERPL CREATININE-BSD FRML MDRD: 92 ML/MIN/1.73SQ M
GLUCOSE P FAST SERPL-MCNC: 60 MG/DL (ref 65–99)
HCT VFR BLD AUTO: 42 % (ref 34.8–46.1)
HGB BLD-MCNC: 12.7 G/DL (ref 11.5–15.4)
IMM GRANULOCYTES # BLD AUTO: 0.04 THOUSAND/UL (ref 0–0.2)
IMM GRANULOCYTES NFR BLD AUTO: 1 % (ref 0–2)
IRON SATN MFR SERPL: 14 %
IRON SERPL-MCNC: 60 UG/DL (ref 50–170)
LYMPHOCYTES # BLD AUTO: 1.79 THOUSANDS/ΜL (ref 0.6–4.47)
LYMPHOCYTES NFR BLD AUTO: 29 % (ref 14–44)
MCH RBC QN AUTO: 24.7 PG (ref 26.8–34.3)
MCHC RBC AUTO-ENTMCNC: 30.2 G/DL (ref 31.4–37.4)
MCV RBC AUTO: 82 FL (ref 82–98)
MONOCYTES # BLD AUTO: 0.65 THOUSAND/ΜL (ref 0.17–1.22)
MONOCYTES NFR BLD AUTO: 10 % (ref 4–12)
NEUTROPHILS # BLD AUTO: 3.68 THOUSANDS/ΜL (ref 1.85–7.62)
NEUTS SEG NFR BLD AUTO: 58 % (ref 43–75)
NRBC BLD AUTO-RTO: 0 /100 WBCS
PLATELET # BLD AUTO: 326 THOUSANDS/UL (ref 149–390)
PMV BLD AUTO: 10 FL (ref 8.9–12.7)
POTASSIUM SERPL-SCNC: 4.2 MMOL/L (ref 3.5–5.3)
PROT SERPL-MCNC: 7.1 G/DL (ref 6.4–8.2)
RBC # BLD AUTO: 5.15 MILLION/UL (ref 3.81–5.12)
SODIUM SERPL-SCNC: 137 MMOL/L (ref 136–145)
TIBC SERPL-MCNC: 427 UG/DL (ref 250–450)
TSH SERPL DL<=0.05 MIU/L-ACNC: 2.04 UIU/ML (ref 0.36–3.74)
WBC # BLD AUTO: 6.29 THOUSAND/UL (ref 4.31–10.16)

## 2019-01-10 PROCEDURE — 99214 OFFICE O/P EST MOD 30 MIN: CPT | Performed by: NURSE PRACTITIONER

## 2019-01-10 PROCEDURE — 85025 COMPLETE CBC W/AUTO DIFF WBC: CPT

## 2019-01-10 PROCEDURE — 82306 VITAMIN D 25 HYDROXY: CPT

## 2019-01-10 PROCEDURE — 36415 COLL VENOUS BLD VENIPUNCTURE: CPT

## 2019-01-10 PROCEDURE — 80053 COMPREHEN METABOLIC PANEL: CPT

## 2019-01-10 PROCEDURE — 82728 ASSAY OF FERRITIN: CPT

## 2019-01-10 PROCEDURE — 83550 IRON BINDING TEST: CPT

## 2019-01-10 PROCEDURE — 83540 ASSAY OF IRON: CPT

## 2019-01-10 PROCEDURE — 84443 ASSAY THYROID STIM HORMONE: CPT

## 2019-01-10 RX ORDER — ALBUTEROL SULFATE 90 UG/1
2 AEROSOL, METERED RESPIRATORY (INHALATION) EVERY 6 HOURS PRN
Qty: 1 INHALER | Refills: 0 | Status: SHIPPED | OUTPATIENT
Start: 2019-01-10 | End: 2019-02-09

## 2019-01-10 RX ORDER — ESCITALOPRAM OXALATE 10 MG/1
5 TABLET ORAL DAILY
Qty: 30 TABLET | Refills: 0 | Status: SHIPPED | OUTPATIENT
Start: 2019-01-10 | End: 2019-02-21 | Stop reason: SDUPTHER

## 2019-01-10 RX ORDER — AMOXICILLIN 875 MG/1
875 TABLET, COATED ORAL 2 TIMES DAILY
Qty: 20 TABLET | Refills: 0 | Status: SHIPPED | OUTPATIENT
Start: 2019-01-10 | End: 2019-01-20

## 2019-01-10 NOTE — PROGRESS NOTES
Assessment/Plan: Will order labs patient is requesting iron panel along with Vitamin D and CBC  She was advised if her iron is low she will have to follow up with Hematology for continued iron infusions  Will order US of kidneys and will give script for mammogram   She did have recent urine cultures which were negative and CT scan done was negative  She was advised if continued flank pain or worsening of symptoms she will have to go to ER  She is deferring the Flu vaccine today  Will call in Amoxicillin 875 mg by mouth BID and Ventolin inhaler as needed for her URI symptoms  Will start her on Lexapro 10 mg daily  She was advised the side effects and bring her back in one month  If any issues or concerns please call the office  No problem-specific Assessment & Plan notes found for this encounter           Problem List Items Addressed This Visit     Iron deficiency anemia, unspecified - Primary    Relevant Orders    TSH, 3rd generation with Free T4 reflex    Iron Panel    Comprehensive metabolic panel    CBC and differential    Postgastrectomy malabsorption    Relevant Orders    TSH, 3rd generation with Free T4 reflex    Iron Panel    Comprehensive metabolic panel    CBC and differential    Vitamin A deficiency    Relevant Orders    TSH, 3rd generation with Free T4 reflex    Iron Panel    Comprehensive metabolic panel    CBC and differential    Vitamin B 12 deficiency    Relevant Orders    TSH, 3rd generation with Free T4 reflex    Iron Panel    Comprehensive metabolic panel    CBC and differential    Screening for breast cancer    Relevant Orders    Mammo screening bilateral w 3d & cad    Hematuria    Relevant Orders    TSH, 3rd generation with Free T4 reflex    Iron Panel    US retroperitoneal complete    Comprehensive metabolic panel    CBC and differential    Anxiety and depression    Relevant Medications    escitalopram (LEXAPRO) 10 mg tablet    Other Relevant Orders    TSH, 3rd generation with Free T4 reflex    Iron Panel    Comprehensive metabolic panel    CBC and differential    Vitamin D deficiency    Relevant Orders    Vitamin D 25 hydroxy    TSH, 3rd generation with Free T4 reflex    Iron Panel    Comprehensive metabolic panel    CBC and differential    Screening for cardiovascular condition    Relevant Orders    Lipid panel    Pharyngitis    Relevant Medications    amoxicillin (AMOXIL) 875 mg tablet    albuterol (PROVENTIL HFA,VENTOLIN HFA) 90 mcg/act inhaler            Subjective:      Patient ID: Mila Leonard is a 37 y o  female  Oracio Hoyt is here today to establish care as a new patient  She was a previous patient of Jose Grove  She states she did have a baby around 8 months ago and while pregnant was getting iron infusions  She did have gastric bypass done in 12/16/14  She states she was following with [facility] and has not been there in over one year  She states she is due for lab work and was wondering if I could order some due to her feeling very weak and tired  She states she has also been having kidney pain and was at he UC and ER which was negative for a UTI but showing blood  She states she just did finish her Bactrim 2 days ago and she is starting with right flank pain again  She denies any fever or dysuria  She states she is drinking plenty of water and trying to stay well hydrated  She does have anxiety and was taking Cymbalta and Buspar which was not helping she would like to go on something else for this  She states she is having some palpitations and feels like she is coming down with a URI  She states she also is not sure if her iron levels and other vitamins are off as well  She denies any chest pain  She does smoke on occasion and denies any alcohol or drug use  She states she did deliver her daughter at 39 weeks without any complications and is not breast feeding  She is due for a mammogram and does follow up with Dr Terry Morse  She is deferring the Flu vaccine    She offers no other complaints  The following portions of the patient's history were reviewed and updated as appropriate:   She  has a past medical history of Anxiety; Arthritis; Bariatric surgery status; Carpal tunnel syndrome; Heartburn; History of gastric bypass (2014); Hypertension; Migraine; Obesity; Postgastrectomy malabsorption; Restless leg syndrome; and Seizures ([facility] [address] )  She   Patient Active Problem List    Diagnosis Date Noted    Screening for breast cancer 01/10/2019    Hematuria 01/10/2019    Anxiety and depression 01/10/2019    Vitamin D deficiency 01/10/2019    Screening for cardiovascular condition 01/10/2019    Pharyngitis 01/10/2019    Hx of  delivery, currently pregnant, third trimester 2018    Obesity affecting pregnancy in third trimester 2018    Elderly multigravida in third trimester 2018    Status post repeat low transverse  section 2018    History of bilateral tubal ligation 2018    Iron deficiency anemia, unspecified 2018    Pregnancy complicated by previous bariatric surgery 2018    Cervical cerclage suture present in second trimester 2018    Incompetent cervix in pregnancy, antepartum, unspecified trimester 2017    Smoker 2017    Sciatica 2016    Neck nodule 10/19/2016    Panniculus 2016    Neck muscle spasm 2016    Carpal tunnel syndrome 2016    Low iron 2016    Vitamin A deficiency 2016    Fatigue 2015    Postgastrectomy malabsorption 2014    Obesity 2014    Constipation 2014    Prediabetes 2014    Restless legs syndrome 2014    Vertigo 2014    Vitamin B 12 deficiency 2014    Anxiety disorder 2014    Benign paroxysmal vertigo 2014    Dizziness 02/15/2013    Esophageal reflux 2012     She  has a past surgical history that includes Gastric bypass (2014);   section; Cholecystectomy; pr revision of cervix,non obstetrical (N/A, 2017);  section (N/A, 2018); and Tooth extraction  Her family history includes Cancer in her mother; Coronary artery disease in her father; Other in her mother  She  reports that she has quit smoking  She smoked 0 25 packs per day  She has never used smokeless tobacco  She reports that she does not drink alcohol or use drugs  Current Outpatient Prescriptions   Medication Sig Dispense Refill    b complex vitamins tablet Take 1 tablet by mouth daily      calcium citrate (CALCITRATE) 950 MG tablet Take 2 tablets by mouth daily   CRANBERRY EXTRACT PO Take by mouth      cyanocobalamin (VITAMIN B-12) 1,000 mcg tablet Take 1,000 mcg by mouth daily      ferrous sulfate 325 (65 Fe) mg tablet Take 325 mg by mouth daily with breakfast      fluticasone (FLONASE) 50 mcg/act nasal spray SPRAY 1 SPRAY INTO EACH NOSTRIL EVERY DAY 1 Bottle 3    Multiple Vitamin (MULTIVITAMIN) tablet Take 1 tablet by mouth daily      Potassium 99 MG TABS Take 99 mg by mouth      Vitamin D, Cholecalciferol, 1000 units CAPS Take 1 tablet by mouth daily      albuterol (PROVENTIL HFA,VENTOLIN HFA) 90 mcg/act inhaler Inhale 2 puffs every 6 (six) hours as needed for wheezing for up to 30 days 1 Inhaler 0    amoxicillin (AMOXIL) 875 mg tablet Take 1 tablet (875 mg total) by mouth 2 (two) times a day for 10 days 20 tablet 0    escitalopram (LEXAPRO) 10 mg tablet Take 0 5 tablets (5 mg total) by mouth daily 30 tablet 0     No current facility-administered medications for this visit  Current Outpatient Prescriptions on File Prior to Visit   Medication Sig    calcium citrate (CALCITRATE) 950 MG tablet Take 2 tablets by mouth daily      cyanocobalamin (VITAMIN B-12) 1,000 mcg tablet Take 1,000 mcg by mouth daily    ferrous sulfate 325 (65 Fe) mg tablet Take 325 mg by mouth daily with breakfast    fluticasone (FLONASE) 50 mcg/act nasal spray SPRAY 1 SPRAY INTO EACH NOSTRIL EVERY DAY    Multiple Vitamin (MULTIVITAMIN) tablet Take 1 tablet by mouth daily    Potassium 99 MG TABS Take 99 mg by mouth    Vitamin D, Cholecalciferol, 1000 units CAPS Take 1 tablet by mouth daily     No current facility-administered medications on file prior to visit  She is allergic to aspirin; nsaids; and codeine       Review of Systems   Constitutional: Negative  HENT: Positive for postnasal drip and sore throat  Eyes: Negative  Respiratory: Negative  Cardiovascular: Negative  Gastrointestinal: Negative  Endocrine: Negative  Genitourinary: Positive for flank pain  Skin: Negative  Allergic/Immunologic: Negative  Neurological: Negative  Hematological: Negative  Psychiatric/Behavioral: Negative  Below is the patient's most recent value for Albumin, ALT, AST, BUN, Calcium, Chloride, Cholesterol, CO2, Creatinine, GFR, Glucose, HDL, Hematocrit, Hemoglobin, Hemoglobin A1C, LDL, Magnesium, Phosphorus, Platelets, Potassium, PSA, Sodium, Triglycerides, and WBC  Lab Results   Component Value Date    ALT 35 12/30/2018    AST 21 12/30/2018    BUN 12 12/30/2018    CALCIUM 9 5 12/30/2018     (H) 12/30/2018    CHOL 103 07/22/2015    CO2 26 12/30/2018    CREATININE 0 79 12/30/2018    HDL 48 01/20/2016    HCT 40 6 12/30/2018    HGB 13 1 12/30/2018    HGBA1C 4 9 07/22/2015     12/30/2018    K 4 0 12/30/2018     07/22/2015    TRIG 82 01/20/2016    WBC 3 00 (L) 12/30/2018     Note: for a comprehensive list of the patient's lab results, access the Results Review activity  Objective:      /62 (BP Location: Right arm, Patient Position: Sitting, Cuff Size: Large)   Pulse 97   Temp 98 4 °F (36 9 °C) (Temporal)   Ht 5' 4" (1 626 m)   Wt 91 4 kg (201 lb 9 6 oz)   SpO2 98%   BMI 34 60 kg/m²          Physical Exam   Constitutional: She is oriented to person, place, and time  She appears well-developed and well-nourished     HENT:   Head: Normocephalic and atraumatic  Right Ear: External ear normal    Left Ear: External ear normal    Nose: Nose normal    Mouth/Throat: Oropharynx is clear and moist    Eyes: Pupils are equal, round, and reactive to light  Conjunctivae and EOM are normal    Neck: Normal range of motion  Neck supple  Cardiovascular: Normal rate, regular rhythm, normal heart sounds and intact distal pulses  Pulmonary/Chest: Effort normal and breath sounds normal    Abdominal: Soft  Bowel sounds are normal    Genitourinary:   Genitourinary Comments: Slight flank tenderness on palpation   Musculoskeletal: Normal range of motion  Neurological: She is alert and oriented to person, place, and time  She has normal reflexes  Skin: Skin is warm and dry  Psychiatric: She has a normal mood and affect  Her behavior is normal  Judgment and thought content normal    Vitals reviewed

## 2019-01-11 NOTE — PROGRESS NOTES
Can you let [facility] know I did get her lab work back her iron is normal along with her other labs  Her vitamin D is low at 29 2 and her fasting sugar was low at 60    How is she feeling today??

## 2019-01-15 ENCOUNTER — OFFICE VISIT (OUTPATIENT)
Dept: BARIATRICS | Facility: CLINIC | Age: 44
End: 2019-01-15
Payer: COMMERCIAL

## 2019-01-15 VITALS
WEIGHT: 201.5 LBS | TEMPERATURE: 98.7 F | DIASTOLIC BLOOD PRESSURE: 82 MMHG | HEIGHT: 65 IN | SYSTOLIC BLOOD PRESSURE: 110 MMHG | BODY MASS INDEX: 33.57 KG/M2 | HEART RATE: 78 BPM

## 2019-01-15 DIAGNOSIS — K91.2 POSTSURGICAL MALABSORPTION: ICD-10-CM

## 2019-01-15 DIAGNOSIS — E16.1 REACTIVE HYPOGLYCEMIA: ICD-10-CM

## 2019-01-15 DIAGNOSIS — E55.9 VITAMIN D INSUFFICIENCY: ICD-10-CM

## 2019-01-15 DIAGNOSIS — M79.10 MUSCULAR PAIN: ICD-10-CM

## 2019-01-15 DIAGNOSIS — Z98.84 BARIATRIC SURGERY STATUS: Primary | ICD-10-CM

## 2019-01-15 DIAGNOSIS — R63.5 WEIGHT GAIN: ICD-10-CM

## 2019-01-15 PROBLEM — O09.893 HX OF PRETERM DELIVERY, CURRENTLY PREGNANT, THIRD TRIMESTER: Status: RESOLVED | Noted: 2018-05-02 | Resolved: 2019-01-15

## 2019-01-15 PROBLEM — E66.9 OBESITY, CLASS I, BMI 30-34.9: Status: ACTIVE | Noted: 2019-01-15

## 2019-01-15 PROBLEM — O09.523 ELDERLY MULTIGRAVIDA IN THIRD TRIMESTER: Status: RESOLVED | Noted: 2018-05-02 | Resolved: 2019-01-15

## 2019-01-15 PROBLEM — D50.9 IRON DEFICIENCY ANEMIA, UNSPECIFIED: Status: RESOLVED | Noted: 2018-04-27 | Resolved: 2019-01-15

## 2019-01-15 PROCEDURE — 99214 OFFICE O/P EST MOD 30 MIN: CPT | Performed by: PHYSICIAN ASSISTANT

## 2019-01-15 NOTE — ASSESSMENT & PLAN NOTE
She is having an intermittent right upper quadrant pain for "a year or two" worse around her menstrual period or if she is  pressing the area makes it worse not associated with meals-pain is an ache-lasts around a couple seconds to a couple minutes  at a time  No associated fever,chills, nausea or vomiting  She had an abdominal and pelvic CT scan at the end of December which was unrevealing for any acute finding  On exam I am unable to appreciate a hernia  (She has had laparoscopic cholecystectomy)-she is also having work-up for kidney issues with ultrasound planned  As reported by patient    I suspect her pain is a musculoskeletal pain-encouraged to rest for a week/avoid heavy lifting and trial heat to the area 10-15 minutes on and 10-15 minutes off -but do not sleep with heating pad    She notes she also discussed pain with PCP-of note her baby is 7 months old and she is carrying her frequently    Advised if pain is associated with fever,chills, nausea/vomiting she should let us know and go to ER for evaluation

## 2019-01-15 NOTE — PROGRESS NOTES
Assessment/Plan:    Bariatric surgery status  -s/p Iglesia-En-Y Gastric Bypass with Dr Mello Flores on 12/16/2014  Overall doing Poor  Initial:288 lb  Current:201 5 lb  EWL: 64%  Erlin:160 lb-pre-pregnancy weight  Current BMI is Body mass index is 33 53 kg/m²  Tolerating a regular diet-yes  Eating at least 60 grams of protein per day-inconsistent skipping meals at times  Following 30/60 minute rule with liquids-no  Drinking at least 64 ounces of fluid per day-yes  Drinking carbonated beverages-no  Sufficient exercise-physically active but no formal exercise-advised on regular exercise as tolerated  Using NSAIDs regularly-no  Using nicotine-no  Using alcohol-no      Reactive hypoglycemia  She has what appears to be intermittent low blood sugars/skips meals and she did have one documented low blood sugar of 60 with routine labs-advised on diet for hypoglycemia prevention  Encouraged to invest in glucometer, strips and use of glucose tablets as needed  Will add hgA1c to her routine labs and also follow-up with our RD  Advised following the diet would not hurt her but if symptoms continue and she had documented lows despite diet changes she should follow-up with endocrinologist    Greater than 50% of the 25 minute visit was spent on education for this with diet, treatment and monitoring  Advised on importance of eating at regular intervals with protein and complex carbohydrates at meals and between meal snacks/liquids with protein/carbohydrate but advised to avoid high simple sugars/high fat foods which can lead to dumping/further lows  I am referring her to RD and will check hgA1c for an idea of average blood sugars  Of note prior levels (some time ago) were very low normal so she may have been having lows in the past too        Postsurgical malabsorption  She is taking 2 opurity capsules  And one b complex  And taking 2 calcium supplements per day    Muscular pain  She is having an intermittent right upper quadrant pain for "a year or two" worse around her menstrual period or if she is  pressing the area makes it worse not associated with meals-pain is an ache-lasts around a couple seconds to a couple minutes  at a time  No associated fever,chills, nausea or vomiting  She had an abdominal and pelvic CT scan at the end of December which was unrevealing for any acute finding  On exam I am unable to appreciate a hernia  (She has had laparoscopic cholecystectomy)-she is also having work-up for kidney issues with ultrasound planned  As reported by patient    I suspect her pain is a musculoskeletal pain-encouraged to rest for a week/avoid heavy lifting and trial heat to the area 10-15 minutes on and 10-15 minutes off -but do not sleep with heating pad  She notes she also discussed pain with PCP-of note her baby is 7 months old and she is carrying her frequently    Advised if pain is associated with fever,chills, nausea/vomiting she should let us know and go to ER for evaluation    Vitamin D insufficiency  Had vitamin D level done by PCP-was told to take extra but didn't know dose-I have advised her to take an Extra 2000 IU vitamin D3 daily    Obesity, Class I, BMI 30-34 9  She had her baby last year  Reports pre-pregnancy weight was around 160 lb she indicates post-pregnancy weight was 220 lb -she reportedly has lost around 20 lb since then but notes weight loss has stalled  I will refer her first to RD -she does not want to follow-up with medical weight management-advised if she does she needs to meet with both RD and  and she can consider this between visits  Weight gain  She had reportedly gaine from 160-220 lb during her pregnancy last year and lost 19 1/2 lb since she had her baby  She is up a net 27 1/2 lb from her post-op quincy one year post-op     I recommended follow-up with RD/ and medical weight management providers    She is also having possible low blood sugars    She notes she only wants to meet with RD first so will arrange same to help her with her weight loss goals and better diet control with possible low blood sugars       Diagnoses and all orders for this visit:    Bariatric surgery status  -     Copper Level; Future  -     Folate; Future  -     HEMOGLOBIN A1C W/ EAG ESTIMATION; Future  -     PTH, intact; Future  -     Vitamin A; Future  -     Vitamin B1, whole blood; Future  -     Vitamin B12; Future  -     Cancel: Vitamin D 25 hydroxy; Future  -     Zinc; Future    Postsurgical malabsorption  -     Copper Level; Future  -     Folate; Future  -     HEMOGLOBIN A1C W/ EAG ESTIMATION; Future  -     PTH, intact; Future  -     Vitamin A; Future  -     Vitamin B1, whole blood; Future  -     Vitamin B12; Future  -     Cancel: Vitamin D 25 hydroxy; Future  -     Zinc; Future    Reactive hypoglycemia  -     HEMOGLOBIN A1C W/ EAG ESTIMATION; Future    Vitamin D insufficiency    Muscular pain          Subjective:      Patient ID: Cain Ivey is a 37 y o  female  She is here in routine follow-up  She has a couple  complaints  She has had intermittent right upper quadrant ache under her right rib cage that lasts from seconds to a couple minutes at a time-indicates pain has been over a year or so  No associated fever,chills, nausea or vomiting  She is moving her bowels ok-tends to be loose but no keke diarrhea  She is taking bariatric supplements  Had a few of her vitamin levels done by PCP  She does not do formal exercise but is physically active  Notes she is having 'kidney issues' and getting this evaluated  She gained weight during her pregnancy and has not been able to lose it all and would like help for this as well    She complains of intermittent shakiness/sometimes skipping meals  Had one documented low blood sugar (60 mg/dl) when fasting for labs    She reports recent complaints of shortness of breath-now on inhaler and notes no complaints of shortness of breath now-followed up with PCP for this  The following portions of the patient's history were reviewed and updated as appropriate: allergies, current medications, past family history, past medical history, past social history, past surgical history and problem list     Review of Systems   Constitutional: Negative for chills and fever  Unexpected weight change: planned weight loss  Respiratory: Negative for shortness of breath and wheezing  Cardiovascular: Negative for chest pain and palpitations  Gastrointestinal: Negative for abdominal pain, constipation, diarrhea, nausea and vomiting         + pain under right rib cage   Psychiatric/Behavioral: Suicidal ideas: no complait of anxiety or depression  Objective:      /82 (BP Location: Right arm, Patient Position: Standing, Cuff Size: Large)   Pulse 78   Temp 98 7 °F (37 1 °C)   Ht 5' 5" (1 651 m)   Wt 91 4 kg (201 lb 8 oz)   BMI 33 53 kg/m²          Physical Exam   Constitutional: She is oriented to person, place, and time  She appears well-developed and well-nourished  HENT:   Mouth/Throat: Oropharynx is clear and moist    Eyes: Conjunctivae are normal  No scleral icterus  Cardiovascular: Normal rate, regular rhythm and normal heart sounds  Pulmonary/Chest: Effort normal and breath sounds normal    Abdominal: Soft  Bowel sounds are normal  She exhibits no distension  There is tenderness  There is no rebound and no guarding  Mild pain to palpation to right upper quadrant immediately under right rib cage; no incisional hernias appreciated   Musculoskeletal:   Normal gait   Neurological: She is alert and oriented to person, place, and time  Psychiatric: She has a normal mood and affect  Her behavior is normal  Judgment and thought content normal    Nursing note and vitals reviewed  GOALS: Continued weight loss with good nutrition intakes    Normal vitamin and mineral levels  Exercise as tolerated    BARRIERS: none identified

## 2019-01-15 NOTE — PATIENT INSTRUCTIONS
Rest for one week as much as possible for right upper pain  Follow-up with kidney tests as advised  Avoid heavy lifting for one week  Try heating pad/microwave roll 10-15 minutes on and 10-15 minutes off for the week  Do not sleep with heating pad to avoid burns  Pain does not appear related to your gastric surgery at this point and may be muscular  Follow diet and treatment for low blood sugars as advised-recommend investing in glucometer, strips and glucose tablets  Follow-up with RD for diet for weight loss goals and possible low blood sugars  If you have true low blood sugars 60 or lower and not controlled with diet changes, then follow-up with an endocrinologist   Review all medical concerns with your PCP as well  Follow-up in one year  We kindly ask that you arrive 15 minutes before your scheduled appointment time with your provider to allow you to be roomed, have your vital signs checked and your chart updated by our staff  We thank you for your patience at your visit  Follow diet as discussed  Follow  vitamin and mineral recommendations as reviewed with you  Exercise as tolerated    If you have gotten a lab slip at this visit, please note that most labs are FASTING-but you need to drink water the night before and the morning before your labs are done  It is HIGHLY RECOMMENDED that you check with your insurance to make sure all the labs ordered are covered by your individual insurance policy  This is especially important if you also get labs done by other providers outside of [facility]  You want to avoid having duplicate labs done  Note you will be given a lab slip AFTER  your annual visit next year to check your vitamin and mineral levels  You will not need to make a second appointment after the labs are received/reviewed  You will receive a letter/and or phone call with your results  Most labs do NOT honor a lab slip dated one year in advance now      Call our office if he have any problems with abdominal pain especially if associated with fever, chills, nausea, vomiting or any other concerns  All  Post-bariatric surgery patients should be aware that very small quantities of any alcohol  can cause impairment and it is very possible not to feel the effect  The effect can be in the system for several hours  It is also a stomach irritant  It is advised to AVOID alcohol, Nonsteroidal antiinflammatory drugs (NSAIDS) and nicotine of all forms   Any of these can cause stomach irritation/pain

## 2019-01-15 NOTE — ASSESSMENT & PLAN NOTE
She had reportedly gaine from 160-220 lb during her pregnancy last year and lost 19 1/2 lb since she had her baby  She is up a net 27 1/2 lb from her post-op quincy one year post-op     I recommended follow-up with RD/ and medical weight management providers    She is also having possible low blood sugars    She notes she only wants to meet with RD first so will arrange same to help her with her weight loss goals and better diet control with possible low blood sugars

## 2019-01-15 NOTE — ASSESSMENT & PLAN NOTE
Had vitamin D level done by PCP-was told to take extra but didn't know dose-I have advised her to take an Extra 2000 IU vitamin D3 daily

## 2019-01-15 NOTE — ASSESSMENT & PLAN NOTE
-s/p Iglesia-En-Y Gastric Bypass with Dr Ronnie Kerns on 12/16/2014  Overall doing Poor  Initial:288 lb  Current:201 5 lb  EWL: 64%  Erlin:160 lb-pre-pregnancy weight  Current BMI is Body mass index is 33 53 kg/m²      Tolerating a regular diet-yes  Eating at least 60 grams of protein per day-inconsistent skipping meals at times  Following 30/60 minute rule with liquids-no  Drinking at least 64 ounces of fluid per day-yes  Drinking carbonated beverages-no  Sufficient exercise-physically active but no formal exercise-advised on regular exercise as tolerated  Using NSAIDs regularly-no  Using nicotine-no  Using alcohol-no

## 2019-01-15 NOTE — ASSESSMENT & PLAN NOTE
She had her baby last year  Reports pre-pregnancy weight was around 160 lb she indicates post-pregnancy weight was 220 lb -she reportedly has lost around 20 lb since then but notes weight loss has stalled  I will refer her first to RD -she does not want to follow-up with medical weight management-advised if she does she needs to meet with both RD and  and she can consider this between visits

## 2019-01-15 NOTE — ASSESSMENT & PLAN NOTE
She has what appears to be intermittent low blood sugars/skips meals and she did have one documented low blood sugar of 60 with routine labs-advised on diet for hypoglycemia prevention  Encouraged to invest in glucometer, strips and use of glucose tablets as needed  Will add hgA1c to her routine labs and also follow-up with our RD  Advised following the diet would not hurt her but if symptoms continue and she had documented lows despite diet changes she should follow-up with endocrinologist    Greater than 50% of the 25 minute visit was spent on education for this with diet, treatment and monitoring  Advised on importance of eating at regular intervals with protein and complex carbohydrates at meals and between meal snacks/liquids with protein/carbohydrate but advised to avoid high simple sugars/high fat foods which can lead to dumping/further lows  I am referring her to RD and will check hgA1c for an idea of average blood sugars  Of note prior levels (some time ago) were very low normal so she may have been having lows in the past too

## 2019-01-16 ENCOUNTER — TRANSCRIBE ORDERS (OUTPATIENT)
Dept: ADMINISTRATIVE | Facility: HOSPITAL | Age: 44
End: 2019-01-16

## 2019-01-16 ENCOUNTER — APPOINTMENT (OUTPATIENT)
Dept: LAB | Facility: HOSPITAL | Age: 44
End: 2019-01-16
Payer: COMMERCIAL

## 2019-01-16 DIAGNOSIS — E16.1 REACTIVE HYPOGLYCEMIA: ICD-10-CM

## 2019-01-16 DIAGNOSIS — Z98.84 BARIATRIC SURGERY STATUS: ICD-10-CM

## 2019-01-16 DIAGNOSIS — Z13.6 SCREENING FOR CARDIOVASCULAR CONDITION: ICD-10-CM

## 2019-01-16 DIAGNOSIS — O99.840 BARIATRIC SURGERY STATUS COMPLICATING PREGNANCY, UNSPECIFIED TRIMESTER: ICD-10-CM

## 2019-01-16 DIAGNOSIS — K91.2 POSTSURGICAL MALABSORPTION, NOT ELSEWHERE CLASSIFIED (CODE): ICD-10-CM

## 2019-01-16 DIAGNOSIS — K91.2 POSTSURGICAL MALABSORPTION: ICD-10-CM

## 2019-01-16 LAB
25(OH)D3 SERPL-MCNC: 34.3 NG/ML (ref 30–100)
ALBUMIN SERPL BCP-MCNC: 3.7 G/DL (ref 3.5–5.7)
ALP SERPL-CCNC: 62 U/L (ref 40–150)
ALT SERPL W P-5'-P-CCNC: 31 U/L (ref 7–52)
ANION GAP SERPL CALCULATED.3IONS-SCNC: 6 MMOL/L (ref 4–13)
AST SERPL W P-5'-P-CCNC: 23 U/L (ref 13–39)
BILIRUB SERPL-MCNC: 0.6 MG/DL (ref 0.2–1)
BUN SERPL-MCNC: 10 MG/DL (ref 7–25)
CALCIUM SERPL-MCNC: 9.2 MG/DL (ref 8.6–10.5)
CHLORIDE SERPL-SCNC: 107 MMOL/L (ref 98–107)
CHOLEST SERPL-MCNC: 179 MG/DL (ref 0–200)
CO2 SERPL-SCNC: 28 MMOL/L (ref 21–31)
CREAT SERPL-MCNC: 0.74 MG/DL (ref 0.6–1.2)
ERYTHROCYTE [DISTWIDTH] IN BLOOD BY AUTOMATED COUNT: 15.7 % (ref 11.5–14.5)
EST. AVERAGE GLUCOSE BLD GHB EST-MCNC: 100 MG/DL
FERRITIN SERPL-MCNC: 10 NG/ML (ref 8–388)
FOLATE SERPL-MCNC: >20 NG/ML (ref 3.1–17.5)
GFR SERPL CREATININE-BSD FRML MDRD: 100 ML/MIN/1.73SQ M
GLUCOSE P FAST SERPL-MCNC: 90 MG/DL (ref 65–99)
HBA1C MFR BLD: 5.1 % (ref 4.2–6.3)
HCT VFR BLD AUTO: 36.8 % (ref 34.8–46.1)
HDLC SERPL-MCNC: 45 MG/DL (ref 40–60)
HGB BLD-MCNC: 11.7 G/DL (ref 12–16)
LDLC SERPL CALC-MCNC: 108 MG/DL (ref 75–193)
MCH RBC QN AUTO: 25 PG (ref 26–34)
MCHC RBC AUTO-ENTMCNC: 31.8 G/DL (ref 31–37)
MCV RBC AUTO: 79 FL (ref 81–99)
NONHDLC SERPL-MCNC: 134 MG/DL
PLATELET # BLD AUTO: 264 THOUSANDS/UL (ref 149–390)
PMV BLD AUTO: 7.3 FL (ref 8.6–11.7)
POTASSIUM SERPL-SCNC: 4.1 MMOL/L (ref 3.5–5.5)
PROT SERPL-MCNC: 6.4 G/DL (ref 6.4–8.9)
PTH-INTACT SERPL-MCNC: 72.8 PG/ML (ref 18.4–80.1)
RBC # BLD AUTO: 4.68 MILLION/UL (ref 3.9–5.2)
SODIUM SERPL-SCNC: 141 MMOL/L (ref 134–143)
TRIGL SERPL-MCNC: 131 MG/DL (ref 44–166)
VIT B12 SERPL-MCNC: 684 PG/ML (ref 100–900)
WBC # BLD AUTO: 3.4 THOUSAND/UL (ref 4.8–10.8)

## 2019-01-16 PROCEDURE — 80061 LIPID PANEL: CPT

## 2019-01-16 PROCEDURE — 84425 ASSAY OF VITAMIN B-1: CPT

## 2019-01-16 PROCEDURE — 83970 ASSAY OF PARATHORMONE: CPT

## 2019-01-16 PROCEDURE — 82728 ASSAY OF FERRITIN: CPT

## 2019-01-16 PROCEDURE — 80053 COMPREHEN METABOLIC PANEL: CPT

## 2019-01-16 PROCEDURE — 82746 ASSAY OF FOLIC ACID SERUM: CPT

## 2019-01-16 PROCEDURE — 82306 VITAMIN D 25 HYDROXY: CPT

## 2019-01-16 PROCEDURE — 36415 COLL VENOUS BLD VENIPUNCTURE: CPT

## 2019-01-16 PROCEDURE — 84590 ASSAY OF VITAMIN A: CPT

## 2019-01-16 PROCEDURE — 84630 ASSAY OF ZINC: CPT

## 2019-01-16 PROCEDURE — 85027 COMPLETE CBC AUTOMATED: CPT

## 2019-01-16 PROCEDURE — 82607 VITAMIN B-12: CPT

## 2019-01-16 PROCEDURE — 83036 HEMOGLOBIN GLYCOSYLATED A1C: CPT

## 2019-01-16 PROCEDURE — 82525 ASSAY OF COPPER: CPT

## 2019-01-17 ENCOUNTER — HOSPITAL ENCOUNTER (OUTPATIENT)
Dept: ULTRASOUND IMAGING | Facility: HOSPITAL | Age: 44
Discharge: HOME | End: 2019-01-17
Payer: COMMERCIAL

## 2019-01-17 ENCOUNTER — HOSPITAL ENCOUNTER (OUTPATIENT)
Dept: MAMMOGRAPHY | Facility: HOSPITAL | Age: 44
Discharge: HOME | End: 2019-01-17
Payer: COMMERCIAL

## 2019-01-17 VITALS — WEIGHT: 201 LBS | HEIGHT: 65 IN | BODY MASS INDEX: 33.49 KG/M2

## 2019-01-17 DIAGNOSIS — Z12.39 SCREENING FOR BREAST CANCER: ICD-10-CM

## 2019-01-17 DIAGNOSIS — R31.9 HEMATURIA, UNSPECIFIED TYPE: ICD-10-CM

## 2019-01-17 PROCEDURE — 77067 SCR MAMMO BI INCL CAD: CPT

## 2019-01-17 PROCEDURE — 76770 US EXAM ABDO BACK WALL COMP: CPT

## 2019-01-17 PROCEDURE — 77063 BREAST TOMOSYNTHESIS BI: CPT

## 2019-01-18 LAB
COPPER SERPL-MCNC: 102 UG/DL (ref 72–166)
VIT B1 BLD-SCNC: 168.1 NMOL/L (ref 66.5–200)
ZINC SERPL-MCNC: 73 UG/DL (ref 56–134)

## 2019-01-18 NOTE — PROGRESS NOTES
Can you let Arthur Coleman know her mammogram did come back abnormal in her right breast and are recommending an US did anyone call her for this

## 2019-01-20 LAB — VIT A SERPL-MCNC: 56.7 UG/DL (ref 20.1–62)

## 2019-02-07 ENCOUNTER — HOSPITAL ENCOUNTER (OUTPATIENT)
Dept: MAMMOGRAPHY | Facility: HOSPITAL | Age: 44
Discharge: HOME | End: 2019-02-07
Payer: COMMERCIAL

## 2019-02-07 ENCOUNTER — HOSPITAL ENCOUNTER (OUTPATIENT)
Dept: ULTRASOUND IMAGING | Facility: HOSPITAL | Age: 44
Discharge: HOME | End: 2019-02-07
Payer: COMMERCIAL

## 2019-02-07 DIAGNOSIS — R92.8 ABNORMAL MAMMOGRAM: ICD-10-CM

## 2019-02-07 PROCEDURE — G0279 TOMOSYNTHESIS, MAMMO: HCPCS

## 2019-02-07 PROCEDURE — 76642 ULTRASOUND BREAST LIMITED: CPT

## 2019-02-07 PROCEDURE — 77065 DX MAMMO INCL CAD UNI: CPT

## 2019-02-08 DIAGNOSIS — K91.2 POSTSURGICAL MALABSORPTION: ICD-10-CM

## 2019-02-08 DIAGNOSIS — Z98.84 BARIATRIC SURGERY STATUS: Primary | ICD-10-CM

## 2019-02-21 ENCOUNTER — OFFICE VISIT (OUTPATIENT)
Dept: INTERNAL MEDICINE CLINIC | Facility: CLINIC | Age: 44
End: 2019-02-21
Payer: COMMERCIAL

## 2019-02-21 VITALS
DIASTOLIC BLOOD PRESSURE: 68 MMHG | HEIGHT: 65 IN | HEART RATE: 70 BPM | OXYGEN SATURATION: 99 % | TEMPERATURE: 99 F | SYSTOLIC BLOOD PRESSURE: 100 MMHG | BODY MASS INDEX: 34.72 KG/M2 | WEIGHT: 208.4 LBS

## 2019-02-21 DIAGNOSIS — R00.2 PALPITATIONS: ICD-10-CM

## 2019-02-21 DIAGNOSIS — F41.9 ANXIETY AND DEPRESSION: ICD-10-CM

## 2019-02-21 DIAGNOSIS — M79.10 MUSCULAR PAIN: ICD-10-CM

## 2019-02-21 DIAGNOSIS — Z98.84 BARIATRIC SURGERY STATUS: Primary | ICD-10-CM

## 2019-02-21 DIAGNOSIS — F32.A ANXIETY AND DEPRESSION: ICD-10-CM

## 2019-02-21 PROBLEM — J02.9 PHARYNGITIS: Status: RESOLVED | Noted: 2019-01-10 | Resolved: 2019-02-21

## 2019-02-21 PROBLEM — Z13.6 SCREENING FOR CARDIOVASCULAR CONDITION: Status: RESOLVED | Noted: 2019-01-10 | Resolved: 2019-02-21

## 2019-02-21 PROBLEM — Z12.39 SCREENING FOR BREAST CANCER: Status: RESOLVED | Noted: 2019-01-10 | Resolved: 2019-02-21

## 2019-02-21 PROCEDURE — 3008F BODY MASS INDEX DOCD: CPT | Performed by: NURSE PRACTITIONER

## 2019-02-21 PROCEDURE — 99214 OFFICE O/P EST MOD 30 MIN: CPT | Performed by: NURSE PRACTITIONER

## 2019-02-21 RX ORDER — METHOCARBAMOL 750 MG/1
TABLET, FILM COATED ORAL
Qty: 60 TABLET | Refills: 0 | Status: SHIPPED | OUTPATIENT
Start: 2019-02-21 | End: 2019-03-21 | Stop reason: SDUPTHER

## 2019-02-21 RX ORDER — ESCITALOPRAM OXALATE 10 MG/1
10 TABLET ORAL DAILY
Qty: 30 TABLET | Refills: 3 | Status: SHIPPED | OUTPATIENT
Start: 2019-02-21 | End: 2019-08-12 | Stop reason: SDUPTHER
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
## 2019-02-26 DIAGNOSIS — E16.2 HYPOGLYCEMIA: Primary | ICD-10-CM

## 2019-02-26 RX ORDER — BLOOD-GLUCOSE METER
EACH MISCELLANEOUS 2 TIMES DAILY
Qty: 1 KIT | Refills: 0 | Status: SHIPPED | OUTPATIENT
Start: 2019-02-26

## 2019-02-26 RX ORDER — LANCETS
EACH MISCELLANEOUS 2 TIMES DAILY
Qty: 102 EACH | Refills: 5 | Status: SHIPPED | OUTPATIENT
Start: 2019-02-26

## 2019-02-26 RX ORDER — LANCING DEVICE/LANCETS
KIT MISCELLANEOUS 2 TIMES DAILY
Qty: 1 KIT | Refills: 0 | Status: SHIPPED | OUTPATIENT
Start: 2019-02-26

## 2019-02-26 RX ORDER — BLOOD SUGAR DIAGNOSTIC
STRIP MISCELLANEOUS 2 TIMES DAILY
Qty: 100 EACH | Refills: 5 | Status: SHIPPED | OUTPATIENT
Start: 2019-02-26

## 2019-02-26 NOTE — TELEPHONE ENCOUNTER
Please send below to [facility]  Glucose monitor OK per [employer] formulary

## 2019-02-27 ENCOUNTER — HOSPITAL ENCOUNTER (OUTPATIENT)
Dept: NON INVASIVE DIAGNOSTICS | Facility: HOSPITAL | Age: 44
Discharge: HOME | End: 2019-02-27
Payer: COMMERCIAL

## 2019-02-27 DIAGNOSIS — R00.2 PALPITATIONS: ICD-10-CM

## 2019-02-27 PROCEDURE — 93225 XTRNL ECG REC<48 HRS REC: CPT

## 2019-02-27 PROCEDURE — 93226 XTRNL ECG REC<48 HR SCAN A/R: CPT

## 2019-03-06 ENCOUNTER — TELEPHONE (OUTPATIENT)
Dept: INTERNAL MEDICINE CLINIC | Facility: CLINIC | Age: 44
End: 2019-03-06

## 2019-03-06 NOTE — TELEPHONE ENCOUNTER
Can you let Princess Vanceceleste know her holter monitor did come back normal if she is still having symptoms [facility]

## 2019-03-09 ENCOUNTER — HOSPITAL ENCOUNTER (EMERGENCY)
Facility: HOSPITAL | Age: 44
Discharge: HOME | End: 2019-03-09
Attending: EMERGENCY MEDICINE
Payer: COMMERCIAL

## 2019-03-09 ENCOUNTER — APPOINTMENT (EMERGENCY)
Dept: RADIOLOGY | Facility: HOSPITAL | Age: 44
End: 2019-03-09
Payer: COMMERCIAL

## 2019-03-09 VITALS
WEIGHT: 208 LBS | OXYGEN SATURATION: 100 % | HEIGHT: 64 IN | BODY MASS INDEX: 35.51 KG/M2 | DIASTOLIC BLOOD PRESSURE: 69 MMHG | TEMPERATURE: 98.5 F | RESPIRATION RATE: 18 BRPM | HEART RATE: 68 BPM | SYSTOLIC BLOOD PRESSURE: 117 MMHG

## 2019-03-09 DIAGNOSIS — R07.9 CHEST PAIN, UNSPECIFIED TYPE: ICD-10-CM

## 2019-03-09 DIAGNOSIS — R06.02 SOB (SHORTNESS OF BREATH): ICD-10-CM

## 2019-03-09 DIAGNOSIS — J32.9 SINUSITIS, UNSPECIFIED CHRONICITY, UNSPECIFIED LOCATION: Primary | ICD-10-CM

## 2019-03-09 LAB
ALBUMIN SERPL BCP-MCNC: 3.9 G/DL (ref 3.5–5.7)
ALP SERPL-CCNC: 64 U/L (ref 40–150)
ALT SERPL W P-5'-P-CCNC: 20 U/L (ref 7–52)
ANION GAP SERPL CALCULATED.3IONS-SCNC: 6 MMOL/L (ref 4–13)
APTT PPP: 37 SECONDS (ref 26–38)
AST SERPL W P-5'-P-CCNC: 18 U/L (ref 13–39)
ATRIAL RATE: 73 BPM
BASOPHILS # BLD AUTO: 0 THOUSANDS/ΜL (ref 0–0.1)
BASOPHILS NFR BLD AUTO: 1 % (ref 0–2)
BILIRUB SERPL-MCNC: 0.2 MG/DL (ref 0.2–1)
BILIRUB UR QL STRIP: NEGATIVE
BNP SERPL-MCNC: 21 PG/ML (ref 1–100)
BUN SERPL-MCNC: 10 MG/DL (ref 7–25)
CALCIUM SERPL-MCNC: 9.1 MG/DL (ref 8.6–10.5)
CHLORIDE SERPL-SCNC: 110 MMOL/L (ref 98–107)
CLARITY UR: CLEAR
CO2 SERPL-SCNC: 25 MMOL/L (ref 21–31)
COLOR UR: YELLOW
CREAT SERPL-MCNC: 0.7 MG/DL (ref 0.6–1.2)
DEPRECATED D DIMER PPP: <150 NG/ML (FEU)
EOSINOPHIL # BLD AUTO: 0.1 THOUSAND/ΜL (ref 0–0.61)
EOSINOPHIL NFR BLD AUTO: 2 % (ref 0–5)
ERYTHROCYTE [DISTWIDTH] IN BLOOD BY AUTOMATED COUNT: 17 % (ref 11.5–14.5)
EXT PREG TEST URINE: NORMAL
GFR SERPL CREATININE-BSD FRML MDRD: 106 ML/MIN/1.73SQ M
GLUCOSE SERPL-MCNC: 164 MG/DL (ref 65–140)
GLUCOSE SERPL-MCNC: 66 MG/DL (ref 65–99)
GLUCOSE UR STRIP-MCNC: NEGATIVE MG/DL
HCT VFR BLD AUTO: 39.7 % (ref 42–47)
HGB BLD-MCNC: 12.5 G/DL (ref 12–16)
HGB UR QL STRIP.AUTO: NEGATIVE
INR PPP: 1.04 (ref 0.9–1.5)
KETONES UR STRIP-MCNC: NEGATIVE MG/DL
LEUKOCYTE ESTERASE UR QL STRIP: NEGATIVE
LIPASE SERPL-CCNC: 32 U/L (ref 11–82)
LYMPHOCYTES # BLD AUTO: 1.3 THOUSANDS/ΜL (ref 0.6–4.47)
LYMPHOCYTES NFR BLD AUTO: 27 % (ref 21–51)
MAGNESIUM SERPL-MCNC: 2 MG/DL (ref 1.9–2.7)
MCH RBC QN AUTO: 25.6 PG (ref 26–34)
MCHC RBC AUTO-ENTMCNC: 31.6 G/DL (ref 31–37)
MCV RBC AUTO: 81 FL (ref 81–99)
MONOCYTES # BLD AUTO: 0.4 THOUSAND/ΜL (ref 0.17–1.22)
MONOCYTES NFR BLD AUTO: 8 % (ref 2–12)
NEUTROPHILS # BLD AUTO: 3 THOUSANDS/ΜL (ref 1.4–6.5)
NEUTS SEG NFR BLD AUTO: 63 % (ref 42–75)
NITRITE UR QL STRIP: NEGATIVE
NRBC BLD AUTO-RTO: 0 /100 WBCS
P AXIS: 27 DEGREES
PH UR STRIP.AUTO: 5 [PH]
PLATELET # BLD AUTO: 256 THOUSANDS/UL (ref 149–390)
PMV BLD AUTO: 7.9 FL (ref 8.6–11.7)
POTASSIUM SERPL-SCNC: 3.5 MMOL/L (ref 3.5–5.5)
PR INTERVAL: 138 MS
PROT SERPL-MCNC: 6.7 G/DL (ref 6.4–8.9)
PROT UR STRIP-MCNC: NEGATIVE MG/DL
PROTHROMBIN TIME: 12.1 SECONDS (ref 10.2–13)
QRS AXIS: 41 DEGREES
QRSD INTERVAL: 90 MS
QT INTERVAL: 364 MS
QTC INTERVAL: 401 MS
RBC # BLD AUTO: 4.9 MILLION/UL (ref 3.9–5.2)
SODIUM SERPL-SCNC: 141 MMOL/L (ref 134–143)
SP GR UR STRIP.AUTO: 1.01 (ref 1–1.03)
T WAVE AXIS: 48 DEGREES
TROPONIN I SERPL-MCNC: <0.03 NG/ML
TROPONIN I SERPL-MCNC: <0.03 NG/ML
TSH SERPL DL<=0.05 MIU/L-ACNC: 1.67 UIU/ML (ref 0.45–5.33)
UROBILINOGEN UR QL STRIP.AUTO: 0.2 E.U./DL
VENTRICULAR RATE: 73 BPM
WBC # BLD AUTO: 4.7 THOUSAND/UL (ref 4.8–10.8)

## 2019-03-09 PROCEDURE — 85025 COMPLETE CBC W/AUTO DIFF WBC: CPT | Performed by: EMERGENCY MEDICINE

## 2019-03-09 PROCEDURE — 84484 ASSAY OF TROPONIN QUANT: CPT | Performed by: EMERGENCY MEDICINE

## 2019-03-09 PROCEDURE — 85730 THROMBOPLASTIN TIME PARTIAL: CPT | Performed by: EMERGENCY MEDICINE

## 2019-03-09 PROCEDURE — 85610 PROTHROMBIN TIME: CPT | Performed by: EMERGENCY MEDICINE

## 2019-03-09 PROCEDURE — 83880 ASSAY OF NATRIURETIC PEPTIDE: CPT | Performed by: EMERGENCY MEDICINE

## 2019-03-09 PROCEDURE — 85379 FIBRIN DEGRADATION QUANT: CPT | Performed by: EMERGENCY MEDICINE

## 2019-03-09 PROCEDURE — 93010 ELECTROCARDIOGRAM REPORT: CPT | Performed by: INTERNAL MEDICINE

## 2019-03-09 PROCEDURE — 99285 EMERGENCY DEPT VISIT HI MDM: CPT

## 2019-03-09 PROCEDURE — 96360 HYDRATION IV INFUSION INIT: CPT

## 2019-03-09 PROCEDURE — 93005 ELECTROCARDIOGRAM TRACING: CPT

## 2019-03-09 PROCEDURE — 96361 HYDRATE IV INFUSION ADD-ON: CPT

## 2019-03-09 PROCEDURE — 84443 ASSAY THYROID STIM HORMONE: CPT | Performed by: EMERGENCY MEDICINE

## 2019-03-09 PROCEDURE — 36415 COLL VENOUS BLD VENIPUNCTURE: CPT | Performed by: EMERGENCY MEDICINE

## 2019-03-09 PROCEDURE — 80053 COMPREHEN METABOLIC PANEL: CPT | Performed by: EMERGENCY MEDICINE

## 2019-03-09 PROCEDURE — 83690 ASSAY OF LIPASE: CPT | Performed by: EMERGENCY MEDICINE

## 2019-03-09 PROCEDURE — 82948 REAGENT STRIP/BLOOD GLUCOSE: CPT

## 2019-03-09 PROCEDURE — 71046 X-RAY EXAM CHEST 2 VIEWS: CPT

## 2019-03-09 PROCEDURE — 81025 URINE PREGNANCY TEST: CPT | Performed by: EMERGENCY MEDICINE

## 2019-03-09 PROCEDURE — 83735 ASSAY OF MAGNESIUM: CPT | Performed by: EMERGENCY MEDICINE

## 2019-03-09 PROCEDURE — 81003 URINALYSIS AUTO W/O SCOPE: CPT | Performed by: EMERGENCY MEDICINE

## 2019-03-09 RX ORDER — AMOXICILLIN AND CLAVULANATE POTASSIUM 875; 125 MG/1; MG/1
1 TABLET, FILM COATED ORAL EVERY 12 HOURS
Qty: 14 TABLET | Refills: 0 | Status: SHIPPED | OUTPATIENT
Start: 2019-03-09 | End: 2019-03-16

## 2019-03-09 RX ADMIN — SODIUM CHLORIDE 1000 ML: 0.9 INJECTION, SOLUTION INTRAVENOUS at 10:20

## 2019-03-09 NOTE — ED PROVIDER NOTES
History  Chief Complaint   Patient presents with    Shortness of Breath     pt reports episodes of sob, bryce with exertion  "I feel like I have a heartbeat in my head"  also having episodes of hypoglycemia, being evaluated by pmd, wore holter monitor for the last 2 days, no dx yet     66-year-old female with history hypoglycemia, seizure disorder, restless leg syndrome, hypertension, GERD and anxiety presents for evaluation of chest pain and shortness breath x2 days  Patient reports midsternal nonradiating chest pain with associated back pain  Patient is currently intermittent and worse with walking  Additionally patient reports exertional dyspnea but denies orthopnea and PND  Patient with no lower extremity edema and no recent travel or history of DVT or PE  History provided by:  Patient   used: No        Prior to Admission Medications   Prescriptions Last Dose Informant Patient Reported? Taking? ACCU-CHEK FASTCLIX LANCETS MISC   No No   Sig: by Does not apply route 2 (two) times a day   Alcohol Swabs (ALCOHOL PADS) 70 % PADS   No No   Sig: by Does not apply route 2 (two) times a day   Blood Glucose Monitoring Suppl (ACCU-CHEK GUIDE) w/Device KIT   No No   Sig: by Does not apply route 2 (two) times a day   CRANBERRY EXTRACT PO  Self Yes Yes   Sig: Take by mouth   Lancets Misc  (ACCU-CHEK FASTCLIX LANCET) KIT   No No   Sig: by Does not apply route 2 (two) times a day   Multiple Vitamin (MULTIVITAMIN) tablet  Self Yes Yes   Sig: Take 1 tablet by mouth daily   Potassium 99 MG TABS  Self Yes Yes   Sig: Take 99 mg by mouth   Vitamin D, Cholecalciferol, 1000 units CAPS  Self Yes Yes   Sig: Take 1 tablet by mouth daily   b complex vitamins tablet  Self Yes Yes   Sig: Take 1 tablet by mouth daily   calcium citrate (CALCITRATE) 950 MG tablet  Self Yes Yes   Sig: Take 2 tablets by mouth daily     cyanocobalamin (VITAMIN B-12) 1,000 mcg tablet  Self Yes No   Sig: Take 1,000 mcg by mouth daily escitalopram (LEXAPRO) 10 mg tablet   No Yes   Sig: Take 1 tablet (10 mg total) by mouth daily   ferrous sulfate 325 (65 Fe) mg tablet  Self Yes Yes   Sig: Take 325 mg by mouth daily with breakfast   fluticasone (FLONASE) 50 mcg/act nasal spray  Self No Yes   Sig: SPRAY 1 SPRAY INTO EACH NOSTRIL EVERY DAY   glucose blood (ACCU-CHEK GUIDE) test strip   No No   Sig: Use as instructed   methocarbamol (ROBAXIN) 750 mg tablet   No Yes   Sig: Take one tablet by mouth every 12 hours as needed      Facility-Administered Medications: None       Past Medical History:   Diagnosis Date    Anxiety     Arthritis     Bariatric surgery status     Carpal tunnel syndrome     Heartburn     History of gastric bypass 2014    Hypertension     Last assessed 2014    Migraine     Obesity     Last assessed 2014    Postgastrectomy malabsorption     Restless leg syndrome     Seizures (HCC)        Past Surgical History:   Procedure Laterality Date     SECTION       SECTION N/A 2018    Procedure:  SECTION (); Surgeon: Alfa Bennett MD;  [facility]: [facility];  Service: Obstetrics    CHOLECYSTECTOMY      GASTRIC BYPASS  2014    AK REVISION OF CERVIX,NON OBSTETRICAL N/A 2017    Procedure: CERCLAGE CERVICAL;  Surgeon: Chelsie Magallanes MD;  Location: [facility];  Service: Obstetrics    TOOTH EXTRACTION         Family History   Problem Relation Age of Onset    Cancer Mother         Myosarcoma of the soft tissue    Other Mother         Total Thyroidectomy; Thyroid mass    Coronary artery disease Father      I have reviewed and agree with the history as documented  Social History     Tobacco Use    Smoking status: Former Smoker     Packs/day: 0 25    Smokeless tobacco: Never Used   Substance Use Topics    Alcohol use: No    Drug use: No        Review of Systems   Constitutional: Negative for chills and fever  HENT: Negative for rhinorrhea and sore throat  Eyes: Negative for visual disturbance  Respiratory: Positive for shortness of breath  Negative for cough  Cardiovascular: Positive for chest pain  Negative for leg swelling  Gastrointestinal: Negative for abdominal pain, diarrhea, nausea and vomiting  Genitourinary: Negative for dysuria  Musculoskeletal: Negative for back pain and myalgias  Skin: Negative for rash  Neurological: Negative for dizziness and headaches  Psychiatric/Behavioral: Negative for confusion  All other systems reviewed and are negative  Physical Exam  Physical Exam   Constitutional: She is oriented to person, place, and time  She appears well-developed and well-nourished  HENT:   Nose: Nose normal    Mouth/Throat: Oropharynx is clear and moist  No oropharyngeal exudate  Eyes: Pupils are equal, round, and reactive to light  Conjunctivae and EOM are normal  No scleral icterus  Neck: Normal range of motion  Neck supple  No JVD present  No tracheal deviation present  Cardiovascular: Normal rate, regular rhythm and normal heart sounds  No murmur heard  Pulmonary/Chest: Effort normal and breath sounds normal  No respiratory distress  She has no wheezes  She has no rales  Abdominal: Soft  Bowel sounds are normal  There is no tenderness  There is no guarding  Musculoskeletal: Normal range of motion  She exhibits no edema or tenderness  Neurological: She is alert and oriented to person, place, and time  No cranial nerve deficit or sensory deficit  She exhibits normal muscle tone  5/5 motor, nl sens   Skin: Skin is warm and dry  Psychiatric: She has a normal mood and affect  Her behavior is normal    Nursing note and vitals reviewed        Vital Signs  ED Triage Vitals [03/09/19 0914]   Temperature Pulse Respirations Blood Pressure SpO2   98 5 °F (36 9 °C) 104 21 141/77 100 %      Temp Source Heart Rate Source Patient Position - Orthostatic VS BP Location FiO2 (%)   Temporal Monitor Sitting Right arm -- Pain Score       2           Vitals:    03/09/19 1200 03/09/19 1215 03/09/19 1230 03/09/19 1238   BP:   117/69 117/69   Pulse: 64 64 70 68   Patient Position - Orthostatic VS:           Visual Acuity      ED Medications  Medications   sodium chloride 0 9 % bolus 1,000 mL (0 mL Intravenous Stopped 3/9/19 1237)       Diagnostic Studies  Results Reviewed     Procedure Component Value Units Date/Time    Troponin I [[id number]]  (Normal) Collected:  03/09/19 1151    Lab Status:  Final result Specimen:  Blood from Arm, Left Updated:  03/09/19 1222     Troponin I <0 03 ng/mL     Troponin I [[id number]]     Lab Status:  No result Specimen:  Blood     POCT pregnancy, urine [[id number]]  (Normal) Resulted:  03/09/19 1058    Lab Status:  Final result Updated:  03/09/19 1059     EXT PREG TEST UR (Ref: Negative) result negative    TSH [[id number]]  (Normal) Collected:  03/09/19 0949    Lab Status:  Final result Specimen:  Blood from Arm, Right Updated:  03/09/19 1053     TSH 3RD GENERATON 1 670 uIU/mL     Narrative:       Patients undergoing fluorescein dye angiography may retain small amounts of fluorescein in the body for 48-72 hours post procedure  Samples containing fluorescein can produce falsely depressed TSH values  If the patient had this procedure,a specimen should be resubmitted post fluorescein clearance      B-Type Natriuretic Peptide [facility] ONLY) [[id number]]  (Normal) Collected:  03/09/19 0949    Lab Status:  Final result Specimen:  Blood from Arm, Right Updated:  03/09/19 1045     BNP 21 pg/mL     Magnesium [[id number]]  (Normal) Collected:  03/09/19 0949    Lab Status:  Final result Specimen:  Blood from Arm, Right Updated:  03/09/19 1040     Magnesium 2 0 mg/dL     Lipase [[id number]]  (Normal) Collected:  03/09/19 0949    Lab Status:  Final result Specimen:  Blood from Arm, Right Updated:  03/09/19 1040     Lipase 32 u/L     Troponin I [[id number]]  (Normal) Collected:  03/09/19 0949    Lab Status:  Final result Specimen:  Blood from Arm, Right Updated:  03/09/19 1040     Troponin I <0 03 ng/mL     Comprehensive metabolic panel [[id number]]  (Abnormal) Collected:  03/09/19 0949    Lab Status:  Final result Specimen:  Blood from Arm, Right Updated:  03/09/19 1040     Sodium 141 mmol/L      Potassium 3 5 mmol/L      Chloride 110 mmol/L      CO2 25 mmol/L      ANION GAP 6 mmol/L      BUN 10 mg/dL      Creatinine 0 70 mg/dL      Glucose 66 mg/dL      Calcium 9 1 mg/dL      AST 18 U/L      ALT 20 U/L      Alkaline Phosphatase 64 U/L      Total Protein 6 7 g/dL      Albumin 3 9 g/dL      Total Bilirubin 0 20 mg/dL      eGFR 106 ml/min/1 73sq m     Narrative:       National Kidney Disease Education Program recommendations are as follows:  GFR calculation is accurate only with a steady state creatinine  Chronic Kidney disease less than 60 ml/min/1 73 sq  meters  Kidney failure less than 15 ml/min/1 73 sq  meters      Protime-INR [[id number]]  (Normal) Collected:  03/09/19 0949    Lab Status:  Final result Specimen:  Blood from Arm, Right Updated:  03/09/19 1033     Protime 12 1 seconds      INR 1 04    APTT [[id number]]  (Normal) Collected:  03/09/19 0949    Lab Status:  Final result Specimen:  Blood from Arm, Right Updated:  03/09/19 1033     PTT 37 seconds     D-Dimer [[id number]]  (Normal) Collected:  03/09/19 0949    Lab Status:  Final result Specimen:  Blood from Arm, Right Updated:  03/09/19 1033     D-Dimer, Quant <150 ng/ml (FEU)     CBC and differential [[id number]]  (Abnormal) Collected:  03/09/19 0949    Lab Status:  Final result Specimen:  Blood from Arm, Right Updated:  03/09/19 1029     WBC 4 70 Thousand/uL      RBC 4 90 Million/uL      Hemoglobin 12 5 g/dL      Hematocrit 39 7 %      MCV 81 fL      MCH 25 6 pg      MCHC 31 6 g/dL      RDW 17 0 %      MPV 7 9 fL      Platelets 182 Thousands/uL      nRBC 0 /100 WBCs      Neutrophils Relative 63 %      Lymphocytes Relative 27 %      Monocytes Relative 8 % Eosinophils Relative 2 %      Basophils Relative 1 %      Neutrophils Absolute 3 00 Thousands/µL      Lymphocytes Absolute 1 30 Thousands/µL      Monocytes Absolute 0 40 Thousand/µL      Eosinophils Absolute 0 10 Thousand/µL      Basophils Absolute 0 00 Thousands/µL     UA w Reflex to Microscopic w Reflex to Culture [[id number]]  (Normal) Collected:  03/09/19 0948    Lab Status:  Final result Specimen:  Urine, Clean Catch Updated:  03/09/19 1029     Color, UA Yellow     Clarity, UA Clear     Specific Gravity, UA 1 015     pH, UA 5 0     Leukocytes, UA Negative     Nitrite, UA Negative     Protein, UA Negative mg/dl      Glucose, UA Negative mg/dl      Ketones, UA Negative mg/dl      Urobilinogen, UA 0 2 E U /dl      Bilirubin, UA Negative     Blood, UA Negative    Fingerstick Glucose (POCT) [[id number]]  (Abnormal) Collected:  03/09/19 0908    Lab Status:  Final result Updated:  03/09/19 1024     POC Glucose 164 mg/dl                  XR chest 2 views   ED Interpretation by Tariq Andersen DO (03/09 1129)   No consolidation no effusion                 Procedures  ECG 12 Lead Documentation  Date/Time: 3/9/2019 9:16 AM  Performed by: Tariq Andersen DO  Authorized by: Tariq Andersen DO     Indications / Diagnosis:  Chest pain  ECG reviewed by me, the ED Provider: yes    Patient location:  ED  Previous ECG:     Previous ECG:  Compared to current    Comparison ECG info:  02/15/2018    Similarity:  No change    Comparison to cardiac monitor: Yes    Interpretation:     Interpretation: normal    Rate:     ECG rate:  73 BPM    ECG rate assessment: normal    Rhythm:     Rhythm: sinus rhythm    Ectopy:     Ectopy: none    QRS:     QRS axis:  Normal  Conduction:     Conduction: normal    ST segments:     ST segments:  Normal  T waves:     T waves: normal    Comments:      Unchanged from prior           Phone Contacts  ED Phone Contact    ED Course  ED Course as of Mar 09 1333   Sat Mar 09, 2019   0903 Patient seen and examined at bedside  Labs, imaging and EKG ordered  EKG unchanged from prior  [facility] reviewed, initial troponin negative and D-dimer negative  1200 Repeat troponin negative  Patient complaining of persistent sinus infections and congestion discussed plan to give prescription for Augmentin as an outpatient  Discussed with patient results of labs and imaging and plan for discharge home  Patient given follow-up information for Cardiology which she will need to follow up as outpatient  Patient return to ED if any change or worsening condition  MDM    Disposition  Final diagnoses:   Sinusitis, unspecified chronicity, unspecified location   SOB (shortness of breath)   Chest pain, unspecified type     Time reflects when diagnosis was documented in both MDM as applicable and the Disposition within this note     Time User Action Codes Description Comment    3/9/2019 12:24 PM Mike Pardo Add [J32 9] Sinusitis, unspecified chronicity, unspecified location     3/9/2019 12:24 PM Sherry Coles Add [R06 02] SOB (shortness of breath)     3/9/2019 12:24 PM Shila Coles Add [R07 9] Chest pain, unspecified type       ED Disposition     ED Disposition Condition Date/Time Comment    Discharge Stable Sat Mar 9, 2019 12:25 PM Agustin Fan discharge to home/self care  Follow-up Information     Follow up With Specialties Details Why Heather Aguiar [address], [phone], Nurse Practitioner Schedule an appointment as soon as possible for a visit in 2 days  [address] [facility]      Pardeep Toussaint MD Cardiology Schedule an appointment as soon as possible for a visit  Please call for appointment [facility]  [address]  [address] [facility]  Emergency Department Emergency Medicine  As needed, If symptoms worsen [address] [facility] [address]  [phone]          Discharge Medication List as of 3/9/2019 12:26 PM      START taking these medications    Details   amoxicillin-clavulanate (AUGMENTIN) 875-125 mg per tablet Take 1 tablet by mouth every 12 (twelve) hours for 7 days, Starting Sat 3/9/2019, Until Sat 3/16/2019, Normal         CONTINUE these medications which have NOT CHANGED    Details   b complex vitamins tablet Take 1 tablet by mouth daily, Historical Med      calcium citrate (CALCITRATE) 950 MG tablet Take 2 tablets by mouth daily  , Historical Med      CRANBERRY EXTRACT PO Take by mouth, Historical Med      escitalopram (LEXAPRO) 10 mg tablet Take 1 tablet (10 mg total) by mouth daily, Starting Thu 2/21/2019, Normal      ferrous sulfate 325 (65 Fe) mg tablet Take 325 mg by mouth daily with breakfast, Historical Med      fluticasone (FLONASE) 50 mcg/act nasal spray SPRAY 1 SPRAY INTO EACH NOSTRIL EVERY DAY, Normal      methocarbamol (ROBAXIN) 750 mg tablet Take one tablet by mouth every 12 hours as needed, Normal      Multiple Vitamin (MULTIVITAMIN) tablet Take 1 tablet by mouth daily, Historical Med      Potassium 99 MG TABS Take 99 mg by mouth, Historical Med      Vitamin D, Cholecalciferol, 1000 units CAPS Take 1 tablet by mouth daily, Historical Med      ACCU-CHEK FASTCLIX LANCETS MISC by Does not apply route 2 (two) times a day, Starting Tue 2/26/2019, Normal      Alcohol Swabs (ALCOHOL PADS) 70 % PADS by Does not apply route 2 (two) times a day, Starting Tue 2/26/2019, Normal      Blood Glucose Monitoring Suppl (ACCU-CHEK GUIDE) w/Device KIT by Does not apply route 2 (two) times a day, Starting Tue 2/26/2019, Normal      cyanocobalamin (VITAMIN B-12) 1,000 mcg tablet Take 1,000 mcg by mouth daily, Historical Med      glucose blood (ACCU-CHEK GUIDE) test strip Use as instructed, Normal      Lancets Misc  (ACCU-CHEK FASTCLIX LANCET) KIT by Does not apply route 2 (two) times a day, Starting Tue 2/26/2019, Normal           No discharge procedures on file      ED Provider  Electronically Signed by           Ramin Belle, DO  03/09/19 [address] [facility],[address], DO  03/09/19 [id number]

## 2019-03-21 DIAGNOSIS — M79.10 MUSCULAR PAIN: ICD-10-CM

## 2019-03-21 RX ORDER — METHOCARBAMOL 750 MG/1
TABLET, FILM COATED ORAL
Qty: 60 TABLET | Refills: 0 | Status: SHIPPED | OUTPATIENT
Start: 2019-03-21 | End: 2019-04-29 | Stop reason: SDUPTHER

## 2019-03-21 NOTE — TELEPHONE ENCOUNTER
Received a call from Ericka Holstein requesting a refill on robaxin  Verified sig and pharmacy  Aware of 48 hr fill policy  Please advise

## 2019-04-01 ENCOUNTER — TELEPHONE (OUTPATIENT)
Dept: INTERNAL MEDICINE CLINIC | Facility: CLINIC | Age: 44
End: 2019-04-01

## 2019-04-01 DIAGNOSIS — R39.9 UTI SYMPTOMS: Primary | ICD-10-CM

## 2019-04-03 ENCOUNTER — APPOINTMENT (OUTPATIENT)
Dept: LAB | Facility: HOSPITAL | Age: 44
End: 2019-04-03
Payer: COMMERCIAL

## 2019-04-03 DIAGNOSIS — R39.9 UTI SYMPTOMS: ICD-10-CM

## 2019-04-03 PROCEDURE — 87077 CULTURE AEROBIC IDENTIFY: CPT

## 2019-04-03 PROCEDURE — 87186 SC STD MICRODIL/AGAR DIL: CPT

## 2019-04-03 PROCEDURE — 87086 URINE CULTURE/COLONY COUNT: CPT

## 2019-04-06 LAB
BACTERIA UR CULT: ABNORMAL
BACTERIA UR CULT: ABNORMAL

## 2019-04-08 DIAGNOSIS — N30.00 ACUTE CYSTITIS WITHOUT HEMATURIA: Primary | ICD-10-CM

## 2019-04-08 RX ORDER — NITROFURANTOIN 25; 75 MG/1; MG/1
100 CAPSULE ORAL 2 TIMES DAILY
Qty: 20 CAPSULE | Refills: 0 | Status: SHIPPED | OUTPATIENT
Start: 2019-04-08 | End: 2019-04-18

## 2019-04-25 ENCOUNTER — OFFICE VISIT (OUTPATIENT)
Dept: URGENT CARE | Facility: CLINIC | Age: 44
End: 2019-04-25
Payer: COMMERCIAL

## 2019-04-25 VITALS
OXYGEN SATURATION: 99 % | RESPIRATION RATE: 18 BRPM | SYSTOLIC BLOOD PRESSURE: 120 MMHG | TEMPERATURE: 97.9 F | HEART RATE: 82 BPM | DIASTOLIC BLOOD PRESSURE: 67 MMHG

## 2019-04-25 DIAGNOSIS — L04.9 ADENITIS, ACUTE: Primary | ICD-10-CM

## 2019-04-25 PROCEDURE — G0382 LEV 3 HOSP TYPE B ED VISIT: HCPCS | Performed by: PHYSICIAN ASSISTANT

## 2019-04-25 PROCEDURE — 99283 EMERGENCY DEPT VISIT LOW MDM: CPT | Performed by: PHYSICIAN ASSISTANT

## 2019-04-25 PROCEDURE — 99213 OFFICE O/P EST LOW 20 MIN: CPT | Performed by: PHYSICIAN ASSISTANT

## 2019-04-25 RX ORDER — AZITHROMYCIN 250 MG/1
TABLET, FILM COATED ORAL
Qty: 6 TABLET | Refills: 0 | Status: SHIPPED | OUTPATIENT
Start: 2019-04-25 | End: 2019-04-29

## 2019-04-29 DIAGNOSIS — M79.10 MUSCULAR PAIN: ICD-10-CM

## 2019-04-29 RX ORDER — METHOCARBAMOL 750 MG/1
TABLET, FILM COATED ORAL
Qty: 60 TABLET | Refills: 0 | Status: SHIPPED | OUTPATIENT
Start: 2019-04-29 | End: 2019-05-28 | Stop reason: SDUPTHER

## 2019-05-01 ENCOUNTER — OFFICE VISIT (OUTPATIENT)
Dept: INTERNAL MEDICINE CLINIC | Facility: CLINIC | Age: 44
End: 2019-05-01
Payer: COMMERCIAL

## 2019-05-01 VITALS
BODY MASS INDEX: 36.54 KG/M2 | HEART RATE: 96 BPM | DIASTOLIC BLOOD PRESSURE: 70 MMHG | WEIGHT: 214 LBS | HEIGHT: 64 IN | SYSTOLIC BLOOD PRESSURE: 118 MMHG | OXYGEN SATURATION: 98 % | TEMPERATURE: 99.3 F

## 2019-05-01 DIAGNOSIS — E16.2 HYPOGLYCEMIA: ICD-10-CM

## 2019-05-01 DIAGNOSIS — J01.10 ACUTE NON-RECURRENT FRONTAL SINUSITIS: Primary | ICD-10-CM

## 2019-05-01 PROCEDURE — 99214 OFFICE O/P EST MOD 30 MIN: CPT | Performed by: NURSE PRACTITIONER

## 2019-05-01 PROCEDURE — 3008F BODY MASS INDEX DOCD: CPT | Performed by: NURSE PRACTITIONER

## 2019-05-01 PROCEDURE — 1036F TOBACCO NON-USER: CPT | Performed by: NURSE PRACTITIONER

## 2019-05-01 RX ORDER — AMOXICILLIN AND CLAVULANATE POTASSIUM 875; 125 MG/1; MG/1
1 TABLET, FILM COATED ORAL 2 TIMES DAILY
Qty: 20 TABLET | Refills: 0 | Status: SHIPPED | OUTPATIENT
Start: 2019-05-01 | End: 2019-05-11

## 2019-05-01 RX ORDER — METHYLPREDNISOLONE 4 MG/1
TABLET ORAL
Qty: 21 EACH | Refills: 0 | Status: SHIPPED | OUTPATIENT
Start: 2019-05-01 | End: 2019-07-29 | Stop reason: DRUGHIGH

## 2019-05-28 DIAGNOSIS — M79.10 MUSCULAR PAIN: ICD-10-CM

## 2019-05-28 RX ORDER — METHOCARBAMOL 750 MG/1
TABLET, FILM COATED ORAL
Qty: 60 TABLET | Refills: 0 | Status: SHIPPED | OUTPATIENT
Start: 2019-05-28 | End: 2019-06-28 | Stop reason: SDUPTHER

## 2019-06-28 DIAGNOSIS — M79.10 MUSCULAR PAIN: ICD-10-CM

## 2019-06-28 RX ORDER — METHOCARBAMOL 750 MG/1
TABLET, FILM COATED ORAL
Qty: 60 TABLET | Refills: 0 | Status: SHIPPED | OUTPATIENT
Start: 2019-06-28 | End: 2019-07-29 | Stop reason: SDUPTHER

## 2019-07-29 ENCOUNTER — OFFICE VISIT (OUTPATIENT)
Dept: INTERNAL MEDICINE CLINIC | Facility: CLINIC | Age: 44
End: 2019-07-29
Payer: COMMERCIAL

## 2019-07-29 VITALS
TEMPERATURE: 98.4 F | DIASTOLIC BLOOD PRESSURE: 72 MMHG | OXYGEN SATURATION: 98 % | WEIGHT: 223.6 LBS | HEART RATE: 86 BPM | SYSTOLIC BLOOD PRESSURE: 120 MMHG | HEIGHT: 64 IN | BODY MASS INDEX: 38.17 KG/M2

## 2019-07-29 DIAGNOSIS — Z13.6 SCREENING FOR CARDIOVASCULAR CONDITION: ICD-10-CM

## 2019-07-29 DIAGNOSIS — Z13.29 SCREENING FOR THYROID DISORDER: ICD-10-CM

## 2019-07-29 DIAGNOSIS — M51.36 DEGENERATIVE DISC DISEASE, LUMBAR: Primary | ICD-10-CM

## 2019-07-29 DIAGNOSIS — F41.9 ANXIETY AND DEPRESSION: ICD-10-CM

## 2019-07-29 DIAGNOSIS — M79.10 MUSCULAR PAIN: ICD-10-CM

## 2019-07-29 DIAGNOSIS — Z98.84 BARIATRIC SURGERY STATUS: ICD-10-CM

## 2019-07-29 DIAGNOSIS — E53.8 VITAMIN B 12 DEFICIENCY: ICD-10-CM

## 2019-07-29 DIAGNOSIS — M25.552 PAIN OF BOTH HIP JOINTS: ICD-10-CM

## 2019-07-29 DIAGNOSIS — M62.838 NECK MUSCLE SPASM: ICD-10-CM

## 2019-07-29 DIAGNOSIS — E55.9 VITAMIN D INSUFFICIENCY: ICD-10-CM

## 2019-07-29 DIAGNOSIS — F32.A ANXIETY AND DEPRESSION: ICD-10-CM

## 2019-07-29 DIAGNOSIS — E66.9 OBESITY (BMI 35.0-39.9 WITHOUT COMORBIDITY): ICD-10-CM

## 2019-07-29 DIAGNOSIS — R63.5 WEIGHT GAIN: ICD-10-CM

## 2019-07-29 DIAGNOSIS — M25.551 PAIN OF BOTH HIP JOINTS: ICD-10-CM

## 2019-07-29 PROBLEM — O99.840 PREGNANCY COMPLICATED BY PREVIOUS BARIATRIC SURGERY: Status: RESOLVED | Noted: 2018-01-23 | Resolved: 2019-07-29

## 2019-07-29 PROBLEM — J01.10 ACUTE NON-RECURRENT FRONTAL SINUSITIS: Status: RESOLVED | Noted: 2019-05-01 | Resolved: 2019-07-29

## 2019-07-29 PROBLEM — O34.32 CERVICAL CERCLAGE SUTURE PRESENT IN SECOND TRIMESTER: Status: RESOLVED | Noted: 2018-01-03 | Resolved: 2019-07-29

## 2019-07-29 PROBLEM — R00.2 PALPITATIONS: Status: RESOLVED | Noted: 2019-02-21 | Resolved: 2019-07-29

## 2019-07-29 PROBLEM — Z98.891 STATUS POST REPEAT LOW TRANSVERSE CESAREAN SECTION: Status: RESOLVED | Noted: 2018-05-02 | Resolved: 2019-07-29

## 2019-07-29 PROBLEM — O99.213 OBESITY AFFECTING PREGNANCY IN THIRD TRIMESTER: Status: RESOLVED | Noted: 2018-05-02 | Resolved: 2019-07-29

## 2019-07-29 PROBLEM — O34.30: Status: RESOLVED | Noted: 2017-12-12 | Resolved: 2019-07-29

## 2019-07-29 PROCEDURE — 3008F BODY MASS INDEX DOCD: CPT | Performed by: NURSE PRACTITIONER

## 2019-07-29 PROCEDURE — 99214 OFFICE O/P EST MOD 30 MIN: CPT | Performed by: NURSE PRACTITIONER

## 2019-07-29 RX ORDER — METHOCARBAMOL 750 MG/1
TABLET, FILM COATED ORAL
Qty: 60 TABLET | Refills: 0 | Status: SHIPPED | OUTPATIENT
Start: 2019-07-29 | End: 2019-08-28 | Stop reason: SDUPTHER

## 2019-07-29 NOTE — PATIENT INSTRUCTIONS
Hip Pain   WHAT YOU NEED TO KNOW:   What causes hip pain? Hip pain can be caused by a number of conditions, such as bursitis, arthritis, or muscle or tendon strain  You may have swelling in the fluid-filled sacs that protect your muscles and tendons  Hip pain can also be caused by a lower back problem  Hip pain may be caused by trauma, playing sports, or running  Your pain may start in your hip and go to your thigh, buttock, or groin  How is hip pain treated? You may need x-rays to make sure there are no broken bones  You may be given NSAIDs to help decrease pain and swelling  How can I manage hip pain? · Rest  your injured hip so that it can heal  You may need to avoid putting any weight on your hip for at least 48 hours  Return to normal activities as directed  · Ice  the injury for 20 minutes every 4 hours, or as directed  Use an ice pack, or put crushed ice in a plastic bag  Cover it with a towel to protect your skin  Ice helps prevent tissue damage and decreases swelling and pain  · Elevate  your injured hip above the level of your heart as often as you can  This will help decrease swelling and pain  If possible, prop your hip and leg on pillows or blankets to keep the area elevated comfortably  · Maintain a healthy weight  Extra body weight can cause pressure and pain in your hip, knee, and ankle joints  Ask your healthcare provider how much you should weigh  Ask him to help you create a weight loss plan if you are overweight  · Use assistive devices as directed  You may need to use a cane or crutches  Assistive devices help decrease pain and pressure on your hip when you walk  Ask your healthcare provider for more information about assistive devices and how to use them correctly  When should I seek immediate care? · Your pain gets worse  · You have numbness in your leg or toes  · You cannot put any weight on or move your hip  When should I contact my healthcare provider?    · You have a fever  · Your pain does not decrease, even after treatment  · You have questions or concerns about your condition or care  CARE AGREEMENT:   You have the right to help plan your care  Learn about your health condition and how it may be treated  Discuss treatment options with your caregivers to decide what care you want to receive  You always have the right to refuse treatment  The above information is an  only  It is not intended as medical advice for individual conditions or treatments  Talk to your doctor, nurse or pharmacist before following any medical regimen to see if it is safe and effective for you  © 2017 [facility] Information is for End User's use only and may not be sold, redistributed or otherwise used for commercial purposes  All illustrations and images included in CareNotes® are the copyrighted property of [employer]  or [facility]  Low Fat Diet   AMBULATORY CARE:   A low-fat diet  is an eating plan that is low in total fat, unhealthy fat, and cholesterol  You may need to follow a low-fat diet if you have trouble digesting or absorbing fat  You may also need to follow this diet if you have high cholesterol  You can also lower your cholesterol by increasing the amount of fiber in your diet  Soluble fiber is a type of fiber that helps to decrease cholesterol levels  Different types of fat in food:   · Limit unhealthy fats  A diet that is high in cholesterol, saturated fat, and trans fat may cause unhealthy cholesterol levels  Unhealthy cholesterol levels increase your risk of heart disease  ¨ Cholesterol:  Limit intake of cholesterol to less than 200 mg per day  Cholesterol is found in meat, eggs, and dairy  ¨ Saturated fat:  Limit saturated fat to less than 7% of your total daily calories  Ask your dietitian how many calories you need each day  Saturated fat is found in butter, cheese, ice cream, whole milk, and palm oil  Saturated fat is also found in meat, such as beef, pork, chicken skin, and processed meats  Processed meats include sausage, hot dogs, and bologna  ¨ Trans fat:  Avoid trans fat as much as possible  Trans fat is used in fried and baked foods  Foods that say trans fat free on the label may still have up to 0 5 grams of trans fat per serving  · Include healthy fats  Replace foods that are high in saturated and trans fat with foods high in healthy fats  This may help to decrease high cholesterol levels  ¨ Monounsaturated fats: These are found in avocados, nuts, and vegetable oils, such as olive, canola, and sunflower oil  ¨ Polyunsaturated fats: These can be found in vegetable oils, such as soybean or corn oil  Omega-3 fats can help to decrease the risk of heart disease  Omega-3 fats are found in fish, such as salmon, herring, trout, and tuna  Omega-3 fats can also be found in plant foods, such as walnuts, flaxseed, soybeans, and canola oil    Foods to limit or avoid:   · Grains:      ¨ Snacks that are made with partially hydrogenated oils, such as chips, regular crackers, and butter-flavored popcorn    ¨ High-fat baked goods, such as biscuits, croissants, doughnuts, pies, cookies, and pastries    · Dairy:      ¨ Whole milk, 2% milk, and yogurt and ice cream made with whole milk    ¨ Half and half creamer, heavy cream, and whipping cream    ¨ Cheese, cream cheese, and sour cream    · Meats and proteins:      ¨ High-fat cuts of meat (T-bone steak, regular hamburger, and ribs)    ¨ Fried meat, poultry (turkey and chicken), and fish    ¨ Poultry (chicken and turkey) with skin    ¨ Cold cuts (salami or bologna), hot dogs, guzman, and sausage    ¨ Whole eggs and egg yolks    · Vegetables and fruits with added fat:      ¨ Fried vegetables or vegetables in butter or high-fat sauces, such as cream or cheese sauces    ¨ Fried fruit or fruit served with butter or cream    · Fats:      ¨ Butter, stick margarine, and shortening    ¨ Coconut, palm oil, and palm kernel oil  Foods to include:   · Grains:      ¨ Whole-grain breads, cereals, pasta, and brown rice    ¨ Low-fat crackers and pretzels    · Vegetables and fruits:      ¨ Fresh, frozen, or canned vegetables (no salt or low-sodium)    ¨ Fresh, frozen, dried, or canned fruit (canned in light syrup or fruit juice)    ¨ Avocado    · Low-fat dairy products:      ¨ Nonfat (skim) or 1% milk    ¨ Nonfat or low-fat cheese, yogurt, and cottage cheese    · Meats and proteins:      ¨ Chicken or turkey with no skin    ¨ Baked or broiled fish    ¨ Lean beef and pork (loin, round, extra lean hamburger)    ¨ Beans and peas, unsalted nuts, soy products    ¨ Egg whites and substitutes    ¨ Seeds and nuts    · Fats:      ¨ Unsaturated oil, such as canola, olive, peanut, soybean, or sunflower oil    ¨ Soft or liquid margarine and vegetable oil spread    ¨ Low-fat salad dressing  Other ways to decrease fat:   · Read food labels before you buy foods  Choose foods that have less than 30% of calories from fat  Choose low-fat or fat-free dairy products  Remember that fat free does not mean calorie free  These foods still contain calories, and too many calories can lead to weight gain  · Trim fat from meat and avoid fried food  Trim all visible fat from meat before you cook it  Remove the skin from poultry  Do not velez meat, fish, or poultry  Bake, roast, boil, or broil these foods instead  Avoid fried foods  Eat a baked potato instead of [place] fries  Steam vegetables instead of sautéing them in butter  · Add less fat to foods  Use imitation guzman bits on salads and baked potatoes instead of regular guzman bits  Use fat-free or low-fat salad dressings instead of regular dressings  Use low-fat or nonfat butter-flavored topping instead of regular butter or margarine on popcorn and other foods  Ways to decrease fat in recipes:  Replace high-fat ingredients with low-fat or nonfat ones  This may cause baked goods to be drier than usual  You may need to use nonfat cooking spray on pans to prevent food from sticking  You also may need to change the amount of other ingredients, such as water, in the recipe  Try the following:  · Use low-fat or light margarine instead of regular margarine or shortening  · Use lean ground turkey breast or chicken, or lean ground beef (less than 5% fat) instead of hamburger  · Add 1 teaspoon of canola oil to 8 ounces of skim milk instead of using cream or half and half  · Use grated zucchini, carrots, or apples in breads instead of coconut  · Use blenderized, low-fat cottage cheese, plain tofu, or low-fat ricotta cheese instead of cream cheese  · Use 1 egg white and 1 teaspoon of canola oil, or use ¼ cup (2 ounces) of fat-free egg substitute instead of a whole egg  · Replace half of the oil that is called for in a recipe with applesauce when you bake  Use 3 tablespoons of cocoa powder and 1 tablespoon of canola oil instead of a square of baking chocolate  How to increase fiber:  Eat enough high-fiber foods to get 20 to 30 grams of fiber every day  Slowly increase your fiber intake to avoid stomach cramps, gas, and other problems  · Eat 3 ounces of whole-grain foods each day  An ounce is about 1 slice of bread  Eat whole-grain breads, such as whole-wheat bread  Whole wheat, whole-wheat flour, or other whole grains should be listed as the first ingredient on the food label  Replace white flour with whole-grain flour or use half of each in recipes  Whole-grain flour is heavier than white flour, so you may have to add more yeast or baking powder  · Eat a high-fiber cereal for breakfast   Oatmeal is a good source of soluble fiber  Look for cereals that have bran or fiber in the name  Choose whole-grain products, such as brown rice, barley, and whole-wheat pasta  · Eat more beans, peas, and lentils  For example, add beans to soups or salads  Eat at least 5 cups of fruits and vegetables each day  Eat fruits and vegetables with the peel because the peel is high in fiber  © 2017 [address] [employer] Information is for End User's use only and may not be sold, redistributed or otherwise used for commercial purposes  All illustrations and images included in CareNotes® are the copyrighted property of [employer]  or [employer]  The above information is an  only  It is not intended as medical advice for individual conditions or treatments  Talk to your doctor, nurse or pharmacist before following any medical regimen to see if it is safe and effective for you  Heart Healthy Diet   AMBULATORY CARE:   A heart healthy diet  is an eating plan low in total fat, unhealthy fats, and sodium (salt)  A heart healthy diet helps decrease your risk for heart disease and stroke  Limit the amount of fat you eat to 25% to 35% of your total daily calories  Limit sodium to less than 2,300 mg each day  Healthy fats:  Healthy fats can help improve cholesterol levels  The risk for heart disease is decreased when cholesterol levels are normal  Choose healthy fats, such as the following:  · Unsaturated fat  is found in foods such as soybean, canola, olive, corn, and safflower oils  It is also found in soft tub margarine that is made with liquid vegetable oil  · Omega-3 fat  is found in certain fish, such as salmon, tuna, and trout, and in walnuts and flaxseed  Unhealthy fats:  Unhealthy fats can cause unhealthy cholesterol levels in your blood and increase your risk of heart disease  Limit unhealthy fats, such as the following:  · Cholesterol  is found in animal foods, such as eggs and lobster, and in dairy products made from whole milk  Limit cholesterol to less than 300 milligrams (mg) each day  You may need to limit cholesterol to 200 mg each day if you have heart disease       · Saturated fat  is found in meats, such as guzman and hamburger  It is also found in chicken or turkey skin, whole milk, and butter  Limit saturated fat to less than 7% of your total daily calories  Limit saturated fat to less than 6% if you have heart disease or are at increased risk for it  · Trans fat  is found in packaged foods, such as potato chips and cookies  It is also in hard margarine, some fried foods, and shortening  Avoid trans fats as much as possible    Heart healthy foods and drinks to include:  Ask your dietitian or healthcare provider how many servings to have from each of the following food groups:  · Grains:      ¨ Whole-wheat breads, cereals, and pastas, and brown rice    ¨ Low-fat, low-sodium crackers and chips    · Vegetables:      ¨ Broccoli, green beans, green peas, and spinach    ¨ Collards, kale, and lima beans    ¨ Carrots, sweet potatoes, tomatoes, and peppers    ¨ Canned vegetables with no salt added    · Fruits:      ¨ Bananas, peaches, pears, and pineapple    ¨ Grapes, raisins, and dates    ¨ Oranges, tangerines, grapefruit, orange juice, and grapefruit juice    ¨ Apricots, mangoes, melons, and papaya    ¨ Raspberries and strawberries    ¨ Canned fruit with no added sugar    · Low-fat dairy products:      ¨ Nonfat (skim) milk, 1% milk, and low-fat almond, cashew, or soy milks fortified with calcium    ¨ Low-fat cheese, regular or frozen yogurt, and cottage cheese    · Meats and proteins , such as lean cuts of beef and pork (loin, leg, round), skinless chicken and turkey, legumes, soy products, egg whites, and nuts  Foods and drinks to limit or avoid:  Ask your dietitian or healthcare provider about these and other foods that are high in unhealthy fat, sodium, and sugar:  · Snack or packaged foods , such as frozen dinners, cookies, macaroni and cheese, and cereals with more than 300 mg of sodium per serving    · Canned or dry mixes  for cakes, soups, sauces, or gravies    · Vegetables with added sodium , such as instant potatoes, vegetables with added sauces, or regular canned vegetables    · Other foods high in sodium , such as ketchup, barbecue sauce, salad dressing, pickles, olives, soy sauce, and miso    · High-fat dairy foods  such as whole or 2% milk, cream cheese, or sour cream, and cheeses     · High-fat protein foods  such as high-fat cuts of beef (T-bone steaks, ribs), chicken or turkey with skin, and organ meats, such as liver    · Cured or smoked meats , such as hot dogs, guzman, and sausage    · Unhealthy fats and oils , such as butter, stick margarine, shortening, and cooking oils such as coconut or palm oil    · Food and drinks high in sugar , such as soft drinks (soda), sports drinks, sweetened tea, candy, cake, cookies, pies, and doughnuts  Other diet guidelines to follow:   · Eat more foods containing omega-3 fats  Eat fish high in omega-3 fats at least 2 times a week  · Limit alcohol  Too much alcohol can damage your heart and raise your blood pressure  Women should limit alcohol to 1 drink a day  Men should limit alcohol to 2 drinks a day  A drink of alcohol is 12 ounces of beer, 5 ounces of wine, or 1½ ounces of liquor  · Choose low-sodium foods  High-sodium foods can lead to high blood pressure  Add little or no salt to food you prepare  Use herbs and spices in place of salt  · Eat more fiber  to help lower cholesterol levels  Eat at least 5 servings of fruits and vegetables each day  Eat 3 ounces of whole-grain foods each day  Legumes (beans) are also a good source of fiber  Lifestyle guidelines:   · Do not smoke  Nicotine and other chemicals in cigarettes and cigars can cause lung and heart damage  Ask your healthcare provider for information if you currently smoke and need help to quit  E-cigarettes or smokeless tobacco still contain nicotine  Talk to your healthcare provider before you use these products       · Exercise regularly  to help you maintain a healthy weight and improve your blood pressure and cholesterol levels  Ask your healthcare provider about the best exercise plan for you  Do not start an exercise program without asking your healthcare provider  Follow up with your healthcare provider as directed:  Write down your questions so you remember to ask them during your visits  © 2017 [address] [employer] Information is for End User's use only and may not be sold, redistributed or otherwise used for commercial purposes  All illustrations and images included in CareNotes® are the copyrighted property of [employer]  or Chriss Land[employer]  The above information is an  only  It is not intended as medical advice for individual conditions or treatments  Talk to your doctor, nurse or pharmacist before following any medical regimen to see if it is safe and effective for you

## 2019-07-29 NOTE — PROGRESS NOTES
Assessment/Plan: Will refer to PT and will order fasting labs along with KAZ, sed rate, CRP and RF  Due to her previous history of gastric bypass she was advised to try taking Tylenol Arthritis for her pain  Once labs are back and will notify her and see about possible referral to Rheumatology  She is following up with Endocrine in September for her hypoglycemia  She is up to date on her screenings  BMI smart set will not pull into note will notify IT  Will follow back up in 3 months or sooner if need be  CT did demonstrate degenerative disc disease seen with advanced disc space narrowing at L5-S1  No problem-specific Assessment & Plan notes found for this encounter           Problem List Items Addressed This Visit        Musculoskeletal and Integument    Neck muscle spasm    Relevant Orders    Comprehensive metabolic panel    CBC and differential    Degenerative disc disease, lumbar - Primary    Relevant Orders    Comprehensive metabolic panel    CBC and differential    Sedimentation rate, automated    RF Screen w/ Reflex to Titer    KAZ Screen w/ Reflex to Titer/Pattern    C-reactive protein    Ambulatory referral to Physical Therapy       Other    Bariatric surgery status    Relevant Orders    Comprehensive metabolic panel    CBC and differential    Insulin, fasting    Vitamin B 12 deficiency    Relevant Orders    Comprehensive metabolic panel    CBC and differential    Anxiety and depression    Relevant Orders    Comprehensive metabolic panel    CBC and differential    Vitamin D insufficiency    Relevant Orders    Comprehensive metabolic panel    CBC and differential    Vitamin D 25 hydroxy    Muscular pain    Relevant Medications    methocarbamol (ROBAXIN) 750 mg tablet    Other Relevant Orders    Ambulatory referral to Physical Therapy    Weight gain    Relevant Orders    Comprehensive metabolic panel    CBC and differential    Insulin, fasting    Pain of both hip joints    Relevant Orders    Comprehensive metabolic panel    CBC and differential    Sedimentation rate, automated    RF Screen w/ Reflex to Titer    KAZ Screen w/ Reflex to Titer/Pattern    C-reactive protein    Ambulatory referral to Physical Therapy    Screening for thyroid disorder    Relevant Orders    TSH, 3rd generation with Free T4 reflex    Screening for cardiovascular condition    Relevant Orders    Lipid panel            Subjective:      Patient ID: Rajinder Mayes is a 40 y o  female  Eikristian Sloan is here today for a follow up visit  She states she is having continued weight gain and joint pain  She states her hips are killing her when walking and she is having continued back pain  She has not followed up or made an appointment with Pain Management  She is still having low sugars but is using glucose tablets which are helping  She does follow up with Endocrine in September  She states she has gained almost 10 pounds since May  She states she is still having periods of fatigue and is worried her vitamin levels are off too  She states her joints kill when sitting and when she does get up to walk she can barely move  She states she is not having any redness or warmth to her joints that she is noticing  She is willing to try PT  She denies any chest pain or Sob and still has some palpitations but has not seen Cardiology for this  She states she is just very busy with her daughter  She is up to date on her screenings  She offers no other issues  The following portions of the patient's history were reviewed and updated as appropriate:   She  has a past medical history of Anxiety, Arthritis, Bariatric surgery status, Carpal tunnel syndrome, Heartburn, History of gastric bypass (12/2014), Hypertension, Migraine, Obesity, Postgastrectomy malabsorption, Restless leg syndrome, and Seizures ([facility] [address] )    She   Patient Active Problem List    Diagnosis Date Noted    Degenerative disc disease, lumbar 07/29/2019    Pain of both hip joints 07/29/2019    Screening for thyroid disorder 2019    Screening for cardiovascular condition 2019    Hypoglycemia 2019    Reactive hypoglycemia 01/15/2019    Muscular pain 01/15/2019    Obesity, Class I, BMI 30-34 9 01/15/2019    Weight gain 01/15/2019    Hematuria 01/10/2019    Anxiety and depression 01/10/2019    Vitamin D insufficiency 01/10/2019    Status post repeat low transverse  section 2018    History of bilateral tubal ligation 2018    Bariatric surgery status 2018    Pregnancy complicated by previous bariatric surgery 2018    Smoker 2017    Sciatica 2016    Neck nodule 10/19/2016    Panniculus 2016    Neck muscle spasm 2016    Carpal tunnel syndrome 2016    Fatigue 2015    Postsurgical malabsorption 2014    Prediabetes 2014    Restless legs syndrome 2014    Vertigo 2014    Vitamin B 12 deficiency 2014    Anxiety disorder 2014    Benign paroxysmal vertigo 2014     She  has a past surgical history that includes Gastric bypass (2014);  section; Cholecystectomy; pr revision of cervix,non obstetrical (N/A, 2017);  section (N/A, 2018); and Tooth extraction  Her family history includes Cancer in her mother; Coronary artery disease in her father; Other in her mother  She  reports that she has quit smoking  She smoked 0 25 packs per day  She has never used smokeless tobacco  She reports that she does not drink alcohol or use drugs    Current Outpatient Medications   Medication Sig Dispense Refill    ACCU-CHEK FASTCLIX LANCETS MISC by Does not apply route 2 (two) times a day 102 each 5    Alcohol Swabs (ALCOHOL PADS) 70 % PADS by Does not apply route 2 (two) times a day 100 each 5    b complex vitamins tablet Take 1 tablet by mouth daily      Blood Glucose Monitoring Suppl (ACCU-CHEK GUIDE) w/Device KIT by Does not apply route 2 (two) times a day 1 kit 0    calcium citrate (CALCITRATE) 950 MG tablet Take 2 tablets by mouth daily   CRANBERRY EXTRACT PO Take by mouth      cyanocobalamin (VITAMIN B-12) 1,000 mcg tablet Take 1,000 mcg by mouth daily      escitalopram (LEXAPRO) 10 mg tablet Take 1 tablet (10 mg total) by mouth daily 30 tablet 3    ferrous sulfate 325 (65 Fe) mg tablet Take 325 mg by mouth daily with breakfast      fluticasone (FLONASE) 50 mcg/act nasal spray SPRAY 1 SPRAY INTO EACH NOSTRIL EVERY DAY 1 Bottle 3    glucose blood (ACCU-CHEK GUIDE) test strip Use as instructed 100 each 5    Lancets Misc  (ACCU-CHEK FASTCLIX LANCET) KIT by Does not apply route 2 (two) times a day 1 kit 0    methocarbamol (ROBAXIN) 750 mg tablet Take one tablet by mouth every 12 hours as needed 60 tablet 0    methylPREDNISolone 4 MG tablet therapy pack Use as directed on package 21 each 0    Multiple Vitamin (MULTIVITAMIN) tablet Take 1 tablet by mouth daily      Potassium 99 MG TABS Take 99 mg by mouth      Vitamin D, Cholecalciferol, 1000 units CAPS Take 1 tablet by mouth daily       No current facility-administered medications for this visit  Current Outpatient Medications on File Prior to Visit   Medication Sig    ACCU-CHEK FASTCLIX LANCETS MISC by Does not apply route 2 (two) times a day    Alcohol Swabs (ALCOHOL PADS) 70 % PADS by Does not apply route 2 (two) times a day    b complex vitamins tablet Take 1 tablet by mouth daily    Blood Glucose Monitoring Suppl (ACCU-CHEK GUIDE) w/Device KIT by Does not apply route 2 (two) times a day    calcium citrate (CALCITRATE) 950 MG tablet Take 2 tablets by mouth daily      CRANBERRY EXTRACT PO Take by mouth    cyanocobalamin (VITAMIN B-12) 1,000 mcg tablet Take 1,000 mcg by mouth daily    escitalopram (LEXAPRO) 10 mg tablet Take 1 tablet (10 mg total) by mouth daily    ferrous sulfate 325 (65 Fe) mg tablet Take 325 mg by mouth daily with breakfast    fluticasone (FLONASE) 50 mcg/act nasal spray SPRAY 1 SPRAY INTO EACH NOSTRIL EVERY DAY    glucose blood (ACCU-CHEK GUIDE) test strip Use as instructed    Lancets Misc  (ACCU-CHEK FASTCLIX LANCET) KIT by Does not apply route 2 (two) times a day    methylPREDNISolone 4 MG tablet therapy pack Use as directed on package    Multiple Vitamin (MULTIVITAMIN) tablet Take 1 tablet by mouth daily    Potassium 99 MG TABS Take 99 mg by mouth    Vitamin D, Cholecalciferol, 1000 units CAPS Take 1 tablet by mouth daily    [DISCONTINUED] methocarbamol (ROBAXIN) 750 mg tablet Take one tablet by mouth every 12 hours as needed     No current facility-administered medications on file prior to visit  She is allergic to aspirin; nsaids; and codeine       Review of Systems   Constitutional: Positive for unexpected weight change  HENT: Negative  Eyes: Negative  Respiratory: Negative  Cardiovascular: Negative  Gastrointestinal: Negative  Endocrine: Negative  Genitourinary: Negative  Musculoskeletal: Positive for arthralgias and myalgias  Skin: Negative  Allergic/Immunologic: Positive for environmental allergies  Neurological: Negative  Hematological: Negative  Psychiatric/Behavioral: Negative  Below is the patient's most recent value for Albumin, ALT, AST, BUN, Calcium, Chloride, Cholesterol, CO2, Creatinine, GFR, Glucose, HDL, Hematocrit, Hemoglobin, Hemoglobin A1C, LDL, Magnesium, Phosphorus, Platelets, Potassium, PSA, Sodium, Triglycerides, and WBC     Lab Results   Component Value Date    ALT 20 03/09/2019    AST 18 03/09/2019    BUN 10 03/09/2019    CALCIUM 9 1 03/09/2019     (H) 03/09/2019    CHOL 103 07/22/2015    CO2 25 03/09/2019    CREATININE 0 70 03/09/2019    HDL 45 01/16/2019    HCT 39 7 (L) 03/09/2019    HGB 12 5 03/09/2019    HGBA1C 5 1 01/16/2019    MG 2 0 03/09/2019     03/09/2019    K 3 5 03/09/2019     07/22/2015    TRIG 131 01/16/2019    WBC 4 70 (L) 03/09/2019     Note: for a comprehensive list of the patient's lab results, access the Results Review activity  Objective:      /72 (BP Location: Right arm, Patient Position: Sitting, Cuff Size: Large)   Pulse 86   Temp 98 4 °F (36 9 °C) (Temporal)   Ht 5' 4" (1 626 m)   Wt 101 kg (223 lb 9 6 oz)   SpO2 98%   BMI 38 38 kg/m²          Physical Exam   Constitutional: She is oriented to person, place, and time  She appears well-developed and well-nourished  HENT:   Head: Normocephalic and atraumatic  Right Ear: External ear normal    Left Ear: External ear normal    Nose: Nose normal    Mouth/Throat: Oropharynx is clear and moist    Eyes: Pupils are equal, round, and reactive to light  Conjunctivae and EOM are normal    Neck: Normal range of motion  Neck supple  Cardiovascular: Normal rate, regular rhythm, normal heart sounds and intact distal pulses  Pulmonary/Chest: Effort normal and breath sounds normal    Abdominal: Soft  Bowel sounds are normal    Musculoskeletal: Normal range of motion  Neurological: She is alert and oriented to person, place, and time  Skin: Skin is warm and dry  Capillary refill takes less than 2 seconds  Psychiatric: She has a normal mood and affect  Her behavior is normal  Judgment and thought content normal    Vitals reviewed  BMI Counseling: Body mass index is 38 38 kg/m²  Discussed the patient's BMI with her  The BMI is above average  BMI counseling and education was provided to the patient  Nutrition recommendations include reducing portion sizes, decreasing overall calorie intake, 3-5 servings of fruits/vegetables daily, reducing fast food intake, consuming healthier snacks, decreasing soda and/or juice intake, moderation in carbohydrate intake, increasing intake of lean protein, reducing intake of saturated fat and trans fat and reducing intake of cholesterol

## 2019-07-31 ENCOUNTER — HOSPITAL ENCOUNTER (EMERGENCY)
Facility: HOSPITAL | Age: 44
Discharge: HOME | End: 2019-07-31
Attending: EMERGENCY MEDICINE | Admitting: EMERGENCY MEDICINE
Payer: COMMERCIAL

## 2019-07-31 ENCOUNTER — APPOINTMENT (EMERGENCY)
Dept: RADIOLOGY | Facility: HOSPITAL | Age: 44
End: 2019-07-31
Payer: COMMERCIAL

## 2019-07-31 VITALS
TEMPERATURE: 98.9 F | RESPIRATION RATE: 16 BRPM | DIASTOLIC BLOOD PRESSURE: 78 MMHG | WEIGHT: 223 LBS | OXYGEN SATURATION: 100 % | HEART RATE: 72 BPM | SYSTOLIC BLOOD PRESSURE: 130 MMHG | HEIGHT: 64 IN | BODY MASS INDEX: 38.07 KG/M2

## 2019-07-31 DIAGNOSIS — S63.91XA HAND SPRAIN, RIGHT, INITIAL ENCOUNTER: ICD-10-CM

## 2019-07-31 DIAGNOSIS — S63.501A SPRAIN OF RIGHT WRIST, INITIAL ENCOUNTER: Primary | ICD-10-CM

## 2019-07-31 PROCEDURE — 99283 EMERGENCY DEPT VISIT LOW MDM: CPT

## 2019-07-31 PROCEDURE — 73130 X-RAY EXAM OF HAND: CPT

## 2019-07-31 PROCEDURE — 73110 X-RAY EXAM OF WRIST: CPT

## 2019-07-31 NOTE — ED PROVIDER NOTES
History  Chief Complaint   Patient presents with    Hand Injury     right hand injury after putting her baby down in the crib     Patient is a 54-year-old female presents the emergency department complaining of pain in the right hand and wrist after she was lifting a small child with outstretched hand felt a pop in the area of the radial right wrist and around the right thumb  No other injury  History provided by:  Patient  Wrist Injury - Major   Location:  Wrist  Wrist location:  R wrist  Injury: yes    Time since incident:  1 hour  Pain details:     Radiates to: Right hand right thumb  Severity:  Mild    Onset quality:  Sudden    Duration:  1 hour    Timing:  Constant    Progression:  Worsening  Dislocation: no    Prior injury to area:  No  Associated symptoms: no fatigue and no fever        Prior to Admission Medications   Prescriptions Last Dose Informant Patient Reported? Taking? ACCU-CHEK FASTCLIX LANCETS MISC  Self No No   Sig: by Does not apply route 2 (two) times a day   Alcohol Swabs (ALCOHOL PADS) 70 % PADS  Self No No   Sig: by Does not apply route 2 (two) times a day   Blood Glucose Monitoring Suppl (ACCU-CHEK GUIDE) w/Device KIT  Self No No   Sig: by Does not apply route 2 (two) times a day   CRANBERRY EXTRACT PO  Self Yes No   Sig: Take by mouth   Lancets Misc  (ACCU-CHEK FASTCLIX LANCET) KIT  Self No No   Sig: by Does not apply route 2 (two) times a day   Multiple Vitamin (MULTIVITAMIN) tablet  Self Yes No   Sig: Take 1 tablet by mouth daily   Potassium 99 MG TABS  Self Yes No   Sig: Take 99 mg by mouth   Vitamin D, Cholecalciferol, 1000 units CAPS  Self Yes No   Sig: Take 1 tablet by mouth daily   b complex vitamins tablet  Self Yes No   Sig: Take 1 tablet by mouth daily   calcium citrate (CALCITRATE) 950 MG tablet  Self Yes No   Sig: Take 2 tablets by mouth daily     cyanocobalamin (VITAMIN B-12) 1,000 mcg tablet  Self Yes No   Sig: Take 1,000 mcg by mouth daily   escitalopram (LEXAPRO) 10 mg tablet  Self No No   Sig: Take 1 tablet (10 mg total) by mouth daily   Patient taking differently: Take 20 mg by mouth daily    ferrous sulfate 325 (65 Fe) mg tablet  Self Yes No   Sig: Take 325 mg by mouth daily with breakfast   fluticasone (FLONASE) 50 mcg/act nasal spray  Self No No   Sig: SPRAY 1 SPRAY INTO EACH NOSTRIL EVERY DAY   glucose blood (ACCU-CHEK GUIDE) test strip  Self No No   Sig: Use as instructed   methocarbamol (ROBAXIN) 750 mg tablet   No No   Sig: Take one tablet by mouth every 12 hours as needed      Facility-Administered Medications: None       Past Medical History:   Diagnosis Date    Anxiety     Arthritis     Bariatric surgery status     Carpal tunnel syndrome     Heartburn     History of gastric bypass 2014    Hypertension     Last assessed 2014    Migraine     Obesity     Last assessed 2014    Postgastrectomy malabsorption     Restless leg syndrome     Seizures (HCC)        Past Surgical History:   Procedure Laterality Date     SECTION       SECTION N/A 2018    Procedure:  SECTION (); Surgeon: Edgardo Lindsey MD;  [facility];  Service: Obstetrics    CHOLECYSTECTOMY      GASTRIC BYPASS  2014    VA REVISION OF CERVIX,NON OBSTETRICAL N/A 2017    Procedure: CERCLAGE CERVICAL;  Surgeon: Susan Ohara MD;  [facility];  Service: Obstetrics    TOOTH EXTRACTION         Family History   Problem Relation Age of Onset    Cancer Mother         Myosarcoma of the soft tissue    Other Mother         Total Thyroidectomy; Thyroid mass    Coronary artery disease Father      I have reviewed and agree with the history as documented      Social History     Tobacco Use    Smoking status: Former Smoker     Packs/day: 0 25    Smokeless tobacco: Never Used   Substance Use Topics    Alcohol use: No    Drug use: No        Review of Systems   Constitutional: Negative for activity change, appetite change, chills, fatigue and fever  HENT: Negative for congestion, ear pain, rhinorrhea and sore throat  Eyes: Negative for discharge, redness and visual disturbance  Respiratory: Negative for cough, chest tightness, shortness of breath and wheezing  Cardiovascular: Negative for chest pain and palpitations  Gastrointestinal: Negative for abdominal pain, constipation, diarrhea, nausea and vomiting  Endocrine: Negative for polydipsia and polyuria  Genitourinary: Negative for difficulty urinating, dysuria, frequency, hematuria and urgency  Musculoskeletal: Negative for arthralgias and myalgias  Right hand and wrist pain   Skin: Negative for color change, pallor and rash  Neurological: Negative for dizziness, weakness, light-headedness, numbness and headaches  Hematological: Negative for adenopathy  Does not bruise/bleed easily  All other systems reviewed and are negative  Physical Exam  Physical Exam   Constitutional: She is oriented to person, place, and time  She appears well-developed and well-nourished  HENT:   Head: Normocephalic and atraumatic  Right Ear: External ear normal    Left Ear: External ear normal    Nose: Nose normal    Mouth/Throat: Oropharynx is clear and moist    Eyes: Pupils are equal, round, and reactive to light  Conjunctivae and EOM are normal    Neck: Normal range of motion  Neck supple  Cardiovascular: Normal rate, regular rhythm, normal heart sounds and intact distal pulses  Pulmonary/Chest: Effort normal and breath sounds normal  No respiratory distress  She has no wheezes  She has no rales  She exhibits no tenderness  Abdominal: Soft  Bowel sounds are normal  She exhibits no distension  There is no tenderness  There is no guarding  Musculoskeletal:        Right wrist: She exhibits decreased range of motion and tenderness  She exhibits no swelling and no deformity          Right hand: She exhibits decreased range of motion, tenderness and bony tenderness  She exhibits normal capillary refill, no deformity and no swelling  Neurological: She is alert and oriented to person, place, and time  No cranial nerve deficit or sensory deficit  Skin: Skin is warm and dry  Psychiatric: She has a normal mood and affect  Nursing note and vitals reviewed  Vital Signs  ED Triage Vitals [07/31/19 1843]   Temperature Pulse Respirations Blood Pressure SpO2   98 9 °F (37 2 °C) 74 16 130/80 100 %      Temp Source Heart Rate Source Patient Position - Orthostatic VS BP Location FiO2 (%)   Temporal Monitor Sitting Left arm --      Pain Score       6           Vitals:    07/31/19 1843   BP: 130/80   Pulse: 74   Patient Position - Orthostatic VS: Sitting         Visual Acuity      ED Medications  Medications - No data to display    Diagnostic Studies  Results Reviewed     None                 XR wrist 3+ views RIGHT   ED Interpretation by Senthil Reeder DO (07/31 1921)   No acute fracture or dislocation      XR hand 3+ views RIGHT   ED Interpretation by Senthil Reeder DO (07/31 1921)   No acute fracture or dislocation                 Procedures  Procedures       ED Course                               MDM  Number of Diagnoses or Management Options  Hand sprain, right, initial encounter: new and requires workup  Sprain of right wrist, initial encounter: new and requires workup  Diagnosis management comments: Patient is neurovascularly intact distally injury no acute fractures or dislocations visualized on the x-ray suspect sprain of right wrist and hand advised wrist splint for now rest ice supportive care and prompt follow-up with Orthopedics for further evaluation and treatment and obtain test results return precautions and anticipatory guidance discussed           Amount and/or Complexity of Data Reviewed  Tests in the radiology section of CPT®: ordered and reviewed  Independent visualization of images, tracings, or specimens: yes    Risk of Complications, Morbidity, and/or Mortality  Presenting problems: low  Diagnostic procedures: low  Management options: low    Patient Progress  Patient progress: stable      Disposition  Final diagnoses:   Sprain of right wrist, initial encounter   Hand sprain, right, initial encounter     Time reflects when diagnosis was documented in both MDM as applicable and the Disposition within this note     Time User Action Codes Description Comment    7/31/2019  6:52 PM Karolyn Moon 1 Sprain of right wrist, initial encounter     7/31/2019  6:52 PM Marilee Other Add [[id number] [id number]] Hand sprain, right, initial encounter       ED Disposition     ED Disposition Condition Date/Time Comment    Discharge Stable Wed Jul 31, 2019  6:52 PM Coleen Farley discharge to home/self care  Follow-up Information     Follow up With Specialties Details [address] [place] Trish, Nurse Practitioner Schedule an appointment as soon as possible for a visit in 3 days  [address] Orthopedic Surgery Schedule an appointment as soon as possible for a visit in 3 days  [address]  [address] [facility] [address]  [facility] [address]            Patient's Medications   Discharge Prescriptions    No medications on file     No discharge procedures on file      ED Provider  Electronically Signed by           Daniel Rogers DO  07/31/19 1841

## 2019-08-12 DIAGNOSIS — F41.9 ANXIETY AND DEPRESSION: ICD-10-CM

## 2019-08-12 DIAGNOSIS — F32.A ANXIETY AND DEPRESSION: ICD-10-CM

## 2019-08-12 RX ORDER — ESCITALOPRAM OXALATE 20 MG/1
20 TABLET ORAL DAILY
Qty: 30 TABLET | Refills: 0 | Status: SHIPPED | OUTPATIENT
Start: 2019-08-12 | End: 2019-09-20 | Stop reason: SDUPTHER

## 2019-08-12 NOTE — TELEPHONE ENCOUNTER
Received a call from [facility] requesting a refill on lexapro  Verified sig and pharmacy  Aware of 48 hr fill policy  Please advise

## 2019-08-28 DIAGNOSIS — M79.10 MUSCULAR PAIN: ICD-10-CM

## 2019-08-28 RX ORDER — METHOCARBAMOL 750 MG/1
TABLET, FILM COATED ORAL
Qty: 60 TABLET | Refills: 0 | Status: SHIPPED | OUTPATIENT
Start: 2019-08-28 | End: 2019-09-27 | Stop reason: SDUPTHER

## 2019-08-28 NOTE — TELEPHONE ENCOUNTER
Received a call from [facility] requesting a refill on robaxin  Verified sig and pharmacy  Aware of 48 hr fill policy  Please advise

## 2019-09-01 ENCOUNTER — OFFICE VISIT (OUTPATIENT)
Dept: URGENT CARE | Facility: CLINIC | Age: 44
End: 2019-09-01
Payer: COMMERCIAL

## 2019-09-01 VITALS
TEMPERATURE: 98.7 F | HEIGHT: 64 IN | SYSTOLIC BLOOD PRESSURE: 120 MMHG | HEART RATE: 64 BPM | BODY MASS INDEX: 38.07 KG/M2 | RESPIRATION RATE: 18 BRPM | DIASTOLIC BLOOD PRESSURE: 74 MMHG | OXYGEN SATURATION: 98 % | WEIGHT: 223 LBS

## 2019-09-01 DIAGNOSIS — J01.90 ACUTE SINUSITIS, RECURRENCE NOT SPECIFIED, UNSPECIFIED LOCATION: Primary | ICD-10-CM

## 2019-09-01 PROCEDURE — 99204 OFFICE O/P NEW MOD 45 MIN: CPT | Performed by: PHYSICIAN ASSISTANT

## 2019-09-01 PROCEDURE — G0383 LEV 4 HOSP TYPE B ED VISIT: HCPCS | Performed by: PHYSICIAN ASSISTANT

## 2019-09-01 PROCEDURE — 99284 EMERGENCY DEPT VISIT MOD MDM: CPT | Performed by: PHYSICIAN ASSISTANT

## 2019-09-01 RX ORDER — FLUTICASONE PROPIONATE 50 MCG
1 SPRAY, SUSPENSION (ML) NASAL DAILY
Qty: 1 BOTTLE | Refills: 0 | Status: SHIPPED | OUTPATIENT
Start: 2019-09-01 | End: 2019-09-01 | Stop reason: SDUPTHER

## 2019-09-01 RX ORDER — AMOXICILLIN AND CLAVULANATE POTASSIUM 875; 125 MG/1; MG/1
1 TABLET, FILM COATED ORAL EVERY 12 HOURS SCHEDULED
Qty: 14 TABLET | Refills: 0 | Status: SHIPPED | OUTPATIENT
Start: 2019-09-01 | End: 2019-09-08

## 2019-09-01 RX ORDER — AMOXICILLIN AND CLAVULANATE POTASSIUM 875; 125 MG/1; MG/1
1 TABLET, FILM COATED ORAL EVERY 12 HOURS SCHEDULED
Qty: 14 TABLET | Refills: 0 | Status: SHIPPED | OUTPATIENT
Start: 2019-09-01 | End: 2019-09-01 | Stop reason: SDUPTHER

## 2019-09-01 RX ORDER — FLUTICASONE PROPIONATE 50 MCG
1 SPRAY, SUSPENSION (ML) NASAL DAILY
Qty: 1 BOTTLE | Refills: 0 | Status: SHIPPED | OUTPATIENT
Start: 2019-09-01 | End: 2019-10-21

## 2019-09-01 NOTE — PATIENT INSTRUCTIONS
1  Sinusitis  -take antibiotic as directed  -Flonase nasal spray  -Increase fluids  -Tylenol/motrin  -Salt H20 gargles/throat lozenges  -Try using humidifier at nighttime    -Follow-up with PCP within 5 days  -Go to ER with worsening symptoms, difficulty breathing, high fever, or any signs of distress

## 2019-09-01 NOTE — PROGRESS NOTES
[facility]        NAME: Amber Man is a 40 y o  female  : 1975    MRN: [id number]  DATE: 2019  TIME: 1:31 PM    Assessment and Plan   Acute sinusitis, recurrence not specified, unspecified location [J01 90]  1  Acute sinusitis, recurrence not specified, unspecified location  amoxicillin-clavulanate (AUGMENTIN) 875-125 mg per tablet    fluticasone (FLONASE) 50 mcg/act nasal spray    DISCONTINUED: amoxicillin-clavulanate (AUGMENTIN) 875-125 mg per tablet    DISCONTINUED: fluticasone (FLONASE) 50 mcg/act nasal spray         Patient Instructions     1  Sinusitis  -take antibiotic as directed  -Flonase nasal spray  -Increase fluids  -Tylenol/motrin  -Salt H20 gargles/throat lozenges  -Try using humidifier at nighttime    -Follow-up with PCP within 5 days  -Go to ER with worsening symptoms, difficulty breathing, high fever, or any signs of distress    Chief Complaint     Chief Complaint   Patient presents with    Facial Pain     PT complains of facial pain, headache, and neck pain    Neck Pain         History of Present Illness       The patient presents today for an evaluation of cold symptoms for the past 3 days  The patient states that she is having sinus pain/pressure and a headache  The patient admits to nasal congestion and post nasal drip  The patient tried tylenol sinus without relief  Review of Systems   Review of Systems   Constitutional: Negative for chills and fever  HENT: Positive for ear pain, postnasal drip, rhinorrhea, sinus pressure and sinus pain  Eyes: Negative for visual disturbance  Respiratory: Negative for shortness of breath  Cardiovascular: Negative for chest pain  Musculoskeletal: Negative for arthralgias  Neurological: Positive for headaches  All other systems reviewed and are negative          Current Medications       Current Outpatient Medications:     ACCU-CHEK FASTCLIX LANCETS MISC, by Does not apply route 2 (two) times a day, Disp: 102 each, Rfl: 5    Alcohol Swabs (ALCOHOL PADS) 70 % PADS, by Does not apply route 2 (two) times a day, Disp: 100 each, Rfl: 5    b complex vitamins tablet, Take 1 tablet by mouth daily, Disp: , Rfl:     Blood Glucose Monitoring Suppl (ACCU-CHEK GUIDE) w/Device KIT, by Does not apply route 2 (two) times a day, Disp: 1 kit, Rfl: 0    calcium citrate (CALCITRATE) 950 MG tablet, Take 2 tablets by mouth daily  , Disp: , Rfl:     escitalopram (LEXAPRO) 20 mg tablet, Take 1 tablet (20 mg total) by mouth daily, Disp: 30 tablet, Rfl: 0    ferrous sulfate 325 (65 Fe) mg tablet, Take 325 mg by mouth daily with breakfast, Disp: , Rfl:     glucose blood (ACCU-CHEK GUIDE) test strip, Use as instructed, Disp: 100 each, Rfl: 5    Lancets Misc  (ACCU-CHEK FASTCLIX LANCET) KIT, by Does not apply route 2 (two) times a day, Disp: 1 kit, Rfl: 0    methocarbamol (ROBAXIN) 750 mg tablet, Take one tablet by mouth every 12 hours as needed, Disp: 60 tablet, Rfl: 0    Multiple Vitamin (MULTIVITAMIN) tablet, Take 1 tablet by mouth daily, Disp: , Rfl:     Potassium 99 MG TABS, Take 99 mg by mouth, Disp: , Rfl:     Vitamin D, Cholecalciferol, 1000 units CAPS, Take 1 tablet by mouth daily, Disp: , Rfl:     amoxicillin-clavulanate (AUGMENTIN) 875-125 mg per tablet, Take 1 tablet by mouth every 12 (twelve) hours for 7 days, Disp: 14 tablet, Rfl: 0    CRANBERRY EXTRACT PO, Take by mouth, Disp: , Rfl:     cyanocobalamin (VITAMIN B-12) 1,000 mcg tablet, Take 1,000 mcg by mouth daily, Disp: , Rfl:     fluticasone (FLONASE) 50 mcg/act nasal spray, SPRAY 1 SPRAY INTO EACH NOSTRIL EVERY DAY, Disp: 1 Bottle, Rfl: 3    fluticasone (FLONASE) 50 mcg/act nasal spray, 1 spray into each nostril daily, Disp: 1 Bottle, Rfl: 0    Current Allergies     Allergies as of 09/01/2019 - Reviewed 09/01/2019   Allergen Reaction Noted    Aspirin Other (See Comments) 12/04/2016    Nsaids Other (See Comments) 08/01/2017    Codeine Itching 01/17/2016 The following portions of the patient's history were reviewed and updated as appropriate: allergies, current medications, past family history, past medical history, past social history, past surgical history and problem list      Past Medical History:   Diagnosis Date    Anxiety     Arthritis     Bariatric surgery status     Carpal tunnel syndrome     Heartburn     History of gastric bypass 2014    Hypertension     Last assessed 2014    Migraine     Obesity     Last assessed 2014    Postgastrectomy malabsorption     Restless leg syndrome     Seizures ([address] )        Past Surgical History:   Procedure Laterality Date     SECTION       SECTION N/A 2018    Procedure:  SECTION (); Surgeon: Tyson Blue MD;  [place] ;  Service: Obstetrics    CHOLECYSTECTOMY      GASTRIC BYPASS  2014    ID REVISION OF CERVIX,NON OBSTETRICAL N/A 2017    Procedure: CERCLAGE CERVICAL;  Surgeon: Andrés Franz MD;  Location: [facility];  Service: Obstetrics    TOOTH EXTRACTION         Family History   Problem Relation Age of Onset    Cancer Mother         Myosarcoma of the soft tissue    Other Mother         Total Thyroidectomy; Thyroid mass    Coronary artery disease Father          Medications have been verified  Objective   /74 (BP Location: Right arm, Patient Position: Sitting, Cuff Size: Standard)   Pulse 64   Temp 98 7 °F (37 1 °C) (Probe)   Resp 18   Ht 5' 4" (1 626 m)   Wt 101 kg (223 lb)   SpO2 98%   BMI 38 28 kg/m²        Physical Exam     Physical Exam   Constitutional: She is oriented to person, place, and time  She appears well-developed and well-nourished  No distress  HENT:   Head: Normocephalic and atraumatic  Right Ear: Tympanic membrane and ear canal normal    Left Ear: Tympanic membrane and ear canal normal    Nose: Right sinus exhibits maxillary sinus tenderness and frontal sinus tenderness   Left sinus exhibits maxillary sinus tenderness and frontal sinus tenderness  Mouth/Throat: Uvula is midline, oropharynx is clear and moist and mucous membranes are normal    Eyes: Pupils are equal, round, and reactive to light  Conjunctivae and EOM are normal    Neck: Normal range of motion  Neck supple  Cardiovascular: Normal rate, regular rhythm and normal heart sounds  Pulmonary/Chest: Effort normal and breath sounds normal    Lymphadenopathy:     She has no cervical adenopathy  Neurological: She is alert and oriented to person, place, and time  Skin: Skin is warm and dry  Psychiatric: She has a normal mood and affect  Nursing note and vitals reviewed

## 2019-09-20 DIAGNOSIS — F41.9 ANXIETY AND DEPRESSION: ICD-10-CM

## 2019-09-20 DIAGNOSIS — F32.A ANXIETY AND DEPRESSION: ICD-10-CM

## 2019-09-20 RX ORDER — ESCITALOPRAM OXALATE 20 MG/1
20 TABLET ORAL DAILY
Qty: 30 TABLET | Refills: 3 | Status: SHIPPED | OUTPATIENT
Start: 2019-09-20 | End: 2020-01-27 | Stop reason: SDUPTHER

## 2019-09-27 DIAGNOSIS — M79.10 MUSCULAR PAIN: ICD-10-CM

## 2019-09-27 RX ORDER — METHOCARBAMOL 750 MG/1
TABLET, FILM COATED ORAL
Qty: 60 TABLET | Refills: 0 | Status: SHIPPED | OUTPATIENT
Start: 2019-09-27 | End: 2019-10-28 | Stop reason: SDUPTHER

## 2019-10-07 ENCOUNTER — OFFICE VISIT (OUTPATIENT)
Dept: URGENT CARE | Facility: CLINIC | Age: 44
End: 2019-10-07
Payer: COMMERCIAL

## 2019-10-07 VITALS
BODY MASS INDEX: 38.07 KG/M2 | HEIGHT: 64 IN | OXYGEN SATURATION: 98 % | WEIGHT: 223 LBS | HEART RATE: 63 BPM | DIASTOLIC BLOOD PRESSURE: 83 MMHG | SYSTOLIC BLOOD PRESSURE: 134 MMHG | TEMPERATURE: 98.1 F | RESPIRATION RATE: 16 BRPM

## 2019-10-07 DIAGNOSIS — K29.00 ACUTE GASTRITIS, PRESENCE OF BLEEDING UNSPECIFIED, UNSPECIFIED GASTRITIS TYPE: Primary | ICD-10-CM

## 2019-10-07 DIAGNOSIS — M54.6 ACUTE BILATERAL THORACIC BACK PAIN: ICD-10-CM

## 2019-10-07 DIAGNOSIS — Z87.440 PERSONAL HISTORY UTI: ICD-10-CM

## 2019-10-07 LAB
SL AMB  POCT GLUCOSE, UA: ABNORMAL
SL AMB LEUKOCYTE ESTERASE,UA: ABNORMAL
SL AMB POCT BILIRUBIN,UA: ABNORMAL
SL AMB POCT BLOOD,UA: ABNORMAL
SL AMB POCT CLARITY,UA: CLEAR
SL AMB POCT COLOR,UA: YELLOW
SL AMB POCT KETONES,UA: ABNORMAL
SL AMB POCT NITRITE,UA: ABNORMAL
SL AMB POCT PH,UA: 6
SL AMB POCT SPECIFIC GRAVITY,UA: 1
SL AMB POCT URINE PROTEIN: ABNORMAL
SL AMB POCT UROBILINOGEN: 0.2

## 2019-10-07 PROCEDURE — 99214 OFFICE O/P EST MOD 30 MIN: CPT | Performed by: NURSE PRACTITIONER

## 2019-10-07 PROCEDURE — 87086 URINE CULTURE/COLONY COUNT: CPT | Performed by: NURSE PRACTITIONER

## 2019-10-07 PROCEDURE — 81002 URINALYSIS NONAUTO W/O SCOPE: CPT | Performed by: NURSE PRACTITIONER

## 2019-10-07 PROCEDURE — 99284 EMERGENCY DEPT VISIT MOD MDM: CPT | Performed by: NURSE PRACTITIONER

## 2019-10-07 PROCEDURE — G0383 LEV 4 HOSP TYPE B ED VISIT: HCPCS | Performed by: NURSE PRACTITIONER

## 2019-10-07 RX ORDER — FAMOTIDINE 20 MG/1
20 TABLET, FILM COATED ORAL 2 TIMES DAILY
Qty: 60 TABLET | Refills: 1 | Status: SHIPPED | OUTPATIENT
Start: 2019-10-07 | End: 2020-06-23

## 2019-10-07 RX ORDER — ALUMINA, MAGNESIA, AND SIMETHICONE 2400; 2400; 240 MG/30ML; MG/30ML; MG/30ML
10 SUSPENSION ORAL EVERY 6 HOURS PRN
Qty: 355 ML | Refills: 1 | Status: SHIPPED | OUTPATIENT
Start: 2019-10-07 | End: 2019-10-21

## 2019-10-07 NOTE — PATIENT INSTRUCTIONS
Take the pepcid and maalox max as ordered  Follow-up with your PCP for monitoring/management  Gastritis   AMBULATORY CARE:   Gastritis  is inflammation or irritation of the lining of your stomach  Common symptoms include the following:   · Stomach pain, burning, or tenderness when you press on it    · Stomach fullness or tightness    · Nausea or vomiting    · Loss of appetite, or feeling full quickly when you eat    · Bad breath    · Fatigue or feeling more tired than usual    · Heartburn  Call 911 for any of the following:   · You develop chest pain or shortness of breath  Seek care immediately if:   · You vomit blood  · You have black or bloody bowel movements  · You have severe stomach or back pain  Contact your healthcare provider if:   · You have a fever  · You have new or worsening symptoms, even after treatment  · You have questions or concerns about your condition or care  Treatment for gastritis:  Your symptoms may go away without treatment  Treatment will depend on what is causing your gastritis  Your healthcare provider may recommend changes to the medicines you take  Medicines may be given to help treat a bacterial infection or decrease stomach acid  Manage or prevent gastritis:   · Do not smoke  Nicotine and other chemicals in cigarettes and cigars can make your symptoms worse and cause lung damage  Ask your healthcare provider for information if you currently smoke and need help to quit  E-cigarettes or smokeless tobacco still contain nicotine  Talk to your healthcare provider before you use these products  · Do not drink alcohol  Alcohol can prevent healing and make your gastritis worse  Talk to your healthcare provider if you need help to stop drinking  · Do not take NSAIDs or aspirin unless directed  These and similar medicines can cause irritation  If your healthcare provider says it is okay to take NSAIDs, take them with food       · Do not eat foods that cause irritation  Foods such as oranges and salsa can cause burning or pain  Eat a variety of healthy foods  Examples include fruits (not citrus), vegetables, low-fat dairy products, beans, whole-grain breads, and lean meats and fish  Try to eat small meals, and drink water with your meals  Do not eat for at least 3 hours before you go to bed  · Find ways to relax and decrease stress  Stress can increase stomach acid and make gastritis worse  Activities such as yoga, meditation, or listening to music can help you relax  Spend time with friends, or do things you enjoy  Follow up with your healthcare provider as directed: You may need ongoing tests or treatment, or referral to a gastroenterologist  Write down your questions so you remember to ask them during your visits  © 2017 [employer] Information is for End User's use only and may not be sold, redistributed or otherwise used for commercial purposes  All illustrations and images included in CareNotes® are the copyrighted property of [employer]  or [employer]  The above information is an  only  It is not intended as medical advice for individual conditions or treatments  Talk to your doctor, nurse or pharmacist before following any medical regimen to see if it is safe and effective for you
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
## 2019-10-08 LAB — BACTERIA UR CULT: NORMAL

## 2019-10-16 ENCOUNTER — HOSPITAL ENCOUNTER (EMERGENCY)
Facility: HOSPITAL | Age: 44
Discharge: HOME | End: 2019-10-16
Attending: EMERGENCY MEDICINE | Admitting: EMERGENCY MEDICINE
Payer: COMMERCIAL

## 2019-10-16 VITALS
DIASTOLIC BLOOD PRESSURE: 65 MMHG | OXYGEN SATURATION: 97 % | HEIGHT: 64 IN | TEMPERATURE: 97.4 F | HEART RATE: 73 BPM | BODY MASS INDEX: 36.54 KG/M2 | WEIGHT: 214 LBS | RESPIRATION RATE: 18 BRPM | SYSTOLIC BLOOD PRESSURE: 163 MMHG

## 2019-10-16 DIAGNOSIS — L03.90 CELLULITIS: Primary | ICD-10-CM

## 2019-10-16 PROCEDURE — 99283 EMERGENCY DEPT VISIT LOW MDM: CPT

## 2019-10-16 RX ORDER — TRAMADOL HYDROCHLORIDE 50 MG/1
50 TABLET ORAL ONCE
Status: COMPLETED | OUTPATIENT
Start: 2019-10-16 | End: 2019-10-16

## 2019-10-16 RX ORDER — SULFAMETHOXAZOLE AND TRIMETHOPRIM 800; 160 MG/1; MG/1
1 TABLET ORAL 2 TIMES DAILY
Qty: 14 TABLET | Refills: 0 | Status: SHIPPED | OUTPATIENT
Start: 2019-10-16 | End: 2019-10-23

## 2019-10-16 RX ORDER — SULFAMETHOXAZOLE AND TRIMETHOPRIM 800; 160 MG/1; MG/1
1 TABLET ORAL ONCE
Status: COMPLETED | OUTPATIENT
Start: 2019-10-16 | End: 2019-10-16

## 2019-10-16 RX ADMIN — TRAMADOL HYDROCHLORIDE 50 MG: 50 TABLET, FILM COATED ORAL at 17:29

## 2019-10-16 RX ADMIN — SULFAMETHOXAZOLE AND TRIMETHOPRIM 1 TABLET: 800; 160 TABLET ORAL at 17:29

## 2019-10-16 NOTE — ED PROVIDER NOTES
History  Chief Complaint   Patient presents with    Breast Mass     lump under right breast  developed over the past 3 days     Has area of redness and painful swelling under right breat involving chest wall, not the breast itself, w/o fever, w/o known injury or bite, no drainage, no other areas of concern  Had prior episode which resolved w/o specific treatment  Prior to Admission Medications   Prescriptions Last Dose Informant Patient Reported? Taking? ACCU-CHEK FASTCLIX LANCETS MISC  Self No No   Sig: by Does not apply route 2 (two) times a day   Alcohol Swabs (ALCOHOL PADS) 70 % PADS  Self No No   Sig: by Does not apply route 2 (two) times a day   Blood Glucose Monitoring Suppl (ACCU-CHEK GUIDE) w/Device KIT  Self No No   Sig: by Does not apply route 2 (two) times a day   CRANBERRY EXTRACT PO  Self Yes No   Sig: Take by mouth   Lancets Misc  (ACCU-CHEK FASTCLIX LANCET) KIT  Self No No   Sig: by Does not apply route 2 (two) times a day   Multiple Vitamin (MULTIVITAMIN) tablet  Self Yes No   Sig: Take 1 tablet by mouth daily   Potassium 99 MG TABS  Self Yes No   Sig: Take 99 mg by mouth   Vitamin D, Cholecalciferol, 1000 units CAPS  Self Yes No   Sig: Take 1 tablet by mouth daily   aluminum-magnesium hydroxide-simethicone (MAALOX MAX) 400-400-40 MG/5ML suspension   No No   Sig: Take 10 mL by mouth every 6 (six) hours as needed for indigestion or heartburn for up to 14 days   b complex vitamins tablet  Self Yes No   Sig: Take 1 tablet by mouth daily   calcium citrate (CALCITRATE) 950 MG tablet  Self Yes No   Sig: Take 2 tablets by mouth daily     cyanocobalamin (VITAMIN B-12) 1,000 mcg tablet  Self Yes No   Sig: Take 1,000 mcg by mouth daily   escitalopram (LEXAPRO) 20 mg tablet   No No   Sig: Take 1 tablet (20 mg total) by mouth daily   famotidine (PEPCID) 20 mg tablet   No No   Sig: Take 1 tablet (20 mg total) by mouth 2 (two) times a day   ferrous sulfate 325 (65 Fe) mg tablet  Self Yes No   Sig: Take 325 mg by mouth daily with breakfast   fluticasone (FLONASE) 50 mcg/act nasal spray  Self No No   Sig: SPRAY 1 SPRAY INTO EACH NOSTRIL EVERY DAY   fluticasone (FLONASE) 50 mcg/act nasal spray   No No   Si spray into each nostril daily   glucose blood (ACCU-CHEK GUIDE) test strip  Self No No   Sig: Use as instructed   methocarbamol (ROBAXIN) 750 mg tablet   No No   Sig: Take one tablet by mouth every 12 hours as needed      Facility-Administered Medications: None       Past Medical History:   Diagnosis Date    Anxiety     Arthritis     Bariatric surgery status     Carpal tunnel syndrome     Heartburn     History of gastric bypass 2014    Hypertension     Last assessed 2014    Migraine     Obesity     Last assessed 2014    Postgastrectomy malabsorption     Restless leg syndrome     Seizures (HCC)        Past Surgical History:   Procedure Laterality Date     SECTION       SECTION N/A 2018    Procedure:  SECTION (); Surgeon: Chai Escalante MD;  Location: [facility];  Service: Obstetrics    CHOLECYSTECTOMY      GASTRIC BYPASS  2014    [place] REVISION OF CERVIX,NON OBSTETRICAL N/A 2017    Procedure: CERCLAGE CERVICAL;  Surgeon: Faye Mckee MD;  Location: [facility];  Service: Obstetrics    TOOTH EXTRACTION         Family History   Problem Relation Age of Onset    Cancer Mother         Myosarcoma of the soft tissue    Other Mother         Total Thyroidectomy; Thyroid mass    Coronary artery disease Father      I have reviewed and agree with the history as documented  Social History     Tobacco Use    Smoking status: Former Smoker     Packs/day: 0 25    Smokeless tobacco: Never Used   Substance Use Topics    Alcohol use: No    Drug use: No        Review of Systems   Constitutional: Negative for chills and fever  Respiratory: Negative for cough and shortness of breath      Cardiovascular: Negative for chest pain and leg swelling  Gastrointestinal: Negative for abdominal pain, diarrhea, nausea and vomiting  Genitourinary: Negative for dysuria  Musculoskeletal: Negative for back pain and myalgias  Skin: Negative for rash  Neurological: Negative for dizziness and headaches  Psychiatric/Behavioral: Negative for confusion  All other systems reviewed and are negative  Physical Exam  Physical Exam   Constitutional: She is oriented to person, place, and time  She appears well-developed and well-nourished  No distress  HENT:   Head: Normocephalic and atraumatic  Cardiovascular: Normal rate and regular rhythm  Pulmonary/Chest: Effort normal and breath sounds normal        Neurological: She is alert and oriented to person, place, and time  Skin: Skin is warm and dry         Vital Signs  ED Triage Vitals   Temperature Pulse Respirations Blood Pressure SpO2   10/16/19 1702 10/16/19 1702 10/16/19 1702 10/16/19 1704 10/16/19 1702   (!) 97 4 °F (36 3 °C) 73 18 163/65 97 %      Temp Source Heart Rate Source Patient Position - Orthostatic VS BP Location FiO2 (%)   10/16/19 1702 10/16/19 1702 10/16/19 1704 10/16/19 1704 --   Temporal Monitor Sitting Left arm       Pain Score       10/16/19 1702       6           Vitals:    10/16/19 1702 10/16/19 1704   BP:  163/65   Pulse: 73    Patient Position - Orthostatic VS:  Sitting         Visual Acuity      ED Medications  Medications   sulfamethoxazole-trimethoprim (BACTRIM DS) 800-160 mg per tablet 1 tablet (1 tablet Oral Given 10/16/19 1729)   traMADol (ULTRAM) tablet 50 mg (50 mg Oral Given 10/16/19 1729)       Diagnostic Studies  Results Reviewed     None                 No orders to display              Procedures  Procedures       ED Course  ED Course as of Oct 16 1749   Wed Oct 16, 2019   1718 Initial Impression/MDM: lower chest wall cellulitis vs early abscess, not directly involving breast, is appropriate for trial of oral antibiotics w/pain control; area of erythema marked w/a pen                                      MDM    Disposition  Final diagnoses:   Cellulitis     Time reflects when diagnosis was documented in both MDM as applicable and the Disposition within this note     Time User Action Codes Description Comment    10/16/2019  5:34 PM Lisa Red Add [L03 [id number]] Cellulitis       ED Disposition     ED Disposition Condition Date/Time Comment    Discharge Stable Wed Oct 16, 2019  5:34 PM Nitin Almendarez discharge to home/self care  Follow-up Information     Follow up With Specialties Details Why Heather Aguiar [address], [address], Nurse Practitioner Schedule an appointment as soon as possible for a visit   [address]  [phone]            Discharge Medication List as of 10/16/2019  5:35 PM      START taking these medications    Details   sulfamethoxazole-trimethoprim (BACTRIM DS) 800-160 mg per tablet Take 1 tablet by mouth 2 (two) times a day for 7 days smx-tmp DS (BACTRIM) 800-160 mg tabs (1tab q12 D10), Starting Wed 10/16/2019, Until Wed 10/23/2019, Print         CONTINUE these medications which have NOT CHANGED    Details   ACCU-CHEK FASTCLIX LANCETS MISC by Does not apply route 2 (two) times a day, Starting Tue 2/26/2019, Normal      Alcohol Swabs (ALCOHOL PADS) 70 % PADS by Does not apply route 2 (two) times a day, Starting Tue 2/26/2019, Normal      aluminum-magnesium hydroxide-simethicone (MAALOX MAX) 400-400-40 MG/5ML suspension Take 10 mL by mouth every 6 (six) hours as needed for indigestion or heartburn for up to 14 days, Starting Mon 10/7/2019, Until Mon 10/21/2019, Normal      b complex vitamins tablet Take 1 tablet by mouth daily, Historical Med      Blood Glucose Monitoring Suppl (ACCU-CHEK GUIDE) w/Device KIT by Does not apply route 2 (two) times a day, Starting Tue 2/26/2019, Normal      calcium citrate (CALCITRATE) 950 MG tablet Take 2 tablets by mouth daily  , Historical Med CRANBERRY EXTRACT PO Take by mouth, Historical Med      cyanocobalamin (VITAMIN B-12) 1,000 mcg tablet Take 1,000 mcg by mouth daily, Historical Med      escitalopram (LEXAPRO) 20 mg tablet Take 1 tablet (20 mg total) by mouth daily, Starting Fri 9/20/2019, Normal      famotidine (PEPCID) 20 mg tablet Take 1 tablet (20 mg total) by mouth 2 (two) times a day, Starting Mon 10/7/2019, Until Wed 11/6/2019, Normal      ferrous sulfate 325 (65 Fe) mg tablet Take 325 mg by mouth daily with breakfast, Historical Med      !! fluticasone (FLONASE) 50 mcg/act nasal spray SPRAY 1 SPRAY INTO EACH NOSTRIL EVERY DAY, Normal      !! fluticasone (FLONASE) 50 mcg/act nasal spray 1 spray into each nostril daily, Starting Sun 9/1/2019, Normal      glucose blood (ACCU-CHEK GUIDE) test strip Use as instructed, Normal      Lancets Misc  (ACCU-CHEK FASTCLIX LANCET) KIT by Does not apply route 2 (two) times a day, Starting Tue 2/26/2019, Normal      methocarbamol (ROBAXIN) 750 mg tablet Take one tablet by mouth every 12 hours as needed, Normal      Multiple Vitamin (MULTIVITAMIN) tablet Take 1 tablet by mouth daily, Historical Med      Potassium 99 MG TABS Take 99 mg by mouth, Historical Med      Vitamin D, Cholecalciferol, 1000 units CAPS Take 1 tablet by mouth daily, Historical Med       !! - Potential duplicate medications found  Please discuss with provider  No discharge procedures on file      ED Provider  Electronically Signed by           Lazarus Conrad, MD  10/16/19 1229

## 2019-10-17 ENCOUNTER — HOSPITAL ENCOUNTER (EMERGENCY)
Facility: HOSPITAL | Age: 44
Discharge: HOME | End: 2019-10-17
Attending: EMERGENCY MEDICINE | Admitting: EMERGENCY MEDICINE
Payer: COMMERCIAL

## 2019-10-17 VITALS
SYSTOLIC BLOOD PRESSURE: 109 MMHG | HEIGHT: 64 IN | RESPIRATION RATE: 16 BRPM | BODY MASS INDEX: 36.37 KG/M2 | TEMPERATURE: 97.7 F | OXYGEN SATURATION: 98 % | HEART RATE: 72 BPM | DIASTOLIC BLOOD PRESSURE: 61 MMHG | WEIGHT: 213 LBS

## 2019-10-17 DIAGNOSIS — L02.91 ABSCESS: Primary | ICD-10-CM

## 2019-10-17 PROCEDURE — 99283 EMERGENCY DEPT VISIT LOW MDM: CPT

## 2019-10-17 PROCEDURE — 87205 SMEAR GRAM STAIN: CPT | Performed by: EMERGENCY MEDICINE

## 2019-10-17 PROCEDURE — 87070 CULTURE OTHR SPECIMN AEROBIC: CPT | Performed by: EMERGENCY MEDICINE

## 2019-10-17 RX ORDER — TRAMADOL HYDROCHLORIDE 50 MG/1
50 TABLET ORAL ONCE
Status: COMPLETED | OUTPATIENT
Start: 2019-10-17 | End: 2019-10-17

## 2019-10-17 RX ORDER — TRAMADOL HYDROCHLORIDE 50 MG/1
50 TABLET ORAL EVERY 8 HOURS PRN
Qty: 9 TABLET | Refills: 0 | Status: SHIPPED | OUTPATIENT
Start: 2019-10-17 | End: 2019-10-21

## 2019-10-17 RX ORDER — LIDOCAINE HYDROCHLORIDE AND EPINEPHRINE 10; 10 MG/ML; UG/ML
1 INJECTION, SOLUTION INFILTRATION; PERINEURAL ONCE
Status: COMPLETED | OUTPATIENT
Start: 2019-10-17 | End: 2019-10-17

## 2019-10-17 RX ADMIN — TRAMADOL HYDROCHLORIDE 50 MG: 50 TABLET, FILM COATED ORAL at 19:45

## 2019-10-17 RX ADMIN — LIDOCAINE HYDROCHLORIDE,EPINEPHRINE BITARTRATE 1 ML: 10; .01 INJECTION, SOLUTION INFILTRATION; PERINEURAL at 19:18

## 2019-10-17 NOTE — ED PROVIDER NOTES
History  Chief Complaint   Patient presents with    Cellulitis     seen here last night and dx with "skin infection" of right breast  Pain is worse today     63-year-old female presents with increasing redness and pain under her right breast   She was diagnosed with cellulitis yesterday and started on Bactrim but notes that the pain is worsened  There also was a small amount of white drainage coming from the middle of the area  Rash   Quality: painful and redness    Pain details:     Quality:  Burning and throbbing    Duration:  2 days    Progression:  Worsening  Severity:  Moderate  Associated symptoms: induration    Associated symptoms: no abdominal pain, no fever, no nausea, no shortness of breath, no URI and not vomiting        Prior to Admission Medications   Prescriptions Last Dose Informant Patient Reported? Taking?    ACCU-CHEK FASTCLIX LANCETS MISC  Self No No   Sig: by Does not apply route 2 (two) times a day   Alcohol Swabs (ALCOHOL PADS) 70 % PADS  Self No No   Sig: by Does not apply route 2 (two) times a day   Blood Glucose Monitoring Suppl (ACCU-CHEK GUIDE) w/Device KIT  Self No No   Sig: by Does not apply route 2 (two) times a day   CRANBERRY EXTRACT PO  Self Yes No   Sig: Take by mouth   Lancets Misc  (ACCU-CHEK FASTCLIX LANCET) KIT  Self No No   Sig: by Does not apply route 2 (two) times a day   Multiple Vitamin (MULTIVITAMIN) tablet  Self Yes No   Sig: Take 1 tablet by mouth daily   Potassium 99 MG TABS  Self Yes No   Sig: Take 99 mg by mouth   Sulfamethoxazole-Trimethoprim (BACTRIM PO)   Yes Yes   Sig: Take by mouth   Vitamin D, Cholecalciferol, 1000 units CAPS  Self Yes No   Sig: Take 1 tablet by mouth daily   aluminum-magnesium hydroxide-simethicone (MAALOX MAX) 400-400-40 MG/5ML suspension   No No   Sig: Take 10 mL by mouth every 6 (six) hours as needed for indigestion or heartburn for up to 14 days   b complex vitamins tablet  Self Yes No   Sig: Take 1 tablet by mouth daily   calcium citrate (CALCITRATE) 950 MG tablet  Self Yes No   Sig: Take 2 tablets by mouth daily  cyanocobalamin (VITAMIN B-12) 1,000 mcg tablet  Self Yes No   Sig: Take 1,000 mcg by mouth daily   escitalopram (LEXAPRO) 20 mg tablet   No No   Sig: Take 1 tablet (20 mg total) by mouth daily   famotidine (PEPCID) 20 mg tablet   No No   Sig: Take 1 tablet (20 mg total) by mouth 2 (two) times a day   ferrous sulfate 325 (65 Fe) mg tablet  Self Yes No   Sig: Take 325 mg by mouth daily with breakfast   fluticasone (FLONASE) 50 mcg/act nasal spray  Self No No   Sig: SPRAY 1 SPRAY INTO EACH NOSTRIL EVERY DAY   fluticasone (FLONASE) 50 mcg/act nasal spray   No No   Si spray into each nostril daily   glucose blood (ACCU-CHEK GUIDE) test strip  Self No No   Sig: Use as instructed   methocarbamol (ROBAXIN) 750 mg tablet   No No   Sig: Take one tablet by mouth every 12 hours as needed   sulfamethoxazole-trimethoprim (BACTRIM DS) 800-160 mg per tablet   No No   Sig: Take 1 tablet by mouth 2 (two) times a day for 7 days smx-tmp DS (BACTRIM) 800-160 mg tabs (1tab q12 D10)      Facility-Administered Medications: None       Past Medical History:   Diagnosis Date    Anxiety     Arthritis     Bariatric surgery status     Carpal tunnel syndrome     Heartburn     History of gastric bypass 2014    Hypertension     Last assessed 2014    Migraine     Obesity     Last assessed 2014    Postgastrectomy malabsorption     Restless leg syndrome     Seizures (HCC)        Past Surgical History:   Procedure Laterality Date     SECTION       SECTION N/A 2018    Procedure:  SECTION ();   Surgeon: Katya Young MD;  Location: [facility];  Service: Obstetrics    CHOLECYSTECTOMY      GASTRIC BYPASS  2014    [place] REVISION OF CERVIX,NON OBSTETRICAL N/A 2017    Procedure: CERCLAGE CERVICAL;  Surgeon: Randy Haider MD;  Location: [facility];  Service: Obstetrics    TOOTH EXTRACTION Family History   Problem Relation Age of Onset    Cancer Mother         Myosarcoma of the soft tissue    Other Mother         Total Thyroidectomy; Thyroid mass    Coronary artery disease Father      I have reviewed and agree with the history as documented  Social History     Tobacco Use    Smoking status: Former Smoker     Packs/day: 0 25    Smokeless tobacco: Never Used   Substance Use Topics    Alcohol use: No    Drug use: No        Review of Systems   Constitutional: Negative for fever  Respiratory: Negative for shortness of breath  Gastrointestinal: Negative for abdominal pain, nausea and vomiting  Skin: Positive for rash  Physical Exam  Physical Exam   Constitutional: She is oriented to person, place, and time  She appears well-developed and well-nourished  She appears distressed (Mild)  Neck: Normal range of motion  Cardiovascular: Normal rate  Pulmonary/Chest: Breath sounds normal    Abdominal: She exhibits no distension  Musculoskeletal: She exhibits no deformity  Neurological: She is alert and oriented to person, place, and time  No cranial nerve deficit  Skin:        Nursing note and vitals reviewed  Vital Signs  ED Triage Vitals [10/17/19 1902]   Temperature Pulse Respirations Blood Pressure SpO2   97 7 °F (36 5 °C) 77 16 136/73 100 %      Temp src Heart Rate Source Patient Position - Orthostatic VS BP Location FiO2 (%)   -- -- -- -- --      Pain Score       7           Vitals:    10/17/19 1902 10/17/19 1945   BP: 136/73 109/61   Pulse: 77 72         Visual Acuity      ED Medications  Medications   lidocaine-epinephrine (XYLOCAINE/EPINEPHRINE) 1 %-1:100,000 injection 1 mL (1 mL Infiltration Given 10/17/19 1918)   traMADol (ULTRAM) tablet 50 mg (50 mg Oral Given 10/17/19 1945)       Diagnostic Studies  Results Reviewed     Procedure Component Value Units Date/Time    Wound culture and Gram stain [[id number]] Collected:  10/17/19 1940    Lab Status:   In process Specimen:  Wound from Abdominal Updated:  10/17/19 1942                 No orders to display              Procedures  Procedures       ED Course                               MDM  Number of Diagnoses or Management Options  Abscess:   Diagnosis management comments: Cellulitis appears to be properly healing, currently on Bactrim  I and D was performed, see procedure note  Patient was instructed to follow up in 48 hours for wound check  Return if she develops fevers, severe pain, rapidly spreading redness  Wound culture was sent  Patient is to remain on Bactrim  Prescribed Ultram for breakthrough pain  Amount and/or Complexity of Data Reviewed  Review and summarize past medical records: yes    Risk of Complications, Morbidity, and/or Mortality  Presenting problems: low  Diagnostic procedures: moderate  Management options: low    Patient Progress  Patient progress: stable      Disposition  Final diagnoses:   Abscess     Time reflects when diagnosis was documented in both MDM as applicable and the Disposition within this note     Time User Action Codes Description Comment    10/17/2019  7:35 PM [address], [address] Su[address] Kahn [L02 91] Abscess       ED Disposition     ED Disposition Condition Date/Time Comment    Discharge Stable Thu Oct 17, 2019  7:35 PM Jared Mcclelland discharge to home/self care              Follow-up Information     Follow up With Specialties Details Why Heather Aguiar [address], [address], Nurse Practitioner Schedule an appointment as soon as possible for a visit in 2 days For wound re-check [address] [place] [address]  [phone]            Discharge Medication List as of 10/17/2019  7:38 PM      START taking these medications    Details   traMADol (ULTRAM) 50 mg tablet Take 1 tablet (50 mg total) by mouth every 8 (eight) hours as needed for severe pain for up to 3 days, Starting Thu 10/17/2019, Until Sun 10/20/2019, Print         CONTINUE these medications which have NOT CHANGED    Details   ACCU-CHEK FASTCLIX LANCETS MISC by Does not apply route 2 (two) times a day, Starting Tue 2/26/2019, Normal      Alcohol Swabs (ALCOHOL PADS) 70 % PADS by Does not apply route 2 (two) times a day, Starting Tue 2/26/2019, Normal      aluminum-magnesium hydroxide-simethicone (MAALOX MAX) 400-400-40 MG/5ML suspension Take 10 mL by mouth every 6 (six) hours as needed for indigestion or heartburn for up to 14 days, Starting Mon 10/7/2019, Until Mon 10/21/2019, Normal      b complex vitamins tablet Take 1 tablet by mouth daily, Historical Med      Blood Glucose Monitoring Suppl (ACCU-CHEK GUIDE) w/Device KIT by Does not apply route 2 (two) times a day, Starting Tue 2/26/2019, Normal      calcium citrate (CALCITRATE) 950 MG tablet Take 2 tablets by mouth daily  , Historical Med      CRANBERRY EXTRACT PO Take by mouth, Historical Med      cyanocobalamin (VITAMIN B-12) 1,000 mcg tablet Take 1,000 mcg by mouth daily, Historical Med      escitalopram (LEXAPRO) 20 mg tablet Take 1 tablet (20 mg total) by mouth daily, Starting Fri 9/20/2019, Normal      famotidine (PEPCID) 20 mg tablet Take 1 tablet (20 mg total) by mouth 2 (two) times a day, Starting Mon 10/7/2019, Until Wed 11/6/2019, Normal      ferrous sulfate 325 (65 Fe) mg tablet Take 325 mg by mouth daily with breakfast, Historical Med      !! fluticasone (FLONASE) 50 mcg/act nasal spray SPRAY 1 SPRAY INTO EACH NOSTRIL EVERY DAY, Normal      !! fluticasone (FLONASE) 50 mcg/act nasal spray 1 spray into each nostril daily, Starting Sun 9/1/2019, Normal      glucose blood (ACCU-CHEK GUIDE) test strip Use as instructed, Normal      Lancets Misc  (ACCU-CHEK FASTCLIX LANCET) KIT by Does not apply route 2 (two) times a day, Starting Tue 2/26/2019, Normal      methocarbamol (ROBAXIN) 750 mg tablet Take one tablet by mouth every 12 hours as needed, Normal      Multiple Vitamin (MULTIVITAMIN) tablet Take 1 tablet by mouth daily, Historical Med Potassium 99 MG TABS Take 99 mg by mouth, Historical Med      sulfamethoxazole-trimethoprim (BACTRIM DS) 800-160 mg per tablet Take 1 tablet by mouth 2 (two) times a day for 7 days smx-tmp DS (BACTRIM) 800-160 mg tabs (1tab q12 D10), Starting Wed 10/16/2019, Until Wed 10/23/2019, Print      Sulfamethoxazole-Trimethoprim (BACTRIM PO) Take by mouth, Historical Med      Vitamin D, Cholecalciferol, 1000 units CAPS Take 1 tablet by mouth daily, Historical Med       !! - Potential duplicate medications found  Please discuss with provider  No discharge procedures on file      ED Provider  Electronically Signed by           Ramesh Fisher DO  10/17/19 0895

## 2019-10-17 NOTE — ED PROCEDURE NOTE
PROCEDURE  Incision and drain  Date/Time: 10/17/2019 7:40 PM  Performed by: Wero Ordaz DO  Authorized by: Wero Ordaz DO     Patient location:  ED  Consent:     Consent obtained:  Verbal    Consent given by:  Patient    Risks discussed:  Bleeding and incomplete drainage  Universal protocol:     Procedure explained and questions answered to patient or proxy's satisfaction: yes      Relevant documents present and verified: yes      Site/side marked: yes      Immediately prior to procedure a time out was called: yes      Patient identity confirmed:  Verbally with patient and arm band  Location:     Type:  Abscess    Location:  [facility]  Pre-procedure details:     Skin preparation:  Chloraprep  Anesthesia (see MAR for exact dosages): Anesthesia method:  Local infiltration    Local anesthetic:  Lidocaine 1% w/o epi  Procedure details:     Complexity:  Simple    Incision types:  Stab incision    Scalpel blade:  11    Approach:  Open    Incision depth:  Subcutaneous    Wound management:  Probed and deloculated    Drainage:  Purulent    Drainage amount: Moderate    Wound treatment:  Wound left open    Packing materials:  1/4 in gauze  Post-procedure details:     Patient tolerance of procedure:   Tolerated well, no immediate complications         Wero Ordaz DO  10/17/19 1941

## 2019-10-17 NOTE — ED NOTES
I=D of abcess right abd under right breast done by Dr Anthony Liu, wound culture obtained and sent  Pt tj precedure well, DSD applied by Dr Tremayne Garcia RN  10/17/19 1950

## 2019-10-20 LAB
BACTERIA WND AEROBE CULT: ABNORMAL
BACTERIA WND AEROBE CULT: ABNORMAL
GRAM STN SPEC: ABNORMAL

## 2019-10-21 ENCOUNTER — OFFICE VISIT (OUTPATIENT)
Dept: INTERNAL MEDICINE CLINIC | Facility: CLINIC | Age: 44
End: 2019-10-21
Payer: COMMERCIAL

## 2019-10-21 VITALS
OXYGEN SATURATION: 99 % | DIASTOLIC BLOOD PRESSURE: 68 MMHG | HEIGHT: 64 IN | SYSTOLIC BLOOD PRESSURE: 108 MMHG | HEART RATE: 83 BPM | BODY MASS INDEX: 38.8 KG/M2 | RESPIRATION RATE: 16 BRPM | WEIGHT: 227.3 LBS | TEMPERATURE: 98.1 F

## 2019-10-21 DIAGNOSIS — N61.0 CELLULITIS OF RIGHT BREAST: Primary | ICD-10-CM

## 2019-10-21 DIAGNOSIS — J34.89 SINUS PRESSURE: ICD-10-CM

## 2019-10-21 DIAGNOSIS — G44.009 CLUSTER HEADACHE, NOT INTRACTABLE, UNSPECIFIED CHRONICITY PATTERN: ICD-10-CM

## 2019-10-21 PROCEDURE — 3008F BODY MASS INDEX DOCD: CPT | Performed by: NURSE PRACTITIONER

## 2019-10-21 PROCEDURE — 99213 OFFICE O/P EST LOW 20 MIN: CPT | Performed by: NURSE PRACTITIONER

## 2019-10-21 RX ORDER — PREDNISONE 10 MG/1
TABLET ORAL
Qty: 18 TABLET | Refills: 0 | Status: SHIPPED | OUTPATIENT
Start: 2019-10-21 | End: 2020-02-07

## 2019-10-21 NOTE — PATIENT INSTRUCTIONS
Cellulitis   WHAT YOU NEED TO KNOW:   What is cellulitis? Cellulitis is a skin infection caused by bacteria  Cellulitis usually appears on the legs and feet, arms and hands, or face  What increases my risk for cellulitis? · An injury that breaks the skin, such as a bite, scratch, or cut    · Sores or injuries exposed to hot tub water or water in ponds, streams, or oceans    · Shared belongings, such as towels or exercise equipment    · Drugs that are injected    · A weak immune system or diabetes    · Lymphedema, chronic venous insufficiency, peripheral vascular disease, or deep vein thrombosis  What are the signs and symptoms of cellulitis? · A red, warm, swollen area on your skin    · Pain when the area is touched    · Bumps or blisters (abscess) that may drain pus    · Bumpy, raised skin that feels like an orange peel  How is cellulitis diagnosed? Your healthcare provider may know you have cellulitis by looking at and feeling your skin  Tell him or her how long you have had symptoms, and if anything helps decrease your symptoms  Tell your healthcare provider if you have ever had a cellulitis infection  He or she may not know which kind of bacteria caused your cellulitis  You may  need any of the following tests:  · Blood tests  may show which kind of bacteria is causing your infection  Blood tests may also show if the infection is in your blood  · A sample of tissue or fluid from your infected skin  may show what is causing your infection  The sample may also show if your infection is caused by another kind of skin disorder  · An x-ray, ultrasound, CT, or MRI  may show if the infection has spread  You may be given contrast liquid to help the infection show up better in the pictures  Tell the healthcare provider if you have ever had an allergic reaction to contrast liquid  Do not enter the MRI room with anything metal  Metal can cause serious injury   Tell the healthcare provider if you have any metal in or on your body  How is cellulitis treated? Treatment may decrease symptoms, stop the infection from spreading, and cure the infection  Treatment depends on how severe your cellulitis is  Cellulitis may go away on its own  You may  instead need antibiotics to help treat the bacterial infection  Your healthcare provider may draw a [place] around the edges of your cellulitis  If your cellulitis spreads, your healthcare provider will see it outside of the [place]  How can I manage my symptoms? · Elevate the area above the level of your heart  as often as you can  This will help decrease swelling and pain  Prop the area on pillows or blankets to keep it elevated comfortably  · Clean the area daily until the wound scabs over  Gently wash the area with soap and water  Pat dry  Use dressings as directed  · Place cool or warm, wet cloths on the area as directed  Use clean cloths and clean water  Leave it on the area until the cloth is room temperature  Pat the area dry with a clean, dry cloth  The cloths may help decrease pain  How can I prevent cellulitis? · Do not scratch bug bites or areas of injury  You increase your risk for cellulitis by scratching these areas  · Do not share personal items, such as towels, clothing, and razors  · Clean exercise equipment  with germ-killing  before and after you use it  · Wash your hands often  Use soap and water  Wash your hands after you use the bathroom, change a child's diapers, or sneeze  Wash your hands before you prepare or eat food  Use lotion to prevent dry, cracked skin  · Wear pressure stockings as directed  You may be told to wear the stockings if you have peripheral edema  Peripheral edema is swelling in your legs  The stockings improve blood flow and decrease swelling  · Treat athlete's foot  This can help prevent the spread of a bacterial skin infection    Call 911 if:   · You have sudden trouble breathing or chest pain  When should I seek immediate care? · Your wound gets larger and more painful  · You feel a crackling under your skin when you touch it  · You have purple dots or bumps on your skin, or you see bleeding under your skin  · You have new swelling and pain in your legs  · The red, warm, swollen area gets larger  · You see red streaks coming from the infected area  When should I contact my healthcare provider? · You have a fever  · Your fever or pain does not go away or gets worse  · The area does not get smaller after 2 days of antibiotics  · You have questions or concerns about your condition or care  CARE AGREEMENT:   You have the right to help plan your care  Learn about your health condition and how it may be treated  Discuss treatment options with your caregivers to decide what care you want to receive  You always have the right to refuse treatment  The above information is an  only  It is not intended as medical advice for individual conditions or treatments  Talk to your doctor, nurse or pharmacist before following any medical regimen to see if it is safe and effective for you  © 2017 [address] Information is for End User's use only and may not be sold, redistributed or otherwise used for commercial purposes  All illustrations and images included in CareNotes® are the copyrighted property of [employer]  or Chriss Land[employer]

## 2019-10-21 NOTE — PROGRESS NOTES
Assessment/Plan: Wound culture did come back showing gram negative rods and positive cocci in pairs  Patient is taking Bactrim for this  Will order a CT of the sinuses due to her continued complaints of sinusitis and sinus pressure and pain  Will call a short course of steroids in as well for her pain  She was advised if her symptoms worsen to call the office  Will notify her once CT scan is approved  No problem-specific Assessment & Plan notes found for this encounter  Problem List Items Addressed This Visit        Nervous and Auditory    Cluster headache, not intractable    Relevant Medications    predniSONE 10 mg tablet    Other Relevant Orders    CT sinus wo contrast       Other    Cellulitis of right breast - Primary    Sinus pressure    Relevant Medications    predniSONE 10 mg tablet    Other Relevant Orders    CT sinus wo contrast            Subjective:      Patient ID: Wendy Garces is a 40 y o  female  Cody Nieves is here today for an ER follow up visit  She was in the ER twice with complaints of right breat pain and redness with some drainage noted  She did have an I and D done with culture sent  She is currently taking Bactrim  She states she is feeling better but now is starting with she believes a sinus infection and having on and off headaches  She is having nausea and did vomit as well  She states she just feels wiped out and tired  She is not complaining of a fever or any other symptoms  She states she is not having any drainage under the right breast and the redness is much better  She states she is drinking fluids and doing her best to stay well hydrated  She offers no other issues         The following portions of the patient's history were reviewed and updated as appropriate:   She  has a past medical history of Anxiety, Arthritis, Bariatric surgery status, Carpal tunnel syndrome, Heartburn, History of gastric bypass (12/2014), Hypertension, Migraine, Obesity, Postgastrectomy malabsorption, Restless leg syndrome, and Seizures ([facility] [address] )  She   Patient Active Problem List    Diagnosis Date Noted    Cellulitis of right breast 10/21/2019    Cluster headache, not intractable 10/21/2019    Sinus pressure 10/21/2019    Degenerative disc disease, lumbar 2019    Pain of both hip joints 2019    Screening for thyroid disorder 2019    Screening for cardiovascular condition 2019    Hypoglycemia 2019    Reactive hypoglycemia 01/15/2019    Muscular pain 01/15/2019    Obesity, Class I, BMI 30-34 9 01/15/2019    Weight gain 01/15/2019    Hematuria 01/10/2019    Anxiety and depression 01/10/2019    Vitamin D insufficiency 01/10/2019    History of bilateral tubal ligation 2018    Bariatric surgery status 2018    Smoker 2017    Sciatica 2016    Neck nodule 10/19/2016    Panniculus 2016    Neck muscle spasm 2016    Carpal tunnel syndrome 2016    Fatigue 2015    Postsurgical malabsorption 2014    Prediabetes 2014    Restless legs syndrome 2014    Vertigo 2014    Vitamin B 12 deficiency 2014    Anxiety disorder 2014    Benign paroxysmal vertigo 2014     She  has a past surgical history that includes Gastric bypass (2014);  section; Cholecystectomy; pr revision of cervix,non obstetrical (N/A, 2017);  section (N/A, 2018); and Tooth extraction  Her family history includes Cancer in her mother; Coronary artery disease in her father; Other in her mother  She  reports that she has quit smoking  She smoked 0 25 packs per day  She has never used smokeless tobacco  She reports that she does not drink alcohol or use drugs    Current Outpatient Medications   Medication Sig Dispense Refill    ACCU-CHEK FASTCLIX LANCETS MISC by Does not apply route 2 (two) times a day 102 each 5    Alcohol Swabs (ALCOHOL PADS) 70 % PADS by Does not apply route 2 (two) times a day 100 each 5    aluminum-magnesium hydroxide-simethicone (MAALOX MAX) 400-400-40 MG/5ML suspension Take 10 mL by mouth every 6 (six) hours as needed for indigestion or heartburn for up to 14 days 355 mL 1    b complex vitamins tablet Take 1 tablet by mouth daily      Blood Glucose Monitoring Suppl (ACCU-CHEK GUIDE) w/Device KIT by Does not apply route 2 (two) times a day 1 kit 0    calcium citrate (CALCITRATE) 950 MG tablet Take 2 tablets by mouth daily        CRANBERRY EXTRACT PO Take by mouth      escitalopram (LEXAPRO) 20 mg tablet Take 1 tablet (20 mg total) by mouth daily 30 tablet 3    famotidine (PEPCID) 20 mg tablet Take 1 tablet (20 mg total) by mouth 2 (two) times a day 60 tablet 1    ferrous sulfate 325 (65 Fe) mg tablet Take 325 mg by mouth daily with breakfast      fluticasone (FLONASE) 50 mcg/act nasal spray SPRAY 1 SPRAY INTO EACH NOSTRIL EVERY DAY 1 Bottle 3    glucose blood (ACCU-CHEK GUIDE) test strip Use as instructed 100 each 5    Lancets Misc  (ACCU-CHEK FASTCLIX LANCET) KIT by Does not apply route 2 (two) times a day 1 kit 0    methocarbamol (ROBAXIN) 750 mg tablet Take one tablet by mouth every 12 hours as needed 60 tablet 0    Multiple Vitamin (MULTIVITAMIN) tablet Take 1 tablet by mouth daily      Potassium 99 MG TABS Take 99 mg by mouth      sulfamethoxazole-trimethoprim (BACTRIM DS) 800-160 mg per tablet Take 1 tablet by mouth 2 (two) times a day for 7 days smx-tmp DS (BACTRIM) 800-160 mg tabs (1tab q12 D10) 14 tablet 0    traMADol (ULTRAM) 50 mg tablet Take 1 tablet (50 mg total) by mouth every 8 (eight) hours as needed for severe pain for up to 3 days 9 tablet 0    Vitamin D, Cholecalciferol, 1000 units CAPS Take 1 tablet by mouth daily      predniSONE 10 mg tablet 30 mg by mouth daily for 3 days, then 20 mg by mouth daily for 3 days, then 10 mg by mouth daily for 3 days, then stop 18 tablet 0     No current facility-administered medications for this visit  Current Outpatient Medications on File Prior to Visit   Medication Sig    ACCU-CHEK FASTCLIX LANCETS MISC by Does not apply route 2 (two) times a day    Alcohol Swabs (ALCOHOL PADS) 70 % PADS by Does not apply route 2 (two) times a day    aluminum-magnesium hydroxide-simethicone (MAALOX MAX) 400-400-40 MG/5ML suspension Take 10 mL by mouth every 6 (six) hours as needed for indigestion or heartburn for up to 14 days    b complex vitamins tablet Take 1 tablet by mouth daily    Blood Glucose Monitoring Suppl (ACCU-CHEK GUIDE) w/Device KIT by Does not apply route 2 (two) times a day    calcium citrate (CALCITRATE) 950 MG tablet Take 2 tablets by mouth daily      CRANBERRY EXTRACT PO Take by mouth    escitalopram (LEXAPRO) 20 mg tablet Take 1 tablet (20 mg total) by mouth daily    famotidine (PEPCID) 20 mg tablet Take 1 tablet (20 mg total) by mouth 2 (two) times a day    ferrous sulfate 325 (65 Fe) mg tablet Take 325 mg by mouth daily with breakfast    fluticasone (FLONASE) 50 mcg/act nasal spray SPRAY 1 SPRAY INTO EACH NOSTRIL EVERY DAY    glucose blood (ACCU-CHEK GUIDE) test strip Use as instructed    Lancets Misc  (ACCU-CHEK FASTCLIX LANCET) KIT by Does not apply route 2 (two) times a day    methocarbamol (ROBAXIN) 750 mg tablet Take one tablet by mouth every 12 hours as needed    Multiple Vitamin (MULTIVITAMIN) tablet Take 1 tablet by mouth daily    Potassium 99 MG TABS Take 99 mg by mouth    sulfamethoxazole-trimethoprim (BACTRIM DS) 800-160 mg per tablet Take 1 tablet by mouth 2 (two) times a day for 7 days smx-tmp DS (BACTRIM) 800-160 mg tabs (1tab q12 D10)    traMADol (ULTRAM) 50 mg tablet Take 1 tablet (50 mg total) by mouth every 8 (eight) hours as needed for severe pain for up to 3 days    Vitamin D, Cholecalciferol, 1000 units CAPS Take 1 tablet by mouth daily    [DISCONTINUED] cyanocobalamin (VITAMIN B-12) 1,000 mcg tablet Take 1,000 mcg by mouth daily    [DISCONTINUED] fluticasone (FLONASE) 50 mcg/act nasal spray 1 spray into each nostril daily (Patient not taking: Reported on 10/21/2019)    [DISCONTINUED] Sulfamethoxazole-Trimethoprim (BACTRIM PO) Take by mouth     No current facility-administered medications on file prior to visit  She is allergic to aspirin; nsaids; and codeine       Review of Systems   Constitutional: Negative  HENT: Positive for sinus pressure and sinus pain  Eyes: Negative  Respiratory: Negative  Cardiovascular: Negative  Gastrointestinal: Negative  Endocrine: Negative  Genitourinary: Negative  Musculoskeletal: Negative  Skin: Positive for wound  Allergic/Immunologic: Negative  Neurological: Positive for headaches  Hematological: Negative  Psychiatric/Behavioral: Negative  Objective:      /68 (BP Location: Right arm, Patient Position: Sitting, Cuff Size: Large)   Pulse 83   Temp 98 1 °F (36 7 °C) (Temporal)   Resp 16   Ht 5' 4" (1 626 m)   Wt 103 kg (227 lb 4 8 oz)   LMP 10/10/2019   SpO2 99%   BMI 39 02 kg/m²          Physical Exam   Constitutional: She is oriented to person, place, and time  She appears well-developed and well-nourished  HENT:   Head: Normocephalic and atraumatic  Right Ear: External ear normal    Left Ear: External ear normal    Nose: Nose normal    Mouth/Throat: Oropharynx is clear and moist    Eyes: Pupils are equal, round, and reactive to light  Conjunctivae and EOM are normal    Neck: Normal range of motion  Neck supple  Cardiovascular: Normal rate, regular rhythm, normal heart sounds and intact distal pulses  Pulmonary/Chest: Effort normal and breath sounds normal    Abdominal: Soft  Bowel sounds are normal    Musculoskeletal: Normal range of motion  Neurological: She is alert and oriented to person, place, and time  Skin: Skin is warm and dry  Capillary refill takes less than 2 seconds     Small open area noted under right breast with limited drainage and redness noted   Psychiatric: She has a normal mood and affect  Her behavior is normal  Judgment and thought content normal    Vitals reviewed

## 2019-10-28 DIAGNOSIS — M79.10 MUSCULAR PAIN: ICD-10-CM

## 2019-10-28 RX ORDER — METHOCARBAMOL 750 MG/1
TABLET, FILM COATED ORAL
Qty: 60 TABLET | Refills: 0 | Status: SHIPPED | OUTPATIENT
Start: 2019-10-28 | End: 2019-11-25 | Stop reason: SDUPTHER

## 2019-10-31 ENCOUNTER — OFFICE VISIT (OUTPATIENT)
Dept: URGENT CARE | Facility: CLINIC | Age: 44
End: 2019-10-31
Payer: COMMERCIAL

## 2019-10-31 VITALS
WEIGHT: 227 LBS | TEMPERATURE: 100.5 F | RESPIRATION RATE: 16 BRPM | SYSTOLIC BLOOD PRESSURE: 122 MMHG | HEIGHT: 64 IN | DIASTOLIC BLOOD PRESSURE: 78 MMHG | HEART RATE: 86 BPM | BODY MASS INDEX: 38.76 KG/M2 | OXYGEN SATURATION: 97 %

## 2019-10-31 DIAGNOSIS — J02.9 ACUTE PHARYNGITIS, UNSPECIFIED ETIOLOGY: Primary | ICD-10-CM

## 2019-10-31 PROCEDURE — G0382 LEV 3 HOSP TYPE B ED VISIT: HCPCS | Performed by: NURSE PRACTITIONER

## 2019-10-31 PROCEDURE — 99283 EMERGENCY DEPT VISIT LOW MDM: CPT | Performed by: NURSE PRACTITIONER

## 2019-10-31 PROCEDURE — 87880 STREP A ASSAY W/OPTIC: CPT | Performed by: NURSE PRACTITIONER

## 2019-10-31 PROCEDURE — 99213 OFFICE O/P EST LOW 20 MIN: CPT | Performed by: NURSE PRACTITIONER

## 2019-10-31 PROCEDURE — 87070 CULTURE OTHR SPECIMN AEROBIC: CPT | Performed by: NURSE PRACTITIONER

## 2019-10-31 NOTE — PATIENT INSTRUCTIONS
Rapid strep negative  Will send for culture  Call in 2-3 days for results  Tylenol as needed for pain or fever  Salt water gargles and throat lozenges as needed  Chloraseptic spray may help with pain  Follow up with PCP if no improvement  Go to ER with worsening symptoms  Pharyngitis   AMBULATORY CARE:   Pharyngitis , or sore throat, is inflammation of the tissues and structures in your pharynx (throat)  Pharyngitis is most often caused by bacteria  It may also be caused by a cold or flu virus  Other causes include smoking, allergies, or acid reflux  Signs and symptoms that may occur with pharyngitis:   · Sore throat or pain when you swallow    · Fever, chills, and body aches    · Hoarse or raspy voice    · Cough, runny or stuffy nose, itchy or watery eyes    · Headache    · Upset stomach and loss of appetite    · Mild neck stiffness    · Swollen glands that feel like hard lumps when you touch your neck    · White and yellow pus-filled blisters in the back of your throat  Call 911 for any of the following:   · You have trouble breathing or swallowing because your throat is swollen or sore  Seek care immediately if:   · You are drooling because it hurts too much to swallow  · Your fever is higher than 102? F (39?C) or lasts longer than 3 days  · You are confused  · You taste blood in your throat  Contact your healthcare provider if:   · Your throat pain gets worse  · You have a painful lump in your throat that does not go away after 5 days  · Your symptoms do not improve after 5 days  · You have questions or concerns about your condition or care  Treatment for pharyngitis:  Viral pharyngitis will go away on its own without treatment  Your sore throat should start to feel better in 3 to 5 days for both viral and bacterial infections  You may need any of the following  · NSAIDs , such as ibuprofen, help decrease swelling, pain, and fever   NSAIDs can cause stomach bleeding or kidney problems in certain people  If you take blood thinner medicine, always ask your healthcare provider if NSAIDs are safe for you  Always read the medicine label and follow directions  · Acetaminophen  decreases pain and fever  It is available without a doctor's order  Ask how much to take and how often to take it  Follow directions  Acetaminophen can cause liver damage if not taken correctly  Manage your symptoms:   · Gargle salt water  Mix ¼ teaspoon salt in an 8 ounce glass of warm water and gargle  This may help decrease swelling in your throat  · Drink liquids as directed  You may need to drink more liquids than usual  Liquids may help soothe your throat and prevent dehydration  Ask how much liquid to drink each day and which liquids are best for you  · Use a cool-steam humidifier  to help moisten the air in your room and calm your cough  · Soothe your throat  with cough drops, ice, soft foods, or popsicles  Prevent the spread of pharyngitis:  Cover your mouth and nose when you cough or sneeze  Do not share food or drinks  Wash your hands often  Use soap and water  If soap and water are unavailable, use an alcohol based hand   Follow up with your healthcare provider as directed:  Write down your questions so you remember to ask them during your visits  © 2017 [address] Information is for End User's use only and may not be sold, redistributed or otherwise used for commercial purposes  All illustrations and images included in CareNotes® are the copyrighted property of [employer]  or Chriss Land[employer]  The above information is an  only  It is not intended as medical advice for individual conditions or treatments  Talk to your doctor, nurse or pharmacist before following any medical regimen to see if it is safe and effective for you

## 2019-10-31 NOTE — PROGRESS NOTES
[facility]        NAME: Janae Cruz is a 40 y o  female  : 1975    MRN: [id number]  DATE: 2019  TIME: 7:55 PM     Assessment and Plan   Acute pharyngitis, unspecified etiology [J02 9]  1  Acute pharyngitis, unspecified etiology  POCT rapid strepA    Throat culture         Patient Instructions     Patient Instructions   Rapid strep negative  Will send for culture  Call in 2-3 days for results  Tylenol as needed for pain or fever  Salt water gargles and throat lozenges as needed  Chloraseptic spray may help with pain  Follow up with PCP if no improvement  Go to ER with worsening symptoms  Chief Complaint     Chief Complaint   Patient presents with    Sore Throat         History of Present Illness   Janae Cruz presents to the clinic c/o    This is a 40year old female here today with sore throat and dry cough  Woke up in the middle of night last with sore throat  She states her throat burns  Through out the day it has become worse  No nasal congestion or ear pain  Feel hot and sweaty  No known fevers  Took Tylenol sinus  She was just on Bactrim 2 weeks ago for cellulitis  She was also on a course of NSAIDS  Review of Systems   Review of Systems   Constitutional: Positive for activity change, chills, fatigue and fever  HENT: Positive for sore throat  Negative for congestion, ear pain and rhinorrhea  Respiratory: Negative for cough  Skin: Negative  Psychiatric/Behavioral: Negative            Current Medications     Long-Term Medications   Medication Sig Dispense Refill    ACCU-CHEK FASTCLIX LANCETS MISC by Does not apply route 2 (two) times a day 102 each 5    b complex vitamins tablet Take 1 tablet by mouth daily      escitalopram (LEXAPRO) 20 mg tablet Take 1 tablet (20 mg total) by mouth daily 30 tablet 3    famotidine (PEPCID) 20 mg tablet Take 1 tablet (20 mg total) by mouth 2 (two) times a day 60 tablet 1    fluticasone (FLONASE) 50 mcg/act nasal spray SPRAY 1 SPRAY INTO EACH NOSTRIL EVERY DAY 1 Bottle 3    methocarbamol (ROBAXIN) 750 mg tablet Take one tablet by mouth every 12 hours as needed 60 tablet 0    Multiple Vitamin (MULTIVITAMIN) tablet Take 1 tablet by mouth daily      Vitamin D, Cholecalciferol, 1000 units CAPS Take 1 tablet by mouth daily      Lancets Misc  (ACCU-CHEK FASTCLIX LANCET) KIT by Does not apply route 2 (two) times a day 1 kit 0    Potassium 99 MG TABS Take 99 mg by mouth         Current Allergies     Allergies as of 10/31/2019 - Reviewed 10/31/2019   Allergen Reaction Noted    Aspirin Other (See Comments) 12/04/2016    Nsaids Other (See Comments) 08/01/2017    Codeine Itching 01/17/2016            The following portions of the patient's history were reviewed and updated as appropriate: allergies, current medications, past family history, past medical history, past social history, past surgical history and problem list     Objective   /78   Pulse 86   Temp 100 5 °F (38 1 °C)   Resp 16   Ht 5' 4" (1 626 m)   Wt 103 kg (227 lb)   LMP 10/10/2019   SpO2 97%   BMI 38 96 kg/m²        Physical Exam     Physical Exam   Constitutional: She appears well-developed and well-nourished  She does not appear ill  No distress  HENT:   Right Ear: Tympanic membrane normal    Left Ear: Tympanic membrane normal    Mouth/Throat: Posterior oropharyngeal erythema present  No posterior oropharyngeal edema or tonsillar abscesses  Tonsils are 0 on the right  Tonsils are 0 on the left  Skin: Skin is warm and dry  Psychiatric: She has a normal mood and affect  Her behavior is normal    Nursing note and vitals reviewed  Rapid strep negative

## 2019-11-02 LAB — S PYO AG THROAT QL: NEGATIVE

## 2019-11-03 ENCOUNTER — APPOINTMENT (OUTPATIENT)
Dept: RADIOLOGY | Facility: CLINIC | Age: 44
End: 2019-11-03
Payer: COMMERCIAL

## 2019-11-03 ENCOUNTER — OFFICE VISIT (OUTPATIENT)
Dept: URGENT CARE | Facility: CLINIC | Age: 44
End: 2019-11-03
Payer: COMMERCIAL

## 2019-11-03 VITALS
BODY MASS INDEX: 38.76 KG/M2 | DIASTOLIC BLOOD PRESSURE: 64 MMHG | TEMPERATURE: 98.5 F | SYSTOLIC BLOOD PRESSURE: 100 MMHG | WEIGHT: 227 LBS | OXYGEN SATURATION: 99 % | RESPIRATION RATE: 20 BRPM | HEIGHT: 64 IN | HEART RATE: 87 BPM

## 2019-11-03 DIAGNOSIS — M54.50 ACUTE LOW BACK PAIN, UNSPECIFIED BACK PAIN LATERALITY, UNSPECIFIED WHETHER SCIATICA PRESENT: ICD-10-CM

## 2019-11-03 DIAGNOSIS — R05.9 COUGH: ICD-10-CM

## 2019-11-03 DIAGNOSIS — J06.9 ACUTE URI: Primary | ICD-10-CM

## 2019-11-03 LAB
BACTERIA THROAT CULT: NORMAL
SL AMB  POCT GLUCOSE, UA: NORMAL
SL AMB LEUKOCYTE ESTERASE,UA: NORMAL
SL AMB POCT BILIRUBIN,UA: NORMAL
SL AMB POCT BLOOD,UA: NORMAL
SL AMB POCT CLARITY,UA: CLEAR
SL AMB POCT COLOR,UA: YELLOW
SL AMB POCT KETONES,UA: NORMAL
SL AMB POCT NITRITE,UA: NORMAL
SL AMB POCT PH,UA: 6
SL AMB POCT SPECIFIC GRAVITY,UA: 1.01
SL AMB POCT URINE PROTEIN: NORMAL
SL AMB POCT UROBILINOGEN: 0.2

## 2019-11-03 PROCEDURE — 71046 X-RAY EXAM CHEST 2 VIEWS: CPT

## 2019-11-03 PROCEDURE — 99213 OFFICE O/P EST LOW 20 MIN: CPT | Performed by: PHYSICIAN ASSISTANT

## 2019-11-03 PROCEDURE — 99283 EMERGENCY DEPT VISIT LOW MDM: CPT | Performed by: PHYSICIAN ASSISTANT

## 2019-11-03 PROCEDURE — G0382 LEV 3 HOSP TYPE B ED VISIT: HCPCS | Performed by: PHYSICIAN ASSISTANT

## 2019-11-03 RX ORDER — AZITHROMYCIN 250 MG/1
TABLET, FILM COATED ORAL
Qty: 6 TABLET | Refills: 0 | Status: SHIPPED | OUTPATIENT
Start: 2019-11-03 | End: 2019-11-07

## 2019-11-03 NOTE — PROGRESS NOTES
[facility]    NAME: Hernan Pi is a 40 y o  female  : 1975    MRN: [id number]  DATE: November 3, 2019  TIME: 12:17 PM    Assessment and Plan   Acute URI [J06 9]  1  Acute URI  azithromycin (ZITHROMAX) 250 mg tablet   2  Cough  XR chest pa & lateral   3  Acute low back pain, unspecified back pain laterality, unspecified whether sciatica present  POCT urine dip auto non-scope       Patient Instructions   Patient Instructions   Infection appears viral   Recommend symptomatic treatment  Can take ibuprofen or tylenol as needed for pain or fever  Over the counter cough and cold medications to help with symptoms  Use salt water gargles for sore throat and throat lozenges  Cough drops as needed  Wash hands frequently to prevent the spread of infection  Symptoms may persist for 10-14 days  If not improving over the next 3 days, can start zithromax  Probiotics  Chief Complaint     Chief Complaint   Patient presents with    Cough     cough, chest cold for 5 days, seen here 3 days ago    Fever     for 2 days       History of Present Illness   80-year-old female here with complaint of ongoing sore throat, cough and fever  Symptoms have been present for over 3 days  Feels like she has some chest congestion and cough is productive  Pain across her back, is concerned about her kidneys  Was recently on Bactrim for a breast abscess  Throat culture done 3 days ago was negative for strep  Review of Systems   Review of Systems   Constitutional: Positive for chills and fever  Negative for appetite change  HENT: Positive for congestion and sore throat  Negative for ear discharge, ear pain, facial swelling, postnasal drip, sinus pressure and sneezing  Respiratory: Positive for cough and chest tightness  Negative for shortness of breath and wheezing  Musculoskeletal: Positive for back pain and myalgias  Neurological: Positive for headaches         Current Medications Current Outpatient Medications:     ACCU-CHEK FASTCLIX LANCETS MISC, by Does not apply route 2 (two) times a day, Disp: 102 each, Rfl: 5    Alcohol Swabs (ALCOHOL PADS) 70 % PADS, by Does not apply route 2 (two) times a day, Disp: 100 each, Rfl: 5    b complex vitamins tablet, Take 1 tablet by mouth daily, Disp: , Rfl:     Blood Glucose Monitoring Suppl (ACCU-CHEK GUIDE) w/Device KIT, by Does not apply route 2 (two) times a day, Disp: 1 kit, Rfl: 0    calcium citrate (CALCITRATE) 950 MG tablet, Take 2 tablets by mouth daily  , Disp: , Rfl:     CRANBERRY EXTRACT PO, Take by mouth, Disp: , Rfl:     escitalopram (LEXAPRO) 20 mg tablet, Take 1 tablet (20 mg total) by mouth daily, Disp: 30 tablet, Rfl: 3    ferrous sulfate 325 (65 Fe) mg tablet, Take 325 mg by mouth daily with breakfast, Disp: , Rfl:     fluticasone (FLONASE) 50 mcg/act nasal spray, SPRAY 1 SPRAY INTO EACH NOSTRIL EVERY DAY, Disp: 1 Bottle, Rfl: 3    glucose blood (ACCU-CHEK GUIDE) test strip, Use as instructed, Disp: 100 each, Rfl: 5    Lancets Misc  (ACCU-CHEK FASTCLIX LANCET) KIT, by Does not apply route 2 (two) times a day, Disp: 1 kit, Rfl: 0    Multiple Vitamin (MULTIVITAMIN) tablet, Take 1 tablet by mouth daily, Disp: , Rfl:     Potassium 99 MG TABS, Take 99 mg by mouth, Disp: , Rfl:     Vitamin D, Cholecalciferol, 1000 units CAPS, Take 1 tablet by mouth daily, Disp: , Rfl:     azithromycin (ZITHROMAX) 250 mg tablet, Take 2 tablets today then 1 tablet daily x 4 days, Disp: 6 tablet, Rfl: 0    famotidine (PEPCID) 20 mg tablet, Take 1 tablet (20 mg total) by mouth 2 (two) times a day (Patient not taking: Reported on 11/3/2019), Disp: 60 tablet, Rfl: 1    methocarbamol (ROBAXIN) 750 mg tablet, Take one tablet by mouth every 12 hours as needed (Patient not taking: Reported on 11/3/2019), Disp: 60 tablet, Rfl: 0    predniSONE 10 mg tablet, 30 mg by mouth daily for 3 days, then 20 mg by mouth daily for 3 days, then 10 mg by mouth daily for 3 days, then stop (Patient not taking: Reported on 10/31/2019), Disp: 18 tablet, Rfl: 0    Current Allergies     Allergies as of 2019 - Reviewed 2019   Allergen Reaction Noted    Aspirin Other (See Comments) 2016    Nsaids Other (See Comments) 2017    Codeine Itching 2016          The following portions of the patient's history were reviewed and updated as appropriate: allergies, current medications, past family history, past medical history, past social history, past surgical history and problem list    Past Medical History:   Diagnosis Date    Anxiety     Arthritis     Bariatric surgery status     Carpal tunnel syndrome     Heartburn     History of gastric bypass 2014    Hypertension     Last assessed 2014    Migraine     Obesity     Last assessed 2014    Postgastrectomy malabsorption     Restless leg syndrome     Seizures ([facility] [address] )      Past Surgical History:   Procedure Laterality Date     SECTION       SECTION N/A 2018    Procedure:  SECTION (); Surgeon: Robyn Jorgensen MD;  Location[facility];  Service: Obstetrics    CHOLECYSTECTOMY      GASTRIC BYPASS  2014    [place] REVISION OF CERVIX,NON OBSTETRICAL N/A 2017    Procedure: CERCLAGE CERVICAL;  Surgeon: Wilma Stanton MD;  Location: [facility];  Service: Obstetrics    TOOTH EXTRACTION       Family History   Problem Relation Age of Onset    Cancer Mother         Myosarcoma of the soft tissue    Other Mother         Total Thyroidectomy;  Thyroid mass    Coronary artery disease Father      Social History     Socioeconomic History    Marital status: /Civil Union     Spouse name: Not on file    Number of children: Not on file    Years of education: Not on file    Highest education level: Not on file   Occupational History    Not on file   Social Needs    Financial resource strain: Not on file    Food insecurity:     Worry: Not on file Inability: Not on file    Transportation needs:     Medical: Not on file     Non-medical: Not on file   Tobacco Use    Smoking status: Former Smoker     Packs/day: 0 25    Smokeless tobacco: Never Used   Substance and Sexual Activity    Alcohol use: No    Drug use: No    Sexual activity: Yes     Partners: Male   Lifestyle    Physical activity:     Days per week: Not on file     Minutes per session: Not on file    Stress: Not on file   Relationships    Social connections:     Talks on phone: Not on file     Gets together: Not on file     Attends Yarsanism service: Not on file     Active member of club or organization: Not on file     Attends meetings of clubs or organizations: Not on file     Relationship status: Not on file    Intimate partner violence:     Fear of current or ex partner: Not on file     Emotionally abused: Not on file     Physically abused: Not on file     Forced sexual activity: Not on file   Other Topics Concern    Not on file   Social History Narrative    Caffeine use     Medications have been verified  Objective   /64   Pulse 87   Temp 98 5 °F (36 9 °C) (Tympanic)   Resp 20   Ht 5' 4" (1 626 m)   Wt 103 kg (227 lb)   LMP 10/10/2019   SpO2 99%   BMI 38 96 kg/m²      Physical Exam   Physical Exam   Constitutional: She appears well-developed and well-nourished  No distress  HENT:   Head: Normocephalic and atraumatic  Right Ear: Tympanic membrane normal    Left Ear: Tympanic membrane normal    Nose: Mucosal edema present  No rhinorrhea  Right sinus exhibits no maxillary sinus tenderness and no frontal sinus tenderness  Left sinus exhibits no maxillary sinus tenderness and no frontal sinus tenderness  Mouth/Throat: Posterior oropharyngeal erythema present  No oropharyngeal exudate or posterior oropharyngeal edema  Eyes: Conjunctivae are normal    Cardiovascular: Normal rate, regular rhythm and normal heart sounds  No murmur heard    Pulmonary/Chest: Effort normal  She has decreased breath sounds  Abdominal: Normal appearance and bowel sounds are normal  There is generalized tenderness  There is no rigidity, no rebound, no guarding and no CVA tenderness  Nursing note and vitals reviewed

## 2019-11-03 NOTE — PATIENT INSTRUCTIONS
Infection appears viral   Recommend symptomatic treatment  Can take ibuprofen or tylenol as needed for pain or fever  Over the counter cough and cold medications to help with symptoms  Use salt water gargles for sore throat and throat lozenges  Cough drops as needed  Wash hands frequently to prevent the spread of infection  Symptoms may persist for 10-14 days  If not improving over the next 3 days, can start zithromax  Probiotics

## 2019-11-25 DIAGNOSIS — M79.10 MUSCULAR PAIN: ICD-10-CM

## 2019-11-25 RX ORDER — METHOCARBAMOL 750 MG/1
TABLET, FILM COATED ORAL
Qty: 60 TABLET | Refills: 0 | Status: SHIPPED | OUTPATIENT
Start: 2019-11-25 | End: 2019-12-26 | Stop reason: SDUPTHER

## 2019-11-25 NOTE — TELEPHONE ENCOUNTER
Received a call from [facility] requesting a refill on robaxin  Verified sig and pharmacy  Aware of 48 hr fill policy  Please advise

## 2019-12-26 DIAGNOSIS — M79.10 MUSCULAR PAIN: ICD-10-CM

## 2019-12-26 RX ORDER — METHOCARBAMOL 750 MG/1
TABLET, FILM COATED ORAL
Qty: 60 TABLET | Refills: 0 | Status: SHIPPED | OUTPATIENT
Start: 2019-12-26 | End: 2020-01-27 | Stop reason: SDUPTHER

## 2020-01-09 ENCOUNTER — OFFICE VISIT (OUTPATIENT)
Dept: URGENT CARE | Facility: CLINIC | Age: 45
End: 2020-01-09
Payer: COMMERCIAL

## 2020-01-09 VITALS
DIASTOLIC BLOOD PRESSURE: 66 MMHG | HEIGHT: 64 IN | WEIGHT: 232.8 LBS | BODY MASS INDEX: 39.75 KG/M2 | TEMPERATURE: 97 F | OXYGEN SATURATION: 96 % | SYSTOLIC BLOOD PRESSURE: 130 MMHG | HEART RATE: 70 BPM | RESPIRATION RATE: 18 BRPM

## 2020-01-09 DIAGNOSIS — R35.0 URINARY FREQUENCY: Primary | ICD-10-CM

## 2020-01-09 LAB
SL AMB  POCT GLUCOSE, UA: NEGATIVE
SL AMB LEUKOCYTE ESTERASE,UA: NEGATIVE
SL AMB POCT BILIRUBIN,UA: NEGATIVE
SL AMB POCT BLOOD,UA: ABNORMAL
SL AMB POCT CLARITY,UA: ABNORMAL
SL AMB POCT COLOR,UA: YELLOW
SL AMB POCT KETONES,UA: NEGATIVE
SL AMB POCT NITRITE,UA: NEGATIVE
SL AMB POCT PH,UA: 8
SL AMB POCT SPECIFIC GRAVITY,UA: 1
SL AMB POCT URINE PROTEIN: NEGATIVE
SL AMB POCT UROBILINOGEN: 0.2

## 2020-01-09 PROCEDURE — 99213 OFFICE O/P EST LOW 20 MIN: CPT | Performed by: PHYSICIAN ASSISTANT

## 2020-01-09 PROCEDURE — 87086 URINE CULTURE/COLONY COUNT: CPT | Performed by: PHYSICIAN ASSISTANT

## 2020-01-09 PROCEDURE — 99283 EMERGENCY DEPT VISIT LOW MDM: CPT | Performed by: PHYSICIAN ASSISTANT

## 2020-01-09 PROCEDURE — G0382 LEV 3 HOSP TYPE B ED VISIT: HCPCS | Performed by: PHYSICIAN ASSISTANT

## 2020-01-09 RX ORDER — CIPROFLOXACIN 500 MG/1
500 TABLET, FILM COATED ORAL EVERY 12 HOURS SCHEDULED
Qty: 14 TABLET | Refills: 0 | Status: SHIPPED | OUTPATIENT
Start: 2020-01-09 | End: 2020-01-16

## 2020-01-09 NOTE — PROGRESS NOTES
[facility]        NAME: Frankie Cyr is a 40 y o  female  : 1975    MRN: [id number]  DATE: 2020  TIME: 10:02 AM    Assessment and Plan   Urinary frequency [R35 0]  1  Urinary frequency  ciprofloxacin (CIPRO) 500 mg tablet    POCT urine dip auto non-scope    Urine culture         Patient Instructions     Start Cipro as prescribed  Follow up with PCP in 3-5 days  Proceed to  ER if symptoms worsen  Chief Complaint     Chief Complaint   Patient presents with    Possible UTI     Urinary frequency x 2 days         History of Present Illness       Patient presents with a 3 day history of urinary frequency  Patient denies any dysuria but does have up suprapubic pressure  Denies any flank pain fever chills nausea vomiting  Patient denies any chance of pregnancy and had a tubal ligation  Review of Systems   Review of Systems   Constitutional: Negative for chills and fever  Gastrointestinal: Negative for abdominal pain, nausea and vomiting  Genitourinary: Positive for frequency  Negative for difficulty urinating, dysuria and urgency  Musculoskeletal: Negative for myalgias  Skin: Negative for rash  Hematological: Negative for adenopathy  Current Medications       Current Outpatient Medications:     ACCU-CHEK FASTCLIX LANCETS MISC, by Does not apply route 2 (two) times a day, Disp: 102 each, Rfl: 5    Alcohol Swabs (ALCOHOL PADS) 70 % PADS, by Does not apply route 2 (two) times a day, Disp: 100 each, Rfl: 5    b complex vitamins tablet, Take 1 tablet by mouth daily, Disp: , Rfl:     Blood Glucose Monitoring Suppl (ACCU-CHEK GUIDE) w/Device KIT, by Does not apply route 2 (two) times a day, Disp: 1 kit, Rfl: 0    calcium citrate (CALCITRATE) 950 MG tablet, Take 2 tablets by mouth daily  , Disp: , Rfl:     CRANBERRY EXTRACT PO, Take by mouth, Disp: , Rfl:     escitalopram (LEXAPRO) 20 mg tablet, Take 1 tablet (20 mg total) by mouth daily, Disp: 30 tablet, Rfl: 3    ferrous sulfate 325 (65 Fe) mg tablet, Take 325 mg by mouth daily with breakfast, Disp: , Rfl:     fluticasone (FLONASE) 50 mcg/act nasal spray, SPRAY 1 SPRAY INTO EACH NOSTRIL EVERY DAY, Disp: 1 Bottle, Rfl: 3    glucose blood (ACCU-CHEK GUIDE) test strip, Use as instructed, Disp: 100 each, Rfl: 5    Lancets Misc  (ACCU-CHEK FASTCLIX LANCET) KIT, by Does not apply route 2 (two) times a day, Disp: 1 kit, Rfl: 0    methocarbamol (ROBAXIN) 750 mg tablet, Take one tablet by mouth every 12 hours as needed, Disp: 60 tablet, Rfl: 0    Multiple Vitamin (MULTIVITAMIN) tablet, Take 1 tablet by mouth daily, Disp: , Rfl:     Potassium 99 MG TABS, Take 99 mg by mouth, Disp: , Rfl:     Vitamin D, Cholecalciferol, 1000 units CAPS, Take 1 tablet by mouth daily, Disp: , Rfl:     ciprofloxacin (CIPRO) 500 mg tablet, Take 1 tablet (500 mg total) by mouth every 12 (twelve) hours for 7 days, Disp: 14 tablet, Rfl: 0    famotidine (PEPCID) 20 mg tablet, Take 1 tablet (20 mg total) by mouth 2 (two) times a day (Patient not taking: Reported on 11/3/2019), Disp: 60 tablet, Rfl: 1    predniSONE 10 mg tablet, 30 mg by mouth daily for 3 days, then 20 mg by mouth daily for 3 days, then 10 mg by mouth daily for 3 days, then stop (Patient not taking: Reported on 10/31/2019), Disp: 18 tablet, Rfl: 0    Current Allergies     Allergies as of 01/09/2020 - Reviewed 01/09/2020   Allergen Reaction Noted    Aspirin Other (See Comments) 12/04/2016    Nsaids Other (See Comments) 08/01/2017    Codeine Itching 01/17/2016            The following portions of the patient's history were reviewed and updated as appropriate: allergies, current medications, past family history, past medical history, past social history, past surgical history and problem list      Past Medical History:   Diagnosis Date    Anxiety     Arthritis     Bariatric surgery status     Carpal tunnel syndrome     Heartburn     History of gastric bypass 12/2014    Hypertension     Last assessed 2014    Migraine     Obesity     Last assessed 2014    Postgastrectomy malabsorption     Restless leg syndrome     Seizures ([facility] [address] )        Past Surgical History:   Procedure Laterality Date     SECTION       SECTION N/A 2018    Procedure:  SECTION (); Surgeon: Jass Rodriguez MD;  [facility];  Service: Obstetrics    CHOLECYSTECTOMY      GASTRIC BYPASS  2014    [place] REVISION OF CERVIX,NON OBSTETRICAL N/A 2017    Procedure: CERCLAGE CERVICAL;  Surgeon: Yesenia Hernandez MD;  [facility];  Service: Obstetrics    TOOTH EXTRACTION         Family History   Problem Relation Age of Onset    Cancer Mother         Myosarcoma of the soft tissue    Other Mother         Total Thyroidectomy; Thyroid mass    Coronary artery disease Father          Medications have been verified          Objective   /66   Pulse 70   Temp (!) 97 °F (36 1 °C) (Tympanic)   Resp 18   Ht 5' 4" (1 626 m)   Wt 106 kg (232 lb 12 8 oz)   SpO2 96%   BMI 39 96 kg/m²        Physical Exam     Physical Exam

## 2020-01-09 NOTE — PATIENT INSTRUCTIONS
Urinary Urgency and Frequency   WHAT YOU NEED TO KNOW:   Urinary urgency and frequency is a condition that increases how strongly or how often you need to urinate  The condition may also be called urgency-frequency syndrome  Urinary urgency means you feel such a strong need to urinate that you have trouble waiting  You may also feel discomfort in your bladder  Urinary frequency means you need to urinate many times during the day  This may also be called increased daytime frequency  You may be woken from sleep by the need to urinate  Urgency and frequency often happen together, but you may only have one  DISCHARGE INSTRUCTIONS:   Contact your healthcare provider if:   · Your urine is pink, or you notice blood in your urine  · You have pain with urination  · You continue to have symptoms even after you take your medicine  · You have new or worsening symptoms  · You have questions or concerns about your condition or care  Medicines: You may need any of the following:  · Medicines  may be given to relax your bladder and decrease urination  · Antibiotics  help prevent or treat a bacterial infection  · Take your medicine as directed  Contact your healthcare provider if you think your medicine is not helping or if you have side effects  Tell him or her if you are allergic to any medicine  Keep a list of the medicines, vitamins, and herbs you take  Include the amounts, and when and why you take them  Bring the list or the pill bottles to follow-up visits  Carry your medicine list with you in case of an emergency  Manage urinary urgency and frequency:   · Keep a record of your urination patterns for a few days  Write down the number of times you urinate over 24 hours, the amount, and if you have urine leakage  Record how strong the urge to urinate was each time  Your healthcare provider may also want you to record the type and amount of liquids you drink  · Train your bladder    Go to the bathroom at set times, such as every 2 hours, even if you do not feel the urge to go  You can also try to hold your urine when you feel the urge to go  For example, hold your urine for 5 minutes when you feel the urge to go  As that becomes easier, hold your urine for 10 minutes  Work up to every 3 or 4 hours to help control your bladder  · Limit liquids as directed  Limit liquids to decrease the amount you urinate  Ask how much liquid to drink each day and which liquids are best for you  You may need to avoid drinking liquids several hours before you go to sleep  Your healthcare provider may also recommend that you limit caffeine and alcohol  · Do Kegel exercises often  Kegel exercises help strengthen your pelvic muscles and improve bladder control  These muscles help you stop urinating  Squeeze these muscles tightly for 5 seconds like you are trying to stop the flow of urine  Then relax for 5 seconds  Gradually work up to squeezing for 10 seconds  Do 3 sets of 15 repetitions a day, or as directed  · Exercise regularly and maintain a healthy weight  Ask your healthcare provider how much you should weigh and about the best exercise plan for you  Extra weight puts pressure on your bladder and may make your symptoms worse  Ask your provider to help you create a safe weight loss plan if you are overweight  Follow up with your healthcare provider as directed: Your healthcare provider may refer you to a specialist to find the cause of your symptoms  Write down your questions so you remember to ask them during your visits  © 2017 [employer] Information is for End User's use only and may not be sold, redistributed or otherwise used for commercial purposes  All illustrations and images included in CareNotes® are the copyrighted property of [employer]  or [employer]  The above information is an  only   It is not intended as medical advice for individual conditions or treatments  Talk to your doctor, nurse or pharmacist before following any medical regimen to see if it is safe and effective for you

## 2020-01-10 LAB — BACTERIA UR CULT: NORMAL

## 2020-01-14 ENCOUNTER — TELEPHONE (OUTPATIENT)
Dept: BARIATRICS | Facility: CLINIC | Age: 45
End: 2020-01-14

## 2020-01-14 PROBLEM — Z98.890 STATUS POST DIGESTIVE SYSTEM SURGERY: Status: ACTIVE | Noted: 2020-01-14

## 2020-01-17 ENCOUNTER — TELEPHONE (OUTPATIENT)
Dept: INTERNAL MEDICINE CLINIC | Facility: CLINIC | Age: 45
End: 2020-01-17

## 2020-01-17 DIAGNOSIS — R10.30 LOWER ABDOMINAL PAIN: Primary | ICD-10-CM

## 2020-01-17 DIAGNOSIS — R35.0 URINE FREQUENCY: ICD-10-CM

## 2020-01-17 NOTE — TELEPHONE ENCOUNTER
Pt called stated she was at [facility]  On 1/9and had a UTI  Stated was given Cipro but still feels she has the UTI  Stated she still has a lot of discomfort in her lower abdomen  She did not know if they gave her the correct Abx   She was sasking what to do

## 2020-01-17 NOTE — TELEPHONE ENCOUNTER
Urine culture in UC was negative for a UTI she the antibiotic will not help I can repeat another urine

## 2020-01-17 NOTE — TELEPHONE ENCOUNTER
I put the urine in  Soo Nick is not sure if she is going to get it done  I did inform her that she may need to call gyn as per our conversation

## 2020-01-27 DIAGNOSIS — F32.A ANXIETY AND DEPRESSION: ICD-10-CM

## 2020-01-27 DIAGNOSIS — M79.10 MUSCULAR PAIN: ICD-10-CM

## 2020-01-27 DIAGNOSIS — F41.9 ANXIETY AND DEPRESSION: ICD-10-CM

## 2020-01-27 RX ORDER — METHOCARBAMOL 750 MG/1
TABLET, FILM COATED ORAL
Qty: 60 TABLET | Refills: 0 | Status: SHIPPED | OUTPATIENT
Start: 2020-01-27 | End: 2020-02-25 | Stop reason: SDUPTHER

## 2020-01-27 RX ORDER — ESCITALOPRAM OXALATE 20 MG/1
20 TABLET ORAL DAILY
Qty: 30 TABLET | Refills: 3 | Status: SHIPPED | OUTPATIENT
Start: 2020-01-27 | End: 2020-06-12 | Stop reason: SDUPTHER

## 2020-02-07 ENCOUNTER — OFFICE VISIT (OUTPATIENT)
Dept: URGENT CARE | Facility: CLINIC | Age: 45
End: 2020-02-07
Payer: COMMERCIAL

## 2020-02-07 VITALS
OXYGEN SATURATION: 99 % | DIASTOLIC BLOOD PRESSURE: 75 MMHG | HEART RATE: 74 BPM | RESPIRATION RATE: 18 BRPM | SYSTOLIC BLOOD PRESSURE: 122 MMHG | TEMPERATURE: 97.9 F

## 2020-02-07 DIAGNOSIS — J01.90 ACUTE SINUSITIS, RECURRENCE NOT SPECIFIED, UNSPECIFIED LOCATION: Primary | ICD-10-CM

## 2020-02-07 PROCEDURE — G0382 LEV 3 HOSP TYPE B ED VISIT: HCPCS | Performed by: PHYSICIAN ASSISTANT

## 2020-02-07 PROCEDURE — 99283 EMERGENCY DEPT VISIT LOW MDM: CPT | Performed by: PHYSICIAN ASSISTANT

## 2020-02-07 PROCEDURE — 99213 OFFICE O/P EST LOW 20 MIN: CPT | Performed by: PHYSICIAN ASSISTANT

## 2020-02-07 RX ORDER — AMOXICILLIN 875 MG/1
875 TABLET, COATED ORAL 2 TIMES DAILY
Qty: 20 TABLET | Refills: 0 | Status: SHIPPED | OUTPATIENT
Start: 2020-02-07 | End: 2020-02-17

## 2020-02-07 RX ORDER — PREDNISONE 10 MG/1
TABLET ORAL
Qty: 26 TABLET | Refills: 0 | Status: SHIPPED | OUTPATIENT
Start: 2020-02-07 | End: 2020-10-05

## 2020-02-07 NOTE — PROGRESS NOTES
[facility]    NAME: Oneil Moise is a 40 y o  female  : 1975    MRN: [id number]  DATE: 2020  TIME: 11:50 AM    Assessment and Plan   Acute sinusitis, recurrence not specified, unspecified location [J01 90]  1  Acute sinusitis, recurrence not specified, unspecified location  amoxicillin (AMOXIL) 875 mg tablet    predniSONE 10 mg tablet       Patient Instructions     Patient Instructions   I have prescribed an antibiotic for the infection  Please take the antibiotic as prescribed and finish the entire prescription  I recommend that the patient takes an over the counter probiotic or eats yogurt with live cultures in it [place]) to keep good bacteria in the gut and help prevent diarrhea  Wash hands frequently to prevent the spread of infection  Can use over the counter cough and cold medications to help with symptoms  Ibuprofen and/or tylenol as needed for pain or fever  If not improving over the next 7-10 days, follow up with PCP  Prednisone as directed  Chief Complaint     Chief Complaint   Patient presents with    Sinus Problem     Pt c/o sinus congestion since yesterday  History of Present Illness   22-year-old female here with complaint of sinus pressure and sinus congestion  Symptoms present the last 3-4 days  Number worse  Patient states she has learned pain behind her eyes  Nasal congestion with postnasal drip  Slight cough  No fever chills  Review of Systems   Review of Systems   Constitutional: Negative for appetite change, chills and fever  HENT: Positive for congestion, postnasal drip, sinus pressure and sore throat  Negative for ear discharge, ear pain, facial swelling and sneezing  Respiratory: Positive for cough  Negative for shortness of breath and wheezing  Neurological: Positive for headaches         Current Medications     Current Outpatient Medications:     ACCU-CHEK FASTCLIX LANCETS MISC, by Does not apply route 2 (two) times a day, Disp: 102 each, Rfl: 5    Alcohol Swabs (ALCOHOL PADS) 70 % PADS, by Does not apply route 2 (two) times a day, Disp: 100 each, Rfl: 5    amoxicillin (AMOXIL) 875 mg tablet, Take 1 tablet (875 mg total) by mouth 2 (two) times a day for 10 days, Disp: 20 tablet, Rfl: 0    b complex vitamins tablet, Take 1 tablet by mouth daily, Disp: , Rfl:     Blood Glucose Monitoring Suppl (ACCU-CHEK GUIDE) w/Device KIT, by Does not apply route 2 (two) times a day, Disp: 1 kit, Rfl: 0    calcium citrate (CALCITRATE) 950 MG tablet, Take 2 tablets by mouth daily  , Disp: , Rfl:     CRANBERRY EXTRACT PO, Take by mouth, Disp: , Rfl:     escitalopram (LEXAPRO) 20 mg tablet, Take 1 tablet (20 mg total) by mouth daily, Disp: 30 tablet, Rfl: 3    famotidine (PEPCID) 20 mg tablet, Take 1 tablet (20 mg total) by mouth 2 (two) times a day (Patient not taking: Reported on 11/3/2019), Disp: 60 tablet, Rfl: 1    ferrous sulfate 325 (65 Fe) mg tablet, Take 325 mg by mouth daily with breakfast, Disp: , Rfl:     fluticasone (FLONASE) 50 mcg/act nasal spray, SPRAY 1 SPRAY INTO EACH NOSTRIL EVERY DAY, Disp: 1 Bottle, Rfl: 3    glucose blood (ACCU-CHEK GUIDE) test strip, Use as instructed, Disp: 100 each, Rfl: 5    Lancets Misc  (ACCU-CHEK FASTCLIX LANCET) KIT, by Does not apply route 2 (two) times a day, Disp: 1 kit, Rfl: 0    methocarbamol (ROBAXIN) 750 mg tablet, Take one tablet by mouth every 12 hours as needed, Disp: 60 tablet, Rfl: 0    Multiple Vitamin (MULTIVITAMIN) tablet, Take 1 tablet by mouth daily, Disp: , Rfl:     Potassium 99 MG TABS, Take 99 mg by mouth, Disp: , Rfl:     predniSONE 10 mg tablet, Take 3 tabs BID X 2 days, 2 tabs BID X 2 days, 1 tab BID X 2 days, 1 tab daily X 2 days, Disp: 26 tablet, Rfl: 0    Vitamin D, Cholecalciferol, 1000 units CAPS, Take 1 tablet by mouth daily, Disp: , Rfl:     Current Allergies     Allergies as of 02/07/2020 - Reviewed 02/07/2020   Allergen Reaction Noted    Aspirin Other (See Comments) 2016    Nsaids Other (See Comments) 2017    Codeine Itching 2016          The following portions of the patient's history were reviewed and updated as appropriate: allergies, current medications, past family history, past medical history, past social history, past surgical history and problem list    Past Medical History:   Diagnosis Date    Anxiety     Arthritis     Bariatric surgery status     Carpal tunnel syndrome     Heartburn     History of gastric bypass 2014    Hypertension     Last assessed 2014    Migraine     Obesity     Last assessed 2014    Postgastrectomy malabsorption     Restless leg syndrome     Seizures ([facility] [address] )      Past Surgical History:   Procedure Laterality Date     SECTION       SECTION N/A 2018    Procedure:  SECTION (); Surgeon: Hayley Palacios MD;  [facility];  Service: Obstetrics    CHOLECYSTECTOMY      GASTRIC BYPASS  2014    GA REVISION OF CERVIX,NON OBSTETRICAL N/A 2017    Procedure: CERCLAGE CERVICAL;  Surgeon: Luis Montilla MD;  [facility];  Service: Obstetrics    TOOTH EXTRACTION       Family History   Problem Relation Age of Onset    Cancer Mother         Myosarcoma of the soft tissue    Other Mother         Total Thyroidectomy;  Thyroid mass    Coronary artery disease Father      Social History     Socioeconomic History    Marital status: /Civil Union     Spouse name: Not on file    Number of children: Not on file    Years of education: Not on file    Highest education level: Not on file   Occupational History    Not on file   Social Needs    Financial resource strain: Not on file    Food insecurity:     Worry: Not on file     Inability: Not on file    Transportation needs:     Medical: Not on file     Non-medical: Not on file   Tobacco Use    Smoking status: Former Smoker     Packs/day: 0 25    Smokeless tobacco: Never Used Substance and Sexual Activity    Alcohol use: No    Drug use: No    Sexual activity: Yes     Partners: Male   Lifestyle    Physical activity:     Days per week: Not on file     Minutes per session: Not on file    Stress: Not on file   Relationships    Social connections:     Talks on phone: Not on file     Gets together: Not on file     Attends Taoism service: Not on file     Active member of club or organization: Not on file     Attends meetings of clubs or organizations: Not on file     Relationship status: Not on file    Intimate partner violence:     Fear of current or ex partner: Not on file     Emotionally abused: Not on file     Physically abused: Not on file     Forced sexual activity: Not on file   Other Topics Concern    Not on file   Social History Narrative    Caffeine use     Medications have been verified  Objective   /75   Pulse 74   Temp 97 9 °F (36 6 °C)   Resp 18   SpO2 99%      Physical Exam   Physical Exam   Constitutional: She appears well-developed and well-nourished  No distress  HENT:   Head: Normocephalic and atraumatic  Right Ear: Tympanic membrane normal    Left Ear: Tympanic membrane normal    Nose: Mucosal edema present  No rhinorrhea  Right sinus exhibits maxillary sinus tenderness  Right sinus exhibits no frontal sinus tenderness  Left sinus exhibits maxillary sinus tenderness  Left sinus exhibits no frontal sinus tenderness  Mouth/Throat: Posterior oropharyngeal erythema present  No oropharyngeal exudate or posterior oropharyngeal edema  Eyes: Conjunctivae are normal    Neck: Normal range of motion  Cardiovascular: Normal rate, regular rhythm and normal heart sounds  No murmur heard  Pulmonary/Chest: Effort normal and breath sounds normal  No respiratory distress  Nursing note and vitals reviewed

## 2020-02-07 NOTE — PATIENT INSTRUCTIONS
I have prescribed an antibiotic for the infection  Please take the antibiotic as prescribed and finish the entire prescription  I recommend that the patient takes an over the counter probiotic or eats yogurt with live cultures in it [place]) to keep good bacteria in the gut and help prevent diarrhea  Wash hands frequently to prevent the spread of infection  Can use over the counter cough and cold medications to help with symptoms  Ibuprofen and/or tylenol as needed for pain or fever  If not improving over the next 7-10 days, follow up with PCP  Prednisone as directed

## 2020-02-25 DIAGNOSIS — M79.10 MUSCULAR PAIN: ICD-10-CM

## 2020-02-25 RX ORDER — METHOCARBAMOL 750 MG/1
TABLET, FILM COATED ORAL
Qty: 60 TABLET | Refills: 0 | Status: SHIPPED | OUTPATIENT
Start: 2020-02-25 | End: 2020-03-23 | Stop reason: SDUPTHER

## 2020-02-28 ENCOUNTER — OFFICE VISIT (OUTPATIENT)
Dept: INTERNAL MEDICINE CLINIC | Facility: CLINIC | Age: 45
End: 2020-02-28
Payer: COMMERCIAL

## 2020-02-28 ENCOUNTER — TELEPHONE (OUTPATIENT)
Dept: ADMINISTRATIVE | Facility: OTHER | Age: 45
End: 2020-02-28

## 2020-02-28 ENCOUNTER — APPOINTMENT (OUTPATIENT)
Dept: LAB | Facility: CLINIC | Age: 45
End: 2020-02-28
Payer: COMMERCIAL

## 2020-02-28 VITALS
DIASTOLIC BLOOD PRESSURE: 78 MMHG | HEART RATE: 89 BPM | WEIGHT: 238.1 LBS | BODY MASS INDEX: 40.65 KG/M2 | SYSTOLIC BLOOD PRESSURE: 116 MMHG | RESPIRATION RATE: 16 BRPM | HEIGHT: 64 IN | OXYGEN SATURATION: 98 % | TEMPERATURE: 97.5 F

## 2020-02-28 DIAGNOSIS — E53.8 VITAMIN B 12 DEFICIENCY: ICD-10-CM

## 2020-02-28 DIAGNOSIS — F32.A ANXIETY AND DEPRESSION: ICD-10-CM

## 2020-02-28 DIAGNOSIS — M25.551 PAIN OF BOTH HIP JOINTS: ICD-10-CM

## 2020-02-28 DIAGNOSIS — M79.605 PAIN IN BOTH LOWER EXTREMITIES: Primary | ICD-10-CM

## 2020-02-28 DIAGNOSIS — Z13.29 SCREENING FOR THYROID DISORDER: ICD-10-CM

## 2020-02-28 DIAGNOSIS — M62.838 NECK MUSCLE SPASM: ICD-10-CM

## 2020-02-28 DIAGNOSIS — Z12.39 SCREENING FOR BREAST CANCER: ICD-10-CM

## 2020-02-28 DIAGNOSIS — M25.552 PAIN OF BOTH HIP JOINTS: ICD-10-CM

## 2020-02-28 DIAGNOSIS — R63.5 WEIGHT GAIN: ICD-10-CM

## 2020-02-28 DIAGNOSIS — F41.9 ANXIETY AND DEPRESSION: ICD-10-CM

## 2020-02-28 DIAGNOSIS — Z13.6 SCREENING FOR CARDIOVASCULAR CONDITION: ICD-10-CM

## 2020-02-28 DIAGNOSIS — Z98.84 BARIATRIC SURGERY STATUS: ICD-10-CM

## 2020-02-28 DIAGNOSIS — E55.9 VITAMIN D INSUFFICIENCY: ICD-10-CM

## 2020-02-28 DIAGNOSIS — M51.36 DEGENERATIVE DISC DISEASE, LUMBAR: ICD-10-CM

## 2020-02-28 DIAGNOSIS — M79.604 PAIN IN BOTH LOWER EXTREMITIES: Primary | ICD-10-CM

## 2020-02-28 PROBLEM — N61.0 CELLULITIS OF RIGHT BREAST: Status: RESOLVED | Noted: 2019-10-21 | Resolved: 2020-02-28

## 2020-02-28 PROBLEM — J34.89 SINUS PRESSURE: Status: RESOLVED | Noted: 2019-10-21 | Resolved: 2020-02-28

## 2020-02-28 LAB
25(OH)D3 SERPL-MCNC: 40.6 NG/ML (ref 30–100)
ALBUMIN SERPL BCP-MCNC: 3.3 G/DL (ref 3.5–5)
ALP SERPL-CCNC: 85 U/L (ref 46–116)
ALT SERPL W P-5'-P-CCNC: 41 U/L (ref 12–78)
ANION GAP SERPL CALCULATED.3IONS-SCNC: 5 MMOL/L (ref 4–13)
AST SERPL W P-5'-P-CCNC: 18 U/L (ref 5–45)
BASOPHILS # BLD AUTO: 0.02 THOUSANDS/ΜL (ref 0–0.1)
BASOPHILS NFR BLD AUTO: 0 % (ref 0–1)
BILIRUB SERPL-MCNC: 0.4 MG/DL (ref 0.2–1)
BUN SERPL-MCNC: 15 MG/DL (ref 5–25)
CALCIUM SERPL-MCNC: 9.1 MG/DL (ref 8.3–10.1)
CHLORIDE SERPL-SCNC: 114 MMOL/L (ref 100–108)
CHOLEST SERPL-MCNC: 154 MG/DL (ref 50–200)
CO2 SERPL-SCNC: 22 MMOL/L (ref 21–32)
CREAT SERPL-MCNC: 0.69 MG/DL (ref 0.6–1.3)
CRP SERPL QL: 7.6 MG/L
EOSINOPHIL # BLD AUTO: 0.07 THOUSAND/ΜL (ref 0–0.61)
EOSINOPHIL NFR BLD AUTO: 1 % (ref 0–6)
ERYTHROCYTE [DISTWIDTH] IN BLOOD BY AUTOMATED COUNT: 15.3 % (ref 11.6–15.1)
ERYTHROCYTE [SEDIMENTATION RATE] IN BLOOD: 39 MM/HOUR (ref 0–20)
GFR SERPL CREATININE-BSD FRML MDRD: 106 ML/MIN/1.73SQ M
GLUCOSE SERPL-MCNC: 95 MG/DL (ref 65–140)
HCT VFR BLD AUTO: 39.6 % (ref 34.8–46.1)
HDLC SERPL-MCNC: 49 MG/DL
HGB BLD-MCNC: 12.4 G/DL (ref 11.5–15.4)
IMM GRANULOCYTES # BLD AUTO: 0.01 THOUSAND/UL (ref 0–0.2)
IMM GRANULOCYTES NFR BLD AUTO: 0 % (ref 0–2)
INSULIN SERPL-ACNC: 13.1 MU/L (ref 3–25)
LDLC SERPL CALC-MCNC: 87 MG/DL (ref 0–100)
LYMPHOCYTES # BLD AUTO: 1.37 THOUSANDS/ΜL (ref 0.6–4.47)
LYMPHOCYTES NFR BLD AUTO: 24 % (ref 14–44)
MCH RBC QN AUTO: 26.8 PG (ref 26.8–34.3)
MCHC RBC AUTO-ENTMCNC: 31.3 G/DL (ref 31.4–37.4)
MCV RBC AUTO: 86 FL (ref 82–98)
MONOCYTES # BLD AUTO: 0.39 THOUSAND/ΜL (ref 0.17–1.22)
MONOCYTES NFR BLD AUTO: 7 % (ref 4–12)
NEUTROPHILS # BLD AUTO: 3.96 THOUSANDS/ΜL (ref 1.85–7.62)
NEUTS SEG NFR BLD AUTO: 68 % (ref 43–75)
NONHDLC SERPL-MCNC: 105 MG/DL
NRBC BLD AUTO-RTO: 0 /100 WBCS
PLATELET # BLD AUTO: 282 THOUSANDS/UL (ref 149–390)
PMV BLD AUTO: 10 FL (ref 8.9–12.7)
POTASSIUM SERPL-SCNC: 4 MMOL/L (ref 3.5–5.3)
PROT SERPL-MCNC: 7.1 G/DL (ref 6.4–8.2)
RBC # BLD AUTO: 4.63 MILLION/UL (ref 3.81–5.12)
SODIUM SERPL-SCNC: 141 MMOL/L (ref 136–145)
TRIGL SERPL-MCNC: 89 MG/DL
TSH SERPL DL<=0.05 MIU/L-ACNC: 2.13 UIU/ML (ref 0.36–3.74)
WBC # BLD AUTO: 5.82 THOUSAND/UL (ref 4.31–10.16)

## 2020-02-28 PROCEDURE — 36415 COLL VENOUS BLD VENIPUNCTURE: CPT

## 2020-02-28 PROCEDURE — 3008F BODY MASS INDEX DOCD: CPT | Performed by: NURSE PRACTITIONER

## 2020-02-28 PROCEDURE — 83525 ASSAY OF INSULIN: CPT

## 2020-02-28 PROCEDURE — 80053 COMPREHEN METABOLIC PANEL: CPT

## 2020-02-28 PROCEDURE — 86430 RHEUMATOID FACTOR TEST QUAL: CPT

## 2020-02-28 PROCEDURE — 85652 RBC SED RATE AUTOMATED: CPT

## 2020-02-28 PROCEDURE — 82306 VITAMIN D 25 HYDROXY: CPT

## 2020-02-28 PROCEDURE — 86038 ANTINUCLEAR ANTIBODIES: CPT

## 2020-02-28 PROCEDURE — 85025 COMPLETE CBC W/AUTO DIFF WBC: CPT

## 2020-02-28 PROCEDURE — 99213 OFFICE O/P EST LOW 20 MIN: CPT | Performed by: NURSE PRACTITIONER

## 2020-02-28 PROCEDURE — 84443 ASSAY THYROID STIM HORMONE: CPT

## 2020-02-28 PROCEDURE — 86140 C-REACTIVE PROTEIN: CPT

## 2020-02-28 PROCEDURE — 80061 LIPID PANEL: CPT

## 2020-02-28 PROCEDURE — 1036F TOBACCO NON-USER: CPT | Performed by: NURSE PRACTITIONER

## 2020-02-28 NOTE — TELEPHONE ENCOUNTER
Upon review of the In Basket request and the patient's chart, initial outreach has been made via fax, please see Contacts section for details  A second outreach attempt will be made within 3 business days      Thank you  Ash Matos MA

## 2020-02-28 NOTE — LETTER
Procedure Request Form: Cervical Cancer Screening      Date Requested: 20  Patient: Kami Haque  Patient : 1975   Referring Provider: Mirtha [id number], CRNP        Date of Procedure ______________________________       The above patient has informed us that they have completed their   most recent Cervical Cancer Screening at your facility  Please complete   this form and attach all corresponding procedure reports/results  Comments __________________________________________________________  ____________________________________________________________________  ____________________________________________________________________  ____________________________________________________________________    Facility Completing Procedure _________________________________________    Form Completed By (print name) _______________________________________      Signature __________________________________________________________      These reports are needed for  compliance    Please fax this completed form and a copy of the procedure report to our office located at [place] [address] as soon as possible to [phone] matt Reina: Phone [phone]    We thank you for your assistance in treating our mutual patient

## 2020-02-28 NOTE — TELEPHONE ENCOUNTER
----- Message from [address] [place] [address] sent at 2/28/2020 10:21 AM EST -----  Can you please obtain and result Gyn exam from Dr Francisco Olivas [phone]    Thanks Oliva Cline

## 2020-02-28 NOTE — PROGRESS NOTES
Assessment/Plan: Will have labs done from July that were put in system that patient did not have done  Will send for PT and will order EMG of both lower legs  Will refer to an Allergist due to her continued congestion and allergy symptoms  Will notify her once labs are back  Will follow up as needed  No problem-specific Assessment & Plan notes found for this encounter  Problem List Items Addressed This Visit        Other    Screening for breast cancer    Relevant Orders    Mammo screening bilateral w 3d & cad    Pain in both lower extremities - Primary    Relevant Orders    Ambulatory referral to Physical Therapy    EMG 2 limb lower extremity            Subjective:      Patient ID: Jayme Batres is a 40 y o  female  Beverly Wilcox is here today for an acute visit  She states she is having leg pain and hip pain  She states her legs are just constantly achy  She was at a chiropractor and they did tell her her hips are off  She states she does have back issues but can not take the pain anymore  She did have gastric bypass surgery done and has gained a lot of weight back since having her daughter  She also is having persistent allergy issues and is always congested and did see the eye doctor which did tell her her eyes are damaged  She offers no other issues  The following portions of the patient's history were reviewed and updated as appropriate:   She  has a past medical history of Anxiety, Arthritis, Bariatric surgery status, Carpal tunnel syndrome, Heartburn, History of gastric bypass (12/2014), Hypertension, Migraine, Obesity, Postgastrectomy malabsorption, Restless leg syndrome, and Seizures ([facility] [address] )    She   Patient Active Problem List    Diagnosis Date Noted    Screening for breast cancer 02/28/2020    Pain in both lower extremities 02/28/2020    Status post digestive system surgery 01/14/2020    Cluster headache, not intractable 10/21/2019    Degenerative disc disease, lumbar 07/29/2019    Pain of both hip joints 2019    Hypoglycemia 2019    Reactive hypoglycemia 01/15/2019    Muscular pain 01/15/2019    Obesity, Class I, BMI 30-34 9 01/15/2019    Weight gain 01/15/2019    Hematuria 01/10/2019    Anxiety and depression 01/10/2019    Vitamin D insufficiency 01/10/2019    History of bilateral tubal ligation 2018    Bariatric surgery status 2018    Smoker 2017    Sciatica 2016    Neck nodule 10/19/2016    Panniculus 2016    Neck muscle spasm 2016    Carpal tunnel syndrome 2016    Fatigue 2015    Postsurgical malabsorption 2014    Prediabetes 2014    Restless legs syndrome 2014    Vertigo 2014    Vitamin B 12 deficiency 2014    Anxiety disorder 2014    Benign paroxysmal vertigo 2014     She  has a past surgical history that includes Gastric bypass (2014);  section; Cholecystectomy; pr revision of cervix,non obstetrical (N/A, 2017);  section (N/A, 2018); and Tooth extraction  Her family history includes Cancer in her mother; Coronary artery disease in her father; Other in her mother  She  reports that she has quit smoking  She smoked 0 25 packs per day  She has never used smokeless tobacco  She reports that she does not drink alcohol or use drugs  Current Outpatient Medications   Medication Sig Dispense Refill    ACCU-CHEK FASTCLIX LANCETS MISC by Does not apply route 2 (two) times a day 102 each 5    Alcohol Swabs (ALCOHOL PADS) 70 % PADS by Does not apply route 2 (two) times a day 100 each 5    b complex vitamins tablet Take 1 tablet by mouth daily      Blood Glucose Monitoring Suppl (ACCU-CHEK GUIDE) w/Device KIT by Does not apply route 2 (two) times a day 1 kit 0    calcium citrate (CALCITRATE) 950 MG tablet Take 2 tablets by mouth daily        CRANBERRY EXTRACT PO Take by mouth      escitalopram (LEXAPRO) 20 mg tablet Take 1 tablet (20 mg total) by mouth daily 30 tablet 3    famotidine (PEPCID) 20 mg tablet Take 1 tablet (20 mg total) by mouth 2 (two) times a day (Patient not taking: Reported on 11/3/2019) 60 tablet 1    ferrous sulfate 325 (65 Fe) mg tablet Take 325 mg by mouth daily with breakfast      fluticasone (FLONASE) 50 mcg/act nasal spray SPRAY 1 SPRAY INTO EACH NOSTRIL EVERY DAY 1 Bottle 3    glucose blood (ACCU-CHEK GUIDE) test strip Use as instructed 100 each 5    Lancets Misc  (ACCU-CHEK FASTCLIX LANCET) KIT by Does not apply route 2 (two) times a day 1 kit 0    methocarbamol (ROBAXIN) 750 mg tablet Take one tablet by mouth every 12 hours as needed 60 tablet 0    Multiple Vitamin (MULTIVITAMIN) tablet Take 1 tablet by mouth daily      Potassium 99 MG TABS Take 99 mg by mouth      predniSONE 10 mg tablet Take 3 tabs BID X 2 days, 2 tabs BID X 2 days, 1 tab BID X 2 days, 1 tab daily X 2 days 26 tablet 0    Vitamin D, Cholecalciferol, 1000 units CAPS Take 1 tablet by mouth daily       No current facility-administered medications for this visit  Current Outpatient Medications on File Prior to Visit   Medication Sig    ACCU-CHEK FASTCLIX LANCETS MISC by Does not apply route 2 (two) times a day    Alcohol Swabs (ALCOHOL PADS) 70 % PADS by Does not apply route 2 (two) times a day    b complex vitamins tablet Take 1 tablet by mouth daily    Blood Glucose Monitoring Suppl (ACCU-CHEK GUIDE) w/Device KIT by Does not apply route 2 (two) times a day    calcium citrate (CALCITRATE) 950 MG tablet Take 2 tablets by mouth daily      CRANBERRY EXTRACT PO Take by mouth    escitalopram (LEXAPRO) 20 mg tablet Take 1 tablet (20 mg total) by mouth daily    famotidine (PEPCID) 20 mg tablet Take 1 tablet (20 mg total) by mouth 2 (two) times a day (Patient not taking: Reported on 11/3/2019)    ferrous sulfate 325 (65 Fe) mg tablet Take 325 mg by mouth daily with breakfast    fluticasone (FLONASE) 50 mcg/act nasal spray SPRAY 1 SPRAY INTO EACH NOSTRIL EVERY DAY    glucose blood (ACCU-CHEK GUIDE) test strip Use as instructed    Lancets Misc  (ACCU-CHEK FASTCLIX LANCET) KIT by Does not apply route 2 (two) times a day    methocarbamol (ROBAXIN) 750 mg tablet Take one tablet by mouth every 12 hours as needed    Multiple Vitamin (MULTIVITAMIN) tablet Take 1 tablet by mouth daily    Potassium 99 MG TABS Take 99 mg by mouth    predniSONE 10 mg tablet Take 3 tabs BID X 2 days, 2 tabs BID X 2 days, 1 tab BID X 2 days, 1 tab daily X 2 days    Vitamin D, Cholecalciferol, 1000 units CAPS Take 1 tablet by mouth daily     No current facility-administered medications on file prior to visit  She is allergic to aspirin; nsaids; and codeine       Review of Systems   Constitutional: Negative  HENT: Negative  Eyes: Negative  Respiratory: Negative  Cardiovascular: Negative  Gastrointestinal: Negative  Endocrine: Negative  Genitourinary: Negative  Musculoskeletal: Positive for arthralgias and myalgias  Skin: Negative  Allergic/Immunologic: Negative  Neurological: Negative  Hematological: Negative  Psychiatric/Behavioral: Negative  Objective:      /78 (BP Location: Right arm, Patient Position: Sitting, Cuff Size: Large)   Pulse 89   Temp 97 5 °F (36 4 °C) (Temporal)   Resp 16   Ht 5' 4" (1 626 m)   Wt 108 kg (238 lb 1 6 oz)   SpO2 98%   BMI 40 87 kg/m²          Physical Exam   Constitutional: She is oriented to person, place, and time  She appears well-developed and well-nourished  HENT:   Head: Normocephalic and atraumatic  Right Ear: External ear normal    Left Ear: External ear normal    Nose: Nose normal    Mouth/Throat: Oropharynx is clear and moist    Eyes: Pupils are equal, round, and reactive to light  Conjunctivae and EOM are normal    Neck: Normal range of motion  Neck supple  Cardiovascular: Normal rate, regular rhythm, normal heart sounds and intact distal pulses     Pulmonary/Chest: Effort normal and breath sounds normal    Abdominal: Soft  Bowel sounds are normal    Musculoskeletal: Normal range of motion  She exhibits tenderness  Neurological: She is alert and oriented to person, place, and time  Skin: Skin is warm and dry  Capillary refill takes less than 2 seconds  Psychiatric: She has a normal mood and affect  Her behavior is normal  Judgment and thought content normal    Vitals reviewed

## 2020-02-28 NOTE — LETTER
Procedure Request Form: Cervical Cancer Screening      Date Requested: 20  Patient: Rey Kaye  Patient : 1975   Referring Provider: MAXIMINO Murillo        Date of Procedure ______________________________       The above patient has informed us that they have completed their   most recent Cervical Cancer Screening at your facility  Please complete   this form and attach all corresponding procedure reports/results  Comments 2015 is last pap we have  Thank you    ____________________________________________________________________  ____________________________________________________________________  ____________________________________________________________________    Facility Completing Procedure _________________________________________    Form Completed By (print name) _______________________________________      Signature __________________________________________________________      These reports are needed for  compliance    Please fax this completed form and a copy of the procedure report to our office located at [address] as soon as possible to [phone] attention Sharonda Guerrero: Phone [phone]    We thank you for your assistance in treating our mutual patient

## 2020-02-29 LAB — RHEUMATOID FACT SER QL LA: NEGATIVE

## 2020-03-02 LAB — RYE IGE QN: NEGATIVE

## 2020-03-05 ENCOUNTER — TELEPHONE (OUTPATIENT)
Dept: NEUROLOGY | Facility: CLINIC | Age: 45
End: 2020-03-05

## 2020-03-09 ENCOUNTER — TELEPHONE (OUTPATIENT)
Dept: INTERNAL MEDICINE CLINIC | Facility: CLINIC | Age: 45
End: 2020-03-09

## 2020-03-09 NOTE — TELEPHONE ENCOUNTER
Received a call from Catherine's boyfriend  He states that there's swelling in her feet and knuckles, not a lot, more like a joint swelling  But also c/o chest discomfort and sob x     He states that he does believe she is just really anxious due to the numbers being elevated, she's been googling the symptoms and numbers and everything points to cancer  Did speak with Sultana Hager, and she stated that she needs to see rheumatology, and she can take tylenol arthritis  Did call him back, and phone doesn't have vm set up

## 2020-03-09 NOTE — TELEPHONE ENCOUNTER
As a follow-up, a second attempt has been made for outreach via fax, please see Contacts section for details  A third and final attempt will be made within 3 business days      Thank you  Caryn Haji MA

## 2020-03-10 NOTE — TELEPHONE ENCOUNTER
Upon review of the In Basket request we were able to locate, review, and update the patient chart as requested for Pap Smear (HPV) aka Cervical Cancer Screening  Any additional questions or concerns should be emailed to the Practice Liaisons via [email or web address] email, please do not reply via In Basket      Thank you  Paula Christensen MA

## 2020-03-23 DIAGNOSIS — M79.10 MUSCULAR PAIN: ICD-10-CM

## 2020-03-23 RX ORDER — METHOCARBAMOL 750 MG/1
TABLET, FILM COATED ORAL
Qty: 60 TABLET | Refills: 0 | Status: SHIPPED | OUTPATIENT
Start: 2020-03-23 | End: 2020-04-22 | Stop reason: SDUPTHER

## 2020-03-27 ENCOUNTER — TELEMEDICINE (OUTPATIENT)
Dept: INTERNAL MEDICINE CLINIC | Facility: CLINIC | Age: 45
End: 2020-03-27
Payer: COMMERCIAL

## 2020-03-27 DIAGNOSIS — J01.10 ACUTE NON-RECURRENT FRONTAL SINUSITIS: Primary | ICD-10-CM

## 2020-03-27 PROCEDURE — 99213 OFFICE O/P EST LOW 20 MIN: CPT | Performed by: NURSE PRACTITIONER

## 2020-03-27 RX ORDER — AMOXICILLIN AND CLAVULANATE POTASSIUM 875; 125 MG/1; MG/1
1 TABLET, FILM COATED ORAL 2 TIMES DAILY
Qty: 20 TABLET | Refills: 0 | Status: SHIPPED | OUTPATIENT
Start: 2020-03-27 | End: 2020-04-06

## 2020-03-27 NOTE — PROGRESS NOTES
Virtual Regular Visit    Problem List Items Addressed This Visit        Respiratory    Acute non-recurrent frontal sinusitis - Primary    Relevant Medications    amoxicillin-clavulanate (AUGMENTIN) 875-125 mg per tablet               Reason for visit is sinus congestion ear pain, and sore throat no fever    Encounter provider Christy Mistry, [facility]    Provider located at [address] [address]      Recent Visits  No visits were found meeting these conditions  Showing recent visits within past 7 days and meeting all other requirements     Today's Visits  Date Type Provider Dept   03/27/20 Telemedicine [facility] [place]   Showing today's visits and meeting all other requirements     Future Appointments  Date Type Provider Dept   03/27/20 Telemedicine [facility] [place]   Showing future appointments within next 150 days and meeting all other requirements        After connecting through [email or web address], the patient was identified by name and date of birth  Eli Goldmann was informed that this is a telemedicine visit and that the visit is being conducted through [email or web address] and patient was informed that this is not a secure, HIPAA-complaint platform  she agrees to proceed  which may not be secure and therefore, might not be HIPAA-compliant  My office door was closed  No one else was in the room  She acknowledged consent and understanding of privacy and security of the video platform  The patient has agreed to participate and understands they can discontinue the visit at any time  Subjective  Eli Goldmann is a 39 y o  female patient who started with a sore throat about a week ago  She is now having ear pain and congestion  She denies any cough or SOB  She states she does have allergies but her symptoms are not getting any better and wanted to start something   She offers no other issues  Past Medical History:   Diagnosis Date    Anxiety     Arthritis     Bariatric surgery status     Carpal tunnel syndrome     Heartburn     History of gastric bypass 2014    Hypertension     Last assessed 2014    Migraine     Obesity     Last assessed 2014    Postgastrectomy malabsorption     Restless leg syndrome     Seizures ([facility] [address] )        Past Surgical History:   Procedure Laterality Date     SECTION       SECTION N/A 2018    Procedure:  SECTION (); Surgeon: Jerome Marquez MD;  Location: [place] ;  Service: Obstetrics    CHOLECYSTECTOMY      GASTRIC BYPASS  2014    CO REVISION OF CERVIX,NON OBSTETRICAL N/A 2017    Procedure: CERCLAGE CERVICAL;  Surgeon: Eren Castle MD;  Location: [facility];  Service: Obstetrics    TOOTH EXTRACTION         Current Outpatient Medications   Medication Sig Dispense Refill    ACCU-CHEK FASTCLIX LANCETS [address] [facility] by Does not apply route 2 (two) times a day 102 each 5    Alcohol Swabs (ALCOHOL PADS) 70 % PADS by Does not apply route 2 (two) times a day 100 each 5    amoxicillin-clavulanate (AUGMENTIN) 875-125 mg per tablet Take 1 tablet by mouth 2 (two) times a day for 10 days 20 tablet 0    b complex vitamins tablet Take 1 tablet by mouth daily      Blood Glucose Monitoring Suppl (ACCU-CHEK GUIDE) w/Device KIT by Does not apply route 2 (two) times a day 1 kit 0    calcium citrate (CALCITRATE) 950 MG tablet Take 2 tablets by mouth daily        CRANBERRY EXTRACT PO Take by mouth      escitalopram (LEXAPRO) 20 mg tablet Take 1 tablet (20 mg total) by mouth daily 30 tablet 3    famotidine (PEPCID) 20 mg tablet Take 1 tablet (20 mg total) by mouth 2 (two) times a day (Patient not taking: Reported on 11/3/2019) 60 tablet 1    ferrous sulfate 325 (65 Fe) mg tablet Take 325 mg by mouth daily with breakfast      fluticasone (FLONASE) 50 mcg/act nasal spray SPRAY 1 SPRAY INTO [facility] NOSTRIL EVERY DAY 1 Bottle 3    glucose blood (ACCU-CHEK GUIDE) test strip Use as instructed 100 each 5    Lancets Misc  (ACCU-CHEK FASTCLIX LANCET) KIT by Does not apply route 2 (two) times a day 1 kit 0    methocarbamol (ROBAXIN) 750 mg tablet Take one tablet by mouth every 12 hours as needed 60 tablet 0    Multiple Vitamin (MULTIVITAMIN) tablet Take 1 tablet by mouth daily      Potassium 99 MG TABS Take 99 mg by mouth      predniSONE 10 mg tablet Take 3 tabs BID X 2 days, 2 tabs BID X 2 days, 1 tab BID X 2 days, 1 tab daily X 2 days 26 tablet 0    Vitamin D, Cholecalciferol, 1000 units CAPS Take 1 tablet by mouth daily       No current facility-administered medications for this visit  Allergies   Allergen Reactions    Aspirin Other (See Comments)     Gastric Bypass    Nsaids Other (See Comments)     Gastric bypass    Codeine Itching       Review of Systems   Constitutional: Negative  HENT: Positive for congestion, postnasal drip and sore throat  Eyes: Negative  Respiratory: Negative  Cardiovascular: Negative  Gastrointestinal: Negative  Endocrine: Negative  Genitourinary: Negative  Musculoskeletal: Negative  Skin: Negative  Allergic/Immunologic: Negative  Neurological: Negative  Hematological: Negative  Psychiatric/Behavioral: Negative  Physical Exam   Constitutional: She is oriented to person, place, and time  She appears well-developed and well-nourished  Neurological: She is alert and oriented to person, place, and time  Psychiatric: She has a normal mood and affect  Her behavior is normal  Judgment and thought content normal       No signs of any distress    Will start on Augmentin 875-125 mg by mouth every 12 hours for 10 days  She was advised to continue supportive care and if any worsening of symptoms to call the office  I spent 15 minutes with the patient during this visit

## 2020-03-27 NOTE — PATIENT INSTRUCTIONS
Sinusitis   WHAT YOU NEED TO KNOW:   What is sinusitis? Sinusitis is inflammation or infection of your sinuses  It is most often caused by a virus  Acute sinusitis may last up to 12 weeks  Chronic sinusitis lasts longer than 12 weeks  Recurrent sinusitis means you have 4 or more times in 1 year  What increases my risk for sinusitis? · Medical conditions, such as an upper respiratory infection, allergies, asthma, or cystic fibrosis    · Dental infections or procedures, such as gum infections, tooth decay, or a root canal    · Smoking    · Abnormal sinus structure, such as nasal growths, swollen tonsils, or a deviated septum    · A weak immune system, from diseases such as diabetes or HIV  What are the signs and symptoms of sinusitis? · Fever    · Pain, pressure, redness, or swelling around the forehead, cheeks, or eyes    · Thick yellow or green discharge from your nose    · Tenderness when you touch your face over your sinuses    · Dry cough that happens mostly at night or when you lie down    · Headache and face pain that is worse when you lean forward    · Tooth pain, or pain when you chew  How is sinusitis diagnosed? Your healthcare provider will examine you and ask about your symptoms  He or she will check inside your nose using a nasal speculum  This is a small tool used to open your nostrils  A sample of the mucus from your nose may show what germ is causing your infection  How is sinusitis treated? Your symptoms may go away on their own  Your healthcare provider may recommend watchful waiting for up to 10 days before starting antibiotics  You may  need any of the following:  · Acetaminophen  decreases pain and fever  It is available without a doctor's order  Ask how much to take and how often to take it  Follow directions   Read the labels of all other medicines you are using to see if they also contain acetaminophen, or ask your doctor or pharmacist  Acetaminophen can cause liver damage if not taken correctly  Do not use more than 4 grams (4,000 milligrams) total of acetaminophen in one day  · NSAIDs , such as ibuprofen, help decrease swelling, pain, and fever  This medicine is available with or without a doctor's order  NSAIDs can cause stomach bleeding or kidney problems in certain people  If you take blood thinner medicine, always ask your healthcare provider if NSAIDs are safe for you  Always read the medicine label and follow directions  · Nasal steroid sprays  may help decrease inflammation in your nose and sinuses  · Decongestants  help reduce swelling and drain mucus in the nose and sinuses  They may help you breathe easier  · Antihistamines  help dry mucus in the nose and relieve sneezing  · Antibiotics  help treat or prevent a bacterial infection  How can I manage my symptoms? · Rinse your sinuses  Use a sinus rinse device to rinse your nasal passages with a saline (salt water) solution or distilled water  Do not use tap water  This will help thin the mucus in your nose and rinse away pollen and dirt  It will also help reduce swelling so you can breathe normally  Ask your healthcare provider how often to do this  · Breathe in steam   Heat a bowl of water until you see steam  Lean over the bowl and make a tent over your head with a large towel  Breathe deeply for about 20 minutes  Be careful not to get too close to the steam or burn yourself  Do this 3 times a day  You can also breathe deeply when you take a hot shower  · Sleep with your head elevated  Place an extra pillow under your head before you go to sleep to help your sinuses drain  · Drink liquids as directed  Ask your healthcare provider how much liquid to drink each day and which liquids are best for you  Liquids will thin the mucus in your nose and help it drain  Avoid drinks that contain alcohol or caffeine  · Do not smoke, and avoid secondhand smoke    Nicotine and other chemicals in cigarettes and cigars can make your symptoms worse  Ask your healthcare provider for information if you currently smoke and need help to quit  E-cigarettes or smokeless tobacco still contain nicotine  Talk to your healthcare provider before you use these products  How can I help prevent the spread of germs that cause sinusitis? Wash your hands often with soap and water  Wash your hands after you use the bathroom, change a child's diaper, or sneeze  Wash your hands before you prepare or eat food  When should I seek immediate care? · Your eye and eyelid are red, swollen, and painful  · You cannot open your eye  · You have vision changes, such as double vision  · Your eyeball bulges out or you cannot move your eye  · You are more sleepy than normal, or you notice changes in your ability to think, move, or talk  · You have a stiff neck, a fever, or a bad headache  · You have swelling of your forehead or scalp  When should I contact my healthcare provider? · Your symptoms do not improve after 3 days  · Your symptoms do not go away after 10 days  · You have nausea and are vomiting  · Your nose is bleeding  · You have questions or concerns about your condition or care  CARE AGREEMENT:   You have the right to help plan your care  Learn about your health condition and how it may be treated  Discuss treatment options with your caregivers to decide what care you want to receive  You always have the right to refuse treatment  The above information is an  only  It is not intended as medical advice for individual conditions or treatments  Talk to your doctor, nurse or pharmacist before following any medical regimen to see if it is safe and effective for you  © 2017 [employer] Information is for End User's use only and may not be sold, redistributed or otherwise used for commercial purposes   All illustrations and images included in CareNotes® are the copyrighted property of [employer]  or [employer] Land[employer]

## 2020-04-22 DIAGNOSIS — M79.10 MUSCULAR PAIN: ICD-10-CM

## 2020-04-22 RX ORDER — METHOCARBAMOL 750 MG/1
TABLET, FILM COATED ORAL
Qty: 60 TABLET | Refills: 0 | Status: SHIPPED | OUTPATIENT
Start: 2020-04-22 | End: 2020-05-21 | Stop reason: SDUPTHER

## 2020-05-21 DIAGNOSIS — M79.10 MUSCULAR PAIN: ICD-10-CM

## 2020-05-21 RX ORDER — METHOCARBAMOL 750 MG/1
TABLET, FILM COATED ORAL
Qty: 60 TABLET | Refills: 0 | Status: SHIPPED | OUTPATIENT
Start: 2020-05-21 | End: 2020-06-19 | Stop reason: SDUPTHER

## 2020-05-29 ENCOUNTER — TELEPHONE (OUTPATIENT)
Dept: INTERNAL MEDICINE CLINIC | Facility: CLINIC | Age: 45
End: 2020-05-29

## 2020-05-29 DIAGNOSIS — E16.2 HYPOGLYCEMIA: Primary | ICD-10-CM

## 2020-06-12 DIAGNOSIS — F32.A ANXIETY AND DEPRESSION: ICD-10-CM

## 2020-06-12 DIAGNOSIS — F41.9 ANXIETY AND DEPRESSION: ICD-10-CM

## 2020-06-12 RX ORDER — ESCITALOPRAM OXALATE 20 MG/1
20 TABLET ORAL DAILY
Qty: 30 TABLET | Refills: 3 | Status: SHIPPED | OUTPATIENT
Start: 2020-06-12 | End: 2020-11-10 | Stop reason: SDUPTHER

## 2020-06-19 DIAGNOSIS — M79.10 MUSCULAR PAIN: ICD-10-CM

## 2020-06-19 RX ORDER — METHOCARBAMOL 750 MG/1
TABLET, FILM COATED ORAL
Qty: 60 TABLET | Refills: 0 | Status: SHIPPED | OUTPATIENT
Start: 2020-06-19 | End: 2020-06-23 | Stop reason: SDUPTHER

## 2020-06-23 ENCOUNTER — TELEMEDICINE (OUTPATIENT)
Dept: INTERNAL MEDICINE CLINIC | Facility: CLINIC | Age: 45
End: 2020-06-23
Payer: COMMERCIAL

## 2020-06-23 DIAGNOSIS — M79.10 MUSCULAR PAIN: ICD-10-CM

## 2020-06-23 DIAGNOSIS — Z98.84 BARIATRIC SURGERY STATUS: Primary | ICD-10-CM

## 2020-06-23 DIAGNOSIS — E65 PANNICULUS: ICD-10-CM

## 2020-06-23 PROBLEM — J01.10 ACUTE NON-RECURRENT FRONTAL SINUSITIS: Status: RESOLVED | Noted: 2020-03-27 | Resolved: 2020-06-23

## 2020-06-23 PROCEDURE — 99214 OFFICE O/P EST MOD 30 MIN: CPT | Performed by: NURSE PRACTITIONER

## 2020-06-23 RX ORDER — METHOCARBAMOL 750 MG/1
TABLET, FILM COATED ORAL
Qty: 60 TABLET | Refills: 0 | Status: SHIPPED | OUTPATIENT
Start: 2020-06-23 | End: 2020-07-21 | Stop reason: SDUPTHER

## 2020-07-20 ENCOUNTER — TELEPHONE (OUTPATIENT)
Dept: INTERNAL MEDICINE CLINIC | Facility: CLINIC | Age: 45
End: 2020-07-20

## 2020-07-20 NOTE — TELEPHONE ENCOUNTER
Patient called 4:45 to ask about medicine for her gastritis because she has painful indigestion  I told her that I would send a message to you and get back to her tomorrow  She was saying that it was Famotadine, Maalox, Pepcid  She really didn't know and I told her that I could not tell her what to take  Patient said that she's going to the pharmacy and try something from there and will call back tomorrow if nothing OTC works  She mentioned Pepcid and Maalox

## 2020-07-21 DIAGNOSIS — M79.10 MUSCULAR PAIN: ICD-10-CM

## 2020-07-21 RX ORDER — METHOCARBAMOL 750 MG/1
TABLET, FILM COATED ORAL
Qty: 60 TABLET | Refills: 0 | Status: SHIPPED | OUTPATIENT
Start: 2020-07-21 | End: 2020-08-20 | Stop reason: SDUPTHER

## 2020-08-20 DIAGNOSIS — M79.10 MUSCULAR PAIN: ICD-10-CM

## 2020-08-20 RX ORDER — METHOCARBAMOL 750 MG/1
TABLET, FILM COATED ORAL
Qty: 60 TABLET | Refills: 0 | Status: SHIPPED | OUTPATIENT
Start: 2020-08-20 | End: 2020-09-18 | Stop reason: SDUPTHER

## 2020-09-18 ENCOUNTER — TELEPHONE (OUTPATIENT)
Dept: INTERNAL MEDICINE CLINIC | Facility: CLINIC | Age: 45
End: 2020-09-18

## 2020-09-18 DIAGNOSIS — M79.10 MUSCULAR PAIN: ICD-10-CM

## 2020-09-18 RX ORDER — METHOCARBAMOL 750 MG/1
TABLET, FILM COATED ORAL
Qty: 60 TABLET | Refills: 0 | Status: SHIPPED | OUTPATIENT
Start: 2020-09-18 | End: 2020-10-20 | Stop reason: SDUPTHER

## 2020-10-05 ENCOUNTER — TELEMEDICINE (OUTPATIENT)
Dept: INTERNAL MEDICINE CLINIC | Facility: CLINIC | Age: 45
End: 2020-10-05
Payer: COMMERCIAL

## 2020-10-05 VITALS — WEIGHT: 238 LBS | BODY MASS INDEX: 40.63 KG/M2 | HEIGHT: 64 IN

## 2020-10-05 DIAGNOSIS — E66.01 MORBID OBESITY WITH BMI OF 40.0-44.9, ADULT (HCC): ICD-10-CM

## 2020-10-05 DIAGNOSIS — J01.10 ACUTE NON-RECURRENT FRONTAL SINUSITIS: Primary | ICD-10-CM

## 2020-10-05 PROBLEM — Z12.39 SCREENING FOR BREAST CANCER: Status: RESOLVED | Noted: 2020-02-28 | Resolved: 2020-10-05

## 2020-10-05 PROCEDURE — 99213 OFFICE O/P EST LOW 20 MIN: CPT | Performed by: NURSE PRACTITIONER

## 2020-10-05 PROCEDURE — 1036F TOBACCO NON-USER: CPT | Performed by: NURSE PRACTITIONER

## 2020-10-05 RX ORDER — AMOXICILLIN AND CLAVULANATE POTASSIUM 875; 125 MG/1; MG/1
1 TABLET, FILM COATED ORAL 2 TIMES DAILY
Qty: 20 TABLET | Refills: 0 | Status: SHIPPED | OUTPATIENT
Start: 2020-10-05 | End: 2020-10-15

## 2020-10-05 RX ORDER — METHYLPREDNISOLONE 4 MG/1
TABLET ORAL
Qty: 21 EACH | Refills: 0 | Status: SHIPPED | OUTPATIENT
Start: 2020-10-05 | End: 2021-01-20

## 2020-10-20 DIAGNOSIS — M79.10 MUSCULAR PAIN: ICD-10-CM

## 2020-10-20 RX ORDER — METHOCARBAMOL 750 MG/1
TABLET, FILM COATED ORAL
Qty: 60 TABLET | Refills: 0 | Status: SHIPPED | OUTPATIENT
Start: 2020-10-20 | End: 2020-11-20 | Stop reason: SDUPTHER

## 2020-11-10 DIAGNOSIS — F41.9 ANXIETY AND DEPRESSION: ICD-10-CM

## 2020-11-10 DIAGNOSIS — F32.A ANXIETY AND DEPRESSION: ICD-10-CM

## 2020-11-10 RX ORDER — ESCITALOPRAM OXALATE 20 MG/1
20 TABLET ORAL DAILY
Qty: 30 TABLET | Refills: 3 | OUTPATIENT
Start: 2020-11-10

## 2020-11-10 RX ORDER — ESCITALOPRAM OXALATE 20 MG/1
20 TABLET ORAL DAILY
Qty: 30 TABLET | Refills: 3 | Status: SHIPPED | OUTPATIENT
Start: 2020-11-10 | End: 2021-02-15

## 2020-11-13 ENCOUNTER — OFFICE VISIT (OUTPATIENT)
Dept: URGENT CARE | Facility: CLINIC | Age: 45
End: 2020-11-13
Payer: COMMERCIAL

## 2020-11-13 VITALS
HEART RATE: 68 BPM | HEIGHT: 64 IN | WEIGHT: 220 LBS | RESPIRATION RATE: 18 BRPM | TEMPERATURE: 97.9 F | OXYGEN SATURATION: 98 % | BODY MASS INDEX: 37.56 KG/M2

## 2020-11-13 DIAGNOSIS — B34.9 VIRAL SYNDROME: ICD-10-CM

## 2020-11-13 DIAGNOSIS — J02.9 SORE THROAT: Primary | ICD-10-CM

## 2020-11-13 LAB — S PYO AG THROAT QL: NEGATIVE

## 2020-11-13 PROCEDURE — 99203 OFFICE O/P NEW LOW 30 MIN: CPT | Performed by: PHYSICIAN ASSISTANT

## 2020-11-13 PROCEDURE — G0382 LEV 3 HOSP TYPE B ED VISIT: HCPCS | Performed by: PHYSICIAN ASSISTANT

## 2020-11-13 PROCEDURE — 87880 STREP A ASSAY W/OPTIC: CPT | Performed by: PHYSICIAN ASSISTANT

## 2020-11-13 PROCEDURE — 99283 EMERGENCY DEPT VISIT LOW MDM: CPT | Performed by: PHYSICIAN ASSISTANT

## 2020-11-13 PROCEDURE — 87637 SARSCOV2&INF A&B&RSV AMP PRB: CPT | Performed by: PHYSICIAN ASSISTANT

## 2020-11-13 PROCEDURE — 87070 CULTURE OTHR SPECIMN AEROBIC: CPT | Performed by: PHYSICIAN ASSISTANT

## 2020-11-15 LAB — BACTERIA THROAT CULT: NORMAL

## 2020-11-17 LAB
FLUAV RNA NPH QL NAA+PROBE: NOT DETECTED
FLUBV RNA NPH QL NAA+PROBE: NOT DETECTED
RSV RNA NPH QL NAA+PROBE: NOT DETECTED
SARS-COV-2 RNA NPH QL NAA+PROBE: NOT DETECTED

## 2020-11-20 DIAGNOSIS — M79.10 MUSCULAR PAIN: ICD-10-CM

## 2020-11-20 RX ORDER — METHOCARBAMOL 750 MG/1
TABLET, FILM COATED ORAL
Qty: 60 TABLET | Refills: 0 | Status: SHIPPED | OUTPATIENT
Start: 2020-11-20 | End: 2020-12-22 | Stop reason: SDUPTHER

## 2020-12-16 ENCOUNTER — LAB (OUTPATIENT)
Dept: LAB | Facility: CLINIC | Age: 45
End: 2020-12-16
Payer: COMMERCIAL

## 2020-12-16 ENCOUNTER — TELEPHONE (OUTPATIENT)
Dept: INTERNAL MEDICINE CLINIC | Facility: CLINIC | Age: 45
End: 2020-12-16

## 2020-12-16 DIAGNOSIS — R35.0 URINE FREQUENCY: Primary | ICD-10-CM

## 2020-12-16 DIAGNOSIS — R39.9 UTI SYMPTOMS: Primary | ICD-10-CM

## 2020-12-16 DIAGNOSIS — R35.0 URINE FREQUENCY: ICD-10-CM

## 2020-12-16 LAB
BACTERIA UR QL AUTO: ABNORMAL /HPF
BILIRUB UR QL STRIP: NEGATIVE
CLARITY UR: CLEAR
COLOR UR: YELLOW
GLUCOSE UR STRIP-MCNC: NEGATIVE MG/DL
HGB UR QL STRIP.AUTO: NEGATIVE
HYALINE CASTS #/AREA URNS LPF: ABNORMAL /LPF
KETONES UR STRIP-MCNC: NEGATIVE MG/DL
LEUKOCYTE ESTERASE UR QL STRIP: ABNORMAL
NITRITE UR QL STRIP: NEGATIVE
NON-SQ EPI CELLS URNS QL MICRO: ABNORMAL /HPF
PH UR STRIP.AUTO: 6 [PH]
PROT UR STRIP-MCNC: NEGATIVE MG/DL
RBC #/AREA URNS AUTO: ABNORMAL /HPF
SP GR UR STRIP.AUTO: 1.02 (ref 1–1.03)
UROBILINOGEN UR QL STRIP.AUTO: 0.2 E.U./DL
WBC #/AREA URNS AUTO: ABNORMAL /HPF

## 2020-12-16 PROCEDURE — 81001 URINALYSIS AUTO W/SCOPE: CPT

## 2020-12-16 PROCEDURE — 87086 URINE CULTURE/COLONY COUNT: CPT

## 2020-12-16 RX ORDER — NITROFURANTOIN 25; 75 MG/1; MG/1
100 CAPSULE ORAL 2 TIMES DAILY
Qty: 10 CAPSULE | Refills: 0 | Status: SHIPPED | OUTPATIENT
Start: 2020-12-16 | End: 2020-12-21

## 2020-12-17 LAB — BACTERIA UR CULT: NORMAL

## 2020-12-22 DIAGNOSIS — M79.10 MUSCULAR PAIN: ICD-10-CM

## 2020-12-22 RX ORDER — METHOCARBAMOL 750 MG/1
TABLET, FILM COATED ORAL
Qty: 60 TABLET | Refills: 0 | Status: SHIPPED | OUTPATIENT
Start: 2020-12-22 | End: 2021-01-20 | Stop reason: SDUPTHER

## 2021-01-08 ENCOUNTER — APPOINTMENT (OUTPATIENT)
Dept: RADIOLOGY | Facility: CLINIC | Age: 46
End: 2021-01-08
Payer: COMMERCIAL

## 2021-01-08 ENCOUNTER — OFFICE VISIT (OUTPATIENT)
Dept: URGENT CARE | Facility: CLINIC | Age: 46
End: 2021-01-08
Payer: COMMERCIAL

## 2021-01-08 VITALS
SYSTOLIC BLOOD PRESSURE: 134 MMHG | BODY MASS INDEX: 37.56 KG/M2 | WEIGHT: 220 LBS | RESPIRATION RATE: 18 BRPM | TEMPERATURE: 98.6 F | HEIGHT: 64 IN | DIASTOLIC BLOOD PRESSURE: 57 MMHG | OXYGEN SATURATION: 98 % | HEART RATE: 81 BPM
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
## 2021-01-08 DIAGNOSIS — S93.401A SPRAIN OF RIGHT ANKLE, UNSPECIFIED LIGAMENT, INITIAL ENCOUNTER: ICD-10-CM

## 2021-01-08 DIAGNOSIS — S80.01XA CONTUSION OF RIGHT KNEE, INITIAL ENCOUNTER: ICD-10-CM

## 2021-01-08 PROCEDURE — G0382 LEV 3 HOSP TYPE B ED VISIT: HCPCS | Performed by: NURSE PRACTITIONER

## 2021-01-08 PROCEDURE — 99213 OFFICE O/P EST LOW 20 MIN: CPT | Performed by: NURSE PRACTITIONER

## 2021-01-08 PROCEDURE — 99283 EMERGENCY DEPT VISIT LOW MDM: CPT | Performed by: NURSE PRACTITIONER

## 2021-01-08 PROCEDURE — 73610 X-RAY EXAM OF ANKLE: CPT

## 2021-01-08 PROCEDURE — 73564 X-RAY EXAM KNEE 4 OR MORE: CPT

## 2021-01-08 NOTE — PROGRESS NOTES
[facility]        NAME: Eli Goldmann is a 39 y o  female  : 1975    MRN: [id number]  DATE: 2021  TIME: 4:09 PM    Assessment and Plan   Fall, initial encounter [W19  XXXA]  1  Fall, initial encounter  XR knee 4+ vw right injury    XR ankle 3+ vw right   2  Sprain of right ankle, unspecified ligament, initial encounter     3  Contusion of right knee, initial encounter       She has limited ROM to her ankle and pain with palpation to her right knee and ankle  Is able to bear weight and walk on right leg  Xray of her right knee and ankle were performed and were negative for fracture  Wrapped right ankle and knee with an elastic bandage and provided instruction on how to apply at home    Patient Instructions     Patient Instructions     Rest, ice, elevate  Wear ace wrap as directed  Tylenol as needed for pain  If you develop any increased pain, swelling, numbness, tingling, or any new or concerning symptoms please return or proceed to ER  Follow up with pcp in 3-5 days  Ankle Sprain   WHAT YOU NEED TO KNOW:   An ankle sprain happens when 1 or more ligaments in your ankle joint stretch or tear  Ligaments are tough tissues that connect bones  Ligaments support your joints and keep your bones in place  DISCHARGE INSTRUCTIONS:   Return to the emergency department if:   · You have severe pain in your ankle  · Your foot or toes are cold or numb  · Your ankle becomes more weak or unstable (wobbly)  · You are unable to put any weight on your ankle or foot  · Your swelling has increased or returned  Call your doctor if:   · Your pain does not go away, even after treatment  · You have questions or concerns about your condition or care  Medicines: You may need any of the following:  · NSAIDs , such as ibuprofen, help decrease swelling, pain, and fever  This medicine is available with or without a doctor's order   NSAIDs can cause stomach bleeding or kidney problems in certain people  If you take blood thinner medicine, always ask your healthcare provider if NSAIDs are safe for you  Always read the medicine label and follow directions  · Acetaminophen  decreases pain and fever  It is available without a doctor's order  Ask how much to take and how often to take it  Follow directions  Read the labels of all other medicines you are using to see if they also contain acetaminophen, or ask your doctor or pharmacist  Acetaminophen can cause liver damage if not taken correctly  Do not use more than 4 grams (4,000 milligrams) total of acetaminophen in one day  · Prescription pain medicine  may be given  Ask your healthcare provider how to take this medicine safely  Some prescription pain medicines contain acetaminophen  Do not take other medicines that contain acetaminophen without talking to your healthcare provider  Too much acetaminophen may cause liver damage  Prescription pain medicine may cause constipation  Ask your healthcare provider how to prevent or treat constipation  · Take your medicine as directed  Contact your healthcare provider if you think your medicine is not helping or if you have side effects  Tell him or her if you are allergic to any medicine  Keep a list of the medicines, vitamins, and herbs you take  Include the amounts, and when and why you take them  Bring the list or the pill bottles to follow-up visits  Carry your medicine list with you in case of an emergency  Self-care:   · Use support devices,  such as a brace, cast, or splint, to limit your movement and protect your joint  You may need to use crutches to decrease your pain as you move around  · Go to physical therapy as directed  A physical therapist teaches you exercises to help improve movement and strength, and to decrease pain  · Rest  your ankle so that it can heal  Return to normal activities as directed      · Apply ice  on your ankle for 15 to 20 minutes every hour or as directed  Use an ice pack, or put crushed ice in a plastic bag  Cover it with a towel  Ice helps prevent tissue damage and decreases swelling and pain  · Compress  your ankle  Ask if you should wrap an elastic bandage around your injured ligament  An elastic bandage provides support and helps decrease swelling and movement so your joint can heal  Wear as long as directed  · Elevate  your ankle above the level of your heart as often as you can  This will help decrease swelling and pain  Prop your ankle on pillows or blankets to keep it elevated comfortably  Prevent another ankle sprain:   · Let your ankle heal   Find out how long your ligament needs to heal  Do not do any physical activity until your healthcare provider says it is okay  If you start activity too soon, you may develop a more serious injury  · Always warm up and stretch  before you exercise or play sports  · Use the right equipment  Always wear shoes that fit well and are made for the activity that you are doing  You may also need ankle supports, elbow and knee pads, or braces  Follow up with your doctor as directed:  Write down your questions so you remember to ask them during your visits  © Copyright [address] [place] [address] Drive Information is for End User's use only and may not be sold, redistributed or otherwise used for commercial purposes  All illustrations and images included in CareNotes® are the copyrighted property of A D A M , Inc  or [address] [place], [place] [address]   The above information is an  only  It is not intended as medical advice for individual conditions or treatments  Talk to your doctor, nurse or pharmacist before following any medical regimen to see if it is safe and effective for you  Follow up with PCP in 3-5 days  Proceed to  ER if symptoms worsen  Chief Complaint     Chief Complaint   Patient presents with    Fall     Pt fell down 2 steps at her house injuring her Right knee and Right ankle  happened today  History of Present Illness       39year old female presents s/p fall  She states she was carrying laundry down her basement steps when she stumbled on the last two steps injuring her right ankle and her right knee  She states her ankle twisted and she landed directly on her right knee  She is complaining of right knee and ankle pain  Patient is c/o decreased ROM of right ankle but denies any numbness or tingling  Patient denies any head injury LOC  Denies any head, neck, or back pain  Denies any previous injury or trauma to right lower extremity  Patient did not take any medication prior to arrival       Review of Systems   Review of Systems   Constitutional: Negative for chills, diaphoresis, fatigue and fever  HENT: Negative for congestion, ear pain, facial swelling, mouth sores, postnasal drip, rhinorrhea, sinus pain, sore throat and trouble swallowing  Eyes: Negative for photophobia, pain, discharge, redness, itching and visual disturbance  Respiratory: Negative for cough, chest tightness and shortness of breath  Cardiovascular: Negative for chest pain and palpitations  Gastrointestinal: Negative for abdominal pain, diarrhea, nausea and vomiting  Genitourinary: Negative  Musculoskeletal: Positive for arthralgias and joint swelling (right lateral ankle)  Negative for back pain, myalgias, neck pain and neck stiffness  Skin: Negative for wound  Neurological: Negative for dizziness, syncope, weakness, light-headedness, numbness and headaches           Current Medications       Current Outpatient Medications:     ACCU-CHEK FASTCLIX LANCETS MISC, by Does not apply route 2 (two) times a day, Disp: 102 each, Rfl: 5    Alcohol Swabs (ALCOHOL PADS) 70 % PADS, by Does not apply route 2 (two) times a day, Disp: 100 each, Rfl: 5    b complex vitamins tablet, Take 1 tablet by mouth daily, Disp: , Rfl:     Blood Glucose Monitoring Suppl (ACCU-CHEK GUIDE) w/Device KIT, by Does not apply route 2 (two) times a day, Disp: 1 kit, Rfl: 0    calcium citrate (CALCITRATE) 950 MG tablet, Take 2 tablets by mouth daily  , Disp: , Rfl:     CRANBERRY EXTRACT PO, Take by mouth, Disp: , Rfl:     escitalopram (LEXAPRO) 20 mg tablet, Take 1 tablet (20 mg total) by mouth daily, Disp: 30 tablet, Rfl: 3    ferrous sulfate 325 (65 Fe) mg tablet, Take 325 mg by mouth daily with breakfast, Disp: , Rfl:     fluticasone (FLONASE) 50 mcg/act nasal spray, SPRAY 1 SPRAY INTO EACH NOSTRIL EVERY DAY, Disp: 1 Bottle, Rfl: 3    glucose blood (ACCU-CHEK GUIDE) test strip, Use as instructed, Disp: 100 each, Rfl: 5    Lancets Misc  (ACCU-CHEK FASTCLIX LANCET) KIT, by Does not apply route 2 (two) times a day, Disp: 1 kit, Rfl: 0    methocarbamol (ROBAXIN) 750 mg tablet, Take one tablet by mouth every 12 hours as needed, Disp: 60 tablet, Rfl: 0    Multiple Vitamin (MULTIVITAMIN) tablet, Take 1 tablet by mouth daily, Disp: , Rfl:     Vitamin D, Cholecalciferol, 1000 units CAPS, Take 1 tablet by mouth daily, Disp: , Rfl:     methylPREDNISolone 4 MG tablet therapy pack, Use as directed on package (Patient not taking: Reported on 11/13/2020), Disp: 21 each, Rfl: 0    Current Allergies     Allergies as of 01/08/2021 - Reviewed 01/08/2021   Allergen Reaction Noted    Aspirin Other (See Comments) 12/04/2016    Nsaids Other (See Comments) 08/01/2017    Codeine Itching 01/17/2016            The following portions of the patient's history were reviewed and updated as appropriate: allergies, current medications, past family history, past medical history, past social history, past surgical history and problem list      Past Medical History:   Diagnosis Date    Anxiety     Arthritis     Bariatric surgery status     Carpal tunnel syndrome     Heartburn     History of gastric bypass 12/2014    Hypertension     Last assessed 12/18/2014    Migraine     Obesity     Last assessed 12/26/2014    Postgastrectomy malabsorption     Restless leg syndrome     Seizures (HCC)        Past Surgical History:   Procedure Laterality Date     SECTION       SECTION N/A 2018    Procedure:  SECTION (); Surgeon: Maricel Amado MD;  [facility];  Service: Obstetrics    CHOLECYSTECTOMY      GASTRIC BYPASS  2014    NC REVISION OF CERVIX,NON OBSTETRICAL N/A 2017    Procedure: CERCLAGE CERVICAL;  Surgeon: Tessa Hillman MD;  [facility];  Service: Obstetrics    TOOTH EXTRACTION         Family History   Problem Relation Age of Onset    Cancer Mother         Myosarcoma of the soft tissue    Other Mother         Total Thyroidectomy; Thyroid mass    Coronary artery disease Father          Medications have been verified  Objective   /57   Pulse 81   Temp 98 6 °F (37 °C)   Resp 18   Ht 5' 4" (1 626 m)   Wt 99 8 kg (220 lb)   SpO2 98%   BMI 37 76 kg/m²   No LMP recorded  Physical Exam     Physical Exam  Vitals signs reviewed  Constitutional:       General: She is not in acute distress  HENT:      Head: Normocephalic and atraumatic  Nose: Nose normal    Eyes:      Pupils: Pupils are equal, round, and reactive to light  Neck:      Musculoskeletal: Normal range of motion and neck supple  No pain with movement, spinous process tenderness or muscular tenderness  Cardiovascular:      Rate and Rhythm: Normal rate and regular rhythm  Pulses: Normal pulses  Heart sounds: Normal heart sounds, S1 normal and S2 normal    Pulmonary:      Effort: Pulmonary effort is normal       Breath sounds: Normal breath sounds and air entry  Abdominal:      General: Abdomen is flat  Bowel sounds are normal       Palpations: Abdomen is soft  Musculoskeletal:      Right knee: She exhibits swelling and bony tenderness  She exhibits normal range of motion, no effusion, no ecchymosis, no deformity, no laceration, no erythema and normal patellar mobility  Right ankle: She exhibits decreased range of motion and swelling  She exhibits no ecchymosis, no deformity, no laceration and normal pulse  No medial malleolus, no AITFL, no CF ligament, no posterior TFL, no head of 5th metatarsal and no proximal fibula tenderness found  Skin:     General: Skin is warm and dry  Capillary Refill: Capillary refill takes less than 2 seconds  Comments: Small abrasions to the top of her right foot  Neurological:      Mental Status: She is alert and oriented to person, place, and time     Psychiatric:         Mood and Affect: Mood normal

## 2021-01-08 NOTE — PATIENT INSTRUCTIONS
Rest, ice, elevate  Wear ace wrap as directed  Tylenol as needed for pain  If you develop any increased pain, swelling, numbness, tingling, or any new or concerning symptoms please return or proceed to ER  Follow up with pcp in 3-5 days  Ankle Sprain   WHAT YOU NEED TO KNOW:   An ankle sprain happens when 1 or more ligaments in your ankle joint stretch or tear  Ligaments are tough tissues that connect bones  Ligaments support your joints and keep your bones in place  DISCHARGE INSTRUCTIONS:   Return to the emergency department if:   · You have severe pain in your ankle  · Your foot or toes are cold or numb  · Your ankle becomes more weak or unstable (wobbly)  · You are unable to put any weight on your ankle or foot  · Your swelling has increased or returned  Call your doctor if:   · Your pain does not go away, even after treatment  · You have questions or concerns about your condition or care  Medicines: You may need any of the following:  · NSAIDs , such as ibuprofen, help decrease swelling, pain, and fever  This medicine is available with or without a doctor's order  NSAIDs can cause stomach bleeding or kidney problems in certain people  If you take blood thinner medicine, always ask your healthcare provider if NSAIDs are safe for you  Always read the medicine label and follow directions  · Acetaminophen  decreases pain and fever  It is available without a doctor's order  Ask how much to take and how often to take it  Follow directions  Read the labels of all other medicines you are using to see if they also contain acetaminophen, or ask your doctor or pharmacist  Acetaminophen can cause liver damage if not taken correctly  Do not use more than 4 grams (4,000 milligrams) total of acetaminophen in one day  · Prescription pain medicine  may be given  Ask your healthcare provider how to take this medicine safely  Some prescription pain medicines contain acetaminophen   Do not take other medicines that contain acetaminophen without talking to your healthcare provider  Too much acetaminophen may cause liver damage  Prescription pain medicine may cause constipation  Ask your healthcare provider how to prevent or treat constipation  · Take your medicine as directed  Contact your healthcare provider if you think your medicine is not helping or if you have side effects  Tell him or her if you are allergic to any medicine  Keep a list of the medicines, vitamins, and herbs you take  Include the amounts, and when and why you take them  Bring the list or the pill bottles to follow-up visits  Carry your medicine list with you in case of an emergency  Self-care:   · Use support devices,  such as a brace, cast, or splint, to limit your movement and protect your joint  You may need to use crutches to decrease your pain as you move around  · Go to physical therapy as directed  A physical therapist teaches you exercises to help improve movement and strength, and to decrease pain  · Rest  your ankle so that it can heal  Return to normal activities as directed  · Apply ice  on your ankle for 15 to 20 minutes every hour or as directed  Use an ice pack, or put crushed ice in a plastic bag  Cover it with a towel  Ice helps prevent tissue damage and decreases swelling and pain  · Compress  your ankle  Ask if you should wrap an elastic bandage around your injured ligament  An elastic bandage provides support and helps decrease swelling and movement so your joint can heal  Wear as long as directed  · Elevate  your ankle above the level of your heart as often as you can  This will help decrease swelling and pain  Prop your ankle on pillows or blankets to keep it elevated comfortably  Prevent another ankle sprain:   · Let your ankle heal   Find out how long your ligament needs to heal  Do not do any physical activity until your healthcare provider says it is okay   If you start activity too soon, you may develop a more serious injury  · Always warm up and stretch  before you exercise or play sports  · Use the right equipment  Always wear shoes that fit well and are made for the activity that you are doing  You may also need ankle supports, elbow and knee pads, or braces  Follow up with your doctor as directed:  Write down your questions so you remember to ask them during your visits  © Copyright [address] Information is for End User's use only and may not be sold, redistributed or otherwise used for commercial purposes  All illustrations and images included in CareNotes® are the copyrighted property of [employer]  or [address] [place], [place] [address]   The above information is an  only  It is not intended as medical advice for individual conditions or treatments  Talk to your doctor, nurse or pharmacist before following any medical regimen to see if it is safe and effective for you

## 2021-01-19 ENCOUNTER — TELEPHONE (OUTPATIENT)
Dept: INTERNAL MEDICINE CLINIC | Facility: CLINIC | Age: 46
End: 2021-01-19

## 2021-01-19 NOTE — TELEPHONE ENCOUNTER
Patient originally called to schedule a skype appt with provider Ranjana Bass to discuss elevated blood pressure and anxiety issues via skype spoke with provider and she stated if availability can add patient to schedule for tommorow for said visit, lmom for patient to contact office to schedule appt

## 2021-01-20 ENCOUNTER — TELEMEDICINE (OUTPATIENT)
Dept: INTERNAL MEDICINE CLINIC | Facility: CLINIC | Age: 46
End: 2021-01-20
Payer: COMMERCIAL

## 2021-01-20 DIAGNOSIS — E55.9 VITAMIN D INSUFFICIENCY: ICD-10-CM

## 2021-01-20 DIAGNOSIS — F41.9 ANXIETY AND DEPRESSION: Primary | ICD-10-CM

## 2021-01-20 DIAGNOSIS — K91.2 POSTSURGICAL MALABSORPTION: ICD-10-CM

## 2021-01-20 DIAGNOSIS — M79.10 MUSCULAR PAIN: ICD-10-CM

## 2021-01-20 DIAGNOSIS — F32.A ANXIETY AND DEPRESSION: Primary | ICD-10-CM

## 2021-01-20 DIAGNOSIS — J01.11 ACUTE RECURRENT FRONTAL SINUSITIS: ICD-10-CM

## 2021-01-20 PROBLEM — J01.10 ACUTE NON-RECURRENT FRONTAL SINUSITIS: Status: RESOLVED | Noted: 2020-10-05 | Resolved: 2021-01-20

## 2021-01-20 PROCEDURE — 3725F SCREEN DEPRESSION PERFORMED: CPT | Performed by: NURSE PRACTITIONER

## 2021-01-20 PROCEDURE — 99213 OFFICE O/P EST LOW 20 MIN: CPT | Performed by: NURSE PRACTITIONER

## 2021-01-20 PROCEDURE — 1036F TOBACCO NON-USER: CPT | Performed by: NURSE PRACTITIONER

## 2021-01-20 RX ORDER — METHOCARBAMOL 750 MG/1
TABLET, FILM COATED ORAL
Qty: 60 TABLET | Refills: 0 | Status: SHIPPED | OUTPATIENT
Start: 2021-01-20 | End: 2021-02-15

## 2021-01-20 RX ORDER — LORAZEPAM 0.5 MG/1
TABLET ORAL
Qty: 30 TABLET | Refills: 0 | Status: SHIPPED | OUTPATIENT
Start: 2021-01-20 | End: 2021-02-22 | Stop reason: SDUPTHER

## 2021-01-20 NOTE — PROGRESS NOTES
Virtual Regular Visit      Assessment/Plan: Will order labs to have done  Will order Ativan PRN inquiry done and consistent with prescribing history  Will continue Lexapro 20 mg daily  Will renew Robaxin as well  Will put referral in for ENT  Did advise to monitor Bps at home  If running high did advise to call the office  Problem List Items Addressed This Visit        Digestive    Postsurgical malabsorption    Relevant Orders    Vitamin B12    Iron Panel (Includes Ferritin, Iron Sat%, Iron, and TIBC)       Respiratory    Acute recurrent frontal sinusitis    Relevant Orders    Ambulatory Referral to Otolaryngology       Other    Anxiety and depression - Primary    Relevant Medications    LORazepam (ATIVAN) 0 5 mg tablet    Vitamin D insufficiency    Relevant Orders    Vitamin D 25 hydroxy    Muscular pain    Relevant Medications    methocarbamol (ROBAXIN) 750 mg tablet               Reason for visit is   Chief Complaint   Patient presents with    Virtual Regular Visit        Encounter provider Mirtha [id number], [facility]    Provider located at [address] [place] [address]  [address] [place] [address]  [id number]      Recent Visits  Date Type Provider Dept   01/19/21 Telephone Karie Fallon [facility]   Showing recent visits within past 7 days and meeting all other requirements     Today's Visits  Date Type Provider Dept   01/20/21 Telemedicine MAXIMINO Dixon [facility]   Showing today's visits and meeting all other requirements     Future Appointments  No visits were found meeting these conditions  Showing future appointments within next 150 days and meeting all other requirements        The patient was identified by name and date of birth   Jessenia Kunal was informed that this is a telemedicine visit and that the visit is being conducted through [employer] and patient was informed that this is not a secure, HIPAA-compliant platform  She agrees to proceed     My office door was closed  No one else was in the room  She acknowledged consent and understanding of privacy and security of the video platform  The patient has agreed to participate and understands they can discontinue the visit at any time  Patient is aware this is a billable service  Subjective  Miranda Karlene Severance is a 39 y o  female patient   Rosy Horn is for an acute visit  She states she has been having a lot of anxiety and did fall down two stairs and hurt her back and was in the UC  She state she was having a lot of shaking and high blood pressure readings  She states she is not having any chest pain but is having some palpitations and can feel her hear beating in her ears  She states she does not have a blood pressure cuff at home  She states when they did give her Ativan in the ER she did feel better  She states she also would like to see ENT for her continues sinus issues  She would also like her vitamin levels checked as well  She offers no other issues  Past Medical History:   Diagnosis Date    Anxiety     Arthritis     Bariatric surgery status     Carpal tunnel syndrome     Heartburn     History of gastric bypass 2014    Hypertension     Last assessed 2014    Migraine     Obesity     Last assessed 2014    Postgastrectomy malabsorption     Restless leg syndrome     Seizures ([facility] [address] )        Past Surgical History:   Procedure Laterality Date     SECTION       SECTION N/A 2018    Procedure:  SECTION ();   Surgeon: Anali Piper MD;  Location: [facility];  Service: Obstetrics    CHOLECYSTECTOMY      GASTRIC BYPASS  2014    [place] REVISION OF CERVIX,NON OBSTETRICAL N/A 2017    Procedure: CERCLAGE CERVICAL;  Surgeon: Hartwell Halsted, MD;  [facility];  Service: Obstetrics    TOOTH EXTRACTION         Current Outpatient Medications   Medication Sig Dispense Refill    ACCU-CHEK FASTCLIX LANCETS MISC by Does not apply route 2 (two) times a day 102 each 5    Alcohol Swabs (ALCOHOL PADS) 70 % PADS by Does not apply route 2 (two) times a day 100 each 5    b complex vitamins tablet Take 1 tablet by mouth daily      Blood Glucose Monitoring Suppl (ACCU-CHEK GUIDE) w/Device KIT by Does not apply route 2 (two) times a day 1 kit 0    calcium citrate (CALCITRATE) 950 MG tablet Take 2 tablets by mouth daily   CRANBERRY EXTRACT PO Take by mouth      escitalopram (LEXAPRO) 20 mg tablet Take 1 tablet (20 mg total) by mouth daily 30 tablet 3    ferrous sulfate 325 (65 Fe) mg tablet Take 325 mg by mouth daily with breakfast      fluticasone (FLONASE) 50 mcg/act nasal spray SPRAY 1 SPRAY INTO EACH NOSTRIL EVERY DAY 1 Bottle 3    glucose blood (ACCU-CHEK GUIDE) test strip Use as instructed 100 each 5    Lancets Misc  (ACCU-CHEK FASTCLIX LANCET) KIT by Does not apply route 2 (two) times a day 1 kit 0    methocarbamol (ROBAXIN) 750 mg tablet Take one tablet by mouth every 12 hours as needed 60 tablet 0    Multiple Vitamin (MULTIVITAMIN) tablet Take 1 tablet by mouth daily      Vitamin D, Cholecalciferol, 1000 units CAPS Take 1 tablet by mouth daily      LORazepam (ATIVAN) 0 5 mg tablet Take one tablet by mouth once daily as needed 30 tablet 0     No current facility-administered medications for this visit  Allergies   Allergen Reactions    Aspirin Other (See Comments)     Gastric Bypass    Nsaids Other (See Comments)     Gastric bypass    Codeine Itching       Review of Systems   All other systems reviewed and are negative  Video Exam    There were no vitals filed for this visit  Physical Exam  Constitutional:       Appearance: Normal appearance  Neurological:      Mental Status: She is alert  Psychiatric:         Mood and Affect: Mood normal          Behavior: Behavior normal          Thought Content:  Thought content normal          Judgment: Judgment normal  I spent 15 minutes directly with the patient during this visit      VIRTUAL VISIT DISCLAIMER    Teto Esposito acknowledges that she has consented to an online visit or consultation  She understands that the online visit is based solely on information provided by her, and that, in the absence of a face-to-face physical evaluation by the physician, the diagnosis she receives is both limited and provisional in terms of accuracy and completeness  This is not intended to replace a full medical face-to-face evaluation by the physician  Teto Esposito understands and accepts these terms

## 2021-02-01 ENCOUNTER — TELEPHONE (OUTPATIENT)
Dept: OTOLARYNGOLOGY | Facility: CLINIC | Age: 46
End: 2021-02-01

## 2021-02-02 ENCOUNTER — TELEPHONE (OUTPATIENT)
Dept: SLEEP CENTER | Facility: CLINIC | Age: 46
End: 2021-02-02

## 2021-02-02 NOTE — TELEPHONE ENCOUNTER
JULIUSM that we need to cancel Catherine's appointment for 2/2/21  Asked if she would call [phone] to reschedule

## 2021-02-03 ENCOUNTER — TELEPHONE (OUTPATIENT)
Dept: OTOLARYNGOLOGY | Facility: CLINIC | Age: 46
End: 2021-02-03

## 2021-02-13 DIAGNOSIS — F32.A ANXIETY AND DEPRESSION: ICD-10-CM

## 2021-02-13 DIAGNOSIS — F41.9 ANXIETY AND DEPRESSION: ICD-10-CM

## 2021-02-13 DIAGNOSIS — M79.10 MUSCULAR PAIN: ICD-10-CM

## 2021-02-15 RX ORDER — METHOCARBAMOL 750 MG/1
TABLET, FILM COATED ORAL
Qty: 60 TABLET | Refills: 0 | Status: SHIPPED | OUTPATIENT
Start: 2021-02-15 | End: 2021-04-05 | Stop reason: SDUPTHER

## 2021-02-15 RX ORDER — ESCITALOPRAM OXALATE 20 MG/1
TABLET ORAL
Qty: 30 TABLET | Refills: 0 | Status: SHIPPED | OUTPATIENT
Start: 2021-02-15 | End: 2021-04-09 | Stop reason: SDUPTHER

## 2021-02-19 ENCOUNTER — TELEPHONE (OUTPATIENT)
Dept: INTERNAL MEDICINE CLINIC | Facility: CLINIC | Age: 46
End: 2021-02-19

## 2021-02-19 NOTE — TELEPHONE ENCOUNTER
Patient called stating that she takes the ativan in the morning around 7 and it lasts around 10 hours but then at night she gets real anxious again  She's asking if you would want to increase the dose or put her on something else  Does she need an appointment?

## 2021-02-22 DIAGNOSIS — F32.A ANXIETY AND DEPRESSION: ICD-10-CM

## 2021-02-22 DIAGNOSIS — F41.9 ANXIETY AND DEPRESSION: ICD-10-CM

## 2021-02-22 RX ORDER — LORAZEPAM 0.5 MG/1
TABLET ORAL
Qty: 60 TABLET | Refills: 0 | Status: SHIPPED | OUTPATIENT
Start: 2021-02-22 | End: 2021-06-22 | Stop reason: ALTCHOICE

## 2021-03-10 DIAGNOSIS — Z23 ENCOUNTER FOR IMMUNIZATION: ICD-10-CM

## 2021-03-30 ENCOUNTER — TELEPHONE (OUTPATIENT)
Dept: INTERNAL MEDICINE CLINIC | Facility: CLINIC | Age: 46
End: 2021-03-30

## 2021-03-30 NOTE — TELEPHONE ENCOUNTER
Patient called asking if there was anything else you can do for her anxiety  She does not feel like the Ativan is working  She said you had even tried to increase her dose last month  She would like to discuss other options

## 2021-03-30 NOTE — TELEPHONE ENCOUNTER
She would need to follow up with Psychiatry if the Ativan is not working or try an everyday medication

## 2021-03-31 NOTE — TELEPHONE ENCOUNTER
Then she would need to see Psychiatry if she is taking ativan with the lexapro and not helping or would not to switch to something else

## 2021-03-31 NOTE — TELEPHONE ENCOUNTER
37w5d without major complaints.   Labor precautions reviewed.   RTC in 1 week for routine PNC.   Did give her the phone numbers to [facility] and [address] [place] [address]

## 2021-04-05 ENCOUNTER — TELEMEDICINE (OUTPATIENT)
Dept: INTERNAL MEDICINE CLINIC | Facility: CLINIC | Age: 46
End: 2021-04-05
Payer: COMMERCIAL

## 2021-04-05 DIAGNOSIS — M79.10 MUSCULAR PAIN: ICD-10-CM

## 2021-04-05 DIAGNOSIS — J01.10 ACUTE NON-RECURRENT FRONTAL SINUSITIS: Primary | ICD-10-CM

## 2021-04-05 PROCEDURE — 1036F TOBACCO NON-USER: CPT | Performed by: NURSE PRACTITIONER

## 2021-04-05 PROCEDURE — 99213 OFFICE O/P EST LOW 20 MIN: CPT | Performed by: NURSE PRACTITIONER

## 2021-04-05 RX ORDER — DOXYCYCLINE 100 MG/1
100 CAPSULE ORAL 2 TIMES DAILY
Qty: 20 CAPSULE | Refills: 0 | Status: SHIPPED | OUTPATIENT
Start: 2021-04-05 | End: 2021-04-15

## 2021-04-05 RX ORDER — METHOCARBAMOL 750 MG/1
TABLET, FILM COATED ORAL
Qty: 60 TABLET | Refills: 0 | Status: SHIPPED | OUTPATIENT
Start: 2021-04-05 | End: 2021-05-07 | Stop reason: SDUPTHER

## 2021-04-05 NOTE — PROGRESS NOTES
Virtual Regular Visit      Assessment/Plan:    Problem List Items Addressed This Visit     None               Reason for visit is   Chief Complaint   Patient presents with    Virtual Regular Visit        Encounter provider Mirtha [address]     Provider located at [address]  [address]  [address]      Recent Visits  Date Type Provider Dept   21 Telephone Stephanie Yepez MA [facility]   Showing recent visits within past 7 days and meeting all other requirements     Future Appointments  No visits were found meeting these conditions  Showing future appointments within next 150 days and meeting all other requirements        The patient was identified by name and date of birth  Migel Walker was informed that this is a telemedicine visit and that the visit is being conducted through [address] [facility] and patient was informed that this is a secure, HIPAA-compliant platform  She agrees to proceed     My office door was closed  No one else was in the room  She acknowledged consent and understanding of privacy and security of the video platform  The patient has agreed to participate and understands they can discontinue the visit at any time  Patient is aware this is a billable service  Subjective  Catherine Fuller is a 55 y o  female patient  HPI     Past Medical History:   Diagnosis Date    Anxiety     Arthritis     Bariatric surgery status     Carpal tunnel syndrome     Heartburn     History of gastric bypass 2014    Hypertension     Last assessed 2014    Migraine     Obesity     Last assessed 2014    Postgastrectomy malabsorption     Restless leg syndrome     Seizures ([address] )        Past Surgical History:   Procedure Laterality Date     SECTION       SECTION N/A 2018    Procedure:  SECTION ();   Surgeon: Errol Whalen MD;  [facility];  Service: Obstetrics    CHOLECYSTECTOMY      GASTRIC BYPASS  12/2014    ID REVISION OF CERVIX,NON OBSTETRICAL N/A 12/12/2017    Procedure: CERCLAGE CERVICAL;  Surgeon: Dian Yepez MD;  [facility]: [facility] ;  Service: Obstetrics    TOOTH EXTRACTION         Current Outpatient Medications   Medication Sig Dispense Refill    ACCU-CHEK FASTCLIX LANCETS MISC by Does not apply route 2 (two) times a day 102 each 5    Alcohol Swabs (ALCOHOL PADS) 70 % PADS by Does not apply route 2 (two) times a day 100 each 5    b complex vitamins tablet Take 1 tablet by mouth daily      Blood Glucose Monitoring Suppl (ACCU-CHEK GUIDE) w/Device KIT by Does not apply route 2 (two) times a day 1 kit 0    calcium citrate (CALCITRATE) 950 MG tablet Take 2 tablets by mouth daily   CRANBERRY EXTRACT PO Take by mouth      escitalopram (LEXAPRO) 20 mg tablet TAKE (1) TABLET DAILY  30 tablet 0    ferrous sulfate 325 (65 Fe) mg tablet Take 325 mg by mouth daily with breakfast      fluticasone (FLONASE) 50 mcg/act nasal spray SPRAY 1 SPRAY INTO EACH NOSTRIL EVERY DAY 1 Bottle 3    glucose blood (ACCU-CHEK GUIDE) test strip Use as instructed 100 each 5    Lancets Misc  (ACCU-CHEK FASTCLIX LANCET) KIT by Does not apply route 2 (two) times a day 1 kit 0    LORazepam (ATIVAN) 0 5 mg tablet Take one tablet by mouth twice daily as needed 60 tablet 0    methocarbamol (ROBAXIN) 750 mg tablet TAKE  (1)  TABLET  EVERY TWELVE HOURS AS NEEDED  60 tablet 0    Multiple Vitamin (MULTIVITAMIN) tablet Take 1 tablet by mouth daily      Vitamin D, Cholecalciferol, 1000 units CAPS Take 1 tablet by mouth daily       No current facility-administered medications for this visit  Allergies   Allergen Reactions    Aspirin Other (See Comments)     Gastric Bypass    Nsaids Other (See Comments)     Gastric bypass    Codeine Itching       Review of Systems    Video Exam    There were no vitals filed for this visit      Physical Exam {covid time spent:16298}      VIRTUAL VISIT DISCLAIMER    Chelsea Daniel acknowledges that she has consented to an online visit or consultation  She understands that the online visit is based solely on information provided by her, and that, in the absence of a face-to-face physical evaluation by the physician, the diagnosis she receives is both limited and provisional in terms of accuracy and completeness  This is not intended to replace a full medical face-to-face evaluation by the physician  Chelsea Sanchez understands and accepts these terms

## 2021-04-05 NOTE — PROGRESS NOTES
Virtual Regular Visit       Assessment/Plan: Did advise covid testing put patient is deferring at present  Will start on Doxy but did advise if symptoms worsen to call the office  Will follow up later this week to see how she is feeling  Problem List Items Addressed This Visit        Respiratory    Acute non-recurrent frontal sinusitis - Primary    Relevant Medications    doxycycline monohydrate (MONODOX) 100 mg capsule               Reason for visit is   Chief Complaint   Patient presents with    Virtual Regular Visit     stuffy nose, sinus pressure, ST, ear pain R      Virtual Regular Visit        Encounter provider Mirtha [address] [place] [address]    Provider located at [address] [place] [address]  [address]  [address]      Recent Visits  Date Type Provider Dept   03/30/21 Telephone Gerardo Bejarano MA [facility]   Showing recent visits within past 7 days and meeting all other requirements     Today's Visits  Date Type Provider Dept   04/05/21 Telemedicine MAXIMINO Monae [facility]   Showing today's visits and meeting all other requirements     Future Appointments  No visits were found meeting these conditions  Showing future appointments within next 150 days and meeting all other requirements        The patient was identified by name and date of birth  Brady Cramer was informed that this is a telemedicine visit and that the visit is being conducted through [address] [place] [address] and patient was informed that this is a secure, HIPAA-compliant platform  She agrees to proceed     My office door was closed  No one else was in the room  She acknowledged consent and understanding of privacy and security of the video platform  The patient has agreed to participate and understands they can discontinue the visit at any time  Patient is aware this is a billable service       Subjective  Catherine Woodson Brady is a 55 y o  female patient   Dede Luciano is for an acute visit  She states she started with sinus pressure, headache, and postnasal drip  She denies any fever or loss of taste or smell  She states she is having some chest congestion  She does not feel this is covid  She states she thinks she did have covid in December  She offers no other issues  Past Medical History:   Diagnosis Date    Anxiety     Arthritis     Bariatric surgery status     Carpal tunnel syndrome     Heartburn     History of gastric bypass 2014    Hypertension     Last assessed 2014    Migraine     Obesity     Last assessed 2014    Postgastrectomy malabsorption     Restless leg syndrome     Seizures ([facility] [address] )        Past Surgical History:   Procedure Laterality Date     SECTION       SECTION N/A 2018    Procedure:  SECTION (); Surgeon: Nini Palacios MD;  [facility] ;  Service: Obstetrics    CHOLECYSTECTOMY      GASTRIC BYPASS  2014    [place] REVISION OF CERVIX,NON OBSTETRICAL N/A 2017    Procedure: CERCLAGE CERVICAL;  Surgeon: Keisha Francois MD;  Location: [facility];  Service: Obstetrics    TOOTH EXTRACTION         Current Outpatient Medications   Medication Sig Dispense Refill    ACCU-CHEK FASTCLIX LANCETS [address] by Does not apply route 2 (two) times a day 102 each 5    Alcohol Swabs (ALCOHOL PADS) 70 % PADS by Does not apply route 2 (two) times a day 100 each 5    b complex vitamins tablet Take 1 tablet by mouth daily      Blood Glucose Monitoring Suppl (ACCU-CHEK GUIDE) w/Device KIT by Does not apply route 2 (two) times a day 1 kit 0    calcium citrate (CALCITRATE) 950 MG tablet Take 2 tablets by mouth daily   CRANBERRY EXTRACT PO Take by mouth      escitalopram (LEXAPRO) 20 mg tablet TAKE (1) TABLET DAILY   30 tablet 0    ferrous sulfate 325 (65 Fe) mg tablet Take 325 mg by mouth daily with breakfast      fluticasone (FLONASE) 50 mcg/act nasal spray SPRAY 1 SPRAY INTO EACH NOSTRIL EVERY DAY 1 Bottle 3    glucose blood (ACCU-CHEK GUIDE) test strip Use as instructed 100 each 5    Lancets Misc  (ACCU-CHEK FASTCLIX LANCET) KIT by Does not apply route 2 (two) times a day 1 kit 0    LORazepam (ATIVAN) 0 5 mg tablet Take one tablet by mouth twice daily as needed 60 tablet 0    methocarbamol (ROBAXIN) 750 mg tablet TAKE  (1)  TABLET  EVERY TWELVE HOURS AS NEEDED  60 tablet 0    Multiple Vitamin (MULTIVITAMIN) tablet Take 1 tablet by mouth daily      Vitamin D, Cholecalciferol, 1000 units CAPS Take 1 tablet by mouth daily      doxycycline monohydrate (MONODOX) 100 mg capsule Take 1 capsule (100 mg total) by mouth 2 (two) times a day for 10 days 20 capsule 0     No current facility-administered medications for this visit  Allergies   Allergen Reactions    Aspirin Other (See Comments)     Gastric Bypass    Nsaids Other (See Comments)     Gastric bypass    Codeine Itching       Review of Systems   Constitutional: Negative  HENT: Positive for congestion, postnasal drip and sinus pain  Eyes: Negative  Respiratory: Negative  Cardiovascular: Negative  Gastrointestinal: Negative  Endocrine: Negative  Genitourinary: Negative  Musculoskeletal: Negative  Skin: Negative  Allergic/Immunologic: Positive for environmental allergies  Neurological: Negative  Hematological: Negative  Psychiatric/Behavioral: Negative  Video Exam    There were no vitals filed for this visit  Physical Exam  Constitutional:       Appearance: She is normal weight  Neurological:      Mental Status: She is alert  Psychiatric:         Mood and Affect: Mood normal          Behavior: Behavior normal          Thought Content:  Thought content normal          Judgment: Judgment normal           I spent 15 minutes directly with the patient during this visit      VIRTUAL VISIT DISCLAIMER    Oswaldo Siegel acknowledges that she has consented to an online visit or consultation  She understands that the online visit is based solely on information provided by her, and that, in the absence of a face-to-face physical evaluation by the physician, the diagnosis she receives is both limited and provisional in terms of accuracy and completeness  This is not intended to replace a full medical face-to-face evaluation by the physician  Bennie Simpson understands and accepts these terms

## 2021-04-07 ENCOUNTER — OFFICE VISIT (OUTPATIENT)
Dept: URGENT CARE | Facility: CLINIC | Age: 46
End: 2021-04-07
Payer: COMMERCIAL

## 2021-04-07 VITALS
WEIGHT: 240 LBS | HEART RATE: 74 BPM | RESPIRATION RATE: 18 BRPM | HEIGHT: 65 IN | OXYGEN SATURATION: 98 % | BODY MASS INDEX: 39.99 KG/M2 | TEMPERATURE: 98 F

## 2021-04-07 DIAGNOSIS — Z11.59 ENCOUNTER FOR SCREENING FOR OTHER VIRAL DISEASES: Primary | ICD-10-CM

## 2021-04-07 PROCEDURE — U0005 INFEC AGEN DETEC AMPLI PROBE: HCPCS | Performed by: NURSE PRACTITIONER

## 2021-04-07 PROCEDURE — 99213 OFFICE O/P EST LOW 20 MIN: CPT | Performed by: NURSE PRACTITIONER

## 2021-04-07 PROCEDURE — U0003 INFECTIOUS AGENT DETECTION BY NUCLEIC ACID (DNA OR RNA); SEVERE ACUTE RESPIRATORY SYNDROME CORONAVIRUS 2 (SARS-COV-2) (CORONAVIRUS DISEASE [COVID-19]), AMPLIFIED PROBE TECHNIQUE, MAKING USE OF HIGH THROUGHPUT TECHNOLOGIES AS DESCRIBED BY CMS-2020-01-R: HCPCS | Performed by: NURSE PRACTITIONER

## 2021-04-07 PROCEDURE — 99283 EMERGENCY DEPT VISIT LOW MDM: CPT | Performed by: NURSE PRACTITIONER

## 2021-04-07 PROCEDURE — G0382 LEV 3 HOSP TYPE B ED VISIT: HCPCS | Performed by: NURSE PRACTITIONER

## 2021-04-07 NOTE — PATIENT INSTRUCTIONS
Continue antibiotic prescribed by PCP  Continue flonase and allergy medication  Will test for COVID-19  You need to isolate into results are obtain  Rest and drink extra fluids  Tylenol as needed for pain or fever  Over-the-counter cough and cold medications as needed  Start vitamin D3 2000 IU po daily, Vitamin C 1 gram PO q 12 hours and multivitamin daily  Follow up with PCP if no improvement, call prior to any doctor visits if COVID testing is not back  Go to the ER with any worsening symptoms, chest pain, shortness of breath, difficulty breathing, lethargy, confusion, dehydration or change in skin color  [address]    Your healthcare provider and/or public health staff have evaluated you and have determined that you do not need to remain in the hospital at this time  At this time you can be isolated at home where you will be monitored by staff from your local or state health department  You should carefully follow the prevention and isolation steps below until a healthcare provider or local or state health department says that you can return to your normal activities  Stay home except to get medical care    People who are mildly ill with COVID-19 are able to isolate at home during their illness  You should restrict activities outside your home, except for getting medical care  Do not go to work, school, or public areas  Avoid using public transportation, ride-sharing, or taxis  Separate yourself from other people and animals in your home    People: As much as possible, you should stay in a specific room and away from other people in your home  Also, you should use a separate bathroom, if available  Animals: You should restrict contact with pets and other animals while you are sick with COVID-19, just like you would around other people   Although there have not been reports of pets or other animals becoming sick with COVID-19, it is still recommended that people sick with COVID-19 limit contact with animals until more information is known about the virus  When possible, have another member of your household care for your animals while you are sick  If you are sick with COVID-19, avoid contact with your pet, including petting, snuggling, being kissed or licked, and sharing food  If you must care for your pet or be around animals while you are sick, wash your hands before and after you interact with pets and wear a facemask  See COVID-19 and Animals for more information  Call ahead before visiting your doctor    If you have a medical appointment, call the healthcare provider and tell them that you have or may have COVID-19  This will help the healthcare providers office take steps to keep other people from getting infected or exposed  Wear a facemask    You should wear a facemask when you are around other people (e g , sharing a room or vehicle) or pets and before you enter a healthcare providers office  If you are not able to wear a facemask (for example, because it causes trouble breathing), then people who live with you should not stay in the same room with you, or they should wear a facemask if they enter your room  Cover your coughs and sneezes    Cover your mouth and nose with a tissue when you cough or sneeze  Throw used tissues in a lined trash can  Immediately wash your hands with soap and water for at least 20 seconds or, if soap and water are not available, clean your hands with an alcohol-based hand  that contains at least 60% alcohol  Clean your hands often    Wash your hands often with soap and water for at least 20 seconds, especially after blowing your nose, coughing, or sneezing; going to the bathroom; and before eating or preparing food  If soap and water are not readily available, use an alcohol-based hand  with at least 60% alcohol, covering all surfaces of your hands and rubbing them together until they feel dry    Soap and water are the best option if hands are visibly dirty  Avoid touching your eyes, nose, and mouth with unwashed hands  Avoid sharing personal household items    You should not share dishes, drinking glasses, cups, eating utensils, towels, or bedding with other people or pets in your home  After using these items, they should be washed thoroughly with soap and water  Clean all high-touch surfaces everyday    High touch surfaces include counters, tabletops, doorknobs, bathroom fixtures, toilets, phones, keyboards, tablets, and bedside tables  Also, clean any surfaces that may have blood, stool, or body fluids on them  Use a household cleaning spray or wipe, according to the label instructions  Labels contain instructions for safe and effective use of the cleaning product including precautions you should take when applying the product, such as wearing gloves and making sure you have good ventilation during use of the product  Monitor your symptoms    Seek prompt medical attention if your illness is worsening (e g , difficulty breathing)  Before seeking care, call your healthcare provider and tell them that you have, or are being evaluated for, COVID-19  Put on a facemask before you enter the facility  These steps will help the healthcare providers office to keep other people in the office or waiting room from getting infected or exposed  Ask your healthcare provider to call the local or state health department  Persons who are placed under active monitoring or facilitated self-monitoring should follow instructions provided by their local health department or occupational health professionals, as appropriate  If you have a medical emergency and need to call 911, notify the dispatch personnel that you have, or are being evaluated for COVID-19  If possible, put on a facemask before emergency medical services arrive      Discontinuing home isolation    Patients with confirmed COVID-19 should remain under home isolation precautions until the following conditions are met:   - They have had no fever for at least 24 hours (that is one full day of no fever without the use medicine that reduces fevers)  AND  - other symptoms have improved (for example, when their cough or shortness of breath have improved)  AND  - If had mild or moderate illness, at least 10 days have passed since their symptoms first appeared or if severe illness (needed oxygen) or immunosuppressed, at least 20 days have passed since symptoms first appeared  Patients with confirmed COVID-19 should also notify close contacts (including their workplace) and ask that they self-quarantine  Currently, close contact is defined as being within 6 feet for 15 minutes or more from the period 24 hours starting 48 hours before symptom onset to the time at which the patient went into isolation  Close contacts of patients diagnosed with COVID-19 should be instructed by the patient to self-quarantine for 14 days from the last time of their last contact with the patient       Source: [employer]

## 2021-04-07 NOTE — PROGRESS NOTES
[facility]        NAME: Ayde Sagastume is a 55 y o  female  : 1975    MRN: [id number]  DATE: 2021  TIME: 11:50 AM    Assessment and Plan   Encounter for screening for other viral diseases [Z11 59]  1  Encounter for screening for other viral diseases  Novel Coronavirus (Covid-19),PCR [facility] - Office Collection     Discussed with patient that this is still most likely viral   No change in antibiotic needed  Will rule out covid 19 as it has been greater than 90's days since she had symptoms  Patient Instructions     Patient Instructions   Continue antibiotic prescribed by PCP  Continue flonase and allergy medication  Will test for COVID-19  You need to isolate into results are obtain  Rest and drink extra fluids  Tylenol as needed for pain or fever  Over-the-counter cough and cold medications as needed  Start vitamin D3 2000 IU po daily, Vitamin C 1 gram PO q 12 hours and multivitamin daily  Follow up with PCP if no improvement, call prior to any doctor visits if COVID testing is not back  Go to the ER with any worsening symptoms, chest pain, shortness of breath, difficulty breathing, lethargy, confusion, dehydration or change in skin color  [address]    Your healthcare provider and/or public health staff have evaluated you and have determined that you do not need to remain in the hospital at this time  At this time you can be isolated at home where you will be monitored by staff from your local or state health department  You should carefully follow the prevention and isolation steps below until a healthcare provider or local or state health department says that you can return to your normal activities  Stay home except to get medical care    People who are mildly ill with COVID-19 are able to isolate at home during their illness  You should restrict activities outside your home, except for getting medical care   Do not go to work, school, or public areas  Avoid using public transportation, ride-sharing, or taxis  Separate yourself from other people and animals in your home    People: As much as possible, you should stay in a specific room and away from other people in your home  Also, you should use a separate bathroom, if available  Animals: You should restrict contact with pets and other animals while you are sick with COVID-19, just like you would around other people  Although there have not been reports of pets or other animals becoming sick with COVID-19, it is still recommended that people sick with COVID-19 limit contact with animals until more information is known about the virus  When possible, have another member of your household care for your animals while you are sick  If you are sick with COVID-19, avoid contact with your pet, including petting, snuggling, being kissed or licked, and sharing food  If you must care for your pet or be around animals while you are sick, wash your hands before and after you interact with pets and wear a facemask  See COVID-19 and Animals for more information  Call ahead before visiting your doctor    If you have a medical appointment, call the healthcare provider and tell them that you have or may have COVID-19  This will help the healthcare providers office take steps to keep other people from getting infected or exposed  Wear a facemask    You should wear a facemask when you are around other people (e g , sharing a room or vehicle) or pets and before you enter a healthcare providers office  If you are not able to wear a facemask (for example, because it causes trouble breathing), then people who live with you should not stay in the same room with you, or they should wear a facemask if they enter your room  Cover your coughs and sneezes    Cover your mouth and nose with a tissue when you cough or sneeze  Throw used tissues in a lined trash can   Immediately wash your hands with soap and water for at least 20 seconds or, if soap and water are not available, clean your hands with an alcohol-based hand  that contains at least 60% alcohol  Clean your hands often    Wash your hands often with soap and water for at least 20 seconds, especially after blowing your nose, coughing, or sneezing; going to the bathroom; and before eating or preparing food  If soap and water are not readily available, use an alcohol-based hand  with at least 60% alcohol, covering all surfaces of your hands and rubbing them together until they feel dry  Soap and water are the best option if hands are visibly dirty  Avoid touching your eyes, nose, and mouth with unwashed hands  Avoid sharing personal household items    You should not share dishes, drinking glasses, cups, eating utensils, towels, or bedding with other people or pets in your home  After using these items, they should be washed thoroughly with soap and water  Clean all high-touch surfaces everyday    High touch surfaces include counters, tabletops, doorknobs, bathroom fixtures, toilets, phones, keyboards, tablets, and bedside tables  Also, clean any surfaces that may have blood, stool, or body fluids on them  Use a household cleaning spray or wipe, according to the label instructions  Labels contain instructions for safe and effective use of the cleaning product including precautions you should take when applying the product, such as wearing gloves and making sure you have good ventilation during use of the product  Monitor your symptoms    Seek prompt medical attention if your illness is worsening (e g , difficulty breathing)  Before seeking care, call your healthcare provider and tell them that you have, or are being evaluated for, COVID-19  Put on a facemask before you enter the facility  These steps will help the healthcare providers office to keep other people in the office or waiting room from getting infected or exposed   Ask your healthcare provider to call the local or [facility] health department  Persons who are placed under active monitoring or facilitated self-monitoring should follow instructions provided by their local health department or occupational health professionals, as appropriate  If you have a medical emergency and need to call 911, notify the dispatch personnel that you have, or are being evaluated for COVID-19  If possible, put on a facemask before emergency medical services arrive  Discontinuing home isolation    Patients with confirmed COVID-19 should remain under home isolation precautions until the following conditions are met:   - They have had no fever for at least 24 hours (that is one full day of no fever without the use medicine that reduces fevers)  AND  - other symptoms have improved (for example, when their cough or shortness of breath have improved)  AND  - If had mild or moderate illness, at least 10 days have passed since their symptoms first appeared or if severe illness (needed oxygen) or immunosuppressed, at least 20 days have passed since symptoms first appeared  Patients with confirmed COVID-19 should also notify close contacts (including their workplace) and ask that they self-quarantine  Currently, close contact is defined as being within 6 feet for 15 minutes or more from the period 24 hours starting 48 hours before symptom onset to the time at which the patient went into isolation  Close contacts of patients diagnosed with COVID-19 should be instructed by the patient to self-quarantine for 14 days from the last time of their last contact with the patient       Source: [employer] tucker         Chief Complaint     Chief Complaint   Patient presents with    Headache     on and off 5 days     Earache     right feels itchy hurts behind ear area    Nasal Congestion     post nasal drip started 5 days ago         History of Present Illness Escobar Nova presents to the clinic c/o    This is a 55year old female here today with complaints of nasal congestion, sinus pressure, post nasal drip and on/off headaches  He does have slight cough  Symptoms started about 5 days ago  She had televisit with PCP on 04/05/2021 and was started on doxy for sinus infection  PCP also recommend covid testing but she declined at that time  She does state that she had covid at end of December  No other exposures  She states prednisone has helped in past but PCP did not want to start prednisone with covid testing  She denies any fatigue but has had fever  Review of Systems   Review of Systems   Constitutional: Positive for fatigue  HENT: Positive for congestion, postnasal drip, rhinorrhea, sinus pressure and sinus pain  Respiratory: Positive for cough  Cardiovascular: Negative  Neurological: Positive for headaches  Psychiatric/Behavioral: Negative  Current Medications     Long-Term Medications   Medication Sig Dispense Refill    ACCU-CHEK FASTCLIX LANCETS MISC by Does not apply route 2 (two) times a day 102 each 5    b complex vitamins tablet Take 1 tablet by mouth daily      escitalopram (LEXAPRO) 20 mg tablet TAKE (1) TABLET DAILY  30 tablet 0    fluticasone (FLONASE) 50 mcg/act nasal spray SPRAY 1 SPRAY INTO EACH NOSTRIL EVERY DAY 1 Bottle 3    Lancets Misc  (ACCU-CHEK FASTCLIX LANCET) KIT by Does not apply route 2 (two) times a day 1 kit 0    LORazepam (ATIVAN) 0 5 mg tablet Take one tablet by mouth twice daily as needed 60 tablet 0    methocarbamol (ROBAXIN) 750 mg tablet TAKE  (1)  TABLET  EVERY TWELVE HOURS AS NEEDED   60 tablet 0    Multiple Vitamin (MULTIVITAMIN) tablet Take 1 tablet by mouth daily      Vitamin D, Cholecalciferol, 1000 units CAPS Take 1 tablet by mouth daily         Current Allergies     Allergies as of 04/07/2021 - Reviewed 04/07/2021   Allergen Reaction Noted    Aspirin Other (See Comments) 12/04/2016    Nsaids Other (See Comments) 08/01/2017    Codeine Itching 01/17/2016            The following portions of the patient's history were reviewed and updated as appropriate: allergies, current medications, past family history, past medical history, past social history, past surgical history and problem list     Objective   Pulse 74   Temp 98 °F (36 7 °C)   Resp 18   Ht 5' 4 5" (1 638 m)   Wt 109 kg (240 lb)   SpO2 98%   BMI 40 56 kg/m²        Physical Exam     Physical Exam  Vitals signs and nursing note reviewed  Constitutional:       General: She is not in acute distress  Appearance: Normal appearance  She is not ill-appearing or toxic-appearing  HENT:      Left Ear: Tympanic membrane normal       Nose: Congestion and rhinorrhea present  Cardiovascular:      Rate and Rhythm: Normal rate and regular rhythm  Pulses: Normal pulses  Heart sounds: Normal heart sounds  Pulmonary:      Effort: Pulmonary effort is normal       Breath sounds: Normal breath sounds  Neurological:      Mental Status: She is alert  Psychiatric:         Mood and Affect: Mood normal          Behavior: Behavior normal          Thought Content:  Thought content normal          Judgment: Judgment normal

## 2021-04-08 ENCOUNTER — DOCUMENTATION (OUTPATIENT)
Dept: URGENT CARE | Facility: CLINIC | Age: 46
End: 2021-04-08

## 2021-04-08 ENCOUNTER — TELEPHONE (OUTPATIENT)
Dept: INTERNAL MEDICINE CLINIC | Facility: CLINIC | Age: 46
End: 2021-04-08

## 2021-04-08 LAB — SARS-COV-2 RNA RESP QL NAA+PROBE: NEGATIVE

## 2021-04-08 NOTE — TELEPHONE ENCOUNTER
Did receive a call from Dirk stating that she did get her negative covid results today, and that she is wanting to know if she can now have prednisolone called into the pharmacy  Please advise

## 2021-04-09 DIAGNOSIS — F41.9 ANXIETY AND DEPRESSION: ICD-10-CM

## 2021-04-09 DIAGNOSIS — F32.A ANXIETY AND DEPRESSION: ICD-10-CM

## 2021-04-09 DIAGNOSIS — J01.10 ACUTE NON-RECURRENT FRONTAL SINUSITIS: Primary | ICD-10-CM

## 2021-04-09 RX ORDER — METHYLPREDNISOLONE 4 MG/1
TABLET ORAL
Qty: 21 EACH | Refills: 0 | Status: SHIPPED | OUTPATIENT
Start: 2021-04-09 | End: 2021-06-22 | Stop reason: ALTCHOICE

## 2021-04-09 RX ORDER — ESCITALOPRAM OXALATE 20 MG/1
20 TABLET ORAL DAILY
Qty: 30 TABLET | Refills: 3 | Status: SHIPPED | OUTPATIENT
Start: 2021-04-09 | End: 2021-07-15

## 2021-05-07 DIAGNOSIS — M79.10 MUSCULAR PAIN: ICD-10-CM

## 2021-05-07 RX ORDER — METHOCARBAMOL 750 MG/1
TABLET, FILM COATED ORAL
Qty: 60 TABLET | Refills: 0 | Status: SHIPPED | OUTPATIENT
Start: 2021-05-07 | End: 2021-06-04

## 2021-05-13 ENCOUNTER — TELEPHONE (OUTPATIENT)
Dept: BARIATRICS | Facility: CLINIC | Age: 46
End: 2021-05-13

## 2021-05-13 NOTE — TELEPHONE ENCOUNTER
LMOM for patient to schedule an OD ANNUAL  Return information left for patient to contact office at convenience

## 2021-05-13 NOTE — TELEPHONE ENCOUNTER
----- Message from Aliza Lucio RN sent at 2021  8:02 AM EDT -----  Regardin year f/u appointment  Please contact patient via TELEPHONE and LETTER for 6 year follow up appointment  Thank You

## 2021-06-04 DIAGNOSIS — M79.10 MUSCULAR PAIN: ICD-10-CM

## 2021-06-04 RX ORDER — METHOCARBAMOL 750 MG/1
TABLET, FILM COATED ORAL
Qty: 60 TABLET | Refills: 0 | Status: SHIPPED | OUTPATIENT
Start: 2021-06-04 | End: 2021-07-06

## 2021-06-15 ENCOUNTER — TELEPHONE (OUTPATIENT)
Dept: OTOLARYNGOLOGY | Facility: CLINIC | Age: 46
End: 2021-06-15

## 2021-06-15 ENCOUNTER — TELEPHONE (OUTPATIENT)
Dept: PSYCHIATRY | Facility: CLINIC | Age: 46
End: 2021-06-15

## 2021-06-15 NOTE — TELEPHONE ENCOUNTER
Pt called looking to set up w/ a psychiatrist  I informed pt  call ENE Berger to put in a referral in the sytem   Once in I will forward information to Intake

## 2021-06-15 NOTE — TELEPHONE ENCOUNTER
Returning call, spoke to Jose and she will call back to reschedule this appointment   She needs to have surgery 1st

## 2021-06-22 NOTE — PRE-PROCEDURE INSTRUCTIONS
Pre-Surgery Instructions:   Medication Instructions    b complex vitamins tablet- hold until after procedure Instructed patient per Anesthesia Guidelines   calcium citrate (CALCITRATE) 950 MG tablet- hold until after procedure Instructed patient per Anesthesia Guidelines   CRANBERRY EXTRACT PO- hold until after procedure Instructed patient per Anesthesia Guidelines   escitalopram (LEXAPRO) 20 mg tablet- ok to take as prescribed Instructed patient per Anesthesia Guidelines   ferrous sulfate 325 (65 Fe) mg tablet- hold until after procedure Instructed patient per Anesthesia Guidelines   fluticasone (FLONASE) 50 mcg/act nasal spray- ok to use as prescribed Instructed patient per Anesthesia Guidelines   methocarbamol (ROBAXIN) 750 mg tablet- ok to take as prescribed Instructed patient per Anesthesia Guidelines   Misc Natural Products (APPLE CIDER VINEGAR DIET PO)- hold until after procedure Instructed patient per Anesthesia Guidelines   Multiple Vitamin (MULTIVITAMIN) tablet- hold until after procedure Instructed patient per Anesthesia Guidelines   vitamin A 2250 MCG (7500 UT) capsule- hold until after procedure Instructed patient per Anesthesia Guidelines   Vitamin D, Cholecalciferol, 1000 units CAPS- hold until after procedure Instructed patient per Anesthesia Guidelines  Has CHG & verbalizes understanding of bathing instructions  Verbalizes understanding to avoid OTC Vitamins/ suppl/ herbals until after procedure, NPO after MN day prior & take approved morning meds with a sip of water

## 2021-06-27 ENCOUNTER — ANESTHESIA EVENT (OUTPATIENT)
Dept: PERIOP | Facility: HOSPITAL | Age: 46
End: 2021-06-27
Payer: COMMERCIAL

## 2021-06-27 NOTE — ANESTHESIA PREPROCEDURE EVALUATION
Procedure:  ABDOMINOPLASTY (N/A Abdomen)    Relevant Problems   ANESTHESIA  old and current charts reviewed and notes read      CARDIO   (-) CHF (congestive heart failure) (HCC)   (-) Chest pain   (-) DVT (deep venous thrombosis) (HCC)   (-) Dysrhythmias   (-) HTN (hypertension)   (-) Hyperlipidemia   (-) MI (myocardial infarction) (HCC)      ENDO  morbid obestiy  reactive hypoglycemia      GI/HEPATIC   (+) Bariatric surgery status   (+) Reactive hypoglycemia      GYN   (-) Currently pregnant      MUSCULOSKELETAL   (+) Degenerative disc disease, lumbar   (+) Neck muscle spasm   (+) Sciatica      NEURO/PSYCH  restless leg hx   (+) Anxiety and depression   (+) Anxiety disorder   (+) Cluster headache, not intractable      PULMONARY  ex smoker   (+) Smoker   (-) Sleep apnea   (-) URI (upper respiratory infection)        Physical Exam    Airway    Mallampati score: II  TM Distance: >3 FB  Neck ROM: full     Dental   upper dentures and lower dentures,     Cardiovascular  Cardiovascular exam normal    Pulmonary  Pulmonary exam normal     Other Findings        Anesthesia Plan  ASA Score- 2     Anesthesia Type- general with ASA Monitors  Additional Monitors:   Airway Plan:     Comment: S/P gastric bypass  Plan Factors-    Chart reviewed  EKG reviewed  Imaging results reviewed  Existing labs reviewed  Patient summary reviewed  Patient is not a current smoker  Patient not instructed to abstain from smoking on day of procedure  Patient did not smoke on day of surgery  Obstructive sleep apnea risk education given perioperatively  Induction- intravenous  Postoperative Plan- Plan for postoperative opioid use  Informed Consent- Anesthetic plan and risks discussed with patient and spouse  I personally reviewed this patient with the CRNA  Discussed and agreed on the Anesthesia Plan with the CRNA  Erinn Mckeon

## 2021-06-28 ENCOUNTER — ANESTHESIA (OUTPATIENT)
Dept: PERIOP | Facility: HOSPITAL | Age: 46
End: 2021-06-28
Payer: COMMERCIAL

## 2021-06-28 ENCOUNTER — HOSPITAL ENCOUNTER (OUTPATIENT)
Facility: HOSPITAL | Age: 46
Setting detail: OUTPATIENT SURGERY
Discharge: HOME | End: 2021-06-28
Attending: PLASTIC SURGERY | Admitting: PLASTIC SURGERY
Payer: COMMERCIAL

## 2021-06-28 VITALS
HEIGHT: 64 IN | RESPIRATION RATE: 18 BRPM | HEART RATE: 75 BPM | DIASTOLIC BLOOD PRESSURE: 66 MMHG | SYSTOLIC BLOOD PRESSURE: 137 MMHG | OXYGEN SATURATION: 96 % | BODY MASS INDEX: 39.78 KG/M2 | TEMPERATURE: 98.1 F | WEIGHT: 233 LBS

## 2021-06-28 DIAGNOSIS — S30.821A BLISTER (NONTHERMAL) OF ABDOMINAL WALL, INITIAL ENCOUNTER: ICD-10-CM

## 2021-06-28 DIAGNOSIS — L08.9 LOCAL INFECTION OF THE SKIN AND SUBCUTANEOUS TISSUE, UNSPECIFIED: ICD-10-CM

## 2021-06-28 DIAGNOSIS — M54.50 LOW BACK PAIN: ICD-10-CM

## 2021-06-28 DIAGNOSIS — L98.7 EXCESSIVE AND REDUNDANT SKIN AND SUBCUTANEOUS TISSUE: ICD-10-CM

## 2021-06-28 DIAGNOSIS — R21 RASH AND OTHER NONSPECIFIC SKIN ERUPTION: ICD-10-CM

## 2021-06-28 LAB
EXT PREGNANCY TEST URINE: NEGATIVE
EXT. CONTROL: NORMAL

## 2021-06-28 PROCEDURE — 88302 TISSUE EXAM BY PATHOLOGIST: CPT | Performed by: PATHOLOGY

## 2021-06-28 PROCEDURE — 81025 URINE PREGNANCY TEST: CPT | Performed by: PLASTIC SURGERY

## 2021-06-28 RX ORDER — ONDANSETRON 4 MG/1
4 TABLET, ORALLY DISINTEGRATING ORAL ONCE
Status: DISCONTINUED | OUTPATIENT
Start: 2021-06-28 | End: 2021-06-28 | Stop reason: HOSPADM

## 2021-06-28 RX ORDER — LIDOCAINE HYDROCHLORIDE 10 MG/ML
INJECTION, SOLUTION EPIDURAL; INFILTRATION; INTRACAUDAL; PERINEURAL AS NEEDED
Status: DISCONTINUED | OUTPATIENT
Start: 2021-06-28 | End: 2021-06-28

## 2021-06-28 RX ORDER — HYDROCODONE BITARTRATE AND ACETAMINOPHEN 5; 325 MG/1; MG/1
1 TABLET ORAL EVERY 6 HOURS PRN
Status: DISCONTINUED | OUTPATIENT
Start: 2021-06-28 | End: 2021-06-28 | Stop reason: HOSPADM

## 2021-06-28 RX ORDER — PROMETHAZINE HYDROCHLORIDE 25 MG/ML
12.5 INJECTION, SOLUTION INTRAMUSCULAR; INTRAVENOUS ONCE AS NEEDED
Status: COMPLETED | OUTPATIENT
Start: 2021-06-28 | End: 2021-06-28

## 2021-06-28 RX ORDER — KETAMINE HCL IN NACL, ISO-OSM 100MG/10ML
SYRINGE (ML) INJECTION AS NEEDED
Status: DISCONTINUED | OUTPATIENT
Start: 2021-06-28 | End: 2021-06-28

## 2021-06-28 RX ORDER — FENTANYL CITRATE/PF 50 MCG/ML
12.5 SYRINGE (ML) INJECTION
Status: DISCONTINUED | OUTPATIENT
Start: 2021-06-28 | End: 2021-06-28 | Stop reason: HOSPADM

## 2021-06-28 RX ORDER — DIPHENHYDRAMINE HYDROCHLORIDE 50 MG/ML
12.5 INJECTION INTRAMUSCULAR; INTRAVENOUS ONCE AS NEEDED
Status: DISCONTINUED | OUTPATIENT
Start: 2021-06-28 | End: 2021-06-28 | Stop reason: HOSPADM

## 2021-06-28 RX ORDER — GLYCOPYRROLATE 0.2 MG/ML
INJECTION INTRAMUSCULAR; INTRAVENOUS AS NEEDED
Status: DISCONTINUED | OUTPATIENT
Start: 2021-06-28 | End: 2021-06-28

## 2021-06-28 RX ORDER — NEOSTIGMINE METHYLSULFATE 1 MG/ML
INJECTION INTRAVENOUS AS NEEDED
Status: DISCONTINUED | OUTPATIENT
Start: 2021-06-28 | End: 2021-06-28

## 2021-06-28 RX ORDER — HYDROMORPHONE HCL 110MG/55ML
PATIENT CONTROLLED ANALGESIA SYRINGE INTRAVENOUS AS NEEDED
Status: DISCONTINUED | OUTPATIENT
Start: 2021-06-28 | End: 2021-06-28

## 2021-06-28 RX ORDER — BUPIVACAINE HYDROCHLORIDE 2.5 MG/ML
INJECTION, SOLUTION EPIDURAL; INFILTRATION; INTRACAUDAL AS NEEDED
Status: DISCONTINUED | OUTPATIENT
Start: 2021-06-28 | End: 2021-06-28 | Stop reason: HOSPADM

## 2021-06-28 RX ORDER — FENTANYL CITRATE 50 UG/ML
INJECTION, SOLUTION INTRAMUSCULAR; INTRAVENOUS AS NEEDED
Status: DISCONTINUED | OUTPATIENT
Start: 2021-06-28 | End: 2021-06-28

## 2021-06-28 RX ORDER — SODIUM CHLORIDE, SODIUM LACTATE, POTASSIUM CHLORIDE, CALCIUM CHLORIDE 600; 310; 30; 20 MG/100ML; MG/100ML; MG/100ML; MG/100ML
INJECTION, SOLUTION INTRAVENOUS CONTINUOUS PRN
Status: DISCONTINUED | OUTPATIENT
Start: 2021-06-28 | End: 2021-06-28

## 2021-06-28 RX ORDER — PROPOFOL 10 MG/ML
INJECTION, EMULSION INTRAVENOUS AS NEEDED
Status: DISCONTINUED | OUTPATIENT
Start: 2021-06-28 | End: 2021-06-28

## 2021-06-28 RX ORDER — MAGNESIUM HYDROXIDE 1200 MG/15ML
LIQUID ORAL AS NEEDED
Status: DISCONTINUED | OUTPATIENT
Start: 2021-06-28 | End: 2021-06-28 | Stop reason: HOSPADM

## 2021-06-28 RX ORDER — MEPERIDINE HYDROCHLORIDE 25 MG/ML
12.5 INJECTION INTRAMUSCULAR; INTRAVENOUS; SUBCUTANEOUS
Status: DISCONTINUED | OUTPATIENT
Start: 2021-06-28 | End: 2021-06-28 | Stop reason: HOSPADM

## 2021-06-28 RX ORDER — ONDANSETRON 2 MG/ML
INJECTION INTRAMUSCULAR; INTRAVENOUS AS NEEDED
Status: DISCONTINUED | OUTPATIENT
Start: 2021-06-28 | End: 2021-06-28

## 2021-06-28 RX ORDER — LIDOCAINE HYDROCHLORIDE AND EPINEPHRINE 5; 5 MG/ML; UG/ML
INJECTION, SOLUTION INFILTRATION; PERINEURAL AS NEEDED
Status: DISCONTINUED | OUTPATIENT
Start: 2021-06-28 | End: 2021-06-28 | Stop reason: HOSPADM

## 2021-06-28 RX ORDER — FENTANYL CITRATE/PF 50 MCG/ML
25 SYRINGE (ML) INJECTION
Status: DISCONTINUED | OUTPATIENT
Start: 2021-06-28 | End: 2021-06-28 | Stop reason: HOSPADM

## 2021-06-28 RX ORDER — DEXAMETHASONE SODIUM PHOSPHATE 4 MG/ML
4 INJECTION, SOLUTION INTRA-ARTICULAR; INTRALESIONAL; INTRAMUSCULAR; INTRAVENOUS; SOFT TISSUE ONCE AS NEEDED
Status: DISCONTINUED | OUTPATIENT
Start: 2021-06-28 | End: 2021-06-28 | Stop reason: HOSPADM

## 2021-06-28 RX ORDER — SODIUM CHLORIDE, SODIUM LACTATE, POTASSIUM CHLORIDE, CALCIUM CHLORIDE 600; 310; 30; 20 MG/100ML; MG/100ML; MG/100ML; MG/100ML
75 INJECTION, SOLUTION INTRAVENOUS CONTINUOUS
Status: DISCONTINUED | OUTPATIENT
Start: 2021-06-28 | End: 2021-06-28 | Stop reason: HOSPADM

## 2021-06-28 RX ORDER — MIDAZOLAM HYDROCHLORIDE 2 MG/2ML
INJECTION, SOLUTION INTRAMUSCULAR; INTRAVENOUS AS NEEDED
Status: DISCONTINUED | OUTPATIENT
Start: 2021-06-28 | End: 2021-06-28

## 2021-06-28 RX ORDER — ROCURONIUM BROMIDE 10 MG/ML
INJECTION, SOLUTION INTRAVENOUS AS NEEDED
Status: DISCONTINUED | OUTPATIENT
Start: 2021-06-28 | End: 2021-06-28

## 2021-06-28 RX ADMIN — NEOSTIGMINE METHYLSULFATE 3 MG: 1 INJECTION INTRAVENOUS at 12:35

## 2021-06-28 RX ADMIN — FENTANYL CITRATE 50 MCG: 50 INJECTION, SOLUTION INTRAMUSCULAR; INTRAVENOUS at 07:47

## 2021-06-28 RX ADMIN — ROCURONIUM BROMIDE 50 MG: 10 INJECTION, SOLUTION INTRAVENOUS at 07:47

## 2021-06-28 RX ADMIN — HYDROCODONE BITARTRATE AND ACETAMINOPHEN 1 TABLET: 5; 325 TABLET ORAL at 16:00

## 2021-06-28 RX ADMIN — Medication 25 MG: at 10:32

## 2021-06-28 RX ADMIN — FENTANYL CITRATE 25 MCG: 50 INJECTION, SOLUTION INTRAMUSCULAR; INTRAVENOUS at 13:04

## 2021-06-28 RX ADMIN — HYDROMORPHONE HYDROCHLORIDE 0.4 MG: 2 INJECTION, SOLUTION INTRAMUSCULAR; INTRAVENOUS; SUBCUTANEOUS at 08:53

## 2021-06-28 RX ADMIN — ROCURONIUM BROMIDE 20 MG: 10 INJECTION, SOLUTION INTRAVENOUS at 09:44

## 2021-06-28 RX ADMIN — HYDROMORPHONE HYDROCHLORIDE 0.2 MG: 2 INJECTION, SOLUTION INTRAMUSCULAR; INTRAVENOUS; SUBCUTANEOUS at 09:28

## 2021-06-28 RX ADMIN — FENTANYL CITRATE 50 MCG: 50 INJECTION, SOLUTION INTRAMUSCULAR; INTRAVENOUS at 08:38

## 2021-06-28 RX ADMIN — FENTANYL CITRATE 50 MCG: 50 INJECTION, SOLUTION INTRAMUSCULAR; INTRAVENOUS at 12:06

## 2021-06-28 RX ADMIN — LIDOCAINE HYDROCHLORIDE 50 MG: 10 INJECTION, SOLUTION EPIDURAL; INFILTRATION; INTRACAUDAL; PERINEURAL at 07:47

## 2021-06-28 RX ADMIN — Medication 25 MG: at 08:14

## 2021-06-28 RX ADMIN — GLYCOPYRROLATE 0.4 MG: 0.2 INJECTION, SOLUTION INTRAMUSCULAR; INTRAVENOUS at 12:35

## 2021-06-28 RX ADMIN — SODIUM CHLORIDE, SODIUM LACTATE, POTASSIUM CHLORIDE, AND CALCIUM CHLORIDE: .6; .31; .03; .02 INJECTION, SOLUTION INTRAVENOUS at 09:59

## 2021-06-28 RX ADMIN — HYDROMORPHONE HYDROCHLORIDE 0.4 MG: 2 INJECTION, SOLUTION INTRAMUSCULAR; INTRAVENOUS; SUBCUTANEOUS at 09:46

## 2021-06-28 RX ADMIN — PROMETHAZINE HYDROCHLORIDE 12.5 MG: 25 INJECTION INTRAMUSCULAR; INTRAVENOUS at 13:08

## 2021-06-28 RX ADMIN — SODIUM CHLORIDE, SODIUM LACTATE, POTASSIUM CHLORIDE, AND CALCIUM CHLORIDE 75 ML/HR: .6; .31; .03; .02 INJECTION, SOLUTION INTRAVENOUS at 06:46

## 2021-06-28 RX ADMIN — ONDANSETRON 4 MG: 2 INJECTION INTRAMUSCULAR; INTRAVENOUS at 07:42

## 2021-06-28 RX ADMIN — MIDAZOLAM 2 MG: 1 INJECTION INTRAMUSCULAR; INTRAVENOUS at 07:42

## 2021-06-28 RX ADMIN — SODIUM CHLORIDE, SODIUM LACTATE, POTASSIUM CHLORIDE, AND CALCIUM CHLORIDE: .6; .31; .03; .02 INJECTION, SOLUTION INTRAVENOUS at 07:55

## 2021-06-28 RX ADMIN — PROPOFOL 200 MG: 10 INJECTION, EMULSION INTRAVENOUS at 07:47

## 2021-06-28 RX ADMIN — SODIUM CHLORIDE, SODIUM LACTATE, POTASSIUM CHLORIDE, AND CALCIUM CHLORIDE: .6; .31; .03; .02 INJECTION, SOLUTION INTRAVENOUS at 10:00

## 2021-06-28 NOTE — ANESTHESIA POSTPROCEDURE EVALUATION
Post-Op Assessment Note    CV Status:  Stable  Pain Score: 3    Pain management: adequate     Mental Status:  Alert and awake   Hydration Status:  Euvolemic   PONV Controlled:  Controlled   Airway Patency:  Patent  Airway: intubated      Post Op Vitals Reviewed: Yes      Staff: Anesthesiologist, CRNA   Comments: ACV   NDPA        No complications documented      BP      Temp      Pulse     Resp      SpO2

## 2021-06-28 NOTE — INTERVAL H&P NOTE
H&P reviewed  After examining the patient I find no changes in the patients condition since the H&P had been written      Vitals:    06/28/21 0629   BP: 137/87   Pulse: 66   Resp: 16   Temp: 98 8 °F (37 1 °C)   SpO2: 98%

## 2021-06-28 NOTE — NURSING NOTE
Patient resting comfortably  No complaints  Tolerating sips of water  Significant other by her bedside, massaging her left arm

## 2021-06-28 NOTE — ANESTHESIA POSTPROCEDURE EVALUATION
Post-Op Assessment Note    CV Status:  Stable  Pain Score: 0    Pain management: adequate     Mental Status:  Alert and awake   Hydration Status:  Euvolemic   PONV Controlled:  Controlled   Airway Patency:  Patent      Post Op Vitals Reviewed: Yes      Staff: CRNA         No complications documented      BP (P) 159/87 (06/28/21 1249)    Temp (P) 99 6 °F (37 6 °C) (06/28/21 1249)    Pulse  93   Resp (P) 20 (06/28/21 1249)    SpO2   97

## 2021-06-28 NOTE — NURSING NOTE
Patient returned to [facility] via litter  Awake/alert  Skin warm/dry  Respirations easy/unlabored  Dressings/abdominal binder clean/dry/intact  J-P drains x 2 with small amount of bloody drainage noted  Patient states her left arm is sore, antecubital area, but that she has arthritis in that arm  Also states she has a headache because she did not have her coffee yet today  Call bell in reach  Patient tolerating fluids  Denies pain to surgical area  Patient will notify nurse if pain medication is needed

## 2021-06-28 NOTE — NURSING NOTE
Patient and fiance verbalized understanding of discharge instructions  Demonstration/return demonstration provided for procedure for emptying drains

## 2021-06-28 NOTE — OP NOTE
OPERATIVE REPORT  PATIENT NAME: Amanda Del Rosario    :  1975  MRN: [id number]  Pt Location:  OR ROOM 11    SURGERY DATE: 2021    Surgeon(s) and Role:     * Rell Herring MD - Primary    Preop and postop Diagnosis:  Lower abdomen lipodystrophy    Operative Procedure(s) (LRB):  ABDOMINOPLASTY (N/A)    Operative history:  Patient following gastric bypass procedure developed a large lower abdominal pannus  This was causing a chronic rash in her groins  In addition she has lower back pain  At this time the pannus was removed with minimal undermining of the midline skin to allow preservation of the umbilicus in anatomical position as an abdominal plasty  Operative procedure: The patient was marked the standing position with the groin crease at the lower border of the pannus following a surgical scar in this area going across the suprapubic region  A curvature skin outline was then made superior this going above the umbilicus to remove all of the pannus with still direct closure of minimal tension being possible curving laterally in each side so as to minimize the risk of a dog ear as we could not do a total body lift due to insurance constraints     Patient was then taken the operating placed supine the operating table  She was prepped draped usual fashion  Anesthesia was general via endotracheal tube  All surgical for proposed incisions were infiltrated with Marcaine 0 25% and xylocaine 1% with epinephrine  All incisions were then made during 1st the left side of the hemiabdomen followed by identical maneuvers with the right side of the hemiabdomen as follows  Following this the Bovie was used for cutting coagulation purposes going down to the abdominal wall fascia through the subcutaneous tissues carefully coagulating all subcutaneous venous structures    Incision was then made about the umbilicus but we are constrained by scar superior and inferior to the scar in this region which had to be the outline of the umbilical flap in this area  The pannus was then elevated from the abdominal wall fascia carefully the coagulating all perforators as they were encountered back to the midline inferiorly  When this had been accomplished on both sides the the umbilicus was then circumscribed carefully by scissor dissection going down to the abdominal wall fascia to preserve as much circulation as possible  Following this the entire pannus could then the removed and discarded  Careful hemostasis was achieved throughout the abdominal wall  A 15 [place] Terry drain was placed through a groin stab wound to then lie in abdominal wall the gutter on each side  This was held in place with a 2-0 silk whipstitch  With the reverse karen-knife position of the patient, a 3-0 silk suture was placed in the midline of the upper abdomen apron to that of the pubis for orientation purposes  This allowed the incisions be closed with 2-0 Vicryl interrupted simple subcutaneous sutures followed by 3-0 nylon interrupted vertical mattress sutures due to the uneven nature of her thin skin which then would not have allowed a subcuticular closure properly  Next a superior based flap 2 cm in vertical and horizontal old dimensions was marked on the midline of the abdomen at the level of the anterior superior iliac spines where the umbilicus had been noted to be present prior to the removal of the pannus  Flap borders were incised and this area was thinned out going down to the the abdominal fascia  There the umbilicus which had had a suture placed as superior margin could be retrieved and brought to the surface  The superior border the umbilicus was then cut down to this for inferiorly as the flap would reach  The flap was sewn to the umbilicus steps with 4-0 Vicryl Rapide simple sutures    The rest of the umbilicus was sewn to the opening in the abdominal wall and been made with 4 interrupted simple sutures light dressings were placed on all wounds  The patient tolerated the procedure well abdominal binder was placed she was then taken to recovery in good condition  Specimen(s):  ID Type Source Tests Collected by Time Destination   1 : ABDOMINOPLASTY TISSUE Tissue Soft Tissue, Other TISSUE EXAM Millie Sanchez MD 6/28/2021 [id number]        Estimated Blood Loss:   25 mL    Drains:  Closed/Suction Drain Anterior;Right Hip Bulb 15 Fr  (Active)   Number of days: 0       Closed/Suction Drain Anterior; Left Hip Bulb 15 Fr  (Active)   Number of days: 0       [REMOVED] Urethral Catheter Latex 16 Fr   (Removed)   Number of days: 0         SIGNATURE: Millie Sanchez MD  DATE: June 28, 2021  TIME: 12:42 PM

## 2021-06-28 NOTE — NURSING NOTE
Patient requesting to go home  Wants to go to the bathroom  Also, states pain "around the edges" is 3/10  Requesting pain medication

## 2021-07-05 DIAGNOSIS — M79.10 MUSCULAR PAIN: ICD-10-CM

## 2021-07-06 RX ORDER — METHOCARBAMOL 750 MG/1
TABLET, FILM COATED ORAL
Qty: 60 TABLET | Refills: 0 | Status: SHIPPED | OUTPATIENT
Start: 2021-07-06 | End: 2021-07-15

## 2021-07-15 DIAGNOSIS — F32.A ANXIETY AND DEPRESSION: ICD-10-CM

## 2021-07-15 DIAGNOSIS — F41.9 ANXIETY AND DEPRESSION: ICD-10-CM

## 2021-07-15 DIAGNOSIS — M79.10 MUSCULAR PAIN: ICD-10-CM

## 2021-07-15 RX ORDER — ESCITALOPRAM OXALATE 20 MG/1
TABLET ORAL
Qty: 30 TABLET | Refills: 0 | Status: SHIPPED | OUTPATIENT
Start: 2021-07-15 | End: 2021-09-10

## 2021-07-15 RX ORDER — METHOCARBAMOL 750 MG/1
TABLET, FILM COATED ORAL
Qty: 60 TABLET | Refills: 0 | Status: SHIPPED | OUTPATIENT
Start: 2021-07-15 | End: 2021-09-10

## 2021-07-30 PROCEDURE — 99282 EMERGENCY DEPT VISIT SF MDM: CPT

## 2021-07-31 ENCOUNTER — HOSPITAL ENCOUNTER (EMERGENCY)
Facility: HOSPITAL | Age: 46
Discharge: HOME | End: 2021-07-31
Attending: INTERNAL MEDICINE
Payer: COMMERCIAL

## 2021-07-31 VITALS
OXYGEN SATURATION: 98 % | WEIGHT: 230 LBS | BODY MASS INDEX: 39.27 KG/M2 | HEIGHT: 64 IN | SYSTOLIC BLOOD PRESSURE: 127 MMHG | RESPIRATION RATE: 18 BRPM | HEART RATE: 90 BPM | DIASTOLIC BLOOD PRESSURE: 72 MMHG | TEMPERATURE: 97.9 F

## 2021-07-31 DIAGNOSIS — L03.90 CELLULITIS: Primary | ICD-10-CM

## 2021-07-31 PROCEDURE — 99283 EMERGENCY DEPT VISIT LOW MDM: CPT | Performed by: INTERNAL MEDICINE

## 2021-07-31 RX ORDER — AMOXICILLIN AND CLAVULANATE POTASSIUM 875; 125 MG/1; MG/1
1 TABLET, FILM COATED ORAL EVERY 12 HOURS
Qty: 14 TABLET | Refills: 0 | Status: SHIPPED | OUTPATIENT
Start: 2021-07-31 | End: 2021-08-07

## 2021-07-31 RX ORDER — AMOXICILLIN AND CLAVULANATE POTASSIUM 875; 125 MG/1; MG/1
1 TABLET, FILM COATED ORAL ONCE
Status: COMPLETED | OUTPATIENT
Start: 2021-07-31 | End: 2021-07-31

## 2021-07-31 RX ADMIN — AMOXICILLIN AND CLAVULANATE POTASSIUM 1 TABLET: 875; 125 TABLET, FILM COATED ORAL at 00:54

## 2021-07-31 NOTE — ED NOTES
Patient reports she has dull headache - it could be her sinuses - she reports she prefers not to have IV b/c that makes her have anxiety b/c she was stuck 13 x's     Dakota Traylor RN  07/31/21 9701

## 2021-07-31 NOTE — DISCHARGE INSTRUCTIONS
Recommend warm compresses and rest   Complete antibiotic therapy    Return to the emergency room if if worsening

## 2021-07-31 NOTE — ED NOTES
Patient requested to lay down to position of comfort - HR now 90 laying - patient reports since the surgery whenever she does any type of exertion her heart rate goes up     Elba Abbasi RN  07/31/21 7339

## 2021-07-31 NOTE — ED PROVIDER NOTES
History  Chief Complaint   Patient presents with    Rash     on 6/28 had abdominal surgery yesterday saw surgeon for suture removal and areas were red - surgeon aware - throughout day just feels yucky and rash seems more red     Patien presents with with chief complaint of erythema at her surgical site  The patient had gastric bypass and then pano plasty performed on June 28th  Patient was seen at sutures removed today by the surgeon  She states there is erythema and tenderness on the left flank of the panel plasty  She states the discomfort is worsening and she felt like she had a fever at home  She did not actually take her temperature  She denies any nausea vomiting shaking chills or rigors  The patient would prefer no blood work or an IV at this time  And she is refusing both at this time  She would like to be treated however  She is very pleasant and polite  Prior to Admission Medications   Prescriptions Last Dose Informant Patient Reported? Taking?    ACCU-CHEK FASTCLIX LANCETS MISC Past Month at Unknown time Self No Yes   Sig: by Does not apply route 2 (two) times a day   Alcohol Swabs (ALCOHOL PADS) 70 % PADS Past Month at Unknown time Self No Yes   Sig: by Does not apply route 2 (two) times a day   Blood Glucose Monitoring Suppl (ACCU-CHEK GUIDE) w/Device KIT Past Month at Unknown time Self No Yes   Sig: by Does not apply route 2 (two) times a day   CRANBERRY EXTRACT PO 7/30/2021 at Unknown time Self Yes Yes   Sig: Take by mouth   Lancets Misc  (ACCU-CHEK FASTCLIX LANCET) KIT Past Month at Unknown time Self No Yes   Sig: by Does not apply route 2 (two) times a day   Misc Natural Products (APPLE CIDER VINEGAR DIET PO) 7/30/2021 at Unknown time  Yes Yes   Sig: Take by mouth   Multiple Vitamin (MULTIVITAMIN) tablet 7/30/2021 at Unknown time Self Yes Yes   Sig: Take 1 tablet by mouth daily   Vitamin D, Cholecalciferol, 1000 units CAPS 7/30/2021 at Unknown time Self Yes Yes   Sig: Take 1 tablet by mouth daily   b complex vitamins tablet 2021 at Unknown time Self Yes Yes   Sig: Take 1 tablet by mouth daily   calcium citrate (CALCITRATE) 950 MG tablet 2021 at Unknown time Self Yes Yes   Sig: Take 2 tablets by mouth daily  escitalopram (LEXAPRO) 20 mg tablet 2021 at Unknown time  No Yes   Sig: TAKE (1) TABLET DAILY  ferrous sulfate 325 (65 Fe) mg tablet 2021 at Unknown time Self Yes Yes   Sig: Take 325 mg by mouth daily with breakfast   fluticasone (FLONASE) 50 mcg/act nasal spray Past Month at Unknown time Self No Yes   Sig: SPRAY 1 SPRAY INTO EACH NOSTRIL EVERY DAY   glucose blood (ACCU-CHEK GUIDE) test strip Past Month at Unknown time Self No Yes   Sig: Use as instructed   methocarbamol (ROBAXIN) 750 mg tablet Past Month at Unknown time  No Yes   Sig: TAKE  (1)  TABLET  EVERY TWELVE HOURS AS NEEDED  vitamin A 2250 MCG (7500 UT) capsule 2021 at Unknown time  Yes Yes   Sig: Take 7,500 Units by mouth daily      Facility-Administered Medications: None       Past Medical History:   Diagnosis Date    Anxiety     Arthritis     Bariatric surgery status     Carpal tunnel syndrome     Chronic pain disorder     DJD (degenerative joint disease)     GERD (gastroesophageal reflux disease)     Heartburn     History of gastric bypass 2014    Hypertension     Last assessed 2014    Hypoglycemia after GI (gastrointestinal) surgery     Migraine     Obesity     Last assessed 2014    Postgastrectomy malabsorption     Restless leg syndrome     Seizures ([address] )     10yrs ago "low blood sugar"    Vertigo     Wears dentures        Past Surgical History:   Procedure Laterality Date    ABDOMINOPLASTY N/A 2021    Procedure: ABDOMINOPLASTY;  Surgeon: Nicki River MD;  Location: [facility] MAIN OR;  Service: Plastics     SECTION       SECTION N/A 2018    Procedure:  SECTION ();   Surgeon: Micky Giles MD;  [facility]; Service: Obstetrics    CHOLECYSTECTOMY      GASTRIC BYPASS  12/2014    [place] REVISION OF CERVIX,NON OBSTETRICAL N/A 12/12/2017    Procedure: CERCLAGE CERVICAL;  Surgeon: Perez Sorensen MD;  [facility]: [facility] ;  Service: Obstetrics    TOOTH EXTRACTION         Family History   Problem Relation Age of Onset    Cancer Mother         Myosarcoma of the soft tissue    Other Mother         Total Thyroidectomy; Thyroid mass    Coronary artery disease Father      I have reviewed and agree with the history as documented  E-Cigarette/Vaping    E-Cigarette Use Never User      E-Cigarette/Vaping Substances     Social History     Tobacco Use    Smoking status: Light Tobacco Smoker     Packs/day: 0 25    Smokeless tobacco: Never Used    Tobacco comment: did quit - smoking a little bit   Vaping Use    Vaping Use: Never used   Substance Use Topics    Alcohol use: No    Drug use: No       Review of Systems   Constitutional: Negative  Respiratory: Negative  Cardiovascular: Negative  Gastrointestinal: Negative  Genitourinary: Negative  Skin: Positive for color change and rash  Hematological: Negative  Psychiatric/Behavioral: Negative  Physical Exam  Physical Exam  Vitals and nursing note reviewed  Constitutional:       General: She is not in acute distress  Appearance: Normal appearance  She is not ill-appearing  Cardiovascular:      Rate and Rhythm: Normal rate and regular rhythm  Pulses: Normal pulses  Heart sounds: Normal heart sounds  Pulmonary:      Effort: Pulmonary effort is normal       Breath sounds: Normal breath sounds  Abdominal:      General: There is no distension  Palpations: Abdomen is soft  There is no mass  Tenderness: There is abdominal tenderness  Comments: The lateral left quadrant of her surgical start is erythematous warm to the touch and tender  Skin:     Findings: Erythema present  Neurological:      Mental Status: She is alert  Psychiatric:         Mood and Affect: Mood normal          Thought Content: Thought content normal          Vital Signs  ED Triage Vitals   Temperature Pulse Respirations Blood Pressure SpO2   07/31/21 0019 07/31/21 0019 07/31/21 0019 07/31/21 0019 07/31/21 0019   97 9 °F (36 6 °C) (!) 115 18 127/72 98 %      Temp Source Heart Rate Source Patient Position - Orthostatic VS BP Location FiO2 (%)   07/31/21 0019 -- 07/31/21 0019 07/31/21 0019 --   Oral  Sitting Left arm       Pain Score       07/31/21 0028       3           Vitals:    07/31/21 0019 07/31/21 0028   BP: 127/72 127/72   Pulse: (!) 115 90   Patient Position - Orthostatic VS: Sitting          Visual Acuity      ED Medications  Medications   amoxicillin-clavulanate (AUGMENTIN) 875-125 mg per tablet 1 tablet (has no administration in time range)       Diagnostic Studies  Results Reviewed     None                 No orders to display              Procedures  Procedures         ED Course                                           MDM  Number of Diagnoses or Management Options  Cellulitis: established and worsening  Diagnosis management comments: Patient with postop infection at the suture site  Cellulitis most likely diagnosis nontender to deep palpation but tender to superficial around the skin it is warm to the touch erythematous  Recommend patient taking pictures of the area daily and following up with her PCP on Monday  Patient refused laboratory studies are an IV at this time  Disposition  Final diagnoses:   Cellulitis     Time reflects when diagnosis was documented in both MDM as applicable and the Disposition within this note     Time User Action Codes Description Comment    7/31/2021 12:44 AM Radha Lucio Add [L03 90] Cellulitis       ED Disposition     ED Disposition Condition Date/Time Comment    Discharge Stable Sat Jul 31, 2021 12:44 AM Eden Munoz discharge to home/self care              Follow-up Information     Follow up With Specialties Details Why Heather Aguiar [phone], [phone] Nurse Practitioner In 3 days  [address] [facility]  [phone]            Patient's Medications   Discharge Prescriptions    AMOXICILLIN-CLAVULANATE (AUGMENTIN) 875-125 MG PER TABLET    Take 1 tablet by mouth every 12 (twelve) hours for 7 days       Start Date: 7/31/2021 End Date: 8/7/2021       Order Dose: 1 tablet       Quantity: 14 tablet    Refills: 0     No discharge procedures on file      PDMP Review       Value Time User    PDMP Reviewed  Yes 2/22/2021 11:21 AM Gaye Osborn, [facility]          ED Provider  Electronically Signed by           Glenroy Lowery MD  07/31/21 0756

## 2021-08-02 ENCOUNTER — TELEPHONE (OUTPATIENT)
Dept: INTERNAL MEDICINE CLINIC | Facility: CLINIC | Age: 46
End: 2021-08-02

## 2021-08-02 NOTE — TELEPHONE ENCOUNTER
Patient called stating she had plastic surgery done on 6/28  On 7/31 she noticed redness in that area and went to ER   She was diagnoses with cellulitis  She stated she feels like its getting worse/not better  ER prescribed Augmentin  Per hilton, patient needs to call surgeon  Patient instructed to call them

## 2021-09-10 DIAGNOSIS — F32.A ANXIETY AND DEPRESSION: ICD-10-CM

## 2021-09-10 DIAGNOSIS — M79.10 MUSCULAR PAIN: Primary | ICD-10-CM

## 2021-09-10 DIAGNOSIS — F41.9 ANXIETY AND DEPRESSION: ICD-10-CM

## 2021-09-10 DIAGNOSIS — M79.10 MUSCULAR PAIN: ICD-10-CM

## 2021-09-10 RX ORDER — METHOCARBAMOL 750 MG/1
TABLET, FILM COATED ORAL
Qty: 60 TABLET | Refills: 0 | Status: SHIPPED | OUTPATIENT
Start: 2021-09-10 | End: 2022-01-03

## 2021-09-10 RX ORDER — ESCITALOPRAM OXALATE 20 MG/1
TABLET ORAL
Qty: 30 TABLET | Refills: 0 | Status: SHIPPED | OUTPATIENT
Start: 2021-09-10 | End: 2022-02-03

## 2021-09-16 RX ORDER — ESCITALOPRAM OXALATE 20 MG/1
20 TABLET ORAL DAILY
Qty: 30 TABLET | Refills: 3 | Status: SHIPPED | OUTPATIENT
Start: 2021-09-16 | End: 2022-02-03

## 2021-09-16 RX ORDER — METHOCARBAMOL 750 MG/1
750 TABLET, FILM COATED ORAL EVERY 12 HOURS
Qty: 60 TABLET | Refills: 0 | Status: SHIPPED | OUTPATIENT
Start: 2021-09-16 | End: 2021-11-24 | Stop reason: SDUPTHER

## 2021-11-20 ENCOUNTER — OFFICE VISIT (OUTPATIENT)
Dept: URGENT CARE | Facility: CLINIC | Age: 46
End: 2021-11-20
Payer: COMMERCIAL

## 2021-11-20 VITALS — OXYGEN SATURATION: 98 % | RESPIRATION RATE: 18 BRPM | HEART RATE: 74 BPM | TEMPERATURE: 98.2 F

## 2021-11-20 DIAGNOSIS — J01.90 ACUTE SINUSITIS, RECURRENCE NOT SPECIFIED, UNSPECIFIED LOCATION: Primary | ICD-10-CM

## 2021-11-20 PROCEDURE — 99213 OFFICE O/P EST LOW 20 MIN: CPT | Performed by: PHYSICIAN ASSISTANT

## 2021-11-20 RX ORDER — AMOXICILLIN 875 MG/1
875 TABLET, COATED ORAL 2 TIMES DAILY
Qty: 20 TABLET | Refills: 0 | Status: SHIPPED | OUTPATIENT
Start: 2021-11-20 | End: 2021-11-30

## 2021-11-20 RX ORDER — PREDNISONE 10 MG/1
TABLET ORAL
Qty: 26 TABLET | Refills: 0 | Status: SHIPPED | OUTPATIENT
Start: 2021-11-20

## 2021-11-24 ENCOUNTER — APPOINTMENT (OUTPATIENT)
Dept: LAB | Facility: CLINIC | Age: 46
End: 2021-11-24
Payer: COMMERCIAL

## 2021-11-24 DIAGNOSIS — M79.10 MUSCULAR PAIN: ICD-10-CM

## 2021-11-24 DIAGNOSIS — R39.9 UTI SYMPTOMS: ICD-10-CM

## 2021-11-24 DIAGNOSIS — E55.9 VITAMIN D INSUFFICIENCY: ICD-10-CM

## 2021-11-24 DIAGNOSIS — R39.9 UTI SYMPTOMS: Primary | ICD-10-CM

## 2021-11-24 DIAGNOSIS — K91.2 POSTSURGICAL MALABSORPTION: ICD-10-CM

## 2021-11-24 LAB
25(OH)D3 SERPL-MCNC: 28.4 NG/ML (ref 30–100)
BILIRUB UR QL STRIP: NEGATIVE
CLARITY UR: NORMAL
COLOR UR: YELLOW
FERRITIN SERPL-MCNC: 6 NG/ML (ref 8–388)
GLUCOSE UR STRIP-MCNC: NEGATIVE MG/DL
HGB UR QL STRIP.AUTO: NEGATIVE
IRON SATN MFR SERPL: 9 % (ref 15–50)
IRON SERPL-MCNC: 36 UG/DL (ref 50–170)
KETONES UR STRIP-MCNC: NEGATIVE MG/DL
LEUKOCYTE ESTERASE UR QL STRIP: NEGATIVE
NITRITE UR QL STRIP: NEGATIVE
PH UR STRIP.AUTO: 6.5 [PH]
PROT UR STRIP-MCNC: NEGATIVE MG/DL
SP GR UR STRIP.AUTO: 1.01 (ref 1–1.03)
TIBC SERPL-MCNC: 423 UG/DL (ref 250–450)
UROBILINOGEN UR QL STRIP.AUTO: 0.2 E.U./DL
VIT B12 SERPL-MCNC: 261 PG/ML (ref 100–900)

## 2021-11-24 PROCEDURE — 83540 ASSAY OF IRON: CPT

## 2021-11-24 PROCEDURE — 81003 URINALYSIS AUTO W/O SCOPE: CPT

## 2021-11-24 PROCEDURE — 36415 COLL VENOUS BLD VENIPUNCTURE: CPT

## 2021-11-24 PROCEDURE — 87086 URINE CULTURE/COLONY COUNT: CPT

## 2021-11-24 PROCEDURE — 83550 IRON BINDING TEST: CPT

## 2021-11-24 PROCEDURE — 82607 VITAMIN B-12: CPT

## 2021-11-24 PROCEDURE — 82306 VITAMIN D 25 HYDROXY: CPT

## 2021-11-24 PROCEDURE — 82728 ASSAY OF FERRITIN: CPT

## 2021-11-24 RX ORDER — METHOCARBAMOL 750 MG/1
750 TABLET, FILM COATED ORAL EVERY 12 HOURS
Qty: 60 TABLET | Refills: 0 | Status: SHIPPED | OUTPATIENT
Start: 2021-11-24 | End: 2022-01-03

## 2021-11-25 LAB — BACTERIA UR CULT: NORMAL

## 2021-12-01 DIAGNOSIS — E55.9 VITAMIN D INSUFFICIENCY: ICD-10-CM

## 2021-12-01 DIAGNOSIS — D50.9 IRON DEFICIENCY ANEMIA, UNSPECIFIED IRON DEFICIENCY ANEMIA TYPE: Primary | ICD-10-CM

## 2021-12-01 RX ORDER — ERGOCALCIFEROL 1.25 MG/1
50000 CAPSULE ORAL WEEKLY
Qty: 4 CAPSULE | Refills: 3 | Status: SHIPPED | OUTPATIENT
Start: 2021-12-01

## 2021-12-08 ENCOUNTER — TELEPHONE (OUTPATIENT)
Dept: HEMATOLOGY ONCOLOGY | Facility: CLINIC | Age: 46
End: 2021-12-08

## 2021-12-10 ENCOUNTER — TELEPHONE (OUTPATIENT)
Dept: HEMATOLOGY ONCOLOGY | Facility: CLINIC | Age: 46
End: 2021-12-10

## 2021-12-31 DIAGNOSIS — M79.10 MUSCULAR PAIN: ICD-10-CM

## 2022-01-03 RX ORDER — METHOCARBAMOL 750 MG/1
TABLET, FILM COATED ORAL
Qty: 60 TABLET | Refills: 0 | Status: SHIPPED | OUTPATIENT
Start: 2022-01-03 | End: 2022-02-03

## 2022-01-12 ENCOUNTER — TELEPHONE (OUTPATIENT)
Dept: INTERNAL MEDICINE CLINIC | Facility: CLINIC | Age: 47
End: 2022-01-12

## 2022-01-12 NOTE — TELEPHONE ENCOUNTER
Received a vm from Jose stating that she's having Severe Anxiety right now and the lexapro isn't helping  She did ask for a psychiatric phone number, and to see if there is anything else she could take  Did call back and lmom with ethos number and to call us back

## 2022-01-13 ENCOUNTER — TELEPHONE (OUTPATIENT)
Dept: HEMATOLOGY ONCOLOGY | Facility: CLINIC | Age: 47
End: 2022-01-13

## 2022-01-13 ENCOUNTER — TELEMEDICINE (OUTPATIENT)
Dept: HEMATOLOGY ONCOLOGY | Facility: CLINIC | Age: 47
End: 2022-01-13
Payer: COMMERCIAL

## 2022-01-13 DIAGNOSIS — D50.9 IRON DEFICIENCY ANEMIA, UNSPECIFIED IRON DEFICIENCY ANEMIA TYPE: Primary | ICD-10-CM

## 2022-01-13 DIAGNOSIS — Z98.84 BARIATRIC SURGERY STATUS: ICD-10-CM

## 2022-01-13 DIAGNOSIS — E53.8 VITAMIN B 12 DEFICIENCY: ICD-10-CM

## 2022-01-13 PROCEDURE — 99244 OFF/OP CNSLTJ NEW/EST MOD 40: CPT | Performed by: INTERNAL MEDICINE

## 2022-01-13 RX ORDER — SODIUM CHLORIDE 9 MG/ML
20 INJECTION, SOLUTION INTRAVENOUS ONCE
Status: CANCELLED | OUTPATIENT
Start: 2022-01-20

## 2022-01-13 RX ORDER — CYANOCOBALAMIN 1000 UG/ML
1000 INJECTION INTRAMUSCULAR; SUBCUTANEOUS ONCE
Status: CANCELLED | OUTPATIENT
Start: 2022-01-20 | End: 2022-01-20

## 2022-01-13 NOTE — PROGRESS NOTES
Virtual Regular Visit    Verification of patient location:    Patient is located in the following state in which I hold an active license [place]      Assessment/Plan:  1  Iron deficiency anemia, unspecified iron deficiency anemia type  The patient was educated about her iron deficiency microcytic anemia which is most likely related to decreased absorption of iron due to the history of gastric bypass surgery  The patient was told that we will replace her iron with Venofer IV on a weekly basis x6 doses  We will then pursue further workup to rule out other etiologies of her anemia   - CBC and differential; Future  - Comprehensive metabolic panel; Future  - Iron Panel (Includes Ferritin, Iron Sat%, Iron, and TIBC); Future  - Ferritin; Future  - Vitamin B12; Future  - LD,Blood; Future  - C-reactive protein; Future  - Sedimentation rate, automated; Future  - Magnesium; Future  - TSH, 3rd generation with Free T4 reflex; Future    2  Vitamin B 12 deficiency  She has also vitamin B12 deficient due to the gastric bypass surgery  She will be treated with the weekly injection vitamin B12 with the iron treatment x6  She was asked to take oral vitamin B12 supplements  She may be a candidate for vitamin B12 injections on a monthly basis if she becomes vitamin B12 deficient again   - CBC and differential; Future  - Comprehensive metabolic panel; Future  - Iron Panel (Includes Ferritin, Iron Sat%, Iron, and TIBC); Future  - Ferritin; Future  - Vitamin B12; Future  - LD,Blood; Future  - C-reactive protein; Future  - Sedimentation rate, automated; Future  - Magnesium; Future  - TSH, 3rd generation with Free T4 reflex; Future    3  Bariatric surgery status  Copper level will be also checked prior to her next visit since absorption may be also affected in gastric bypass patients    Problem List Items Addressed This Visit        Other    Bariatric surgery status    Vitamin B 12 deficiency    Relevant Orders    CBC and differential Comprehensive metabolic panel    Iron Panel (Includes Ferritin, Iron Sat%, Iron, and TIBC)    Ferritin    Vitamin B12    LD,Blood    C-reactive protein    Sedimentation rate, automated    Magnesium    TSH, 3rd generation with Free T4 reflex    Iron deficiency anemia - Primary    Relevant Orders    CBC and differential    Comprehensive metabolic panel    Iron Panel (Includes Ferritin, Iron Sat%, Iron, and TIBC)    Ferritin    Vitamin B12    LD,Blood    C-reactive protein    Sedimentation rate, automated    Magnesium    TSH, 3rd generation with Free T4 reflex               Reason for visit is   Chief Complaint   Patient presents with    Virtual Regular Visit        Encounter provider Aminta Garcia MD    Provider located at [address] [place] [address], [facility] [address]  [facility] [address]  [facility] [id number] [phone]      Recent Visits  No visits were found meeting these conditions  Showing recent visits within past 7 days and meeting all other requirements  Today's Visits  Date Type Provider Dept   01/13/22 Telephone Shawanda Srivastava RN [facility]   01/13/22 Telemedicine Aminta Garcia MD [facility]   Showing today's visits and meeting all other requirements  Future Appointments  No visits were found meeting these conditions  Showing future appointments within next 150 days and meeting all other requirements       The patient was identified by name and date of birth  Cain Ivey was informed that this is a telemedicine visit and that the visit is being conducted through [address] [place] [address] Now and patient was informed that this is a secure, HIPAA-compliant platform  She agrees to proceed     My office door was closed  No one else was in the room  She acknowledged consent and understanding of privacy and security of the video platform  The patient has agreed to participate and understands they can discontinue the visit at any time      Patient is aware this is a billable service  Subjective  Catherine Nino is a 55 y o  female with history of arthritis, gastroesophageal reflux disease, hypertension, and gastric bypass surgery in 2014  HPI   The patient complained about significant fatigue and heavy menstrual bleeding  Most recent available blood work from 2021 showed hemoglobin of 10 8 with MCV of 80  White cell count was 6 7 with platelet count of 623  Recent iron panel showed ferritin of 6 and saturation of 9% on 2021  Her vitamin B12 level was 261  Past Medical History:   Diagnosis Date    Anxiety     Arthritis     Bariatric surgery status     Carpal tunnel syndrome     Chronic pain disorder     DJD (degenerative joint disease)     GERD (gastroesophageal reflux disease)     Heartburn     History of gastric bypass 2014    Hypertension     Last assessed 2014    Hypoglycemia after GI (gastrointestinal) surgery     Migraine     Obesity     Last assessed 2014    Postgastrectomy malabsorption     Restless leg syndrome     Seizures ([address] )     10yrs ago "low blood sugar"    Vertigo     Wears dentures        Past Surgical History:   Procedure Laterality Date    ABDOMINOPLASTY N/A 2021    Procedure: ABDOMINOPLASTY;  Surgeon: Eze Palma MD;  Location[facility] MAIN OR;  Service: Plastics     SECTION       SECTION N/A 2018    Procedure:  SECTION ();   Surgeon: Josiah Gregory MD;  Location: [facility] ;  Service: Obstetrics    CHOLECYSTECTOMY      GASTRIC BYPASS  2014    [place] REVISION OF CERVIX,NON OBSTETRICAL N/A 2017    Procedure: CERCLAGE CERVICAL;  Surgeon: Jay Finch MD;  [facility];  Service: Obstetrics    TOOTH EXTRACTION         Current Outpatient Medications   Medication Sig Dispense Refill    ACCU-CHEK FASTCLIX LANCETS MISC by Does not apply route 2 (two) times a day 102 each 5    Alcohol Swabs (ALCOHOL PADS) 70 % PADS by Does not apply route 2 (two) times a day 100 each 5    b complex vitamins tablet Take 1 tablet by mouth daily      Blood Glucose Monitoring Suppl (ACCU-CHEK GUIDE) w/Device KIT by Does not apply route 2 (two) times a day 1 kit 0    calcium citrate (CALCITRATE) 950 MG tablet Take 2 tablets by mouth daily   CRANBERRY EXTRACT PO Take by mouth      ergocalciferol (VITAMIN D2) 50,000 units Take 1 capsule (50,000 Units total) by mouth once a week 4 capsule 3    escitalopram (LEXAPRO) 20 mg tablet Take 1 tablet (20 mg total) by mouth daily 30 tablet 3    escitalopram (LEXAPRO) 20 mg tablet TAKE (1) TABLET DAILY  30 tablet 0    ferrous sulfate 325 (65 Fe) mg tablet Take 325 mg by mouth daily with breakfast      fluticasone (FLONASE) 50 mcg/act nasal spray SPRAY 1 SPRAY INTO EACH NOSTRIL EVERY DAY 1 Bottle 3    glucose blood (ACCU-CHEK GUIDE) test strip Use as instructed 100 each 5    Lancets Misc  (ACCU-CHEK FASTCLIX LANCET) KIT by Does not apply route 2 (two) times a day 1 kit 0    methocarbamol (ROBAXIN) 750 mg tablet TAKE 1 TABLET EVERY 12 HOURS 60 tablet 0    Misc Natural Products (APPLE CIDER VINEGAR DIET PO) Take by mouth      Multiple Vitamin (MULTIVITAMIN) tablet Take 1 tablet by mouth daily      predniSONE 10 mg tablet Take 3 tabs BID X 2 days, 2 tabs BID X 2 days, 1 tab BID X 2 days, 1 tab daily X 2 days 26 tablet 0    vitamin A 2250 MCG (7500 UT) capsule Take 7,500 Units by mouth daily      Vitamin D, Cholecalciferol, 1000 units CAPS Take 1 tablet by mouth daily       No current facility-administered medications for this visit  Allergies   Allergen Reactions    Aspirin Other (See Comments)     Gastric Bypass    Nsaids Other (See Comments)     Gastric bypass    Codeine Itching       Review of Systems   Constitutional: Positive for fatigue  Negative for chills and fever  HENT: Negative for ear pain and sore throat  Eyes: Negative for pain and visual disturbance  Respiratory: Negative for cough and shortness of breath  Cardiovascular: Negative for chest pain and palpitations  Gastrointestinal: Negative for abdominal pain and vomiting  Genitourinary: Positive for menstrual problem  Negative for dysuria and hematuria  Musculoskeletal: Negative for arthralgias and back pain  Skin: Negative for color change and rash  Neurological: Negative for seizures and syncope  All other systems reviewed and are negative  Video Exam    There were no vitals filed for this visit  Physical Exam  Constitutional:       Appearance: Normal appearance  HENT:      Head: Normocephalic and atraumatic  Nose: Nose normal    Eyes:      Extraocular Movements: Extraocular movements intact  Pupils: Pupils are equal, round, and reactive to light  Pulmonary:      Effort: Pulmonary effort is normal    Musculoskeletal:         General: Normal range of motion  Cervical back: Normal range of motion and neck supple  Neurological:      Mental Status: She is alert and oriented to person, place, and time  Psychiatric:         Mood and Affect: Mood normal          Behavior: Behavior normal          Thought Content: Thought content normal          Judgment: Judgment normal           I spent 40 minutes directly with the patient during this visit    VIRTUAL VISIT DISCLAIMER      Nickykristian Nova verbally agrees to participate in [employer]  Pt is aware that [employer] could be limited without vital signs or the ability to perform a full hands-on physical Juli Pollard understands she or the provider may request at any time to terminate the video visit and request the patient to seek care or treatment in person

## 2022-01-13 NOTE — Clinical Note
The patient is to be scheduled for Venofer and vitamin B12 at the [facility] week x6 treatment  Follow-up with Andrews Ty in April with blood work prior

## 2022-01-20 ENCOUNTER — OFFICE VISIT (OUTPATIENT)
Dept: URGENT CARE | Facility: CLINIC | Age: 47
End: 2022-01-20
Payer: COMMERCIAL

## 2022-01-20 ENCOUNTER — HOSPITAL ENCOUNTER (OUTPATIENT)
Dept: INFUSION CENTER | Facility: HOSPITAL | Age: 47
Discharge: HOME | End: 2022-01-20
Attending: INTERNAL MEDICINE
Payer: COMMERCIAL

## 2022-01-20 VITALS
DIASTOLIC BLOOD PRESSURE: 83 MMHG | OXYGEN SATURATION: 100 % | HEART RATE: 99 BPM | RESPIRATION RATE: 20 BRPM | TEMPERATURE: 96.7 F | SYSTOLIC BLOOD PRESSURE: 154 MMHG

## 2022-01-20 VITALS
SYSTOLIC BLOOD PRESSURE: 124 MMHG | DIASTOLIC BLOOD PRESSURE: 86 MMHG | OXYGEN SATURATION: 97 % | TEMPERATURE: 97.8 F | RESPIRATION RATE: 20 BRPM | HEART RATE: 99 BPM

## 2022-01-20 DIAGNOSIS — F41.9 ANXIETY DISORDER, UNSPECIFIED TYPE: Primary | ICD-10-CM

## 2022-01-20 DIAGNOSIS — E53.8 VITAMIN B 12 DEFICIENCY: Primary | ICD-10-CM

## 2022-01-20 DIAGNOSIS — D50.9 IRON DEFICIENCY ANEMIA, UNSPECIFIED IRON DEFICIENCY ANEMIA TYPE: ICD-10-CM

## 2022-01-20 PROCEDURE — 96372 THER/PROPH/DIAG INJ SC/IM: CPT

## 2022-01-20 PROCEDURE — 99213 OFFICE O/P EST LOW 20 MIN: CPT

## 2022-01-20 PROCEDURE — 96365 THER/PROPH/DIAG IV INF INIT: CPT

## 2022-01-20 RX ORDER — CYANOCOBALAMIN 1000 UG/ML
1000 INJECTION INTRAMUSCULAR; SUBCUTANEOUS ONCE
Status: CANCELLED | OUTPATIENT
Start: 2022-01-27 | End: 2022-01-27

## 2022-01-20 RX ORDER — SODIUM CHLORIDE 9 MG/ML
20 INJECTION, SOLUTION INTRAVENOUS ONCE
Status: CANCELLED | OUTPATIENT
Start: 2022-01-27

## 2022-01-20 RX ORDER — CYANOCOBALAMIN 1000 UG/ML
1000 INJECTION INTRAMUSCULAR; SUBCUTANEOUS ONCE
Status: COMPLETED | OUTPATIENT
Start: 2022-01-20 | End: 2022-01-20

## 2022-01-20 RX ORDER — SODIUM CHLORIDE 9 MG/ML
20 INJECTION, SOLUTION INTRAVENOUS ONCE
Status: COMPLETED | OUTPATIENT
Start: 2022-01-20 | End: 2022-01-20

## 2022-01-20 RX ORDER — HYDROXYZINE 50 MG/1
50 TABLET, FILM COATED ORAL EVERY 6 HOURS PRN
Qty: 15 TABLET | Refills: 0 | Status: SHIPPED | OUTPATIENT
Start: 2022-01-20

## 2022-01-20 RX ADMIN — SODIUM CHLORIDE 20 ML/HR: 0.9 INJECTION, SOLUTION INTRAVENOUS at 14:27

## 2022-01-20 RX ADMIN — CYANOCOBALAMIN 1000 MCG: 1000 INJECTION, SOLUTION INTRAMUSCULAR at 14:42

## 2022-01-20 RX ADMIN — IRON SUCROSE 200 MG: 20 INJECTION, SOLUTION INTRAVENOUS at 14:34

## 2022-01-20 NOTE — PATIENT INSTRUCTIONS
Seek immediate medical attention for any suicidal or homicidal thoughts  Call [facility] for initial intake  Follow-up with PCP    Anxiety   WHAT YOU NEED TO KNOW:   Anxiety is a condition that causes you to feel extremely worried or nervous  The feelings are so strong that they can cause problems with your daily activities or sleep  Anxiety may be triggered by something you fear, or it may happen without a cause  Family or work stress, smoking, caffeine, and alcohol can increase your risk for anxiety  Certain medicines or health conditions can also increase your risk  Anxiety can become a long-term condition if it is not managed or treated  DISCHARGE INSTRUCTIONS:   Call your local emergency number (911 in the [address]) if:   · You have chest pain, tightness, or heaviness that may spread to your shoulders, arms, jaw, neck, or back  · You feel like hurting yourself or someone else  Call your doctor if:   · Your symptoms get worse or do not get better with treatment  · Your anxiety keeps you from doing your regular daily activities  · You have new symptoms since your last visit  · You have questions or concerns about your condition or care  Medicines:   · Medicines  may be given to help you feel more calm and relaxed, and decrease your symptoms  · Take your medicine as directed  Contact your healthcare provider if you think your medicine is not helping or if you have side effects  Tell him of her if you are allergic to any medicine  Keep a list of the medicines, vitamins, and herbs you take  Include the amounts, and when and why you take them  Bring the list or the pill bottles to follow-up visits  Carry your medicine list with you in case of an emergency  Manage anxiety:   · Talk to someone about your anxiety  Your healthcare provider may suggest counseling  Cognitive behavioral therapy can help you understand and change how you react to events that trigger your symptoms   You might feel more comfortable talking with a friend or family member about your anxiety  Choose someone you know will be supportive and encouraging  · Find ways to relax  Activities such as exercise, meditation, or listening to music can help you relax  Spend time with friends, or do things you enjoy  · Practice deep breathing  Deep breathing can help you relax when you feel anxious  Focus on taking slow, deep breaths several times a day, or during an anxiety attack  Breathe in through your nose and out through your mouth  · Create a regular sleep routine  Regular sleep can help you feel calmer during the day  Go to sleep and wake up at the same times every day  Do not watch television or use the computer right before bed  Your room should be comfortable, dark, and quiet  · Eat a variety of healthy foods  Healthy foods include fruits, vegetables, low-fat dairy products, lean meats, fish, whole-grain breads, and cooked beans  Healthy foods can help you feel less anxious and have more energy  · Exercise regularly  Exercise can increase your energy level  Exercise may also lift your mood and help you sleep better  Your healthcare provider can help you create an exercise plan  · Do not smoke  Nicotine and other chemicals in cigarettes and cigars can increase anxiety  Ask your healthcare provider for information if you currently smoke and need help to quit  E-cigarettes or smokeless tobacco still contain nicotine  Talk to your healthcare provider before you use these products  · Do not have caffeine  Caffeine can make your symptoms worse  Do not have foods or drinks that are meant to increase your energy level  · Limit or do not drink alcohol  Ask your healthcare provider if alcohol is safe for you  You may not be able to drink alcohol if you take certain anxiety or depression medicines  Limit alcohol to 1 drink per day if you are a woman  Limit alcohol to 2 drinks per day if you are a man   A drink of alcohol is 12 ounces of beer, 5 ounces of wine, or 1½ ounces of liquor  · Do not use drugs  Drugs can make your anxiety worse  It can also make anxiety hard to manage  Talk to your healthcare provider if you use drugs and want help to quit  Follow up with your doctor within 2 weeks or as directed:  Write down your questions so you remember to ask them during your visits  © Copyright [employer] 2021 Information is for End User's use only and may not be sold, redistributed or otherwise used for commercial purposes  All illustrations and images included in CareNotes® are the copyrighted property of A D A M , Inc  or [facility] Elijah Tamayo   The above information is an  only  It is not intended as medical advice for individual conditions or treatments  Talk to your doctor, nurse or pharmacist before following any medical regimen to see if it is safe and effective for you

## 2022-01-20 NOTE — PROGRESS NOTES
Patient tolerated IV Venofer without reaction or issues  B 12 IM injection given in left deltoid  Patient tolerated well  AVS given to patient  Patient ambulated off unit without incident  All personal belongings taken with patient

## 2022-01-20 NOTE — PROGRESS NOTES
[facility]        NAME: Belinda Mario is a 55 y o  female  : 1975    MRN: [id number]  DATE: 2022  TIME: 10:39 AM    Assessment and Plan   Anxiety disorder, unspecified type [F41 9]  1  Anxiety disorder, unspecified type  hydrOXYzine HCL (ATARAX) 50 mg tablet       Patient has initial intake set-up with [facility]  Patient also speaks with a psychologist weekly  Patient Instructions     Seek immediate medical attention for any suicidal or homicidal thoughts  Call [facility] for initial intake  Follow-up with PCP  Follow up with PCP in 3-5 days  Proceed to  ER if symptoms worsen  Chief Complaint     Chief Complaint   Patient presents with    Anxiety     chronic anxiety with acute episode of severe aniety whech started x2-3 days ago  History of Present Illness       Anxiety  Presents for initial visit  Onset was 1 to 5 years ago  The problem has been gradually worsening  Symptoms include depressed mood, excessive worry, insomnia, nervous/anxious behavior and restlessness  Patient reports no chest pain, dizziness, nausea, palpitations, shortness of breath or suicidal ideas  Symptoms occur constantly  The severity of symptoms is interfering with daily activities  The symptoms are aggravated by family issues and work stress  The quality of sleep is poor  Nighttime awakenings: several      Risk factors include marital problems  Her past medical history is significant for anemia, anxiety/panic attacks and depression  There is no history of suicide attempts  Past treatments include herbal remedies, counseling (CBT) and lifestyle changes  The treatment provided no relief  Compliance with prior treatments has been variable  Prior compliance problems include difficulty with treatment plan  Review of Systems   Review of Systems   Constitutional: Positive for activity change, appetite change and fatigue  Negative for chills and fever     Respiratory: Negative for cough, chest tightness and shortness of breath  Cardiovascular: Negative for chest pain and palpitations  Gastrointestinal: Negative for abdominal pain, diarrhea, nausea and vomiting  Musculoskeletal: Negative for arthralgias  Skin: Negative for color change and rash  Neurological: Negative for dizziness, syncope, weakness, numbness and headaches  Psychiatric/Behavioral: Positive for sleep disturbance  Negative for self-injury and suicidal ideas  The patient is nervous/anxious and has insomnia  Current Medications       Current Outpatient Medications:     ACCU-CHEK FASTCLIX LANCETS MISC, by Does not apply route 2 (two) times a day, Disp: 102 each, Rfl: 5    Alcohol Swabs (ALCOHOL PADS) 70 % PADS, by Does not apply route 2 (two) times a day, Disp: 100 each, Rfl: 5    b complex vitamins tablet, Take 1 tablet by mouth daily, Disp: , Rfl:     Blood Glucose Monitoring Suppl (ACCU-CHEK GUIDE) w/Device KIT, by Does not apply route 2 (two) times a day, Disp: 1 kit, Rfl: 0    calcium citrate (CALCITRATE) 950 MG tablet, Take 2 tablets by mouth daily  , Disp: , Rfl:     CRANBERRY EXTRACT PO, Take by mouth, Disp: , Rfl:     ergocalciferol (VITAMIN D2) 50,000 units, Take 1 capsule (50,000 Units total) by mouth once a week, Disp: 4 capsule, Rfl: 3    escitalopram (LEXAPRO) 20 mg tablet, Take 1 tablet (20 mg total) by mouth daily, Disp: 30 tablet, Rfl: 3    escitalopram (LEXAPRO) 20 mg tablet, TAKE (1) TABLET DAILY  , Disp: 30 tablet, Rfl: 0    ferrous sulfate 325 (65 Fe) mg tablet, Take 325 mg by mouth daily with breakfast, Disp: , Rfl:     fluticasone (FLONASE) 50 mcg/act nasal spray, SPRAY 1 SPRAY INTO EACH NOSTRIL EVERY DAY, Disp: 1 Bottle, Rfl: 3    glucose blood (ACCU-CHEK GUIDE) test strip, Use as instructed, Disp: 100 each, Rfl: 5    hydrOXYzine HCL (ATARAX) 50 mg tablet, Take 1 tablet (50 mg total) by mouth every 6 (six) hours as needed for anxiety, Disp: 15 tablet, Rfl: 0    Lancets Misc  (ACCU-CHEK FASTCLIX LANCET) KIT, by Does not apply route 2 (two) times a day, Disp: 1 kit, Rfl: 0    methocarbamol (ROBAXIN) 750 mg tablet, TAKE 1 TABLET EVERY 12 HOURS, Disp: 60 tablet, Rfl: 0    Misc Natural Products (APPLE CIDER VINEGAR DIET PO), Take by mouth, Disp: , Rfl:     Multiple Vitamin (MULTIVITAMIN) tablet, Take 1 tablet by mouth daily, Disp: , Rfl:     predniSONE 10 mg tablet, Take 3 tabs BID X 2 days, 2 tabs BID X 2 days, 1 tab BID X 2 days, 1 tab daily X 2 days, Disp: 26 tablet, Rfl: 0    vitamin A 2250 MCG (7500 UT) capsule, Take 7,500 Units by mouth daily, Disp: , Rfl:     Vitamin D, Cholecalciferol, 1000 units CAPS, Take 1 tablet by mouth daily, Disp: , Rfl:     Current Allergies     Allergies as of 2022 - Reviewed 2022   Allergen Reaction Noted    Aspirin Other (See Comments) 2016    Nsaids Other (See Comments) 2017    Codeine Itching 2016            The following portions of the patient's history were reviewed and updated as appropriate: allergies, current medications, past family history, past medical history, past social history, past surgical history and problem list      Past Medical History:   Diagnosis Date    Anxiety     Arthritis     Bariatric surgery status     Carpal tunnel syndrome     Chronic pain disorder     DJD (degenerative joint disease)     GERD (gastroesophageal reflux disease)     Heartburn     History of gastric bypass 2014    Hypertension     Last assessed 2014    Hypoglycemia after GI (gastrointestinal) surgery     Migraine     Obesity     Last assessed 2014    Postgastrectomy malabsorption     Restless leg syndrome     Seizures (HCC)     10yrs ago "low blood sugar"    Vertigo     Wears dentures        Past Surgical History:   Procedure Laterality Date    ABDOMINOPLASTY N/A 2021    Procedure: ABDOMINOPLASTY;  Surgeon: Darrin Arthur MD;  Location:  MAIN OR;  Service: Plastics     SECTION       SECTION N/A 2018    Procedure:  SECTION (); Surgeon: Winnie Shaffer MD;  Location: [facility];  Service: Obstetrics    CHOLECYSTECTOMY      GASTRIC BYPASS  2014    RI REVISION OF CERVIX,NON OBSTETRICAL N/A 2017    Procedure: CERCLAGE CERVICAL;  Surgeon: Jason Levy MD;  [facility];  Service: Obstetrics    TOOTH EXTRACTION         Family History   Problem Relation Age of Onset    Cancer Mother         Myosarcoma of the soft tissue    Other Mother         Total Thyroidectomy; Thyroid mass    Coronary artery disease Father          Medications have been verified  Objective   /83   Pulse 99   Temp (!) 96 7 °F (35 9 °C)   Resp 20   SpO2 100%        Physical Exam     Physical Exam  Constitutional:       Appearance: Normal appearance  HENT:      Right Ear: Tympanic membrane normal       Left Ear: Tympanic membrane normal       Nose: No congestion  Cardiovascular:      Rate and Rhythm: Regular rhythm  Tachycardia present  Pulses: Normal pulses  Heart sounds: Normal heart sounds  Pulmonary:      Effort: Pulmonary effort is normal       Breath sounds: Normal breath sounds  Abdominal:      Tenderness: There is no abdominal tenderness  Skin:     General: Skin is warm and dry  Neurological:      Mental Status: She is alert and oriented to person, place, and time  Psychiatric:         Mood and Affect: Mood is anxious and depressed  Affect is tearful  Speech: Speech normal          Behavior: Behavior is cooperative  Thought Content: Thought content normal  Thought content does not include homicidal or suicidal ideation           Cognition and Memory: Cognition normal

## 2022-01-27 ENCOUNTER — HOSPITAL ENCOUNTER (OUTPATIENT)
Dept: INFUSION CENTER | Facility: HOSPITAL | Age: 47
Discharge: HOME | End: 2022-01-27
Attending: INTERNAL MEDICINE
Payer: COMMERCIAL

## 2022-01-27 VITALS
TEMPERATURE: 98.4 F | HEART RATE: 78 BPM | OXYGEN SATURATION: 97 % | SYSTOLIC BLOOD PRESSURE: 132 MMHG | DIASTOLIC BLOOD PRESSURE: 80 MMHG | RESPIRATION RATE: 18 BRPM

## 2022-01-27 DIAGNOSIS — E53.8 VITAMIN B 12 DEFICIENCY: Primary | ICD-10-CM

## 2022-01-27 DIAGNOSIS — D50.9 IRON DEFICIENCY ANEMIA, UNSPECIFIED IRON DEFICIENCY ANEMIA TYPE: ICD-10-CM

## 2022-01-27 PROCEDURE — 96365 THER/PROPH/DIAG IV INF INIT: CPT

## 2022-01-27 PROCEDURE — 96372 THER/PROPH/DIAG INJ SC/IM: CPT

## 2022-01-27 RX ORDER — SODIUM CHLORIDE 9 MG/ML
20 INJECTION, SOLUTION INTRAVENOUS ONCE
Status: CANCELLED | OUTPATIENT
Start: 2022-02-03

## 2022-01-27 RX ORDER — CYANOCOBALAMIN 1000 UG/ML
1000 INJECTION INTRAMUSCULAR; SUBCUTANEOUS ONCE
Status: CANCELLED | OUTPATIENT
Start: 2022-02-03 | End: 2022-02-03

## 2022-01-27 RX ORDER — CYANOCOBALAMIN 1000 UG/ML
1000 INJECTION INTRAMUSCULAR; SUBCUTANEOUS ONCE
Status: COMPLETED | OUTPATIENT
Start: 2022-01-27 | End: 2022-01-27

## 2022-01-27 RX ORDER — SODIUM CHLORIDE 9 MG/ML
20 INJECTION, SOLUTION INTRAVENOUS ONCE
Status: COMPLETED | OUTPATIENT
Start: 2022-01-27 | End: 2022-01-27

## 2022-01-27 RX ADMIN — CYANOCOBALAMIN 1000 MCG: 1000 INJECTION, SOLUTION INTRAMUSCULAR at 15:02

## 2022-01-27 RX ADMIN — IRON SUCROSE 200 MG: 20 INJECTION, SOLUTION INTRAVENOUS at 14:24

## 2022-01-27 RX ADMIN — SODIUM CHLORIDE 20 ML/HR: 0.9 INJECTION, SOLUTION INTRAVENOUS at 14:21

## 2022-02-03 ENCOUNTER — HOSPITAL ENCOUNTER (OUTPATIENT)
Dept: INFUSION CENTER | Facility: HOSPITAL | Age: 47
Discharge: HOME | End: 2022-02-03
Attending: INTERNAL MEDICINE
Payer: COMMERCIAL

## 2022-02-03 VITALS
SYSTOLIC BLOOD PRESSURE: 114 MMHG | DIASTOLIC BLOOD PRESSURE: 63 MMHG | RESPIRATION RATE: 18 BRPM | TEMPERATURE: 98.1 F | OXYGEN SATURATION: 99 % | HEART RATE: 90 BPM

## 2022-02-03 DIAGNOSIS — E53.8 VITAMIN B 12 DEFICIENCY: Primary | ICD-10-CM

## 2022-02-03 DIAGNOSIS — D50.9 IRON DEFICIENCY ANEMIA, UNSPECIFIED IRON DEFICIENCY ANEMIA TYPE: ICD-10-CM

## 2022-02-03 PROCEDURE — 96365 THER/PROPH/DIAG IV INF INIT: CPT

## 2022-02-03 PROCEDURE — 96372 THER/PROPH/DIAG INJ SC/IM: CPT

## 2022-02-03 RX ORDER — SODIUM CHLORIDE 9 MG/ML
20 INJECTION, SOLUTION INTRAVENOUS ONCE
Status: COMPLETED | OUTPATIENT
Start: 2022-02-03 | End: 2022-02-03

## 2022-02-03 RX ORDER — SODIUM CHLORIDE 9 MG/ML
20 INJECTION, SOLUTION INTRAVENOUS ONCE
Status: CANCELLED | OUTPATIENT
Start: 2022-02-10

## 2022-02-03 RX ORDER — CYANOCOBALAMIN 1000 UG/ML
1000 INJECTION INTRAMUSCULAR; SUBCUTANEOUS ONCE
Status: COMPLETED | OUTPATIENT
Start: 2022-02-03 | End: 2022-02-03

## 2022-02-03 RX ORDER — CYANOCOBALAMIN 1000 UG/ML
1000 INJECTION INTRAMUSCULAR; SUBCUTANEOUS ONCE
Status: CANCELLED | OUTPATIENT
Start: 2022-02-10 | End: 2022-02-10

## 2022-02-03 RX ADMIN — SODIUM CHLORIDE 20 ML/HR: 0.9 INJECTION, SOLUTION INTRAVENOUS at 14:15

## 2022-02-03 RX ADMIN — CYANOCOBALAMIN 1000 MCG: 1000 INJECTION, SOLUTION INTRAMUSCULAR at 15:12

## 2022-02-03 RX ADMIN — IRON SUCROSE 200 MG: 20 INJECTION, SOLUTION INTRAVENOUS at 14:18

## 2022-02-03 NOTE — PROGRESS NOTES
Patient tolerated venofer infusion without issue  B12 injection given in left deltoid  Discharged in stable condition, declined AVS but aware of next appointment

## 2022-02-10 ENCOUNTER — HOSPITAL ENCOUNTER (OUTPATIENT)
Dept: INFUSION CENTER | Facility: HOSPITAL | Age: 47
Discharge: HOME | End: 2022-02-10
Attending: INTERNAL MEDICINE
Payer: COMMERCIAL

## 2022-02-10 VITALS
TEMPERATURE: 98 F | SYSTOLIC BLOOD PRESSURE: 124 MMHG | DIASTOLIC BLOOD PRESSURE: 68 MMHG | HEART RATE: 96 BPM | RESPIRATION RATE: 18 BRPM | OXYGEN SATURATION: 100 %

## 2022-02-10 DIAGNOSIS — D50.9 IRON DEFICIENCY ANEMIA, UNSPECIFIED IRON DEFICIENCY ANEMIA TYPE: ICD-10-CM

## 2022-02-10 DIAGNOSIS — E53.8 VITAMIN B 12 DEFICIENCY: Primary | ICD-10-CM

## 2022-02-10 PROCEDURE — 96372 THER/PROPH/DIAG INJ SC/IM: CPT

## 2022-02-10 PROCEDURE — 96365 THER/PROPH/DIAG IV INF INIT: CPT

## 2022-02-10 RX ORDER — SODIUM CHLORIDE 9 MG/ML
20 INJECTION, SOLUTION INTRAVENOUS ONCE
Status: COMPLETED | OUTPATIENT
Start: 2022-02-10 | End: 2022-02-10

## 2022-02-10 RX ORDER — SODIUM CHLORIDE 9 MG/ML
20 INJECTION, SOLUTION INTRAVENOUS ONCE
Status: CANCELLED | OUTPATIENT
Start: 2022-02-17

## 2022-02-10 RX ORDER — CYANOCOBALAMIN 1000 UG/ML
1000 INJECTION INTRAMUSCULAR; SUBCUTANEOUS ONCE
Status: CANCELLED | OUTPATIENT
Start: 2022-02-17 | End: 2022-02-17

## 2022-02-10 RX ORDER — CYANOCOBALAMIN 1000 UG/ML
1000 INJECTION INTRAMUSCULAR; SUBCUTANEOUS ONCE
Status: COMPLETED | OUTPATIENT
Start: 2022-02-10 | End: 2022-02-10

## 2022-02-10 RX ADMIN — SODIUM CHLORIDE 20 ML/HR: 0.9 INJECTION, SOLUTION INTRAVENOUS at 13:55

## 2022-02-10 RX ADMIN — IRON SUCROSE 200 MG: 20 INJECTION, SOLUTION INTRAVENOUS at 13:56

## 2022-02-10 RX ADMIN — CYANOCOBALAMIN 1000 MCG: 1000 INJECTION, SOLUTION INTRAMUSCULAR at 14:08

## 2022-02-10 NOTE — PROGRESS NOTES
Patient tolerated IV Venofer without reaction or issues  B12 injection given in left deltoid  Patient tolerated well  AVS declined  Patient ambulated off unit without incident  All personal belongings taken with patient

## 2022-02-17 ENCOUNTER — HOSPITAL ENCOUNTER (OUTPATIENT)
Dept: INFUSION CENTER | Facility: HOSPITAL | Age: 47
Discharge: HOME | End: 2022-02-17
Attending: INTERNAL MEDICINE
Payer: COMMERCIAL

## 2022-02-17 VITALS
RESPIRATION RATE: 18 BRPM | HEART RATE: 76 BPM | TEMPERATURE: 97.7 F | SYSTOLIC BLOOD PRESSURE: 140 MMHG | DIASTOLIC BLOOD PRESSURE: 60 MMHG

## 2022-02-17 DIAGNOSIS — D50.9 IRON DEFICIENCY ANEMIA, UNSPECIFIED IRON DEFICIENCY ANEMIA TYPE: ICD-10-CM

## 2022-02-17 DIAGNOSIS — E53.8 VITAMIN B 12 DEFICIENCY: Primary | ICD-10-CM

## 2022-02-17 PROCEDURE — 96372 THER/PROPH/DIAG INJ SC/IM: CPT

## 2022-02-17 PROCEDURE — 96365 THER/PROPH/DIAG IV INF INIT: CPT

## 2022-02-17 RX ORDER — CYANOCOBALAMIN 1000 UG/ML
1000 INJECTION INTRAMUSCULAR; SUBCUTANEOUS ONCE
Status: COMPLETED | OUTPATIENT
Start: 2022-02-17 | End: 2022-02-17

## 2022-02-17 RX ORDER — SODIUM CHLORIDE 9 MG/ML
20 INJECTION, SOLUTION INTRAVENOUS ONCE
Status: COMPLETED | OUTPATIENT
Start: 2022-02-17 | End: 2022-02-17

## 2022-02-17 RX ORDER — CYANOCOBALAMIN 1000 UG/ML
1000 INJECTION INTRAMUSCULAR; SUBCUTANEOUS ONCE
Status: CANCELLED | OUTPATIENT
Start: 2022-02-24 | End: 2022-02-24

## 2022-02-17 RX ORDER — SODIUM CHLORIDE 9 MG/ML
20 INJECTION, SOLUTION INTRAVENOUS ONCE
Status: CANCELLED | OUTPATIENT
Start: 2022-02-24

## 2022-02-17 RX ADMIN — SODIUM CHLORIDE 20 ML/HR: 0.9 INJECTION, SOLUTION INTRAVENOUS at 14:06

## 2022-02-17 RX ADMIN — CYANOCOBALAMIN 1000 MCG: 1000 INJECTION, SOLUTION INTRAMUSCULAR at 14:37

## 2022-02-17 RX ADMIN — IRON SUCROSE 200 MG: 20 INJECTION, SOLUTION INTRAVENOUS at 14:07

## 2022-03-05 DIAGNOSIS — M79.10 MUSCULAR PAIN: ICD-10-CM

## 2022-03-07 RX ORDER — METHOCARBAMOL 750 MG/1
TABLET, FILM COATED ORAL
Qty: 60 TABLET | Refills: 0 | Status: SHIPPED | OUTPATIENT
Start: 2022-03-07 | End: 2022-05-19 | Stop reason: SDUPTHER

## 2022-04-13 ENCOUNTER — TELEPHONE (OUTPATIENT)
Dept: HEMATOLOGY ONCOLOGY | Facility: CLINIC | Age: 47
End: 2022-04-13

## 2022-04-13 NOTE — TELEPHONE ENCOUNTER
I spoke with the patient in regards to her lab work that needs to be completed prior to her appointment on 4/19/22 at 11:30am  Patient states understanding

## 2022-04-18 ENCOUNTER — APPOINTMENT (OUTPATIENT)
Dept: LAB | Facility: CLINIC | Age: 47
End: 2022-04-18
Payer: COMMERCIAL

## 2022-04-18 DIAGNOSIS — D50.9 IRON DEFICIENCY ANEMIA, UNSPECIFIED IRON DEFICIENCY ANEMIA TYPE: ICD-10-CM

## 2022-04-18 DIAGNOSIS — Z98.84 BARIATRIC SURGERY STATUS: ICD-10-CM

## 2022-04-18 DIAGNOSIS — E53.8 VITAMIN B 12 DEFICIENCY: ICD-10-CM

## 2022-04-18 LAB
ALBUMIN SERPL BCP-MCNC: 3.8 G/DL (ref 3.5–5)
ALP SERPL-CCNC: 79 U/L (ref 46–116)
ALT SERPL W P-5'-P-CCNC: 23 U/L (ref 12–78)
ANION GAP SERPL CALCULATED.3IONS-SCNC: 3 MMOL/L (ref 4–13)
AST SERPL W P-5'-P-CCNC: 17 U/L (ref 5–45)
BASOPHILS # BLD AUTO: 0.04 THOUSANDS/ΜL (ref 0–0.1)
BASOPHILS NFR BLD AUTO: 1 % (ref 0–1)
BILIRUB SERPL-MCNC: 0.75 MG/DL (ref 0.2–1)
BUN SERPL-MCNC: 8 MG/DL (ref 5–25)
CALCIUM SERPL-MCNC: 9.4 MG/DL (ref 8.3–10.1)
CHLORIDE SERPL-SCNC: 111 MMOL/L (ref 100–108)
CO2 SERPL-SCNC: 27 MMOL/L (ref 21–32)
CREAT SERPL-MCNC: 0.88 MG/DL (ref 0.6–1.3)
CRP SERPL QL: <3 MG/L
EOSINOPHIL # BLD AUTO: 0.07 THOUSAND/ΜL (ref 0–0.61)
EOSINOPHIL NFR BLD AUTO: 1 % (ref 0–6)
ERYTHROCYTE [DISTWIDTH] IN BLOOD BY AUTOMATED COUNT: 15.9 % (ref 11.6–15.1)
ERYTHROCYTE [SEDIMENTATION RATE] IN BLOOD: 20 MM/HOUR (ref 0–19)
FERRITIN SERPL-MCNC: 45 NG/ML (ref 8–388)
GFR SERPL CREATININE-BSD FRML MDRD: 78 ML/MIN/1.73SQ M
GLUCOSE SERPL-MCNC: 91 MG/DL (ref 65–140)
HCT VFR BLD AUTO: 45.5 % (ref 34.8–46.1)
HGB BLD-MCNC: 15 G/DL (ref 11.5–15.4)
IMM GRANULOCYTES # BLD AUTO: 0.02 THOUSAND/UL (ref 0–0.2)
IMM GRANULOCYTES NFR BLD AUTO: 0 % (ref 0–2)
IRON SATN MFR SERPL: 23 % (ref 15–50)
IRON SERPL-MCNC: 73 UG/DL (ref 50–170)
LDH SERPL-CCNC: 196 U/L (ref 81–234)
LYMPHOCYTES # BLD AUTO: 1.37 THOUSANDS/ΜL (ref 0.6–4.47)
LYMPHOCYTES NFR BLD AUTO: 19 % (ref 14–44)
MAGNESIUM SERPL-MCNC: 2.5 MG/DL (ref 1.6–2.6)
MCH RBC QN AUTO: 28.8 PG (ref 26.8–34.3)
MCHC RBC AUTO-ENTMCNC: 33 G/DL (ref 31.4–37.4)
MCV RBC AUTO: 87 FL (ref 82–98)
MONOCYTES # BLD AUTO: 0.39 THOUSAND/ΜL (ref 0.17–1.22)
MONOCYTES NFR BLD AUTO: 5 % (ref 4–12)
NEUTROPHILS # BLD AUTO: 5.46 THOUSANDS/ΜL (ref 1.85–7.62)
NEUTS SEG NFR BLD AUTO: 74 % (ref 43–75)
NRBC BLD AUTO-RTO: 0 /100 WBCS
PLATELET # BLD AUTO: 317 THOUSANDS/UL (ref 149–390)
PMV BLD AUTO: 11 FL (ref 8.9–12.7)
POTASSIUM SERPL-SCNC: 4 MMOL/L (ref 3.5–5.3)
PROT SERPL-MCNC: 7.2 G/DL (ref 6.4–8.2)
RBC # BLD AUTO: 5.21 MILLION/UL (ref 3.81–5.12)
SODIUM SERPL-SCNC: 141 MMOL/L (ref 136–145)
TIBC SERPL-MCNC: 312 UG/DL (ref 250–450)
TSH SERPL DL<=0.05 MIU/L-ACNC: 1.67 UIU/ML (ref 0.45–4.5)
VIT B12 SERPL-MCNC: 1064 PG/ML (ref 100–900)
WBC # BLD AUTO: 7.35 THOUSAND/UL (ref 4.31–10.16)

## 2022-04-18 PROCEDURE — 83550 IRON BINDING TEST: CPT

## 2022-04-18 PROCEDURE — 84443 ASSAY THYROID STIM HORMONE: CPT

## 2022-04-18 PROCEDURE — 82728 ASSAY OF FERRITIN: CPT

## 2022-04-18 PROCEDURE — 83615 LACTATE (LD) (LDH) ENZYME: CPT

## 2022-04-18 PROCEDURE — 86140 C-REACTIVE PROTEIN: CPT

## 2022-04-18 PROCEDURE — 82525 ASSAY OF COPPER: CPT

## 2022-04-18 PROCEDURE — 83735 ASSAY OF MAGNESIUM: CPT

## 2022-04-18 PROCEDURE — 83540 ASSAY OF IRON: CPT

## 2022-04-18 PROCEDURE — 36415 COLL VENOUS BLD VENIPUNCTURE: CPT

## 2022-04-18 PROCEDURE — 82607 VITAMIN B-12: CPT

## 2022-04-18 PROCEDURE — 80053 COMPREHEN METABOLIC PANEL: CPT

## 2022-04-18 PROCEDURE — 85652 RBC SED RATE AUTOMATED: CPT

## 2022-04-18 PROCEDURE — 85025 COMPLETE CBC W/AUTO DIFF WBC: CPT

## 2022-04-19 ENCOUNTER — OFFICE VISIT (OUTPATIENT)
Dept: HEMATOLOGY ONCOLOGY | Facility: CLINIC | Age: 47
End: 2022-04-19
Payer: COMMERCIAL

## 2022-04-19 ENCOUNTER — TELEPHONE (OUTPATIENT)
Dept: NEUROLOGY | Facility: CLINIC | Age: 47
End: 2022-04-19

## 2022-04-19 VITALS
BODY MASS INDEX: 32.65 KG/M2 | OXYGEN SATURATION: 98 % | HEIGHT: 65 IN | HEART RATE: 68 BPM | WEIGHT: 196 LBS | SYSTOLIC BLOOD PRESSURE: 130 MMHG | DIASTOLIC BLOOD PRESSURE: 70 MMHG | RESPIRATION RATE: 18 BRPM | TEMPERATURE: 97.4 F

## 2022-04-19 DIAGNOSIS — R61 UNEXPLAINED NIGHT SWEATS: ICD-10-CM

## 2022-04-19 DIAGNOSIS — D50.9 IRON DEFICIENCY ANEMIA, UNSPECIFIED IRON DEFICIENCY ANEMIA TYPE: Primary | ICD-10-CM

## 2022-04-19 DIAGNOSIS — R19.7 DIARRHEA, UNSPECIFIED TYPE: ICD-10-CM

## 2022-04-19 DIAGNOSIS — R63.4 WEIGHT LOSS: ICD-10-CM

## 2022-04-19 DIAGNOSIS — R25.1 TREMOR: ICD-10-CM

## 2022-04-19 DIAGNOSIS — E53.8 VITAMIN B 12 DEFICIENCY: ICD-10-CM

## 2022-04-19 DIAGNOSIS — Z98.84 BARIATRIC SURGERY STATUS: ICD-10-CM

## 2022-04-19 PROCEDURE — 99215 OFFICE O/P EST HI 40 MIN: CPT | Performed by: NURSE PRACTITIONER

## 2022-04-19 RX ORDER — SODIUM CHLORIDE 9 MG/ML
20 INJECTION, SOLUTION INTRAVENOUS ONCE
OUTPATIENT
Start: 2022-07-07

## 2022-04-19 NOTE — TELEPHONE ENCOUNTER
Joan called to schedule an appointment for [facility] for her tremors  Patient said she would travel to see a provider  Gave her the first available for Dr Sophie Thompson and she asked if there was a sooner appointment with a general neurologist and I scheduled her for 6/10/22 at 8am with Dr Oc Carbajal in [place]

## 2022-04-19 NOTE — PROGRESS NOTES
Hematology/Oncology Outpatient Follow-up  Anup Manzo 52 y o  female 1975 [id number]    Date:  4/19/2022      Assessment and Plan:  1  Iron deficiency anemia, unspecified iron deficiency anemia type  Patient is not currently anemic  She completed 5 doses of IV Venofer January/February 2022  Her ferritin is borderline 45  She was given the option of re-evaluating in a few months versus pursuing 2 more doses of IV Venofer now; she is interested in pursuing the IV iron since she continues to be fatigued  Etiology of her iron deficiency is likely due to malabsorption with history of gastric bypass surgery  She also has heavy menstrual bleeding which is likely contributory as well  Request she follow up with repeat labs in 4 months or sooner should the need arise     - CBC and differential; Future  - Comprehensive metabolic panel; Future  - C-reactive protein; Future  - Sedimentation rate, automated; Future  - Vitamin B12; Future  - Iron Panel (Includes Ferritin, Iron Sat%, Iron, and TIBC); Future  - Ferritin; Future    2  Vitamin B 12 deficiency  S/p 5 B12 injections  She has been maintaining herself on oral iron which she will continue  Her repeat vitamin B12 level is appropriate  - Vitamin B12; Future    3  Bariatric surgery status    4  Tremor  She also reports that she has been having hand tremors and occasional head tremor which is concerning her and recently started  She states that her grandmother used to have the same issue  Denies any dizziness or worsening headache from her baseline  She believes it may be due to her stress and anxiety but is concerned  Will refer her to Neurology for further evaluation     - Ambulatory referral to Neurology; Future  - CT chest abdomen pelvis w contrast; Future    5  Weight loss  Patient has had a 30+ weight loss over the past 9 months she also has symptoms of decreased appetite, diarrhea and drenching night sweats every evening    Patient believes that dry ear symptoms are likely due to stress and anxiety however will pursue imaging with CT scan of the chest abdomen pelvis to rule out any significant pathology/malignant process  Awaiting results  - CT chest abdomen pelvis w contrast; Future    6  Unexplained night sweats  - CT chest abdomen pelvis w contrast; Future    7  Diarrhea, unspecified type  - CT chest abdomen pelvis w contrast; Future      HPI:  Nitin Almendarez is a 52 y o  female with history of arthritis, gastroesophageal reflux disease, hypertension, and gastric bypass surgery in December of 2014  She presented to us for further treatment of her iron deficiency anemia  She did admit to heavy menstrual bleeding  Blood work from 08/03/2021 showed hemoglobin of 10 8 with MCV of 80  White cell count was 6 7 with platelet count of 316  Recent iron panel showed ferritin of 6 and saturation of 9% on 11/24/2021  Her vitamin B12 level was 261  She was scheduled for Venofer 200 mg along with B12 injections x6 treatments and completed 5/6 schedule treatments January/February 2022  Interval history:  Patient presents today for follow-up visit  Reports feeling slightly better after completing the treatment but still does not feel well  She mentions that she is under a lot of stress right now and her anxiety is also pretty bad  Her appetite has been decreased which she attributes to the stress and is eating 1 meal a day if that  Appears that she has lost about 30 lb over the past 9 months or so  She states that she also recently noticed that she her hands or tremoring with occasional tremor of the head as well; mentions that her grandmother ease to have the same issue  She does admit to night sweats and has also been having diarrhea  She denies any bleeding from any site other than her menstrual cycles which typically last 5-6 days with 3 days of heavy bleeding  She has been taking B12 supplements and also restarted her oral iron      Her most recent laboratory studies from 04/18/2022 showed normal white cells and platelets, she is not anemic H&H 15 0/45 5, MCV 87  Kidney function and liver function tests are normal   C-reactive protein is normal with slightly elevated sed rate 20  LDH is normal   TSH normal 1 67  B12 is appropriate on supplementation 1064  Her iron panel has improved iron saturation 23%, TIBC 312, serum iron 73 with ferritin 45  Copper level is still pending  ROS: Review of Systems   Constitutional: Positive for appetite change, fatigue and unexpected weight change  Negative for activity change, chills and fever  HENT: Negative for congestion, mouth sores, nosebleeds, sore throat and trouble swallowing  Eyes: Negative  Respiratory: Positive for shortness of breath  Negative for cough and chest tightness  Cardiovascular: Negative for chest pain, palpitations and leg swelling  Gastrointestinal: Positive for diarrhea  Negative for abdominal distention, abdominal pain, blood in stool, constipation, nausea and vomiting  Genitourinary: Positive for menstrual problem  Negative for difficulty urinating, dysuria, frequency, hematuria and urgency  Musculoskeletal: Positive for arthralgias and myalgias  Negative for back pain, gait problem and joint swelling  Skin: Negative for color change, pallor and rash  Neurological: Positive for tremors and headaches  Negative for dizziness, weakness, light-headedness and numbness  Hematological: Negative for adenopathy  Does not bruise/bleed easily  Psychiatric/Behavioral: Positive for dysphoric mood and sleep disturbance  The patient is nervous/anxious          Past Medical History:   Diagnosis Date    Anxiety     Arthritis     Bariatric surgery status     Carpal tunnel syndrome     Chronic pain disorder     DJD (degenerative joint disease)     GERD (gastroesophageal reflux disease)     Heartburn     History of gastric bypass 12/2014    Hypertension     Last assessed 2014    Hypoglycemia after GI (gastrointestinal) surgery     Migraine     Obesity     Last assessed 2014    Postgastrectomy malabsorption     Restless leg syndrome     Seizures ([address] )     10yrs ago "low blood sugar"    Vertigo     Wears dentures        Past Surgical History:   Procedure Laterality Date    ABDOMINOPLASTY N/A 2021    Procedure: ABDOMINOPLASTY;  Surgeon: Page Almendarez MD;  Location: [address] [place] [address] MAIN OR;  Service: Plastics     SECTION       SECTION N/A 2018    Procedure:  SECTION (); Surgeon: Janelle Schlatter, MD;  Location: [facility];  Service: Obstetrics    CHOLECYSTECTOMY      GASTRIC BYPASS  2014    AR REVISION OF CERVIX,NON OBSTETRICAL N/A 2017    Procedure: CERCLAGE CERVICAL;  Surgeon: Tangela Moore MD;  Location[facility];  Service: Obstetrics    TOOTH EXTRACTION         Social History     Socioeconomic History    Marital status: /Civil Union     Spouse name: None    Number of children: None    Years of education: None    Highest education level: None   Occupational History    None   Tobacco Use    Smoking status: Current Every Day Smoker     Packs/day: 0 25    Smokeless tobacco: Never Used   Vaping Use    Vaping Use: Never used   Substance and Sexual Activity    Alcohol use: No    Drug use: No    Sexual activity: Yes     Partners: Male   Other Topics Concern    None   Social History Narrative    Caffeine use     Social Determinants of Health     Financial Resource Strain: Not on file   Food Insecurity: Not on file   Transportation Needs: Not on file   Physical Activity: Not on file   Stress: Not on file   Social Connections: Not on file   Intimate Partner Violence: Not on file   Housing Stability: Not on file       Family History   Problem Relation Age of Onset    Cancer Mother         Myosarcoma of the soft tissue    Other Mother         Total Thyroidectomy;  Thyroid mass    Coronary artery disease Father        Allergies   Allergen Reactions    Aspirin Other (See Comments)     Gastric Bypass    Nsaids Other (See Comments)     Gastric bypass    Codeine Itching         Current Outpatient Medications:     ACCU-CHEK FASTCLIX LANCETS MISC, by Does not apply route 2 (two) times a day, Disp: 102 each, Rfl: 5    Alcohol Swabs (ALCOHOL PADS) 70 % PADS, by Does not apply route 2 (two) times a day, Disp: 100 each, Rfl: 5    b complex vitamins tablet, Take 1 tablet by mouth daily, Disp: , Rfl:     Blood Glucose Monitoring Suppl (ACCU-CHEK GUIDE) w/Device KIT, by Does not apply route 2 (two) times a day, Disp: 1 kit, Rfl: 0    calcium citrate (CALCITRATE) 950 MG tablet, Take 2 tablets by mouth daily  , Disp: , Rfl:     CRANBERRY EXTRACT PO, Take by mouth, Disp: , Rfl:     ergocalciferol (VITAMIN D2) 50,000 units, Take 1 capsule (50,000 Units total) by mouth once a week, Disp: 4 capsule, Rfl: 3    escitalopram (LEXAPRO) 20 mg tablet, TAKE (1) TABLET DAILY  , Disp: 30 tablet, Rfl: 1    ferrous sulfate 325 (65 Fe) mg tablet, Take 325 mg by mouth daily with breakfast, Disp: , Rfl:     fluticasone (FLONASE) 50 mcg/act nasal spray, SPRAY 1 SPRAY INTO EACH NOSTRIL EVERY DAY, Disp: 1 Bottle, Rfl: 3    glucose blood (ACCU-CHEK GUIDE) test strip, Use as instructed, Disp: 100 each, Rfl: 5    hydrOXYzine HCL (ATARAX) 50 mg tablet, Take 1 tablet (50 mg total) by mouth every 6 (six) hours as needed for anxiety, Disp: 15 tablet, Rfl: 0    Lancets Misc  (ACCU-CHEK FASTCLIX LANCET) KIT, by Does not apply route 2 (two) times a day, Disp: 1 kit, Rfl: 0    methocarbamol (ROBAXIN) 750 mg tablet, TAKE 1 TABLET EVERY 12 HOURS, Disp: 60 tablet, Rfl: 0    Misc Natural Products (APPLE CIDER VINEGAR DIET PO), Take by mouth, Disp: , Rfl:     Multiple Vitamin (MULTIVITAMIN) tablet, Take 1 tablet by mouth daily, Disp: , Rfl:     predniSONE 10 mg tablet, Take 3 tabs BID X 2 days, 2 tabs BID X 2 days, 1 tab BID X 2 days, 1 tab daily X 2 days, Disp: 26 tablet, Rfl: 0    vitamin A 2250 MCG (7500 UT) capsule, Take 7,500 Units by mouth daily, Disp: , Rfl:     Vitamin D, Cholecalciferol, 1000 units CAPS, Take 1 tablet by mouth daily, Disp: , Rfl:       Physical Exam:  /70 (BP Location: Left arm, Patient Position: Sitting, Cuff Size: Adult)   Pulse 68   Temp (!) 97 4 °F (36 3 °C)   Resp 18   Ht 5' 4 5" (1 638 m)   Wt 88 9 kg (196 lb)   SpO2 98%   BMI 33 12 kg/m²     Physical Exam  Vitals reviewed  Constitutional:       General: She is not in acute distress  Appearance: She is well-developed  She is not diaphoretic  HENT:      Head: Normocephalic and atraumatic  Eyes:      General: No scleral icterus  Conjunctiva/sclera: Conjunctivae normal       Pupils: Pupils are equal, round, and reactive to light  Neck:      Thyroid: No thyromegaly  Cardiovascular:      Rate and Rhythm: Normal rate and regular rhythm  Heart sounds: Normal heart sounds  No murmur heard  Pulmonary:      Effort: Pulmonary effort is normal  No respiratory distress  Breath sounds: Normal breath sounds  Chest:   Breasts:      Right: No axillary adenopathy  Left: No axillary adenopathy  Abdominal:      General: There is no distension  Palpations: Abdomen is soft  There is no hepatomegaly or splenomegaly  Tenderness: There is no abdominal tenderness  Musculoskeletal:         General: No swelling  Normal range of motion  Cervical back: Normal range of motion and neck supple  Lymphadenopathy:      Cervical: No cervical adenopathy  Upper Body:      Right upper body: No axillary adenopathy  Left upper body: No axillary adenopathy  Skin:     General: Skin is warm and dry  Findings: No erythema or rash  Neurological:      General: No focal deficit present  Mental Status: She is alert and oriented to person, place, and time  Motor: Tremor (both hands noted at rest) present  Psychiatric:         Mood and Affect: Affect normal  Mood is anxious  Behavior: Behavior normal  Behavior is cooperative  Thought Content: Thought content normal          Judgment: Judgment normal            Labs:  Lab Results   Component Value Date    WBC 7 35 04/18/2022    HGB 15 0 04/18/2022    HCT 45 5 04/18/2022    MCV 87 04/18/2022     04/18/2022     Lab Results   Component Value Date     07/22/2015    K 4 0 04/18/2022     (H) 04/18/2022    CO2 27 04/18/2022    ANIONGAP 12 07/22/2015    BUN 8 04/18/2022    CREATININE 0 88 04/18/2022    GLUCOSE 73 07/22/2015    GLUF 90 01/16/2019    CALCIUM 9 4 04/18/2022    AST 17 04/18/2022    ALT 23 04/18/2022    ALKPHOS 79 04/18/2022    PROT 6 9 07/22/2015    BILITOT 0 55 07/22/2015    EGFR 78 04/18/2022       Patient voiced understanding and agreement in the above discussion  Aware to contact our office with questions/symptoms in the interim  This note has been generated by voice recognition software system  Therefore, there may be spelling, grammar, and or syntax errors  Please contact if questions arise

## 2022-04-21 LAB — COPPER SERPL-MCNC: 113 UG/DL (ref 80–158)

## 2022-04-27 ENCOUNTER — TELEPHONE (OUTPATIENT)
Dept: INFUSION CENTER | Facility: HOSPITAL | Age: 47
End: 2022-04-27

## 2022-04-27 NOTE — TELEPHONE ENCOUNTER
Call placed to patient due to no show for scheduled infusion appointment on: 4/27/22 @ 2:21 p m      Spoke with: unable to leave message, mailbox was full    Telephone encounter routed to providers office: yes    Patient marked no show on snap board and unlinked: yes    Appointment rescheduled: no

## 2022-05-04 ENCOUNTER — TELEPHONE (OUTPATIENT)
Dept: INFUSION CENTER | Facility: HOSPITAL | Age: 47
End: 2022-05-04

## 2022-05-04 NOTE — TELEPHONE ENCOUNTER
Call placed to patient due to no show for scheduled infusion appointment on: 5/4/22 @ 2:13 p m      Spoke with: Unable to leave message, mailbox was full    Telephone encounter routed to providers office: yes    Patient marked no show on snap board and unlinked: yes    Appointment rescheduled: no

## 2022-05-19 DIAGNOSIS — M79.10 MUSCULAR PAIN: ICD-10-CM

## 2022-05-19 RX ORDER — METHOCARBAMOL 750 MG/1
750 TABLET, FILM COATED ORAL EVERY 12 HOURS
Qty: 60 TABLET | Refills: 0 | Status: SHIPPED | OUTPATIENT
Start: 2022-05-19

## 2022-06-20 DIAGNOSIS — F32.A ANXIETY AND DEPRESSION: ICD-10-CM

## 2022-06-20 DIAGNOSIS — F41.9 ANXIETY AND DEPRESSION: ICD-10-CM

## 2022-06-20 RX ORDER — ESCITALOPRAM OXALATE 20 MG/1
20 TABLET ORAL DAILY
Qty: 30 TABLET | Refills: 5 | Status: CANCELLED | OUTPATIENT
Start: 2022-06-20

## 2022-06-21 DIAGNOSIS — F41.9 ANXIETY AND DEPRESSION: ICD-10-CM

## 2022-06-21 DIAGNOSIS — F32.A ANXIETY AND DEPRESSION: ICD-10-CM

## 2022-06-21 RX ORDER — ESCITALOPRAM OXALATE 20 MG/1
20 TABLET ORAL DAILY
Qty: 30 TABLET | Refills: 3 | Status: SHIPPED | OUTPATIENT
Start: 2022-06-21

## 2022-07-06 ENCOUNTER — TELEPHONE (OUTPATIENT)
Dept: NEUROLOGY | Facility: CLINIC | Age: 47
End: 2022-07-06

## 2022-07-06 NOTE — TELEPHONE ENCOUNTER
Called x 2  Called pt but phone numbers are not in service sent a [employer] message  For us to update phone numbers and to reschedule a missed appointment with our neurologist       Thank you,     Carolyn Delaney

## 2022-07-25 ENCOUNTER — VBI (OUTPATIENT)
Dept: ADMINISTRATIVE | Facility: OTHER | Age: 47
End: 2022-07-25

## 2022-07-25 NOTE — TELEPHONE ENCOUNTER
07/25/22 8:18 AM     See documentation in the [facility]       Maulik Abdul

## 2022-08-18 ENCOUNTER — TELEPHONE (OUTPATIENT)
Dept: HEMATOLOGY ONCOLOGY | Facility: CLINIC | Age: 47
End: 2022-08-18

## 2022-08-26 DIAGNOSIS — E16.2 HYPOGLYCEMIA: ICD-10-CM

## 2022-08-26 RX ORDER — BLOOD SUGAR DIAGNOSTIC
STRIP MISCELLANEOUS
Qty: 100 EACH | Refills: 0 | Status: SHIPPED | OUTPATIENT
Start: 2022-08-26

## 2022-10-12 PROBLEM — J01.10 ACUTE NON-RECURRENT FRONTAL SINUSITIS: Status: RESOLVED | Noted: 2021-04-05 | Resolved: 2022-10-12

## 2022-10-12 PROBLEM — J01.11 ACUTE RECURRENT FRONTAL SINUSITIS: Status: RESOLVED | Noted: 2021-01-20 | Resolved: 2022-10-12

## 2022-12-13 ENCOUNTER — OFFICE VISIT (OUTPATIENT)
Dept: URGENT CARE | Facility: CLINIC | Age: 47
End: 2022-12-13

## 2022-12-13 VITALS
HEART RATE: 100 BPM | BODY MASS INDEX: 32.44 KG/M2 | RESPIRATION RATE: 20 BRPM | HEIGHT: 64 IN | OXYGEN SATURATION: 97 % | WEIGHT: 190 LBS | TEMPERATURE: 98 F

## 2022-12-13 DIAGNOSIS — H66.93 BILATERAL OTITIS MEDIA, UNSPECIFIED OTITIS MEDIA TYPE: ICD-10-CM

## 2022-12-13 DIAGNOSIS — J06.9 VIRAL URI WITH COUGH: Primary | ICD-10-CM

## 2022-12-13 RX ORDER — FLUTICASONE PROPIONATE 50 MCG
2 SPRAY, SUSPENSION (ML) NASAL DAILY
Qty: 11.1 ML | Refills: 0 | Status: SHIPPED | OUTPATIENT
Start: 2022-12-13

## 2022-12-13 RX ORDER — AMOXICILLIN AND CLAVULANATE POTASSIUM 875; 125 MG/1; MG/1
1 TABLET, FILM COATED ORAL EVERY 12 HOURS SCHEDULED
Qty: 14 TABLET | Refills: 0 | Status: SHIPPED | OUTPATIENT
Start: 2022-12-13 | End: 2022-12-20

## 2022-12-13 RX ORDER — BENZONATATE 200 MG/1
200 CAPSULE ORAL 3 TIMES DAILY PRN
Qty: 20 CAPSULE | Refills: 0 | Status: SHIPPED | OUTPATIENT
Start: 2022-12-13

## 2022-12-13 NOTE — PATIENT INSTRUCTIONS
Take antibiotic, flonase, and tessalon perles as instructed  Tylenol/ibuprofen for pain or fever  Drink plenty of fluids and rest   May try tea with honey or throat lozenges for throat/cough relief  Follow up with PCP in 3-5 days  Proceed to  ER if symptoms worsen  Acute Cough   WHAT YOU NEED TO KNOW:   An acute cough can last up to 3 weeks  Common causes of an acute cough include a cold, allergies, or a lung infection  DISCHARGE INSTRUCTIONS:   Return to the emergency department if:   You have trouble breathing or feel short of breath  You cough up blood, or you see blood in your mucus  You faint or feel weak or dizzy  You have chest pain when you cough or take a deep breath  You have new wheezing  Contact your healthcare provider if:   You have a fever  Your cough lasts longer than 4 weeks  Your symptoms do not improve with treatment  You have questions or concerns about your condition or care  Medicines:   Medicines  may be needed to stop the cough, decrease swelling in your airways, or help open your airways  Medicine may also be given to help you cough up mucus  Ask your healthcare provider what over-the-counter medicines you can take  If you have an infection caused by bacteria, you may need antibiotics  Take your medicine as directed  Contact your healthcare provider if you think your medicine is not helping or if you have side effects  Tell him or her if you are allergic to any medicine  Keep a list of the medicines, vitamins, and herbs you take  Include the amounts, and when and why you take them  Bring the list or the pill bottles to follow-up visits  Carry your medicine list with you in case of an emergency  Manage your symptoms:   Do not smoke and stay away from others who smoke  Nicotine and other chemicals in cigarettes and cigars can cause lung damage and make your cough worse   Ask your healthcare provider for information if you currently smoke and need help to quit  E-cigarettes or smokeless tobacco still contain nicotine  Talk to your healthcare provider before you use these products  Drink extra liquids as directed  Liquids will help thin and loosen mucus so you can cough it up  Liquids will also help prevent dehydration  Examples of good liquids to drink include water, fruit juice, and broth  Do not drink liquids that contain caffeine  Caffeine can increase your risk for dehydration  Ask your healthcare provider how much liquid to drink each day  Rest as directed  Do not do activities that make your cough worse, such as exercise  Use a humidifier or vaporizer  Use a cool mist humidifier or a vaporizer to increase air moisture in your home  This may make it easier for you to breathe and help decrease your cough  Eat 2 to 5 mL of honey 2 times each day  Honey can help thin mucus and decrease your cough  Use cough drops or lozenges  These can help decrease throat irritation and your cough  Follow up with your healthcare provider as directed:  Write down your questions so you remember to ask them during your visits  © Copyright [employer] 2022 Information is for End User's use only and may not be sold, redistributed or otherwise used for commercial purposes  All illustrations and images included in CareNotes® are the copyrighted property of A D A M , Inc  or [facility]  The above information is an  only  It is not intended as medical advice for individual conditions or treatments  Talk to your doctor, nurse or pharmacist before following any medical regimen to see if it is safe and effective for you  Ear Infection   WHAT YOU NEED TO KNOW:   An ear infection is also called otitis media  Blocked or swollen eustachian tubes can cause an infection  Eustachian tubes connect the middle ear to the back of the nose and throat  They drain fluid from the middle ear  You may have a buildup of fluid in your ear   Germs build up in the fluid and infection develops  DISCHARGE INSTRUCTIONS:   Return to the emergency department if:   You have clear fluid coming from your ear  You have a stiff neck, headache, and a fever  Call your doctor if:   You see blood or pus draining from your ear  Your ear pain gets worse or does not go away, even after treatment  The outside of your ear is red or swollen  You are vomiting or have diarrhea  You have questions or concerns about your condition or care  Medicines: You may  need any of the following:  Acetaminophen  decreases pain and fever  It is available without a doctor's order  Ask how much to take and how often to take it  Follow directions  Read the labels of all other medicines you are using to see if they also contain acetaminophen, or ask your doctor or pharmacist  Acetaminophen can cause liver damage if not taken correctly  Do not use more than 4 grams (4,000 milligrams) total of acetaminophen in one day  NSAIDs , such as ibuprofen, help decrease swelling, pain, and fever  This medicine is available with or without a doctor's order  NSAIDs can cause stomach bleeding or kidney problems in certain people  If you take blood thinner medicine, always ask your healthcare provider if NSAIDs are safe for you  Always read the medicine label and follow directions  Ear drops  may contain medicine to decrease pain and inflammation  Antibiotics  help treat a bacterial infection  Take your medicine as directed  Contact your healthcare provider if you think your medicine is not helping or if you have side effects  Tell him or her if you are allergic to any medicine  Keep a list of the medicines, vitamins, and herbs you take  Include the amounts, and when and why you take them  Bring the list or the pill bottles to follow-up visits  Carry your medicine list with you in case of an emergency      Self-care:   Apply heat  on your ear for 15 to 20 minutes, 3 to 4 times a day or as directed  You can apply heat with an electric heating pad, hot water bottle, or warm compress  Always put a cloth between your skin and the heat pack to prevent burns  Heat helps decrease pain  Apply ice  on your ear for 15 to 20 minutes, 3 to 4 times a day for 2 days or as directed  Use an ice pack, or put crushed ice in a plastic bag  Cover it with a towel before you apply it to your ear  Ice decreases swelling and pain  Prevent an ear infection:   Wash your hands often  to help prevent the spread of germs  Ask everyone in your house to wash their hands with soap and water  Ask them to wash after they use the bathroom or change a diaper  Remind them to wash before they prepare or eat food  Stay away from people who are ill  Some germs spread easily and quickly through contact  Follow up with your doctor as directed:  Write down your questions so you remember to ask them during your visits  © [employer] 2022 Information is for End User's use only and may not be sold, redistributed or otherwise used for commercial purposes  All illustrations and images included in CareNotes® are the copyrighted property of [employer]  or [facility] Elijah Tamayo   The above information is an  only  It is not intended as medical advice for individual conditions or treatments  Talk to your doctor, nurse or pharmacist before following any medical regimen to see if it is safe and effective for you

## 2022-12-13 NOTE — PROGRESS NOTES
[facility]        NAME: Hernesto Carlson is a 52 y o  female  : 1975    MRN: [id number]  DATE: 2022  TIME: 12:40 PM    Assessment and Plan   Viral URI with cough [J06 9]  1  Viral URI with cough  fluticasone (FLONASE) 50 mcg/act nasal spray    benzonatate (TESSALON) 200 MG capsule      2  Bilateral otitis media, unspecified otitis media type  amoxicillin-clavulanate (AUGMENTIN) 875-125 mg per tablet            Patient Instructions     Take antibiotic, flonase, and tessalon perles as instructed  Tylenol/ibuprofen for pain or fever  Drink plenty of fluids and rest   May try tea with honey or throat lozenges for throat/cough relief  Follow up with PCP in 3-5 days  Proceed to  ER if symptoms worsen  Chief Complaint     Chief Complaint   Patient presents with   • Earache     X 2 weeks was tx with drops and amoxicillin    • Nasal Congestion   • Cough     X 3 days          History of Present Illness       52 yoF here with bilat ear pain (worse on right ) x 3 weeks - states she was given amox and ear antibiotic ear drops - finished them a few days ago  Sx/pain is still present  Pt also has persistent cough that began 3 days ago  Pts daughter had + flu test 12 days ago - pt felt similar sx  Pt admits to fever (has resolved, headache, decreased sleep)  Pt feels cough is coming from throat irritation  Denies sore throat, SOB, CP, nausea, vomiting, diarrhea, abd pain      Review of Systems   Review of Systems   Constitutional: Positive for fever  Negative for chills  HENT: Positive for congestion, ear pain and rhinorrhea  Negative for ear discharge, sinus pressure, sinus pain, sore throat and trouble swallowing  Eyes: Negative  Respiratory: Positive for cough  Negative for shortness of breath and wheezing  Cardiovascular: Negative for chest pain and palpitations  Gastrointestinal: Negative for abdominal pain, diarrhea, nausea and vomiting  Musculoskeletal: Negative  Skin: Negative  Neurological: Positive for headaches  Negative for dizziness, light-headedness and numbness  Current Medications       Current Outpatient Medications:   •  amoxicillin-clavulanate (AUGMENTIN) 875-125 mg per tablet, Take 1 tablet by mouth every 12 (twelve) hours for 7 days, Disp: 14 tablet, Rfl: 0  •  b complex vitamins tablet, Take 1 tablet by mouth daily, Disp: , Rfl:   •  benzonatate (TESSALON) 200 MG capsule, Take 1 capsule (200 mg total) by mouth 3 (three) times a day as needed for cough, Disp: 20 capsule, Rfl: 0  •  calcium citrate (CALCITRATE) 950 MG tablet, Take 2 tablets by mouth daily  , Disp: , Rfl:   •  ergocalciferol (VITAMIN D2) 50,000 units, Take 1 capsule (50,000 Units total) by mouth once a week, Disp: 4 capsule, Rfl: 3  •  escitalopram (LEXAPRO) 20 mg tablet, Take 1 tablet (20 mg total) by mouth daily (Patient not taking: Reported on 12/13/2022), Disp: 30 tablet, Rfl: 3  •  ferrous sulfate 325 (65 Fe) mg tablet, Take 325 mg by mouth daily with breakfast, Disp: , Rfl:   •  fluticasone (FLONASE) 50 mcg/act nasal spray, 2 sprays into each nostril daily, Disp: 11 1 mL, Rfl: 0  •  glucose blood (Accu-Chek Guide) test strip, Use as instructed, Disp: 100 each, Rfl: 0  •  Multiple Vitamin (MULTIVITAMIN) tablet, Take 1 tablet by mouth daily, Disp: , Rfl:   •  Vitamin D, Cholecalciferol, 1000 units CAPS, Take 1 tablet by mouth daily, Disp: , Rfl:   •  ACCU-CHEK FASTCLIX LANCETS MISC, by Does not apply route 2 (two) times a day, Disp: 102 each, Rfl: 5  •  Alcohol Swabs (ALCOHOL PADS) 70 % PADS, by Does not apply route 2 (two) times a day, Disp: 100 each, Rfl: 5  •  Blood Glucose Monitoring Suppl (ACCU-CHEK GUIDE) w/Device KIT, by Does not apply route 2 (two) times a day, Disp: 1 kit, Rfl: 0  •  CRANBERRY EXTRACT PO, Take by mouth (Patient not taking: Reported on 12/13/2022), Disp: , Rfl:   •  fluticasone (FLONASE) 50 mcg/act nasal spray, SPRAY 1 SPRAY INTO EACH NOSTRIL EVERY DAY (Patient not taking: Reported on 12/13/2022), Disp: 1 Bottle, Rfl: 3  •  hydrOXYzine HCL (ATARAX) 50 mg tablet, Take 1 tablet (50 mg total) by mouth every 6 (six) hours as needed for anxiety (Patient not taking: Reported on 12/13/2022), Disp: 15 tablet, Rfl: 0  •  Lancets Misc  (ACCU-CHEK FASTCLIX LANCET) KIT, by Does not apply route 2 (two) times a day, Disp: 1 kit, Rfl: 0  •  methocarbamol (ROBAXIN) 750 mg tablet, Take 1 tablet (750 mg total) by mouth every 12 (twelve) hours (Patient not taking: Reported on 12/13/2022), Disp: 60 tablet, Rfl: 0  •  Misc Natural Products (APPLE CIDER VINEGAR DIET PO), Take by mouth (Patient not taking: Reported on 12/13/2022), Disp: , Rfl:   •  predniSONE 10 mg tablet, Take 3 tabs BID X 2 days, 2 tabs BID X 2 days, 1 tab BID X 2 days, 1 tab daily X 2 days, Disp: 26 tablet, Rfl: 0  •  vitamin A 2250 MCG (7500 UT) capsule, Take 7,500 Units by mouth daily, Disp: , Rfl:     Current Allergies     Allergies as of 12/13/2022 - Reviewed 04/19/2022   Allergen Reaction Noted   • Aspirin Other (See Comments) 12/04/2016   • Nsaids Other (See Comments) 08/01/2017   • Codeine Itching 01/17/2016            The following portions of the patient's history were reviewed and updated as appropriate: allergies, current medications, past family history, past medical history, past social history, past surgical history and problem list      Past Medical History:   Diagnosis Date   • Anxiety    • Arthritis    • Bariatric surgery status    • Carpal tunnel syndrome    • Chronic pain disorder    • DJD (degenerative joint disease)    • GERD (gastroesophageal reflux disease)    • Heartburn    • History of gastric bypass 12/2014   • Hypertension     Last assessed 12/18/2014   • Hypoglycemia after GI (gastrointestinal) surgery    • Migraine    • Obesity     Last assessed 12/26/2014   • Postgastrectomy malabsorption    • Restless leg syndrome    • Seizures ([facility] [address] )     10yrs ago "low blood sugar"   • Vertigo    • Wears dentures        Past Surgical History:   Procedure Laterality Date   • ABDOMINOPLASTY N/A 2021    Procedure: ABDOMINOPLASTY;  Surgeon: Benny Esquivel MD;  Location: [address] [place] [address];  Service: Plastics   •  SECTION     •  SECTION N/A 2018    Procedure:  SECTION (); Surgeon: Suad Sim MD;  Location: [facility];  Service: Obstetrics   • CHOLECYSTECTOMY     • GASTRIC BYPASS  2014   • WA REVISION OF CERVIX,NON OBSTETRICAL N/A 2017    Procedure: CERCLAGE CERVICAL;  Surgeon: Nicki Soria MD;  Location: [facility];  Service: Obstetrics   • TOOTH EXTRACTION         Family History   Problem Relation Age of Onset   • Cancer Mother         Myosarcoma of the soft tissue   • Other Mother         Total Thyroidectomy; Thyroid mass   • Coronary artery disease Father          Medications have been verified  Objective   Pulse 100   Temp 98 °F (36 7 °C)   Resp 20   Ht 5' 4" (1 626 m)   Wt 86 2 kg (190 lb)   SpO2 97%   BMI 32 61 kg/m²        Physical Exam     Physical Exam  Constitutional:       General: She is not in acute distress  Appearance: Normal appearance  She is not ill-appearing  HENT:      Head: Normocephalic and atraumatic  Right Ear: Ear canal and external ear normal  No drainage, swelling or tenderness  Tympanic membrane is erythematous and bulging  Left Ear: Ear canal and external ear normal  No drainage, swelling or tenderness  Tympanic membrane is erythematous and bulging  Nose: Mucosal edema, congestion and rhinorrhea present  Rhinorrhea is clear  Right Sinus: No maxillary sinus tenderness or frontal sinus tenderness  Left Sinus: No maxillary sinus tenderness or frontal sinus tenderness  Mouth/Throat:      Lips: Pink  Mouth: Mucous membranes are moist       Pharynx: Oropharynx is clear  Posterior oropharyngeal erythema present  No pharyngeal swelling or oropharyngeal exudate        Tonsils: No tonsillar exudate or tonsillar abscesses  Eyes:      Extraocular Movements: Extraocular movements intact  Conjunctiva/sclera: Conjunctivae normal       Pupils: Pupils are equal, round, and reactive to light  Cardiovascular:      Rate and Rhythm: Normal rate and regular rhythm  Pulses: Normal pulses  Heart sounds: No murmur heard  No friction rub  No gallop  Pulmonary:      Effort: Pulmonary effort is normal  No respiratory distress  Breath sounds: Normal breath sounds  No stridor  No wheezing, rhonchi or rales  Chest:      Chest wall: No tenderness  Skin:     General: Skin is warm and dry  Capillary Refill: Capillary refill takes less than 2 seconds  Findings: No rash  Neurological:      General: No focal deficit present  Mental Status: She is alert

## 2022-12-29 ENCOUNTER — OFFICE VISIT (OUTPATIENT)
Dept: INTERNAL MEDICINE CLINIC | Facility: CLINIC | Age: 47
End: 2022-12-29

## 2022-12-29 VITALS
WEIGHT: 193.5 LBS | DIASTOLIC BLOOD PRESSURE: 63 MMHG | HEIGHT: 64 IN | OXYGEN SATURATION: 99 % | SYSTOLIC BLOOD PRESSURE: 100 MMHG | HEART RATE: 73 BPM | BODY MASS INDEX: 33.03 KG/M2 | TEMPERATURE: 97.3 F

## 2022-12-29 DIAGNOSIS — H65.191 ACUTE EFFUSION OF RIGHT EAR: ICD-10-CM

## 2022-12-29 DIAGNOSIS — R25.1 TREMOR: Primary | ICD-10-CM

## 2022-12-29 DIAGNOSIS — Z12.11 SCREENING FOR COLON CANCER: ICD-10-CM

## 2022-12-29 DIAGNOSIS — Z12.31 ENCOUNTER FOR SCREENING MAMMOGRAM FOR MALIGNANT NEOPLASM OF BREAST: ICD-10-CM

## 2022-12-29 DIAGNOSIS — E04.1 THYROID NODULE: ICD-10-CM

## 2022-12-29 DIAGNOSIS — F41.9 ANXIETY AND DEPRESSION: ICD-10-CM

## 2022-12-29 DIAGNOSIS — E55.9 VITAMIN D INSUFFICIENCY: ICD-10-CM

## 2022-12-29 DIAGNOSIS — F32.A ANXIETY AND DEPRESSION: ICD-10-CM

## 2022-12-29 RX ORDER — METHYLPREDNISOLONE 4 MG/1
TABLET ORAL
Qty: 21 EACH | Refills: 0 | Status: SHIPPED | OUTPATIENT
Start: 2022-12-29
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
## 2022-12-29 NOTE — PATIENT INSTRUCTIONS
Low Fat Diet   AMBULATORY CARE:   A low-fat diet  is an eating plan that is low in total fat, unhealthy fat, and cholesterol  You may need to follow a low-fat diet if you have trouble digesting or absorbing fat  You may also need to follow this diet if you have high cholesterol  You can also lower your cholesterol by increasing the amount of fiber in your diet  Soluble fiber is a type of fiber that helps to decrease cholesterol levels  Different types of fat in food:   · Limit unhealthy fats  A diet that is high in cholesterol, saturated fat, and trans fat may cause unhealthy cholesterol levels  Unhealthy cholesterol levels increase your risk of heart disease  ? Cholesterol:  Limit intake of cholesterol to less than 200 mg per day  Cholesterol is found in meat, eggs, and dairy  ? Saturated fat:  Limit saturated fat to less than 7% of your total daily calories  Ask your dietitian how many calories you need each day  Saturated fat is found in butter, cheese, ice cream, whole milk, and palm oil  Saturated fat is also found in meat, such as beef, pork, chicken skin, and processed meats  Processed meats include sausage, hot dogs, and bologna  ? Trans fat:  Avoid trans fat as much as possible  Trans fat is used in fried and baked foods  Foods that say trans fat free on the label may still have up to 0 5 grams of trans fat per serving  · Include healthy fats  Replace foods that are high in saturated and trans fat with foods high in healthy fats  This may help to decrease high cholesterol levels  ? Monounsaturated fats: These are found in avocados, nuts, and vegetable oils, such as olive, canola, and sunflower oil  ? Polyunsaturated fats: These can be found in vegetable oils, such as soybean or corn oil  Omega-3 fats can help to decrease the risk of heart disease  Omega-3 fats are found in fish, such as salmon, herring, trout, and tuna   Omega-3 fats can also be found in plant foods, such as walnuts, flaxseed, soybeans, and canola oil  Foods to limit or avoid:   · Grains:      ? Snacks that are made with partially hydrogenated oils, such as chips, regular crackers, and butter-flavored popcorn    ? High-fat baked goods, such as biscuits, croissants, doughnuts, pies, cookies, and pastries    · Dairy:      ? Whole milk, 2% milk, and yogurt and ice cream made with whole milk    ? Half and half creamer, heavy cream, and whipping cream    ? Cheese, cream cheese, and sour cream    · Meats and proteins:      ? High-fat cuts of meat (T-bone steak, regular hamburger, and ribs)    ? Fried meat, poultry (turkey and chicken), and fish    ? Poultry (chicken and turkey) with skin    ? Cold cuts (salami or bologna), hot dogs, guzman, and sausage    ? Whole eggs and egg yolks    · Vegetables and fruits with added fat:      ? Fried vegetables or vegetables in butter or high-fat sauces, such as cream or cheese sauces    ? Fried fruit or fruit served with butter or cream    · Fats:      ? Butter, stick margarine, and shortening    ? Coconut, palm oil, and palm kernel oil    Foods to include:   · Grains:      ? Whole-grain breads, cereals, pasta, and brown rice    ? Low-fat crackers and pretzels    · Vegetables and fruits:      ? Fresh, frozen, or canned vegetables (no salt or low-sodium)    ? Fresh, frozen, dried, or canned fruit (canned in light syrup or fruit juice)    ? Avocado    · Low-fat dairy products:      ? Nonfat (skim) or 1% milk    ? Nonfat or low-fat cheese, yogurt, and cottage cheese    · Meats and proteins:      ? Chicken or turkey with no skin    ? Baked or broiled fish    ? Lean beef and pork (loin, round, extra lean hamburger)    ? Beans and peas, unsalted nuts, soy products    ? Egg whites and substitutes    ? Seeds and nuts    · Fats:      ? Unsaturated oil, such as canola, olive, peanut, soybean, or sunflower oil    ? Soft or liquid margarine and vegetable oil spread    ?  Low-fat salad dressing    Other ways to decrease fat:   · Read food labels before you buy foods  Choose foods that have less than 30% of calories from fat  Choose low-fat or fat-free dairy products  Remember that fat free does not mean calorie free  These foods still contain calories, and too many calories can lead to weight gain  · Trim fat from meat and avoid fried food  Trim all visible fat from meat before you cook it  Remove the skin from poultry  Do not velez meat, fish, or poultry  Bake, roast, boil, or broil these foods instead  Avoid fried foods  Eat a baked potato instead of [place] fries  Steam vegetables instead of sautéing them in butter  · Add less fat to foods  Use imitation guzman bits on salads and baked potatoes instead of regular guzman bits  Use fat-free or low-fat salad dressings instead of regular dressings  Use low-fat or nonfat butter-flavored topping instead of regular butter or margarine on popcorn and other foods  Ways to decrease fat in recipes:  Replace high-fat ingredients with low-fat or nonfat ones  This may cause baked goods to be drier than usual  You may need to use nonfat cooking spray on pans to prevent food from sticking  You also may need to change the amount of other ingredients, such as water, in the recipe  Try the following:  · Use low-fat or light margarine instead of regular margarine or shortening  · Use lean ground turkey breast or chicken, or lean ground beef (less than 5% fat) instead of hamburger  · Add 1 teaspoon of canola oil to 8 ounces of skim milk instead of using cream or half and half  · Use grated zucchini, carrots, or apples in breads instead of coconut  · Use blenderized, low-fat cottage cheese, plain tofu, or low-fat ricotta cheese instead of cream cheese  · Use 1 egg white and 1 teaspoon of canola oil, or use ¼ cup (2 ounces) of fat-free egg substitute instead of a whole egg       · Replace half of the oil that is called for in a recipe with applesauce when you bake  Use 3 tablespoons of cocoa powder and 1 tablespoon of canola oil instead of a square of baking chocolate  How to increase fiber:  Eat enough high-fiber foods to get 20 to 30 grams of fiber every day  Slowly increase your fiber intake to avoid stomach cramps, gas, and other problems  · Eat 3 ounces of whole-grain foods each day  An ounce is about 1 slice of bread  Eat whole-grain breads, such as whole-wheat bread  Whole wheat, whole-wheat flour, or other whole grains should be listed as the first ingredient on the food label  Replace white flour with whole-grain flour or use half of each in recipes  Whole-grain flour is heavier than white flour, so you may have to add more yeast or baking powder  · Eat a high-fiber cereal for breakfast   Oatmeal is a good source of soluble fiber  Look for cereals that have bran or fiber in the name  Choose whole-grain products, such as brown rice, barley, and whole-wheat pasta  · Eat more beans, peas, and lentils  For example, add beans to soups or salads  Eat at least 5 cups of fruits and vegetables each day  Eat fruits and vegetables with the peel because the peel is high in fiber  © Copyright [employer] 2022 Information is for End User's use only and may not be sold, redistributed or otherwise used for commercial purposes  All illustrations and images included in CareNotes® are the copyrighted property of A D A M , Inc  or [address] [place], [place] [id number]   The above information is an  only  It is not intended as medical advice for individual conditions or treatments  Talk to your doctor, nurse or pharmacist before following any medical regimen to see if it is safe and effective for you  Heart Healthy Diet   AMBULATORY CARE:   A heart healthy diet  is an eating plan low in unhealthy fats and sodium (salt)  The plan is high in healthy fats and fiber   A heart healthy diet helps improve your cholesterol levels and lowers your risk for heart disease and stroke  A dietitian will teach you how to read and understand food labels  Heart healthy diet guidelines to follow:   · Choose foods that contain healthy fats  ? Unsaturated fats  include monounsaturated and polyunsaturated fats  Unsaturated fat is found in foods such as soybean, canola, olive, corn, and safflower oils  It is also found in soft tub margarine that is made with liquid vegetable oil  ? Omega-3 fat  is found in certain fish, such as salmon, tuna, and trout, and in walnuts and flaxseed  Eat fish high in omega-3 fats at least 2 times a week  · Get 20 to 30 grams of fiber each day  Fruits, vegetables, whole-grain foods, and legumes (cooked beans) are good sources of fiber  · Limit or do not have unhealthy fats  ? Cholesterol  is found in animal foods, such as eggs and lobster, and in dairy products made from whole milk  Limit cholesterol to less than 200 mg each day  ? Saturated fat  is found in meats, such as guzman and hamburger  It is also found in chicken or turkey skin, whole milk, and butter  Limit saturated fat to less than 7% of your total daily calories  ? Trans fat  is found in packaged foods, such as potato chips and cookies  It is also in hard margarine, some fried foods, and shortening  Do not eat foods that contain trans fats  · Limit sodium as directed  You may be told to limit sodium to 2,000 to 2,300 mg each day  Choose low-sodium or no-salt-added foods  Add little or no salt to food you prepare  Use herbs and spices in place of salt  Include the following in your heart healthy plan:  Ask your dietitian or healthcare provider how many servings to have from each of the following food groups:  · Grains:      ? Whole-wheat breads, cereals, and pastas, and brown rice    ? Low-fat, low-sodium crackers and chips    · Vegetables:      ? Broccoli, green beans, green peas, and spinach    ? Collards, kale, and lima beans    ?  Carrots, sweet potatoes, tomatoes, and peppers    ? Canned vegetables with no salt added    · Fruits:      ? Bananas, peaches, pears, and pineapple    ? Grapes, raisins, and dates    ? Oranges, tangerines, grapefruit, orange juice, and grapefruit juice    ? Apricots, mangoes, melons, and papaya    ? Raspberries and strawberries    ? Canned fruit with no added sugar    · Low-fat dairy:      ? Nonfat (skim) milk, 1% milk, and low-fat almond, cashew, or soy milks fortified with calcium    ? Low-fat cheese, regular or frozen yogurt, and cottage cheese    · Meats and proteins:      ? Lean cuts of beef and pork (loin, leg, round), skinless chicken and turkey    ? Legumes, soy products, egg whites, or nuts    Limit or do not include the following in your heart healthy plan:   · Unhealthy fats and oils:      ? Whole or 2% milk, cream cheese, sour cream, or cheese    ? High-fat cuts of beef (T-bone steaks, ribs), chicken or turkey with skin, and organ meats such as liver    ? Butter, stick margarine, shortening, and cooking oils such as coconut or palm oil    · Foods and liquids high in sodium:      ? Packaged foods, such as frozen dinners, cookies, macaroni and cheese, and cereals with more than 300 mg of sodium per serving    ? Vegetables with added sodium, such as instant potatoes, vegetables with added sauces, or regular canned vegetables    ? Cured or smoked meats, such as hot dogs, guzman, and sausage    ? High-sodium ketchup, barbecue sauce, salad dressing, pickles, olives, soy sauce, or miso    · Foods and liquids high in sugar:      ? Candy, cake, cookies, pies, or doughnuts    ? Soft drinks (soda), sports drinks, or sweetened tea    ? Canned or dry mixes for cakes, soups, sauces, or gravies    Other healthy heart guidelines:   · Do not smoke  Nicotine and other chemicals in cigarettes and cigars can cause lung and heart damage  Ask your healthcare provider for information if you currently smoke and need help to quit  E-cigarettes or smokeless tobacco still contain nicotine  Talk to your healthcare provider before you use these products  · Limit or do not drink alcohol as directed  Alcohol can damage your heart and raise your blood pressure  Your healthcare provider may give you specific daily and weekly limits  The general recommended limit is 1 drink a day for women 21 or older and for men 72 or older  Do not have more than 3 drinks in a day or 7 in a week  The recommended limit is 2 drinks a day for men 24to 59years of age  Do not have more than 4 drinks in a day or 14 in a week  A drink of alcohol is 12 ounces of beer, 5 ounces of wine, or 1½ ounces of liquor  · Exercise regularly  Exercise can help you maintain a healthy weight and improve your blood pressure and cholesterol levels  Regular exercise can also decrease your risk for heart problems  Ask your healthcare provider about the best exercise plan for you  Do not start an exercise program without asking your healthcare provider  Follow up with your doctor or cardiologist as directed:  Write down your questions so you remember to ask them during your visits  © Copyright [employer] 2022 Information is for End User's use only and may not be sold, redistributed or otherwise used for commercial purposes  All illustrations and images included in CareNotes® are the copyrighted property of A D A M , Inc  or [facility] Elijah Tamayo   The above information is an  only  It is not intended as medical advice for individual conditions or treatments  Talk to your doctor, nurse or pharmacist before following any medical regimen to see if it is safe and effective for you

## 2023-01-03 ENCOUNTER — APPOINTMENT (OUTPATIENT)
Dept: LAB | Facility: CLINIC | Age: 48
End: 2023-01-03

## 2023-01-03 DIAGNOSIS — E53.8 VITAMIN B 12 DEFICIENCY: ICD-10-CM

## 2023-01-03 DIAGNOSIS — E04.1 THYROID NODULE: ICD-10-CM

## 2023-01-03 DIAGNOSIS — D50.9 IRON DEFICIENCY ANEMIA, UNSPECIFIED IRON DEFICIENCY ANEMIA TYPE: ICD-10-CM

## 2023-01-03 LAB
BASOPHILS # BLD AUTO: 0.03 THOUSANDS/ÂΜL (ref 0–0.1)
BASOPHILS NFR BLD AUTO: 1 % (ref 0–1)
EOSINOPHIL # BLD AUTO: 0.13 THOUSAND/ÂΜL (ref 0–0.61)
EOSINOPHIL NFR BLD AUTO: 2 % (ref 0–6)
ERYTHROCYTE [DISTWIDTH] IN BLOOD BY AUTOMATED COUNT: 13.2 % (ref 11.6–15.1)
ERYTHROCYTE [SEDIMENTATION RATE] IN BLOOD: 5 MM/HOUR (ref 0–19)
HCT VFR BLD AUTO: 43.4 % (ref 34.8–46.1)
HGB BLD-MCNC: 13.6 G/DL (ref 11.5–15.4)
IMM GRANULOCYTES # BLD AUTO: 0.03 THOUSAND/UL (ref 0–0.2)
IMM GRANULOCYTES NFR BLD AUTO: 1 % (ref 0–2)
LYMPHOCYTES # BLD AUTO: 1.73 THOUSANDS/ÂΜL (ref 0.6–4.47)
LYMPHOCYTES NFR BLD AUTO: 30 % (ref 14–44)
MCH RBC QN AUTO: 28.9 PG (ref 26.8–34.3)
MCHC RBC AUTO-ENTMCNC: 31.3 G/DL (ref 31.4–37.4)
MCV RBC AUTO: 92 FL (ref 82–98)
MONOCYTES # BLD AUTO: 0.47 THOUSAND/ÂΜL (ref 0.17–1.22)
MONOCYTES NFR BLD AUTO: 8 % (ref 4–12)
NEUTROPHILS # BLD AUTO: 3.48 THOUSANDS/ÂΜL (ref 1.85–7.62)
NEUTS SEG NFR BLD AUTO: 58 % (ref 43–75)
NRBC BLD AUTO-RTO: 0 /100 WBCS
PLATELET # BLD AUTO: 300 THOUSANDS/UL (ref 149–390)
PMV BLD AUTO: 10.8 FL (ref 8.9–12.7)
RBC # BLD AUTO: 4.71 MILLION/UL (ref 3.81–5.12)
WBC # BLD AUTO: 5.87 THOUSAND/UL (ref 4.31–10.16)

## 2023-01-04 LAB
ALBUMIN SERPL BCP-MCNC: 3.5 G/DL (ref 3.5–5)
ALP SERPL-CCNC: 67 U/L (ref 46–116)
ALT SERPL W P-5'-P-CCNC: 30 U/L (ref 12–78)
ANION GAP SERPL CALCULATED.3IONS-SCNC: 6 MMOL/L (ref 4–13)
AST SERPL W P-5'-P-CCNC: 19 U/L (ref 5–45)
BILIRUB SERPL-MCNC: 0.52 MG/DL (ref 0.2–1)
BUN SERPL-MCNC: 13 MG/DL (ref 5–25)
CALCIUM SERPL-MCNC: 9 MG/DL (ref 8.3–10.1)
CHLORIDE SERPL-SCNC: 106 MMOL/L (ref 96–108)
CO2 SERPL-SCNC: 31 MMOL/L (ref 21–32)
CREAT SERPL-MCNC: 0.78 MG/DL (ref 0.6–1.3)
CRP SERPL QL: <3 MG/L
FERRITIN SERPL-MCNC: 13 NG/ML (ref 8–388)
GFR SERPL CREATININE-BSD FRML MDRD: 90 ML/MIN/1.73SQ M
GLUCOSE P FAST SERPL-MCNC: 85 MG/DL (ref 65–99)
IRON SATN MFR SERPL: 15 % (ref 15–50)
IRON SERPL-MCNC: 55 UG/DL (ref 50–170)
POTASSIUM SERPL-SCNC: 4.1 MMOL/L (ref 3.5–5.3)
PROT SERPL-MCNC: 6.7 G/DL (ref 6.4–8.4)
SODIUM SERPL-SCNC: 143 MMOL/L (ref 135–147)
TIBC SERPL-MCNC: 367 UG/DL (ref 250–450)
TSH SERPL DL<=0.05 MIU/L-ACNC: 3.91 UIU/ML (ref 0.45–4.5)
VIT B12 SERPL-MCNC: 1511 PG/ML (ref 100–900)

## 2023-01-07 ENCOUNTER — OFFICE VISIT (OUTPATIENT)
Dept: URGENT CARE | Facility: CLINIC | Age: 48
End: 2023-01-07

## 2023-01-07 VITALS
RESPIRATION RATE: 20 BRPM | OXYGEN SATURATION: 98 % | WEIGHT: 192.6 LBS | TEMPERATURE: 98 F | HEART RATE: 69 BPM | BODY MASS INDEX: 32.88 KG/M2 | HEIGHT: 64 IN

## 2023-01-07 DIAGNOSIS — R10.9 FLANK PAIN: Primary | ICD-10-CM

## 2023-01-07 LAB
SL AMB  POCT GLUCOSE, UA: ABNORMAL
SL AMB LEUKOCYTE ESTERASE,UA: ABNORMAL
SL AMB POCT BILIRUBIN,UA: ABNORMAL
SL AMB POCT BLOOD,UA: ABNORMAL
SL AMB POCT CLARITY,UA: ABNORMAL
SL AMB POCT COLOR,UA: ABNORMAL
SL AMB POCT KETONES,UA: ABNORMAL
SL AMB POCT NITRITE,UA: ABNORMAL
SL AMB POCT PH,UA: 7
SL AMB POCT SPECIFIC GRAVITY,UA: 1.01
SL AMB POCT URINE PROTEIN: ABNORMAL
SL AMB POCT UROBILINOGEN: 0.2

## 2023-01-07 NOTE — PROGRESS NOTES
[facility]    NAME: Brady Cramer is a 52 y o  female  : 1975    MRN: [id number]  DATE: 2023  TIME: 10:26 AM    Assessment and Plan   Flank pain [R10 9]  1  Flank pain  POCT urine dip    Urine culture          Patient Instructions     Patient Instructions   Recommend evaluation in the emergency room for possible kidney stone and to rule out hydronephrosis  Chief Complaint     Chief Complaint   Patient presents with   • Possible UTI     Right sided flank pain x 1 week       History of Present Illness    49-year-old female here with complaint right flank pain for about a week  Denies burning with urination but feels like she does not empty her bladder when she urinates  No urinary frequency  No nausea or vomiting  No fever chills  Review of Systems   Review of Systems   Constitutional: Negative for activity change, appetite change, chills, fatigue and fever  Respiratory: Negative for cough  Cardiovascular: Negative for chest pain  Gastrointestinal: Negative for abdominal pain, constipation, diarrhea, nausea and vomiting  Genitourinary: Positive for flank pain  Negative for difficulty urinating, dysuria, frequency, hematuria and urgency  Musculoskeletal: Positive for back pain  Negative for myalgias  All other systems reviewed and are negative  Current Medications     Current Outpatient Medications:   •  ACCU-CHEK FASTCLIX LANCETS MISC, by Does not apply route 2 (two) times a day, Disp: 102 each, Rfl: 5  •  Alcohol Swabs (ALCOHOL PADS) 70 % PADS, by Does not apply route 2 (two) times a day, Disp: 100 each, Rfl: 5  •  b complex vitamins tablet, Take 1 tablet by mouth daily, Disp: , Rfl:   •  Blood Glucose Monitoring Suppl (ACCU-CHEK GUIDE) w/Device KIT, by Does not apply route 2 (two) times a day, Disp: 1 kit, Rfl: 0  •  calcium citrate (CALCITRATE) 950 MG tablet, Take 2 tablets by mouth daily  , Disp: , Rfl:   •  ergocalciferol (VITAMIN D2) 50,000 units, Take 1 capsule (50,000 Units total) by mouth once a week, Disp: 4 capsule, Rfl: 3  •  ferrous sulfate 325 (65 Fe) mg tablet, Take 325 mg by mouth daily with breakfast, Disp: , Rfl:   •  fluticasone (FLONASE) 50 mcg/act nasal spray, 2 sprays into each nostril daily, Disp: 11 1 mL, Rfl: 0  •  glucose blood (Accu-Chek Guide) test strip, Use as instructed, Disp: 100 each, Rfl: 0  •  Lancets Misc  (ACCU-CHEK FASTCLIX LANCET) KIT, by Does not apply route 2 (two) times a day, Disp: 1 kit, Rfl: 0  •  Multiple Vitamin (MULTIVITAMIN) tablet, Take 1 tablet by mouth daily, Disp: , Rfl:   •  Vitamin D, Cholecalciferol, 1000 units CAPS, Take 1 tablet by mouth daily, Disp: , Rfl:   •  benzonatate (TESSALON) 200 MG capsule, Take 1 capsule (200 mg total) by mouth 3 (three) times a day as needed for cough (Patient not taking: Reported on 12/29/2022), Disp: 20 capsule, Rfl: 0  •  CRANBERRY EXTRACT PO, Take by mouth (Patient not taking: Reported on 12/13/2022), Disp: , Rfl:   •  fluticasone (FLONASE) 50 mcg/act nasal spray, SPRAY 1 SPRAY INTO EACH NOSTRIL EVERY DAY (Patient not taking: Reported on 12/13/2022), Disp: 1 Bottle, Rfl: 3  •  methocarbamol (ROBAXIN) 750 mg tablet, Take 1 tablet (750 mg total) by mouth every 12 (twelve) hours (Patient not taking: Reported on 12/13/2022), Disp: 60 tablet, Rfl: 0  •  methylPREDNISolone 4 MG tablet therapy pack, Use as directed on package (Patient not taking: Reported on 1/7/2023), Disp: 21 each, Rfl: 0  •  Misc Natural Products (APPLE CIDER VINEGAR DIET PO), Take by mouth (Patient not taking: Reported on 12/13/2022), Disp: , Rfl:   •  predniSONE 10 mg tablet, Take 3 tabs BID X 2 days, 2 tabs BID X 2 days, 1 tab BID X 2 days, 1 tab daily X 2 days (Patient not taking: Reported on 12/29/2022), Disp: 26 tablet, Rfl: 0  •  vitamin A 2250 MCG (7500 UT) capsule, Take 7,500 Units by mouth daily (Patient not taking: Reported on 12/29/2022), Disp: , Rfl:     Current Allergies     Allergies as of 2023 - Reviewed 2023   Allergen Reaction Noted   • Aspirin Other (See Comments) 2016   • Nsaids Other (See Comments) 2017   • Codeine Itching 2016          The following portions of the patient's history were reviewed and updated as appropriate: allergies, current medications, past family history, past medical history, past social history, past surgical history and problem list    Past Medical History:   Diagnosis Date   • Anxiety    • Arthritis    • Bariatric surgery status    • Carpal tunnel syndrome    • Chronic pain disorder    • DJD (degenerative joint disease)    • GERD (gastroesophageal reflux disease)    • Heartburn    • History of gastric bypass 2014   • Hypertension     Last assessed 2014   • Hypoglycemia after GI (gastrointestinal) surgery    • Migraine    • Obesity     Last assessed 2014   • Postgastrectomy malabsorption    • Restless leg syndrome    • Seizures ([address] )     10yrs ago "low blood sugar"   • Vertigo    • Wears dentures      Past Surgical History:   Procedure Laterality Date   • ABDOMINOPLASTY N/A 2021    Procedure: ABDOMINOPLASTY;  Surgeon: Sebas Mckeon MD;  Location: [address] [place] [address];  Service: Plastics   •  SECTION     •  SECTION N/A 2018    Procedure:  SECTION (); Surgeon: Good Richardson MD;  Location: [facility];  Service: Obstetrics   • CHOLECYSTECTOMY     • GASTRIC BYPASS  2014   • SD CERCLAGE UTERINE CERVIX NONOBSTETRICAL N/A 2017    Procedure: CERCLAGE CERVICAL;  Surgeon: Luke Latham MD;  Location[facility];  Service: Obstetrics   • TOOTH EXTRACTION       Family History   Problem Relation Age of Onset   • Cancer Mother         Myosarcoma of the soft tissue   • Other Mother         Total Thyroidectomy;  Thyroid mass   • Coronary artery disease Father      Social History     Socioeconomic History   • Marital status: /Civil Union     Spouse name: Not on file   • Number of children: Not on file   • Years of education: Not on file   • Highest education level: Not on file   Occupational History   • Not on file   Tobacco Use   • Smoking status: Every Day     Packs/day: 0 25     Types: Cigarettes   • Smokeless tobacco: Never   Vaping Use   • Vaping Use: Never used   Substance and Sexual Activity   • Alcohol use: No   • Drug use: No   • Sexual activity: Yes     Partners: Male   Other Topics Concern   • Not on file   Social History Narrative    Caffeine use     Social Determinants of Health     Financial Resource Strain: Not on file   Food Insecurity: Not on file   Transportation Needs: Not on file   Physical Activity: Not on file   Stress: Not on file   Social Connections: Not on file   Intimate Partner Violence: Not on file   Housing Stability: Not on file     Medications have been verified  Objective   Pulse 69   Temp 98 °F (36 7 °C)   Resp 20   Ht 5' 4" (1 626 m)   Wt 87 4 kg (192 lb 9 6 oz)   SpO2 98%   BMI 33 06 kg/m²      Physical Exam   Physical Exam  Vitals and nursing note reviewed  Constitutional:       General: She is not in acute distress  Appearance: Normal appearance  She is well-developed  HENT:      Head: Normocephalic and atraumatic  Cardiovascular:      Rate and Rhythm: Normal rate and regular rhythm  Heart sounds: Normal heart sounds  No murmur heard  Pulmonary:      Effort: Pulmonary effort is normal  No respiratory distress  Breath sounds: Normal breath sounds  Abdominal:      General: Bowel sounds are normal       Tenderness: There is abdominal tenderness  There is right CVA tenderness

## 2023-01-07 NOTE — PATIENT INSTRUCTIONS
Recommend evaluation in the emergency room for possible kidney stone and to rule out hydronephrosis

## 2023-01-08 LAB — BACTERIA UR CULT: NORMAL

## 2023-01-16 ENCOUNTER — HOSPITAL ENCOUNTER (EMERGENCY)
Facility: HOSPITAL | Age: 48
Discharge: HOME | End: 2023-01-16
Attending: EMERGENCY MEDICINE

## 2023-01-16 ENCOUNTER — APPOINTMENT (EMERGENCY)
Dept: CT IMAGING | Facility: HOSPITAL | Age: 48
End: 2023-01-16

## 2023-01-16 VITALS
HEART RATE: 66 BPM | TEMPERATURE: 97.9 F | SYSTOLIC BLOOD PRESSURE: 118 MMHG | HEIGHT: 64 IN | WEIGHT: 192 LBS | OXYGEN SATURATION: 99 % | BODY MASS INDEX: 32.78 KG/M2 | RESPIRATION RATE: 18 BRPM | DIASTOLIC BLOOD PRESSURE: 75 MMHG

## 2023-01-16 DIAGNOSIS — S29.012A STRAIN OF THORACIC BACK REGION: Primary | ICD-10-CM

## 2023-01-16 DIAGNOSIS — K52.9 ENTERITIS: ICD-10-CM

## 2023-01-16 LAB
ALBUMIN SERPL BCP-MCNC: 4.2 G/DL (ref 3.5–5)
ALP SERPL-CCNC: 75 U/L (ref 34–104)
ALT SERPL W P-5'-P-CCNC: 21 U/L (ref 7–52)
ANION GAP SERPL CALCULATED.3IONS-SCNC: 8 MMOL/L (ref 4–13)
APTT PPP: 29 SECONDS (ref 23–37)
AST SERPL W P-5'-P-CCNC: 18 U/L (ref 13–39)
BACTERIA UR QL AUTO: ABNORMAL /HPF
BASOPHILS # BLD AUTO: 0.02 THOUSANDS/ÂΜL (ref 0–0.1)
BASOPHILS NFR BLD AUTO: 0 % (ref 0–1)
BILIRUB SERPL-MCNC: 0.38 MG/DL (ref 0.2–1)
BILIRUB UR QL STRIP: NEGATIVE
BUN SERPL-MCNC: 7 MG/DL (ref 5–25)
CALCIUM SERPL-MCNC: 9.4 MG/DL (ref 8.4–10.2)
CARDIAC TROPONIN I PNL SERPL HS: <2 NG/L
CHLORIDE SERPL-SCNC: 108 MMOL/L (ref 96–108)
CLARITY UR: CLEAR
CO2 SERPL-SCNC: 27 MMOL/L (ref 21–32)
COLOR UR: YELLOW
CREAT SERPL-MCNC: 0.78 MG/DL (ref 0.6–1.3)
EOSINOPHIL # BLD AUTO: 0.05 THOUSAND/ÂΜL (ref 0–0.61)
EOSINOPHIL NFR BLD AUTO: 1 % (ref 0–6)
ERYTHROCYTE [DISTWIDTH] IN BLOOD BY AUTOMATED COUNT: 13.6 % (ref 11.6–15.1)
EXT PREGNANCY TEST URINE: NEGATIVE
EXT. CONTROL: NORMAL
GFR SERPL CREATININE-BSD FRML MDRD: 90 ML/MIN/1.73SQ M
GLUCOSE SERPL-MCNC: 90 MG/DL (ref 65–140)
GLUCOSE UR STRIP-MCNC: NEGATIVE MG/DL
HCT VFR BLD AUTO: 43 % (ref 34.8–46.1)
HGB BLD-MCNC: 13.9 G/DL (ref 11.5–15.4)
HGB UR QL STRIP.AUTO: ABNORMAL
IMM GRANULOCYTES # BLD AUTO: 0.01 THOUSAND/UL (ref 0–0.2)
IMM GRANULOCYTES NFR BLD AUTO: 0 % (ref 0–2)
INR PPP: 1.02 (ref 0.84–1.19)
KETONES UR STRIP-MCNC: NEGATIVE MG/DL
LACTATE SERPL-SCNC: 1.2 MMOL/L (ref 0.5–2)
LEUKOCYTE ESTERASE UR QL STRIP: NEGATIVE
LIPASE SERPL-CCNC: 44 U/L (ref 11–82)
LYMPHOCYTES # BLD AUTO: 1.14 THOUSANDS/ÂΜL (ref 0.6–4.47)
LYMPHOCYTES NFR BLD AUTO: 19 % (ref 14–44)
MCH RBC QN AUTO: 29.8 PG (ref 26.8–34.3)
MCHC RBC AUTO-ENTMCNC: 32.3 G/DL (ref 31.4–37.4)
MCV RBC AUTO: 92 FL (ref 82–98)
MONOCYTES # BLD AUTO: 0.33 THOUSAND/ÂΜL (ref 0.17–1.22)
MONOCYTES NFR BLD AUTO: 6 % (ref 4–12)
NEUTROPHILS # BLD AUTO: 4.34 THOUSANDS/ÂΜL (ref 1.85–7.62)
NEUTS SEG NFR BLD AUTO: 74 % (ref 43–75)
NITRITE UR QL STRIP: NEGATIVE
NON-SQ EPI CELLS URNS QL MICRO: ABNORMAL /HPF
NRBC BLD AUTO-RTO: 0 /100 WBCS
PH UR STRIP.AUTO: 6 [PH]
PLATELET # BLD AUTO: 270 THOUSANDS/UL (ref 149–390)
PMV BLD AUTO: 10 FL (ref 8.9–12.7)
POTASSIUM SERPL-SCNC: 3.8 MMOL/L (ref 3.5–5.3)
PROT SERPL-MCNC: 6.8 G/DL (ref 6.4–8.4)
PROT UR STRIP-MCNC: NEGATIVE MG/DL
PROTHROMBIN TIME: 13.4 SECONDS (ref 11.6–14.5)
RBC # BLD AUTO: 4.66 MILLION/UL (ref 3.81–5.12)
RBC #/AREA URNS AUTO: ABNORMAL /HPF
SODIUM SERPL-SCNC: 143 MMOL/L (ref 135–147)
SP GR UR STRIP.AUTO: <=1.005
UROBILINOGEN UR QL STRIP.AUTO: 0.2 E.U./DL
WBC # BLD AUTO: 5.89 THOUSAND/UL (ref 4.31–10.16)
WBC #/AREA URNS AUTO: ABNORMAL /HPF

## 2023-01-16 RX ORDER — MAGNESIUM HYDROXIDE/ALUMINUM HYDROXICE/SIMETHICONE 120; 1200; 1200 MG/30ML; MG/30ML; MG/30ML
30 SUSPENSION ORAL ONCE
Status: COMPLETED | OUTPATIENT
Start: 2023-01-16 | End: 2023-01-16

## 2023-01-16 RX ORDER — LIDOCAINE HYDROCHLORIDE 20 MG/ML
10 SOLUTION OROPHARYNGEAL ONCE
Status: COMPLETED | OUTPATIENT
Start: 2023-01-16 | End: 2023-01-16

## 2023-01-16 RX ORDER — CYCLOBENZAPRINE HCL 5 MG
5 TABLET ORAL 3 TIMES DAILY PRN
Qty: 12 TABLET | Refills: 0 | Status: SHIPPED | OUTPATIENT
Start: 2023-01-16

## 2023-01-16 RX ADMIN — ALUMINUM HYDROXIDE, MAGNESIUM HYDROXIDE, AND DIMETHICONE 30 ML: 200; 20; 200 SUSPENSION ORAL at 10:22

## 2023-01-16 RX ADMIN — LIDOCAINE HYDROCHLORIDE 10 ML: 20 SOLUTION ORAL at 10:22

## 2023-01-16 NOTE — DISCHARGE INSTRUCTIONS
Return to the ER for any new, concerning, or worsening issues  Use Flexeril 1 pill every 8 hours as needed for pain  No driving or operating heavy machinery on this medication  Continue to take over-the-counter medications for your stomach like Maalox or reflux meds for what may be motility issues due to the viral GI issue that you may have  Use Tylenol for additional pain    Return to the ER with any new, concerning, or worsening issues

## 2023-01-16 NOTE — ED PROVIDER NOTES
History  Chief Complaint   Patient presents with   • Abdominal Pain     Mid to upper abdominal pain started yesterday  Reports full feeling  42-year-old female with history of gastric bypass that has been present for over 6 years presents to the emergency department complaining of epigastric fullness and discomfort radiating around her back for 2 days  The patient denies fever chills, but she does admit to diarrhea and mild nausea  No vomiting  Patient is here today after having this discomfort  Patient was seen in urgent care recently as well for blood in her urine and possible UTI  Prior to Admission Medications   Prescriptions Last Dose Informant Patient Reported? Taking? ACCU-CHEK FASTCLIX LANCETS MISC   No No   Sig: by Does not apply route 2 (two) times a day   Alcohol Swabs (ALCOHOL PADS) 70 % PADS   No No   Sig: by Does not apply route 2 (two) times a day   Blood Glucose Monitoring Suppl (ACCU-CHEK GUIDE) w/Device KIT   No No   Sig: by Does not apply route 2 (two) times a day   CRANBERRY EXTRACT PO   Yes No   Sig: Take by mouth   Patient not taking: Reported on 12/13/2022   Lancets Misc  (ACCU-CHEK FASTCLIX LANCET) KIT   No No   Sig: by Does not apply route 2 (two) times a day   Misc Natural Products (APPLE CIDER VINEGAR DIET PO)   Yes No   Sig: Take by mouth   Patient not taking: Reported on 12/13/2022   Multiple Vitamin (MULTIVITAMIN) tablet   Yes No   Sig: Take 1 tablet by mouth daily   Vitamin D, Cholecalciferol, 1000 units CAPS   Yes No   Sig: Take 1 tablet by mouth daily   b complex vitamins tablet   Yes No   Sig: Take 1 tablet by mouth daily   benzonatate (TESSALON) 200 MG capsule   No No   Sig: Take 1 capsule (200 mg total) by mouth 3 (three) times a day as needed for cough   Patient not taking: Reported on 12/29/2022   calcium citrate (CALCITRATE) 950 MG tablet   Yes No   Sig: Take 2 tablets by mouth daily     ergocalciferol (VITAMIN D2) 50,000 units   No No   Sig: Take 1 capsule (50,000 Units total) by mouth once a week   ferrous sulfate 325 (65 Fe) mg tablet   Yes No   Sig: Take 325 mg by mouth daily with breakfast   fluticasone (FLONASE) 50 mcg/act nasal spray   No No   Sig: SPRAY 1 SPRAY INTO EACH NOSTRIL EVERY DAY   Patient not taking: Reported on 2022   fluticasone (FLONASE) 50 mcg/act nasal spray   No No   Si sprays into each nostril daily   glucose blood (Accu-Chek Guide) test strip   No No   Sig: Use as instructed   methocarbamol (ROBAXIN) 750 mg tablet   No No   Sig: Take 1 tablet (750 mg total) by mouth every 12 (twelve) hours   Patient not taking: Reported on 2022   methylPREDNISolone 4 MG tablet therapy pack   No No   Sig: Use as directed on package   Patient not taking: Reported on 2023   predniSONE 10 mg tablet   No No   Sig: Take 3 tabs BID X 2 days, 2 tabs BID X 2 days, 1 tab BID X 2 days, 1 tab daily X 2 days   Patient not taking: Reported on 2022   vitamin A 2250 MCG (7500 UT) capsule   Yes No   Sig: Take 7,500 Units by mouth daily   Patient not taking: Reported on 2022      Facility-Administered Medications: None       Past Medical History:   Diagnosis Date   • Anxiety    • Arthritis    • Bariatric surgery status    • Carpal tunnel syndrome    • Chronic pain disorder    • DJD (degenerative joint disease)    • GERD (gastroesophageal reflux disease)    • Heartburn    • History of gastric bypass 2014   • Hypertension     Last assessed 2014   • Hypoglycemia after GI (gastrointestinal) surgery    • Migraine    • Obesity     Last assessed 2014   • Postgastrectomy malabsorption    • Restless leg syndrome    • Seizures ([facility] [address] )     10yrs ago "low blood sugar"   • Vertigo    • Wears dentures        Past Surgical History:   Procedure Laterality Date   • ABDOMINOPLASTY N/A 2021    Procedure: ABDOMINOPLASTY;  Surgeon: Jaymie Srivastava MD;  Location: [address] [place] [address];  Service: Plastics   •  SECTION     •  SECTION N/A 2018    Procedure:  SECTION (); Surgeon: Coco Stewart MD;  Location: [facility];  Service: Obstetrics   • CHOLECYSTECTOMY     • GASTRIC BYPASS  2014   • MN CERCLAGE UTERINE CERVIX NONOBSTETRICAL N/A 2017    Procedure: CERCLAGE CERVICAL;  Surgeon: Carlos Tapia MD;  Location: [facility];  Service: Obstetrics   • TOOTH EXTRACTION         Family History   Problem Relation Age of Onset   • Cancer Mother         Myosarcoma of the soft tissue   • Other Mother         Total Thyroidectomy; Thyroid mass   • Coronary artery disease Father      I have reviewed and agree with the history as documented  E-Cigarette/Vaping   • E-Cigarette Use Never User      E-Cigarette/Vaping Substances     Social History     Tobacco Use   • Smoking status: Every Day     Packs/day: 0 25     Types: Cigarettes   • Smokeless tobacco: Never   Vaping Use   • Vaping Use: Never used   Substance Use Topics   • Alcohol use: No   • Drug use: No       Review of Systems   Constitutional: Negative for chills and fever  HENT: Negative for ear pain and sore throat  Eyes: Negative for pain and visual disturbance  Respiratory: Negative for cough and shortness of breath  Cardiovascular: Negative for chest pain and palpitations  Gastrointestinal: Positive for abdominal distention, abdominal pain and diarrhea  Negative for vomiting  Genitourinary: Positive for hematuria  Negative for dysuria  Musculoskeletal: Positive for back pain  Negative for arthralgias  Skin: Negative for color change and rash  Neurological: Negative for seizures and syncope  All other systems reviewed and are negative  Physical Exam  Physical Exam  Vitals and nursing note reviewed  Constitutional:       General: She is not in acute distress  Appearance: Normal appearance  She is well-developed  HENT:      Head: Normocephalic and atraumatic        Right Ear: External ear normal       Left Ear: External ear normal       Nose: Nose normal       Mouth/Throat:      Mouth: Mucous membranes are moist    Eyes:      Conjunctiva/sclera: Conjunctivae normal    Cardiovascular:      Rate and Rhythm: Normal rate and regular rhythm  Pulses: Normal pulses  Heart sounds: Normal heart sounds  No murmur heard  Pulmonary:      Effort: Pulmonary effort is normal  No respiratory distress  Breath sounds: Normal breath sounds  Abdominal:      General: Bowel sounds are normal       Palpations: Abdomen is soft  Tenderness: There is abdominal tenderness in the epigastric area  There is no guarding or rebound  Musculoskeletal:         General: Tenderness present  No swelling or deformity  Cervical back: Neck supple  Comments: Patient with tenderness to palpation of the paraspinal musculature in the mid and upper thoracic spine  There is muscle spasm noted  Skin:     General: Skin is warm and dry  Capillary Refill: Capillary refill takes less than 2 seconds  Neurological:      General: No focal deficit present  Mental Status: She is alert and oriented to person, place, and time  Mental status is at baseline           Vital Signs  ED Triage Vitals [01/16/23 0921]   Temperature Pulse Respirations Blood Pressure SpO2   97 9 °F (36 6 °C) 88 20 134/87 100 %      Temp Source Heart Rate Source Patient Position - Orthostatic VS BP Location FiO2 (%)   Temporal Monitor Sitting Left arm --      Pain Score       5           Vitals:    01/16/23 0921 01/16/23 1100 01/16/23 1130   BP: 134/87 135/76 121/59   Pulse: 88 63 62   Patient Position - Orthostatic VS: Sitting Sitting Sitting         Visual Acuity      ED Medications  Medications   aluminum-magnesium hydroxide-simethicone (MYLANTA) oral suspension 30 mL (30 mL Oral Given 1/16/23 1022)   Lidocaine Viscous HCl (XYLOCAINE) 2 % mucosal solution 10 mL (10 mL Swish & Swallow Given 1/16/23 1022)       Diagnostic Studies  Results Reviewed     Procedure Component Value Units Date/Time    HS Troponin 0hr (reflex protocol) [[id number]]  (Normal) Collected: 01/16/23 1014    Lab Status: Final result Specimen: Blood Updated: 01/16/23 1215     hs TnI 0hr <2 ng/L     Urine Microscopic [[id number]]  (Abnormal) Collected: 01/16/23 1025    Lab Status: Final result Specimen: Urine, Clean Catch Updated: 01/16/23 1127     RBC, UA 4-10 /hpf      WBC, UA 0-1 /hpf      Epithelial Cells Occasional /hpf      Bacteria, UA None Seen /hpf     Comprehensive metabolic panel [[id number]] Collected: 01/16/23 1014    Lab Status: Final result Specimen: Blood from Arm, Right Updated: 01/16/23 1108     Sodium 143 mmol/L      Potassium 3 8 mmol/L      Chloride 108 mmol/L      CO2 27 mmol/L      ANION GAP 8 mmol/L      BUN 7 mg/dL      Creatinine 0 78 mg/dL      Glucose 90 mg/dL      Calcium 9 4 mg/dL      AST 18 U/L      ALT 21 U/L      Alkaline Phosphatase 75 U/L      Total Protein 6 8 g/dL      Albumin 4 2 g/dL      Total Bilirubin 0 38 mg/dL      eGFR 90 ml/min/1 73sq m     Narrative:      [place] guidelines for Chronic Kidney Disease (CKD):   •  Stage 1 with normal or high GFR (GFR > 90 mL/min/1 73 square meters)  •  Stage 2 Mild CKD (GFR = 60-89 mL/min/1 73 square meters)  •  Stage 3A Moderate CKD (GFR = 45-59 mL/min/1 73 square meters)  •  Stage 3B Moderate CKD (GFR = 30-44 mL/min/1 73 square meters)  •  Stage 4 Severe CKD (GFR = 15-29 mL/min/1 73 square meters)  •  Stage 5 End Stage CKD (GFR <15 mL/min/1 73 square meters)  Note: GFR calculation is accurate only with a steady state creatinine    Lipase [[id number]]  (Normal) Collected: 01/16/23 1014    Lab Status: Final result Specimen: Blood from Arm, Right Updated: 01/16/23 1108     Lipase 44 u/L     Lactic acid [[id number]]  (Normal) Collected: 01/16/23 1014    Lab Status: Final result Specimen: Blood from Arm, Right Updated: 01/16/23 1107     LACTIC ACID 1 2 mmol/L     Narrative:      Result may be elevated if tourniquet was used during collection      Protime-INR [[id number]]  (Normal) Collected: 01/16/23 1014    Lab Status: Final result Specimen: Blood from Arm, Right Updated: 01/16/23 1059     Protime 13 4 seconds      INR 1 02    APTT [[id number]]  (Normal) Collected: 01/16/23 1014    Lab Status: Final result Specimen: Blood from Arm, Right Updated: 01/16/23 1059     PTT 29 seconds     UA w Reflex to Microscopic w Reflex to Culture [[id number]]  (Abnormal) Collected: 01/16/23 1025    Lab Status: Final result Specimen: Urine, Clean Catch Updated: 01/16/23 1059     Color, UA Yellow     Clarity, UA Clear     Specific Gravity, UA <=1 005     pH, UA 6 0     Leukocytes, UA Negative     Nitrite, UA Negative     Protein, UA Negative mg/dl      Glucose, UA Negative mg/dl      Ketones, UA Negative mg/dl      Urobilinogen, UA 0 2 E U /dl      Bilirubin, UA Negative     Occult Blood, UA 3+    POCT pregnancy, urine [[id number]]  (Normal) Resulted: 01/16/23 1051    Lab Status: Final result Updated: 01/16/23 1051     EXT Preg Test, Ur Negative     Control Valid    CBC and differential [[id number]] Collected: 01/16/23 1014    Lab Status: Final result Specimen: Blood from Arm, Right Updated: 01/16/23 1025     WBC 5 89 Thousand/uL      RBC 4 66 Million/uL      Hemoglobin 13 9 g/dL      Hematocrit 43 0 %      MCV 92 fL      MCH 29 8 pg      MCHC 32 3 g/dL      RDW 13 6 %      MPV 10 0 fL      Platelets 238 Thousands/uL      nRBC 0 /100 WBCs      Neutrophils Relative 74 %      Immat GRANS % 0 %      Lymphocytes Relative 19 %      Monocytes Relative 6 %      Eosinophils Relative 1 %      Basophils Relative 0 %      Neutrophils Absolute 4 34 Thousands/µL      Immature Grans Absolute 0 01 Thousand/uL      Lymphocytes Absolute 1 14 Thousands/µL      Monocytes Absolute 0 33 Thousand/µL      Eosinophils Absolute 0 05 Thousand/µL      Basophils Absolute 0 02 Thousands/µL                  CT renal stone study abdomen pelvis without contrast   Final Result by Tucker Carrizales Vince Donahue MD (01/16 1218)      No radiopaque urinary tract calculi or obstructive uropathy  No evidence of acute abdominopelvic process  Workstation performed: [id number]                    Procedures  ECG 12 Lead Documentation Only    Date/Time: 1/16/2023 10:35 AM  Performed by: Bárbara Mak DO  Authorized by: Bárbara Mak DO     ECG reviewed by me, the ED Provider: yes    Patient location:  ED  Comments:      EKG shows a sinus rhythm at 67 beats a minute there is normal axis there is no definitive acute ST or T wave changes are appreciated  There is sinus arrhythmia noted             ED Course  ED Course as of 01/16/23 1303   Mon Jan 16, 2023   1144 Patient notes improvement in epigastric discomfort after GI cocktail but patient is complaining of right flank pain  With blood in the urine we will check CT for renal stone                               SBIRT 22yo+    Flowsheet Row Most Recent Value   SBIRT (23 yo +)    In order to provide better care to our patients, we are screening all of our patients for alcohol and drug use  Would it be okay to ask you these screening questions? Yes Filed at: 01/16/2023 1029   Initial Alcohol Screen: US AUDIT-C     1  How often do you have a drink containing alcohol? 1 Filed at: 01/16/2023 1029   2  How many drinks containing alcohol do you have on a typical day you are drinking? 0 Filed at: 01/16/2023 1029   3a  Male UNDER 65: How often do you have five or more drinks on one occasion? 0 Filed at: 01/16/2023 1029   3b  FEMALE Any Age, or MALE 65+: How often do you have 4 or more drinks on one occassion? 0 Filed at: 01/16/2023 1029   Audit-C Score 1 Filed at: 01/16/2023 1029   ELMO: How many times in the past year have you    Used an illegal drug or used a prescription medication for non-medical reasons? Never Filed at: 01/16/2023 1029                    Medical Decision Making  After evaluation of the patient    The patient appears to have 2 types of complaints  1 is epigastric discomfort and pressure with a history of diarrhea and positive family members were sick with a GI bug  The patient may have a similar infection  The patient also has mid thoracic paraspinal back pain with easy reproducibility  CT scan is negative for stones at this time  The patient does have blood in her urine but she has her menses  Troponins are less than 2 and EKG is nonacute  I feel the patient can be discharged home with symptomatic care with Maalox and other medication for what may be a GI bug and be placed on muscle relaxants for her back  Patient notes she does a lot of cleaning and that is a physical task that may have exacerbated the back pain  Patient will be discharged  Amount and/or Complexity of Data Reviewed  Labs: ordered  Radiology: ordered  Risk  OTC drugs  Prescription drug management            Disposition  Final diagnoses:   Strain of thoracic back region   Enteritis     Time reflects when diagnosis was documented in both MDM as applicable and the Disposition within this note     Time User Action Codes Description Comment    1/16/2023  1:02 PM Tati Hides Add [K29 70] Gastritis     1/16/2023  1:02 PM Tati Hides Add [S29 012A] Strain of thoracic back region     1/16/2023  1:03 PM Ginette Kamara [V55 628P] Strain of thoracic back region     1/16/2023  1:03 PM Tati Hides Remove [K29 70] Gastritis     1/16/2023  1:03 PM Brutico, Monta Lundborg Add [K52 9] Enteritis       ED Disposition     None      Follow-up Information     Follow up With Specialties Details Why Heather Aguiar [facility], [address], Nurse Practitioner On 1/20/2023  [address] [facility] [address] [address]  [address] [facility], [address] [facility], Nurse Practitioner On 1/20/2023  [address] [id number] [facility]  [phone]            Patient's Medications   Discharge Prescriptions    CYCLOBENZAPRINE (FLEXERIL) 5 MG TABLET    Take 1 tablet (5 mg total) by mouth 3 (three) times a day as needed for muscle spasms for up to 12 doses       Start Date: 1/16/2023 End Date: --       Order Dose: 5 mg       Quantity: 12 tablet    Refills: 0       No discharge procedures on file      PDMP Review       Value Time User    PDMP Reviewed  Yes 2/22/2021 11:21 AM Hill Cooney [address] [place]           ED Provider  Electronically Signed by           Ishan Hendrickson DO  01/16/23 2587

## 2023-01-18 ENCOUNTER — TELEPHONE (OUTPATIENT)
Dept: HEMATOLOGY ONCOLOGY | Facility: CLINIC | Age: 48
End: 2023-01-18

## 2023-01-18 LAB
ATRIAL RATE: 67 BPM
P AXIS: 56 DEGREES
PR INTERVAL: 172 MS
QRS AXIS: 67 DEGREES
QRSD INTERVAL: 88 MS
QT INTERVAL: 358 MS
QTC INTERVAL: 378 MS
T WAVE AXIS: 58 DEGREES
VENTRICULAR RATE: 67 BPM

## 2023-01-19 ENCOUNTER — TELEPHONE (OUTPATIENT)
Dept: INTERNAL MEDICINE CLINIC | Facility: CLINIC | Age: 48
End: 2023-01-19

## 2023-01-19 ENCOUNTER — TELEPHONE (OUTPATIENT)
Dept: HEMATOLOGY ONCOLOGY | Facility: CLINIC | Age: 48
End: 2023-01-19

## 2023-01-19 NOTE — TELEPHONE ENCOUNTER
CALL RETURN FORM   Reason for patient call? Patient calling needs updated lab orders for appmt 1/24, OR are results of 1/16 sufficient? Patient's primary oncologist? Evan Sharpe    Name of person the patient was calling for? Evan Sharpe   Any additional information to add, if applicable? [phone]  Please call with answer   Informed patient that the message will be forwarded to the team and someone will get back to them as soon as possible    Did you relay this information to the patient?   yes

## 2023-01-19 NOTE — TELEPHONE ENCOUNTER
Phoned pt but her voice mail box not set up, messaged pt via My Chart that she does  not need any more labs before Ov

## 2023-01-19 NOTE — TELEPHONE ENCOUNTER
Patient sent message requesting an appointment to discuss lab results  I called and spoke to her and patient decided to see her hematologist first and then call here  That appointment is for next Tuesday 1/24/23

## 2023-01-24 ENCOUNTER — OFFICE VISIT (OUTPATIENT)
Dept: HEMATOLOGY ONCOLOGY | Facility: CLINIC | Age: 48
End: 2023-01-24

## 2023-01-24 ENCOUNTER — TELEPHONE (OUTPATIENT)
Dept: HEMATOLOGY ONCOLOGY | Facility: CLINIC | Age: 48
End: 2023-01-24

## 2023-01-24 VITALS
BODY MASS INDEX: 32.1 KG/M2 | HEIGHT: 64 IN | WEIGHT: 188 LBS | TEMPERATURE: 97.2 F | RESPIRATION RATE: 16 BRPM | HEART RATE: 90 BPM | OXYGEN SATURATION: 98 % | SYSTOLIC BLOOD PRESSURE: 132 MMHG | DIASTOLIC BLOOD PRESSURE: 76 MMHG

## 2023-01-24 DIAGNOSIS — R25.1 TREMOR: ICD-10-CM

## 2023-01-24 DIAGNOSIS — D50.8 OTHER IRON DEFICIENCY ANEMIA: Primary | ICD-10-CM

## 2023-01-24 DIAGNOSIS — Z98.84 BARIATRIC SURGERY STATUS: ICD-10-CM

## 2023-01-24 DIAGNOSIS — E53.8 VITAMIN B 12 DEFICIENCY: ICD-10-CM

## 2023-01-24 RX ORDER — SODIUM CHLORIDE 9 MG/ML
20 INJECTION, SOLUTION INTRAVENOUS ONCE
Status: CANCELLED | OUTPATIENT
Start: 2023-01-31

## 2023-01-24 NOTE — PROGRESS NOTES
Hematology/Oncology Outpatient Follow-up  Bree Mead 52 y o  female 1975 [id number]    Date:  1/24/2023      Assessment and Plan:  1  Other iron deficiency anemia  Patient is not currently anemic however she seems to be iron deficient again ferritin 13  She was offered another course of IV iron Venofer 200 mg weekly for 5 sessions total which she agreed to  Etiology of her iron deficiency is likely malabsorption from her prior bariatric surgery  Reports occasional heavier than average menstrual bleeding which could be contributory as well  Advise she follow-up again with repeat labs in 6 months from now or sooner should need arise  I encouraged her to proceed with her screening mammography and Cologuard which was already ordered by her primary care team     - CBC and differential; Future  - Comprehensive metabolic panel; Future  - C-reactive protein; Future  - Sedimentation rate, automated; Future  - Vitamin B12; Future  - Iron Panel (Includes Ferritin, Iron Sat%, Iron, and TIBC); Future  - Ferritin; Future  - Folate; Future    2  Vitamin B 12 deficiency  Patient was advised to continue her B12 supplements can decrease frequency down to once or twice a week since her levels are slightly higher than average along with her other multivitamins     - Vitamin B12; Future    3  Bariatric surgery status    4  Tremor  Patient has been having hand tremor for some time  She states that her mother had the same issue  Was referred to neurology in past but does not appear she went for appointment for some reason  States her PCP ordered MRI recently which was denied by the insurance company  Was recently re-referred to neurology by her primary care team  Did print the order for her and encouraged her to call to schedule  HPI:  Rocael Terry is a 4 y o  female with history of arthritis, gastroesophageal reflux disease, hypertension, and gastric bypass surgery in December of 2014   She presented to us for further treatment of her iron deficiency anemia  She did admit to heavy menstrual bleeding  Blood work from 08/03/2021 showed hemoglobin of 10 8 with MCV of 80   White cell count was 6 7 with platelet count of 858  Recent iron panel showed ferritin of 6 and saturation of 9% on 11/24/2021   Her vitamin B12 level was 261       She was scheduled for Venofer 200 mg along with B12 injections x6 treatments and completed 5/6 schedule treatments January/February 2022  Interval history:  Patient presents today for a follow-up visit  She was scheduled for 2 additional doses of maintenance IV Venofer after her last office visit April 2022 which she later decided not to pursue  She states that she believes her iron is low again and she is starting to feel more fatigued along with tachycardia when she is going upstairs; this is typical for her  Mentions that she was evaluated in the emergency department recently after she was experiencing some fullness in her upper abdomen along with some loose stools  She had extensive work-up done which did not show any significant findings  Believes she may have had GI virus as her daughter was experiencing some nausea and vomiting as well  She states that she continues to have "irritable bowel" symptoms which have been ongoing fluctuating between loose stool and constipation  She denies bleeding from any site other than her menstrual cycles which she states are heavier than average at times  She continues to take vitamins and states that she recently slowed down on her B12 after she noted her levels are higher than average  Her most recent laboratory studies from 1/16/2022 during her ER evaluation showed normal CBC and CMP hemoglobin 13 9  Her coagulation studies were normal   Additional study from 1/3/2022 which showed normal TSH  Inflammatory markers are not elevated  B12 1511  She is iron deficient again iron saturation 15%, ferritin 13      CT scan of the abdomen and pelvis without contrast/renal study during her ER evaluation 1/16/2023 showed:  IMPRESSION:  No radiopaque urinary tract calculi or obstructive uropathy  No evidence of acute abdominopelvic process  ROS: Review of Systems   Constitutional: Positive for fatigue  Negative for activity change, appetite change, chills and fever  HENT: Negative for congestion, mouth sores, nosebleeds, sore throat and trouble swallowing  Eyes: Negative  Respiratory: Negative for cough, chest tightness and shortness of breath  Cardiovascular: Negative for chest pain, palpitations and leg swelling  Gastrointestinal: Positive for abdominal pain, constipation and diarrhea  Negative for abdominal distention, blood in stool, nausea and vomiting  Genitourinary: Positive for menstrual problem  Negative for difficulty urinating, dysuria, frequency, hematuria and urgency  Musculoskeletal: Positive for arthralgias and myalgias  Negative for back pain, gait problem and joint swelling  Skin: Negative for color change, pallor and rash  Neurological: Positive for tremors  Negative for dizziness, weakness, light-headedness, numbness and headaches  Hematological: Negative for adenopathy  Does not bruise/bleed easily  Psychiatric/Behavioral: Positive for sleep disturbance  Negative for dysphoric mood  The patient is not nervous/anxious          Past Medical History:   Diagnosis Date   • Anxiety    • Arthritis    • Bariatric surgery status    • Carpal tunnel syndrome    • Chronic pain disorder    • DJD (degenerative joint disease)    • GERD (gastroesophageal reflux disease)    • Heartburn    • History of gastric bypass 12/2014   • Hypertension     Last assessed 12/18/2014   • Hypoglycemia after GI (gastrointestinal) surgery    • Migraine    • Obesity     Last assessed 12/26/2014   • Postgastrectomy malabsorption    • Restless leg syndrome    • Seizures ([address] )     10yrs ago "low blood sugar"   • Vertigo    • Wears dentures        Past Surgical History:   Procedure Laterality Date   • ABDOMINOPLASTY N/A 2021    Procedure: ABDOMINOPLASTY;  Surgeon: Osiris Martínez MD;  Location: [address] [place] [address];  Service: Plastics   •  SECTION     •  SECTION N/A 2018    Procedure:  SECTION (); Surgeon: Rocael Hoskins MD;  Location: [facility];  Service: Obstetrics   • CHOLECYSTECTOMY     • GASTRIC BYPASS  2014   • VA CERCLAGE UTERINE CERVIX NONOBSTETRICAL N/A 2017    Procedure: CERCLAGE CERVICAL;  Surgeon: Debi Steele MD;  Location: [facility];  Service: Obstetrics   • TOOTH EXTRACTION         Social History     Socioeconomic History   • Marital status: /Civil Union     Spouse name: None   • Number of children: None   • Years of education: None   • Highest education level: None   Occupational History   • None   Tobacco Use   • Smoking status: Every Day     Packs/day: 0 25     Types: Cigarettes   • Smokeless tobacco: Never   Vaping Use   • Vaping Use: Never used   Substance and Sexual Activity   • Alcohol use: No   • Drug use: No   • Sexual activity: Yes     Partners: Male   Other Topics Concern   • None   Social History Narrative    Caffeine use     Social Determinants of Health     Financial Resource Strain: Not on file   Food Insecurity: Not on file   Transportation Needs: Not on file   Physical Activity: Not on file   Stress: Not on file   Social Connections: Not on file   Intimate Partner Violence: Not on file   Housing Stability: Not on file       Family History   Problem Relation Age of Onset   • Cancer Mother         Myosarcoma of the soft tissue   • Other Mother         Total Thyroidectomy;  Thyroid mass   • Coronary artery disease Father        Allergies   Allergen Reactions   • Aspirin Other (See Comments)     Gastric Bypass   • Nsaids Other (See Comments)     Gastric bypass   • Codeine Itching         Current Outpatient Medications:   •  ACCU-CHEK FASTCLIX LANCETS MISC, by Does not apply route 2 (two) times a day, Disp: 102 each, Rfl: 5  •  Alcohol Swabs (ALCOHOL PADS) 70 % PADS, by Does not apply route 2 (two) times a day, Disp: 100 each, Rfl: 5  •  b complex vitamins tablet, Take 1 tablet by mouth daily, Disp: , Rfl:   •  Blood Glucose Monitoring Suppl (ACCU-CHEK GUIDE) w/Device KIT, by Does not apply route 2 (two) times a day, Disp: 1 kit, Rfl: 0  •  calcium citrate (CALCITRATE) 950 MG tablet, Take 2 tablets by mouth daily  , Disp: , Rfl:   •  cyclobenzaprine (FLEXERIL) 5 mg tablet, Take 1 tablet (5 mg total) by mouth 3 (three) times a day as needed for muscle spasms for up to 12 doses, Disp: 12 tablet, Rfl: 0  •  ergocalciferol (VITAMIN D2) 50,000 units, Take 1 capsule (50,000 Units total) by mouth once a week, Disp: 4 capsule, Rfl: 3  •  ferrous sulfate 325 (65 Fe) mg tablet, Take 325 mg by mouth daily with breakfast, Disp: , Rfl:   •  fluticasone (FLONASE) 50 mcg/act nasal spray, 2 sprays into each nostril daily, Disp: 11 1 mL, Rfl: 0  •  glucose blood (Accu-Chek Guide) test strip, Use as instructed, Disp: 100 each, Rfl: 0  •  Lancets Misc  (ACCU-CHEK FASTCLIX LANCET) KIT, by Does not apply route 2 (two) times a day, Disp: 1 kit, Rfl: 0  •  Multiple Vitamin (MULTIVITAMIN) tablet, Take 1 tablet by mouth daily, Disp: , Rfl:   •  Vitamin D, Cholecalciferol, 1000 units CAPS, Take 1 tablet by mouth daily, Disp: , Rfl:   •  benzonatate (TESSALON) 200 MG capsule, Take 1 capsule (200 mg total) by mouth 3 (three) times a day as needed for cough (Patient not taking: Reported on 12/29/2022), Disp: 20 capsule, Rfl: 0  •  CRANBERRY EXTRACT PO, Take by mouth (Patient not taking: Reported on 12/13/2022), Disp: , Rfl:   •  fluticasone (FLONASE) 50 mcg/act nasal spray, SPRAY 1 SPRAY INTO EACH NOSTRIL EVERY DAY (Patient not taking: Reported on 12/13/2022), Disp: 1 Bottle, Rfl: 3  •  methocarbamol (ROBAXIN) 750 mg tablet, Take 1 tablet (750 mg total) by mouth every 12 (twelve) hours (Patient not taking: Reported on 12/13/2022), Disp: 60 tablet, Rfl: 0  •  methylPREDNISolone 4 MG tablet therapy pack, Use as directed on package (Patient not taking: Reported on 1/7/2023), Disp: 21 each, Rfl: 0  •  Misc Natural Products (APPLE CIDER VINEGAR DIET PO), Take by mouth (Patient not taking: Reported on 12/13/2022), Disp: , Rfl:   •  predniSONE 10 mg tablet, Take 3 tabs BID X 2 days, 2 tabs BID X 2 days, 1 tab BID X 2 days, 1 tab daily X 2 days (Patient not taking: Reported on 12/29/2022), Disp: 26 tablet, Rfl: 0  •  vitamin A 2250 MCG (7500 UT) capsule, Take 7,500 Units by mouth daily (Patient not taking: Reported on 12/29/2022), Disp: , Rfl:       Physical Exam:  /76 (BP Location: Left arm, Patient Position: Sitting, Cuff Size: Adult)   Pulse 90   Temp (!) 97 2 °F (36 2 °C)   Resp 16   Ht 5' 4" (1 626 m)   Wt 85 3 kg (188 lb)   LMP 01/16/2023   SpO2 98%   BMI 32 27 kg/m²     Physical Exam  Vitals reviewed  Constitutional:       General: She is not in acute distress  Appearance: She is well-developed  She is not diaphoretic  HENT:      Head: Normocephalic and atraumatic  Eyes:      General: No scleral icterus  Conjunctiva/sclera: Conjunctivae normal       Pupils: Pupils are equal, round, and reactive to light  Neck:      Thyroid: No thyromegaly  Cardiovascular:      Rate and Rhythm: Normal rate and regular rhythm  Heart sounds: Normal heart sounds  No murmur heard  Pulmonary:      Effort: Pulmonary effort is normal  No respiratory distress  Breath sounds: Normal breath sounds  Abdominal:      General: There is no distension  Palpations: Abdomen is soft  There is no hepatomegaly or splenomegaly  Tenderness: There is no abdominal tenderness  Musculoskeletal:         General: No swelling  Normal range of motion  Cervical back: Normal range of motion and neck supple  Lymphadenopathy:      Cervical: No cervical adenopathy        Upper Body:      Right upper body: No axillary adenopathy  Left upper body: No axillary adenopathy  Skin:     General: Skin is warm and dry  Findings: No erythema or rash  Neurological:      General: No focal deficit present  Mental Status: She is alert and oriented to person, place, and time  Psychiatric:         Mood and Affect: Mood and affect normal          Behavior: Behavior normal  Behavior is cooperative  Thought Content: Thought content normal          Judgment: Judgment normal            Labs:  Lab Results   Component Value Date    WBC 5 89 01/16/2023    HGB 13 9 01/16/2023    HCT 43 0 01/16/2023    MCV 92 01/16/2023     01/16/2023     Lab Results   Component Value Date     07/22/2015    K 3 8 01/16/2023     01/16/2023    CO2 27 01/16/2023    ANIONGAP 12 07/22/2015    BUN 7 01/16/2023    CREATININE 0 78 01/16/2023    GLUCOSE 73 07/22/2015    GLUF 85 01/03/2023    CALCIUM 9 4 01/16/2023    AST 18 01/16/2023    ALT 21 01/16/2023    ALKPHOS 75 01/16/2023    PROT 6 9 07/22/2015    BILITOT 0 55 07/22/2015    EGFR 90 01/16/2023       Patient voiced understanding and agreement in the above discussion  Aware to contact our office with questions/symptoms in the interim  This note has been generated by voice recognition software system  Therefore, there may be spelling, grammar, and or syntax errors  Please contact if questions arise

## 2023-01-24 NOTE — TELEPHONE ENCOUNTER
While we try to accommodate patient requests, our priority is to schedule treatment according to Doctor's orders and site availability  Does the Provider use the intake sheet or checkout note? What would be a preferred day of the week that would work best for your infusion appointment? Any day   Do you prefer mornings or afternoons for your appointments? 2pm, needs to be done before 5  Are there any days or dates that do not work for your schedule, including any upcoming vacations? N/A   We are going to try our best to schedule you at the infusion center closest to your home  In the event that we are unable to what would be your next preferred infusion site or sites? 1  [facility]     Does not want to go to any another infusion site  Do you have transportation to take you to all of your appointments?  Yes  Would you like the infusion center to draw labs from your port? (disregard if patient doesn't have a port or need labs for infusion appointment)

## 2023-02-01 ENCOUNTER — TELEPHONE (OUTPATIENT)
Dept: UROLOGY | Facility: AMBULATORY SURGERY CENTER | Age: 48
End: 2023-02-01

## 2023-02-01 ENCOUNTER — HOSPITAL ENCOUNTER (OUTPATIENT)
Dept: MAMMOGRAPHY | Facility: HOSPITAL | Age: 48
Discharge: HOME | End: 2023-02-01

## 2023-02-01 VITALS — HEIGHT: 64 IN | WEIGHT: 188 LBS | BODY MASS INDEX: 32.1 KG/M2

## 2023-02-01 DIAGNOSIS — Z12.31 ENCOUNTER FOR SCREENING MAMMOGRAM FOR MALIGNANT NEOPLASM OF BREAST: ICD-10-CM

## 2023-02-01 NOTE — TELEPHONE ENCOUNTER
New Patient    What is the reason for the patient’s appointment? Patient has a feeling of UTI but tests keep coming back with no infection    What office location does the patient prefer? [place]    Imaging/Lab Results: Urine Micro and UA done on 1/16/23 abnormal    Do we accept the patient's insurance or is the patient Self-Pay? Yes, [facility]  Insurance Provider:  Plan Type/Number:  Member ID#: Has the patient had any previous Urologist(s)? No    Have patient records been requested? If not are records showing in Epic:     Has the patient had any outside testing done? Labs done on 1/24/23    Does the patient have a personal history of cancer?  No    Patient scheduled 2/10/23 with Jefferson Liu in [address] [place] [address] at 2 pm

## 2023-02-02 ENCOUNTER — HOSPITAL ENCOUNTER (OUTPATIENT)
Dept: INFUSION CENTER | Facility: HOSPITAL | Age: 48
End: 2023-02-02
Attending: INTERNAL MEDICINE

## 2023-02-02 VITALS
TEMPERATURE: 97.5 F | SYSTOLIC BLOOD PRESSURE: 113 MMHG | HEART RATE: 74 BPM | RESPIRATION RATE: 16 BRPM | DIASTOLIC BLOOD PRESSURE: 75 MMHG

## 2023-02-02 DIAGNOSIS — D50.9 IRON DEFICIENCY ANEMIA, UNSPECIFIED IRON DEFICIENCY ANEMIA TYPE: Primary | ICD-10-CM

## 2023-02-02 RX ORDER — SODIUM CHLORIDE 9 MG/ML
20 INJECTION, SOLUTION INTRAVENOUS ONCE
Status: CANCELLED | OUTPATIENT
Start: 2023-02-09

## 2023-02-02 RX ORDER — SODIUM CHLORIDE 9 MG/ML
20 INJECTION, SOLUTION INTRAVENOUS ONCE
Status: COMPLETED | OUTPATIENT
Start: 2023-02-02 | End: 2023-02-02

## 2023-02-02 RX ADMIN — SODIUM CHLORIDE 20 ML/HR: 0.9 INJECTION, SOLUTION INTRAVENOUS at 14:27

## 2023-02-02 RX ADMIN — IRON SUCROSE 200 MG: 20 INJECTION, SOLUTION INTRAVENOUS at 14:30

## 2023-02-08 ENCOUNTER — HOSPITAL ENCOUNTER (OUTPATIENT)
Dept: INFUSION CENTER | Facility: HOSPITAL | Age: 48
Discharge: HOME | End: 2023-02-08
Attending: INTERNAL MEDICINE

## 2023-02-08 VITALS
TEMPERATURE: 97.6 F | RESPIRATION RATE: 18 BRPM | SYSTOLIC BLOOD PRESSURE: 102 MMHG | DIASTOLIC BLOOD PRESSURE: 68 MMHG | HEART RATE: 71 BPM

## 2023-02-08 DIAGNOSIS — D50.9 IRON DEFICIENCY ANEMIA, UNSPECIFIED IRON DEFICIENCY ANEMIA TYPE: Primary | ICD-10-CM

## 2023-02-08 RX ORDER — SODIUM CHLORIDE 9 MG/ML
20 INJECTION, SOLUTION INTRAVENOUS ONCE
Status: COMPLETED | OUTPATIENT
Start: 2023-02-08 | End: 2023-02-08

## 2023-02-08 RX ORDER — SODIUM CHLORIDE 9 MG/ML
20 INJECTION, SOLUTION INTRAVENOUS ONCE
Status: CANCELLED | OUTPATIENT
Start: 2023-02-15

## 2023-02-08 RX ADMIN — SODIUM CHLORIDE 20 ML/HR: 0.9 INJECTION, SOLUTION INTRAVENOUS at 14:04

## 2023-02-08 RX ADMIN — IRON SUCROSE 200 MG: 20 INJECTION, SOLUTION INTRAVENOUS at 14:04

## 2023-02-10 ENCOUNTER — OFFICE VISIT (OUTPATIENT)
Dept: UROLOGY | Facility: CLINIC | Age: 48
End: 2023-02-10

## 2023-02-10 VITALS
DIASTOLIC BLOOD PRESSURE: 60 MMHG | SYSTOLIC BLOOD PRESSURE: 110 MMHG | HEART RATE: 82 BPM | WEIGHT: 191 LBS | HEIGHT: 64 IN | OXYGEN SATURATION: 98 % | BODY MASS INDEX: 32.61 KG/M2

## 2023-02-10 DIAGNOSIS — R39.9 UTI SYMPTOMS: Primary | ICD-10-CM

## 2023-02-10 LAB
BACTERIA UR QL AUTO: NORMAL /HPF
BILIRUB UR QL STRIP: NEGATIVE
CLARITY UR: CLEAR
COLOR UR: COLORLESS
GLUCOSE UR STRIP-MCNC: NEGATIVE MG/DL
HGB UR QL STRIP.AUTO: NEGATIVE
KETONES UR STRIP-MCNC: NEGATIVE MG/DL
LEUKOCYTE ESTERASE UR QL STRIP: NEGATIVE
NITRITE UR QL STRIP: NEGATIVE
NON-SQ EPI CELLS URNS QL MICRO: NORMAL /HPF
PH UR STRIP.AUTO: 7 [PH]
POST-VOID RESIDUAL VOLUME, ML POC: 17 ML
PROT UR STRIP-MCNC: NEGATIVE MG/DL
RBC #/AREA URNS AUTO: NORMAL /HPF
SL AMB  POCT GLUCOSE, UA: ABNORMAL
SL AMB LEUKOCYTE ESTERASE,UA: ABNORMAL
SL AMB POCT BILIRUBIN,UA: ABNORMAL
SL AMB POCT BLOOD,UA: ABNORMAL
SL AMB POCT CLARITY,UA: CLEAR
SL AMB POCT COLOR,UA: YELLOW
SL AMB POCT KETONES,UA: ABNORMAL
SL AMB POCT NITRITE,UA: ABNORMAL
SL AMB POCT PH,UA: 7.5
SL AMB POCT SPECIFIC GRAVITY,UA: 1.01
SL AMB POCT URINE PROTEIN: ABNORMAL
SL AMB POCT UROBILINOGEN: 0.2
SP GR UR STRIP.AUTO: 1 (ref 1–1.03)
UROBILINOGEN UR STRIP-ACNC: <2 MG/DL
WBC #/AREA URNS AUTO: NORMAL /HPF

## 2023-02-10 NOTE — ASSESSMENT & PLAN NOTE
· Send urine microscopic  · CT scan unremarkable  · Increase hydration with water  · Avoid bladder irritants  · Standing order placed for urine testing  · Call urology when having symptoms of UTI  · Referral to pelvic floor physical therapy  · Follow-up in 3 months for recheck

## 2023-02-10 NOTE — PATIENT INSTRUCTIONS
BLADDER HEALTH    WHAT IS CONSIDERED NORMAL? The average bladder can hold about 2 cups of urine before it needs to be emptied  The normal range of voiding urine is 6 to 8 times during a 24 hour period  As we get older, our bladder capacity can get smaller and we may need to pass urine more frequently but usually not more than every 2 hours  Urine should flow easily without discomfort in a good, steady stream until the bladder is empty  No pushing or straining is necessary to empty the bladder  An urge is a signal that you feel as the bladder stretches to fill with urine  Urges can be felt even if the bladder is not full  Urges are not commands to go to the toilet, merely a signal and can be controlled  WHAT ARE GOOD BLADDER HABITS? Take your time when emptying your bladder  Don’t strain or push to empty your bladder  Make sure you empty your bladder completely each time you pass urine  Do not rush the process  Consistently ignoring the urge to go (waiting more than 4 hours between toileting) or urinating too infrequently may be convenient but not healthy for your bladder  Avoid going to the toilet “just in case” or more often than every 2 hours  It is usually not necessary to go when you feel the first urge  Try to go only when your bladder is full  Urgency and frequency of urination can be improved by retraining the bladder and spacing your fluid intake throughout the day  Practice good toilet habits  Don’t let your bladder control your life  TIPS TO MAINTAIN GOOD BLADDER HABITS  Maintain a good fluid intake  Depending on your body size and environment, drink 6 -8 cups (8 oz each) of fluid per day unless otherwise advised by your doctor  Not enough fluid creates a foul odor and dark color of the urine  Limit the amount of caffeine (coffee, cola, chocolate or tea) and citrus foods that you consume as these foods can be associated with increased sensation of urinary urgency and frequency  Limit the amount of alcohol you drink  Alcohol increases urine production and makes it difficult for the brain to coordinate bladder control  Avoid constipation by maintaining a balanced diet of dietary fiber  Cigarette smoking is also irritating to the bladder surface and is associated with bladder cancer  In addition, the coughing associated with smoking may lead to increased incontinent episodes because of the increased pressure  HOW DIET CAN AFFECT YOUR BLADDER  Although there is no particular "diet" that can cure bladder control, there are certain dietary suggestions you can use to help control the problem  There are 2 points to consider when evaluating how your habits and diet may affect your bladder:    Foods and fluids can irritate the bladder  Some foods and beverages are thought to contribute to bladder leakage and irritability  However their effect on the bladder is not completely understood and you may want to see if eliminating one or all of these items improves your bladder control  If you are unable to give them up completely, it is recommended that you use the following items in moderation:  Acidic beverages and foods (orange juice, grapefruit juice, lemonade etc)  Alcoholic beverages  Vinegar  Coffee (regular and decaf)  Tea (regular and decaf)  Caffeinated beverages  Carbonated beverages          Drinking enough and the right kinds of fluids  Many people with bladder control issues decrease their intake of liquids in hope that they will need to urinate less frequently or have less urinary leakage  You should not restrict fluids to control your bladder  While a decrease in liquid intake does result in a decrease in the volume of urine, the smaller amount of urine may be more highly concentrated  Highly concentrated, dark yellow urine is irritating to the bladder surface and may actually cause you to go to the bathroom more frequently        It also encourages the growth of bacteria, which may lead to infections resulting in incontinence  Substitutions for Bladder Irritants: water is always the best beverage choice  Grape and apple juice are thirst quenchers are good selections and are not as irritating to the bladder  Low acid fruits:  Pears, apricots, papaya, watermelon  For coffee drinkers: KAVA®, Postum®, Fang®, Kaffree Patricia®  For tea drinkers:  non-citrus or herbal and sun brewed tea    BEHAVIORAL THERAPY FOR IMPROVED BLADDER CONTROL      1  Urge Control Techniques       A  Stop whatever you are doing and concentrate on pauline your pelvic floor muscles  B   Contract your pelvic floor muscles repetitively (as in your "flick" exercises)  C   Once the urgency has subsided, realize that sometimes the urgency is because you have a             full bladder and have to urinate  Other times the urgency is a false signal and you do not have a full bladder  D  If you have urgency triggered by running water, "key in the door," getting up from a sitting position, etc , contract your pelvic floor muscles prior to       initiating the activity  2   Daily Fluid Intake       A  Avoid drinking too little (less than 3 cups/day) or drinking too much (more than 6             cups/day)  B   Avoid caffeinated beverages  C   If voiding frequently at night, limit your liquid intake after 7 p m  3   Bowel Regularity--Constipation Makes Bladder Symptoms Worse       A  Drink enough liquids, eat plenty of high fiber food  B  Exercise       C  Avoid laxatives and enemas on a regular basis, this decreases the bowel's normal function  4   Voiding Schedules       A  Empty your bladder at 1½ to 2 hour intervals even if you may not have the urge to urinate             at that time  You may gradually increase the time between voidings to 30  minutes, until you can comfortably void every 3 hours  B    If you are voiding more frequently than every 1½ to 2 hours, resist the urge to go  by using urge control techniques (described in 1 )

## 2023-02-10 NOTE — PROGRESS NOTES
Assessment and plan:     UTI symptoms  · Send urine microscopic  · CT scan unremarkable  · Increase hydration with water  · Avoid bladder irritants  · Standing order placed for urine testing  · Call urology when having symptoms of UTI  · Referral to pelvic floor physical therapy  · Follow-up in 3 months for recheck      MAXIMINO Kelley    History of Present Illness     Charlie Black is a 52 y o  new patient who presents for recurrent UTI SYMPTOMS  She went to the urgent care and had testing which was unremarkable other than blood  She then was in the ER after that for ongoing discomfort  She reports drinking a lot of iced tea (2 very large cups), coffee (24 ounces) and "not enough" water (30-80 ounces)  She has a consistent pressure in her bladder  She does report some sporadic discomfort in the flank area  Denies any gross hematuria except when she had a UTI 5 years ago during pregnancy  She is a smoker  She was worried about the blood in her urine testing  She had her period at the time  No family hx of bladder or kidney cancer  She feels like she had kidney stones previously but has no documented imaging of this  She has a history of hypoglycemia, thyroid nodule, vertigo, carpal tunnel, sciatica, headaches, DJD, smoker, bariatric surgery, tubal ligation, anxiety, prediabetes, restless leg, vitamin B12 deficiency, depression      Laboratory     Lab Results   Component Value Date    BUN 7 01/16/2023    CREATININE 0 78 01/16/2023       No components found for: GFR    Lab Results   Component Value Date    GLUCOSE 73 07/22/2015    CALCIUM 9 4 01/16/2023     07/22/2015    K 3 8 01/16/2023    CO2 27 01/16/2023     01/16/2023       Lab Results   Component Value Date    WBC 5 89 01/16/2023    HGB 13 9 01/16/2023    HCT 43 0 01/16/2023    MCV 92 01/16/2023     01/16/2023       No results found for: PSA    Recent Results (from the past 1 hour(s))   POCT Measure PVR    Collection Time: 02/10/23  2:12 PM   Result Value Ref Range    POST-VOID RESIDUAL VOLUME, ML POC 17 mL   POCT urine dip    Collection Time: 02/10/23  2:15 PM   Result Value Ref Range    LEUKOCYTE ESTERASE,UA small     NITRITE,UA neg     SL AMB POCT UROBILINOGEN 0 2     POCT URINE PROTEIN neg      PH,UA 7 5     BLOOD,UA neg     SPECIFIC GRAVITY,UA 1 010     KETONES,UA neg     BILIRUBIN,UA neg     GLUCOSE, UA neg      COLOR,UA yellow     CLARITY,UA clear        @RESULT(URINEMICROSCOPIC)@    @RESULT(URINECULTURE)@    Radiology     CT ABDOMEN AND PELVIS WITHOUT IV CONTRAST - LOW DOSE RENAL STONE 1/16/2023     INDICATION:   Flank pain, kidney stone suspected  R Flank pain      COMPARISON:  CT abdomen and pelvis 12/30/2018     TECHNIQUE:  Low radiation dose thin section CT examination of the abdomen and pelvis was performed without intravenous or oral contrast according to a protocol specifically designed to evaluate for urinary tract calculus  Axial, sagittal, and coronal 2D   reformatted images were created from the source data and submitted for interpretation  Evaluation for pathology in the abdomen and pelvis that is unrelated to urinary tract calculi is limited        Radiation dose length product (DLP) for this visit:  388 73 mGy-cm   This examination, like all CT scans performed in the [facility], was performed utilizing techniques to minimize radiation dose exposure, including the use of iterative   reconstruction and automated exposure control      URINARY TRACT FINDINGS:     RIGHT KIDNEY AND URETER:  No urinary tract calculi  No hydronephrosis or hydroureter      LEFT KIDNEY AND URETER:  No urinary tract calculi    No hydronephrosis or hydroureter      URINARY BLADDER:  Unremarkable         ADDITIONAL FINDINGS:     LOWER CHEST:  No clinically significant abnormality identified in the visualized lower chest      SOLID VISCERA: Limited low radiation dose noncontrast CT evaluation demonstrates no clinically significant abnormality of the imaged portions of the liver, spleen, pancreas, or adrenal glands        GALLBLADDER/BILIARY TREE:  Post cholecystectomy      STOMACH AND BOWEL:  Post Iglesia-en-Y gastric bypass  No bowel obstruction      APPENDIX:  A normal appendix was visualized      ABDOMINOPELVIC CAVITY:  No ascites  No pneumoperitoneum  No lymphadenopathy      REPRODUCTIVE ORGANS:  Unremarkable for patient's age      ABDOMINAL WALL/INGUINAL REGIONS:  Small periumbilical fat-containing hernia      OSSEOUS STRUCTURES:  No acute fracture or osseous destructive lesion identified  Degenerative changes of the spine, pubic symphysis, and multiple joints      IMPRESSION:     No radiopaque urinary tract calculi or obstructive uropathy      No evidence of acute abdominopelvic process  Review of Systems     Review of Systems   Constitutional: Negative for activity change, appetite change, chills, fatigue, fever and unexpected weight change  HENT: Negative for facial swelling  Eyes: Negative for discharge  Respiratory: Negative  Negative for cough and shortness of breath  Cardiovascular: Negative for chest pain and leg swelling  Gastrointestinal: Negative  Negative for abdominal distention, abdominal pain, constipation, diarrhea, nausea and vomiting  Endocrine: Negative  Genitourinary: Negative  Negative for decreased urine volume, difficulty urinating, dysuria, enuresis, flank pain, frequency, genital sores, hematuria and urgency  Musculoskeletal: Negative for back pain and myalgias  Skin: Negative for pallor and rash  Allergic/Immunologic: Negative  Negative for immunocompromised state  Neurological: Negative for facial asymmetry and speech difficulty  Psychiatric/Behavioral: Negative for agitation and confusion       Allergies     Allergies   Allergen Reactions   • Aspirin Other (See Comments)     Gastric Bypass   • Nsaids Other (See Comments)     Gastric bypass   • Codeine Itching Physical Exam     Physical Exam  Vitals reviewed  Constitutional:       General: She is not in acute distress  Appearance: Normal appearance  She is normal weight  She is not ill-appearing, toxic-appearing or diaphoretic  HENT:      Head: Normocephalic and atraumatic  Eyes:      General: No scleral icterus  Cardiovascular:      Rate and Rhythm: Normal rate  Pulmonary:      Effort: Pulmonary effort is normal  No respiratory distress  Abdominal:      General: Abdomen is flat  There is no distension  Palpations: Abdomen is soft  Tenderness: There is no abdominal tenderness  There is no right CVA tenderness, left CVA tenderness, guarding or rebound  Musculoskeletal:         General: No swelling  Cervical back: Normal range of motion  Skin:     General: Skin is warm and dry  Coloration: Skin is not jaundiced or pale  Findings: No rash  Neurological:      General: No focal deficit present  Mental Status: She is alert and oriented to person, place, and time  Gait: Gait normal    Psychiatric:         Mood and Affect: Mood normal          Behavior: Behavior normal          Thought Content:  Thought content normal          Judgment: Judgment normal          Vital Signs     Vitals:    02/10/23 1359   BP: 110/60   BP Location: Left arm   Patient Position: Sitting   Cuff Size: Large   Pulse: 82   SpO2: 98%   Weight: 86 6 kg (191 lb)   Height: 5' 4" (1 626 m)       Current Medications       Current Outpatient Medications:   •  ACCU-CHEK FASTCLIX LANCETS MISC, by Does not apply route 2 (two) times a day, Disp: 102 each, Rfl: 5  •  Alcohol Swabs (ALCOHOL PADS) 70 % PADS, by Does not apply route 2 (two) times a day, Disp: 100 each, Rfl: 5  •  b complex vitamins tablet, Take 1 tablet by mouth daily, Disp: , Rfl:   •  Blood Glucose Monitoring Suppl (ACCU-CHEK GUIDE) w/Device KIT, by Does not apply route 2 (two) times a day, Disp: 1 kit, Rfl: 0  •  calcium citrate (CALCITRATE) 950 MG tablet, Take 2 tablets by mouth daily  , Disp: , Rfl:   •  fluticasone (FLONASE) 50 mcg/act nasal spray, 2 sprays into each nostril daily, Disp: 11 1 mL, Rfl: 0  •  glucose blood (Accu-Chek Guide) test strip, Use as instructed, Disp: 100 each, Rfl: 0  •  Lancets Misc  (ACCU-CHEK FASTCLIX LANCET) KIT, by Does not apply route 2 (two) times a day, Disp: 1 kit, Rfl: 0  •  Multiple Vitamin (MULTIVITAMIN) tablet, Take 1 tablet by mouth daily, Disp: , Rfl:   •  Vitamin D, Cholecalciferol, 1000 units CAPS, Take 1 tablet by mouth daily, Disp: , Rfl:   •  benzonatate (TESSALON) 200 MG capsule, Take 1 capsule (200 mg total) by mouth 3 (three) times a day as needed for cough, Disp: 20 capsule, Rfl: 0  •  CRANBERRY EXTRACT PO, Take by mouth, Disp: , Rfl:   •  cyclobenzaprine (FLEXERIL) 5 mg tablet, Take 1 tablet (5 mg total) by mouth 3 (three) times a day as needed for muscle spasms for up to 12 doses, Disp: 12 tablet, Rfl: 0  •  ergocalciferol (VITAMIN D2) 50,000 units, Take 1 capsule (50,000 Units total) by mouth once a week, Disp: 4 capsule, Rfl: 3  •  ferrous sulfate 325 (65 Fe) mg tablet, Take 325 mg by mouth daily with breakfast, Disp: , Rfl:   •  fluticasone (FLONASE) 50 mcg/act nasal spray, SPRAY 1 SPRAY INTO EACH NOSTRIL EVERY DAY, Disp: 1 Bottle, Rfl: 3  •  methocarbamol (ROBAXIN) 750 mg tablet, Take 1 tablet (750 mg total) by mouth every 12 (twelve) hours, Disp: 60 tablet, Rfl: 0  •  methylPREDNISolone 4 MG tablet therapy pack, Use as directed on package, Disp: 21 each, Rfl: 0  •  Misc Natural Products (APPLE CIDER VINEGAR DIET PO), Take by mouth, Disp: , Rfl:   •  predniSONE 10 mg tablet, Take 3 tabs BID X 2 days, 2 tabs BID X 2 days, 1 tab BID X 2 days, 1 tab daily X 2 days, Disp: 26 tablet, Rfl: 0  •  vitamin A 2250 MCG (7500 UT) capsule, Take 7,500 Units by mouth daily, Disp: , Rfl:     Active Problems     Patient Active Problem List   Diagnosis   • Smoker   • Bariatric surgery status   • History of bilateral tubal ligation   • Anxiety disorder   • Benign paroxysmal vertigo   • Carpal tunnel syndrome   • Fatigue   • Neck muscle spasm   • Neck nodule   • Panniculus   • Postsurgical malabsorption   • Prediabetes   • Restless legs syndrome   • Sciatica   • Vertigo   • Vitamin B 12 deficiency   • Hematuria   • Anxiety and depression   • Vitamin D insufficiency   • Reactive hypoglycemia   • Muscular pain   • Obesity, Class I, BMI 30-34 9   • Weight gain   • Hypoglycemia   • Degenerative disc disease, lumbar   • Pain of both hip joints   • Cluster headache, not intractable   • Status post digestive system surgery   • Pain in both lower extremities   • Morbid obesity with BMI of 40 0-44 9, adult (HCC)   • Iron deficiency anemia   • Tremor   • Thyroid nodule   • BMI 33 0-33 9,adult   • Acute effusion of right ear   • Screening for colon cancer   • Encounter for screening mammogram for malignant neoplasm of breast   • UTI symptoms       Past Medical History     Past Medical History:   Diagnosis Date   • Anxiety    • Arthritis    • Bariatric surgery status    • Carpal tunnel syndrome    • Chronic pain disorder    • DJD (degenerative joint disease)    • GERD (gastroesophageal reflux disease)    • Heartburn    • History of gastric bypass 2014   • Hypertension     Last assessed 2014   • Hypoglycemia after GI (gastrointestinal) surgery    • Migraine    • Obesity     Last assessed 2014   • Postgastrectomy malabsorption    • Restless leg syndrome    • Seizures ([facility])     10yrs ago "low blood sugar"   • Vertigo    • Wears dentures        Surgical History     Past Surgical History:   Procedure Laterality Date   • ABDOMINOPLASTY N/A 2021    Procedure: ABDOMINOPLASTY;  Surgeon: Muna Ken MD;  Location: [address] [place] [address];  Service: Plastics   •  SECTION     •  SECTION N/A 2018    Procedure:  SECTION ();   Surgeon: Lucila Moreau MD;  Location: [facility];  Service: Obstetrics   • CHOLECYSTECTOMY     • GASTRIC BYPASS  2014   • IN CERCLAGE UTERINE CERVIX NONOBSTETRICAL N/A 2017    Procedure: CERCLAGE CERVICAL;  Surgeon: Gio Schmitz MD;  Location: [facility];  Service: Obstetrics   • TOOTH EXTRACTION         Family History     Family History   Problem Relation Age of Onset   • Cancer Mother         Myosarcoma of the soft tissue   • Other Mother         Total Thyroidectomy; Thyroid mass   • Coronary artery disease Father    • No Known Problems Sister    • No Known Problems Sister    • No Known Problems Daughter    • No Known Problems Maternal Grandmother    • No Known Problems Paternal Grandmother    • No Known Problems Maternal Aunt        Social History     Social History     Social History     Tobacco Use   Smoking Status Every Day   • Packs/day: 0 25   • Types: Cigarettes   Smokeless Tobacco Never       Past Surgical History:   Procedure Laterality Date   • ABDOMINOPLASTY N/A 2021    Procedure: ABDOMINOPLASTY;  Surgeon: Fausto Macias MD;  Location: [facility];  Service: Plastics   •  SECTION     •  SECTION N/A 2018    Procedure:  SECTION (); Surgeon: Jacqueline Cobos MD;  Location: [facility];  Service: Obstetrics   • CHOLECYSTECTOMY     • GASTRIC BYPASS  2014   • IN CERCLAGE UTERINE CERVIX NONOBSTETRICAL N/A 2017    Procedure: CERCLAGE CERVICAL;  Surgeon: Gio Schmitz MD;  Location: [facility];  Service: Obstetrics   • TOOTH EXTRACTION           The following portions of the patient's history were reviewed and updated as appropriate: allergies, current medications, past family history, past medical history, past social history, past surgical history and problem list    Please note :  Voice dictation software has been used to create this document  There may be inadvertent transcription errors      [id number] [address]

## 2023-02-14 ENCOUNTER — TELEPHONE (OUTPATIENT)
Dept: MAMMOGRAPHY | Facility: CLINIC | Age: 48
End: 2023-02-14

## 2023-02-14 NOTE — TELEPHONE ENCOUNTER
Email sent to patient at [email or web address]    Tino Alexander,    I have tried to contact you in reference to a mammogram that you had done at [facility] on February 1st at our [facility]  I have attempted to call you many times with no answer and your voicemail box is not set up and I am unable to leave a message  If you could please contact me in reference to this screening I would appreciate it  My contact number is   Thanks,    Citlaly Black, MSN, RN, [facility], CN-BN  Breast Nurse [address] [address]  Raina WILLIS [address] [place] [address]: [phone]  FX: [phone]  [email or web address]

## 2023-02-15 ENCOUNTER — HOSPITAL ENCOUNTER (OUTPATIENT)
Dept: INFUSION CENTER | Facility: HOSPITAL | Age: 48
Discharge: HOME | End: 2023-02-15
Attending: INTERNAL MEDICINE

## 2023-02-15 VITALS
TEMPERATURE: 97.4 F | SYSTOLIC BLOOD PRESSURE: 112 MMHG | DIASTOLIC BLOOD PRESSURE: 75 MMHG | RESPIRATION RATE: 18 BRPM | HEART RATE: 84 BPM

## 2023-02-15 DIAGNOSIS — D50.9 IRON DEFICIENCY ANEMIA, UNSPECIFIED IRON DEFICIENCY ANEMIA TYPE: Primary | ICD-10-CM

## 2023-02-15 RX ORDER — SODIUM CHLORIDE 9 MG/ML
20 INJECTION, SOLUTION INTRAVENOUS ONCE
Status: CANCELLED | OUTPATIENT
Start: 2023-02-22

## 2023-02-15 RX ORDER — SODIUM CHLORIDE 9 MG/ML
20 INJECTION, SOLUTION INTRAVENOUS ONCE
Status: COMPLETED | OUTPATIENT
Start: 2023-02-15 | End: 2023-02-15

## 2023-02-15 RX ADMIN — SODIUM CHLORIDE 20 ML/HR: 0.9 INJECTION, SOLUTION INTRAVENOUS at 15:29

## 2023-02-15 RX ADMIN — SODIUM CHLORIDE 200 MG: 9 INJECTION, SOLUTION INTRAVENOUS at 15:30

## 2023-02-15 NOTE — PROGRESS NOTES
Pt tolerated venofer infusion well without incident  Discharged in stable condition and pt aware of next infusion appointment  AVS declined  No

## 2023-02-20 ENCOUNTER — TELEPHONE (OUTPATIENT)
Dept: UROLOGY | Facility: CLINIC | Age: 48
End: 2023-02-20

## 2023-02-20 NOTE — TELEPHONE ENCOUNTER
----- Message from [address] [place] [address] sent at 2/20/2023  1:32 PM EST -----  Please let patient know her urine testing was unremarkable  No signs of blood or infection    She has not read her [employer] message

## 2023-02-22 ENCOUNTER — HOSPITAL ENCOUNTER (OUTPATIENT)
Dept: INFUSION CENTER | Facility: HOSPITAL | Age: 48
Discharge: HOME | End: 2023-02-22
Attending: INTERNAL MEDICINE

## 2023-02-22 VITALS
RESPIRATION RATE: 18 BRPM | TEMPERATURE: 96.6 F | SYSTOLIC BLOOD PRESSURE: 152 MMHG | OXYGEN SATURATION: 99 % | HEART RATE: 80 BPM | DIASTOLIC BLOOD PRESSURE: 73 MMHG

## 2023-02-22 DIAGNOSIS — D50.9 IRON DEFICIENCY ANEMIA, UNSPECIFIED IRON DEFICIENCY ANEMIA TYPE: Primary | ICD-10-CM

## 2023-02-22 RX ORDER — SODIUM CHLORIDE 9 MG/ML
20 INJECTION, SOLUTION INTRAVENOUS ONCE
Status: COMPLETED | OUTPATIENT
Start: 2023-02-22 | End: 2023-02-22

## 2023-02-22 RX ORDER — SODIUM CHLORIDE 9 MG/ML
20 INJECTION, SOLUTION INTRAVENOUS ONCE
Status: CANCELLED | OUTPATIENT
Start: 2023-03-01

## 2023-02-22 RX ADMIN — IRON SUCROSE 200 MG: 20 INJECTION, SOLUTION INTRAVENOUS at 14:18

## 2023-02-22 RX ADMIN — SODIUM CHLORIDE 20 ML/HR: 0.9 INJECTION, SOLUTION INTRAVENOUS at 14:18

## 2023-02-22 NOTE — PROGRESS NOTES
Pt tolerated today's venofer without adverse reaction noted  Peripheral iv discontinued jelco intact  Reviewed upcoming appt  Defers avs  Discharged ambulatory

## 2023-02-24 ENCOUNTER — VBI (OUTPATIENT)
Dept: ADMINISTRATIVE | Facility: OTHER | Age: 48
End: 2023-02-24

## 2023-02-24 ENCOUNTER — TELEPHONE (OUTPATIENT)
Dept: UROLOGY | Facility: CLINIC | Age: 48
End: 2023-02-24

## 2023-02-24 NOTE — TELEPHONE ENCOUNTER
----- Message from [address] [place] [address] sent at 2/20/2023  1:32 PM EST -----  Please let patient know her urine testing was unremarkable  No signs of blood or infection    She has not read her [employer] message

## 2023-02-27 PROBLEM — Z12.11 SCREENING FOR COLON CANCER: Status: RESOLVED | Noted: 2022-12-29 | Resolved: 2023-02-27

## 2023-02-27 PROBLEM — H65.191 ACUTE EFFUSION OF RIGHT EAR: Status: RESOLVED | Noted: 2022-12-29 | Resolved: 2023-02-27

## 2023-03-01 ENCOUNTER — HOSPITAL ENCOUNTER (OUTPATIENT)
Dept: INFUSION CENTER | Facility: HOSPITAL | Age: 48
Discharge: HOME | End: 2023-03-01
Attending: INTERNAL MEDICINE

## 2023-03-01 VITALS
TEMPERATURE: 97.3 F | OXYGEN SATURATION: 100 % | DIASTOLIC BLOOD PRESSURE: 71 MMHG | RESPIRATION RATE: 18 BRPM | HEART RATE: 67 BPM | SYSTOLIC BLOOD PRESSURE: 143 MMHG

## 2023-03-01 DIAGNOSIS — D50.9 IRON DEFICIENCY ANEMIA, UNSPECIFIED IRON DEFICIENCY ANEMIA TYPE: Primary | ICD-10-CM

## 2023-03-01 RX ORDER — SODIUM CHLORIDE 9 MG/ML
20 INJECTION, SOLUTION INTRAVENOUS ONCE
Status: CANCELLED | OUTPATIENT
Start: 2023-03-01

## 2023-03-01 RX ORDER — SODIUM CHLORIDE 9 MG/ML
20 INJECTION, SOLUTION INTRAVENOUS ONCE
Status: COMPLETED | OUTPATIENT
Start: 2023-03-01 | End: 2023-03-01

## 2023-03-01 RX ADMIN — SODIUM CHLORIDE 20 ML/HR: 0.9 INJECTION, SOLUTION INTRAVENOUS at 14:00

## 2023-03-01 RX ADMIN — IRON SUCROSE 200 MG: 20 INJECTION, SOLUTION INTRAVENOUS at 13:58

## 2023-03-01 NOTE — PLAN OF CARE
Problem: Knowledge Deficit  Goal: Patient/family/caregiver demonstrates understanding of disease process, treatment plan, medications, and discharge instructions  Description: Complete learning assessment and assess knowledge base    Interventions:  - Provide teaching at level of understanding  - Provide teaching via preferred learning methods  Outcome: Progressing
27-Sep-2022 18:53

## 2023-03-15 ENCOUNTER — OFFICE VISIT (OUTPATIENT)
Dept: URGENT CARE | Facility: CLINIC | Age: 48
End: 2023-03-15

## 2023-03-15 VITALS — HEART RATE: 92 BPM | TEMPERATURE: 98 F | OXYGEN SATURATION: 96 % | RESPIRATION RATE: 18 BRPM

## 2023-03-15 DIAGNOSIS — N39.0 URINARY TRACT INFECTION WITH HEMATURIA, SITE UNSPECIFIED: Primary | ICD-10-CM

## 2023-03-15 DIAGNOSIS — R31.9 URINARY TRACT INFECTION WITH HEMATURIA, SITE UNSPECIFIED: Primary | ICD-10-CM

## 2023-03-15 LAB
SL AMB  POCT GLUCOSE, UA: NEGATIVE
SL AMB LEUKOCYTE ESTERASE,UA: ABNORMAL
SL AMB POCT BILIRUBIN,UA: NEGATIVE
SL AMB POCT BLOOD,UA: ABNORMAL
SL AMB POCT CLARITY,UA: ABNORMAL
SL AMB POCT COLOR,UA: YELLOW
SL AMB POCT KETONES,UA: NEGATIVE
SL AMB POCT NITRITE,UA: NEGATIVE
SL AMB POCT PH,UA: 5
SL AMB POCT SPECIFIC GRAVITY,UA: 1
SL AMB POCT URINE PROTEIN: NEGATIVE
SL AMB POCT UROBILINOGEN: 0.2

## 2023-03-15 RX ORDER — NITROFURANTOIN 25; 75 MG/1; MG/1
100 CAPSULE ORAL 2 TIMES DAILY
Qty: 14 CAPSULE | Refills: 0 | Status: SHIPPED | OUTPATIENT
Start: 2023-03-15 | End: 2023-03-22

## 2023-03-15 NOTE — PROGRESS NOTES
[facility]      NAME: Melecio Mathew is a 50 y o  female  : 1975    MRN: [id number]  DATE: March 15, 2023  TIME: 10:09 AM    Assessment and Plan   Urinary tract infection with hematuria, site unspecified [N39 0, R31 9]  1  Urinary tract infection with hematuria, site unspecified  POCT urine dip    Urine culture    nitrofurantoin (MACROBID) 100 mg capsule          Patient Instructions   Dysuria   AMBULATORY CARE:   Dysuria  is trouble urinating, or pain, burning, or discomfort when you urinate  Dysuria is usually a symptom of another problem, such as a blockage or urinary tract infection  Common symptoms include the following:   · Fever     · Cloudy, bad smelling urine     · Urge to urinate often but urinating little     · Back, side, or abdominal pain     · Blood in your urine     · Discharge that smells bad     · Itching  Seek care immediately if:   · You have severe back, side, or abdominal pain  · You have fever and shaking chills  · You vomit several times in a row  Contact your healthcare provider if:   · Your symptoms do not go away, even after treatment  · You have questions or concerns about your condition or care  Treatment for dysuria  may include medicines to treat a bacterial infection or help decrease bladder spasms  Manage your dysuria:   · Drink more liquids  Liquids help flush out bacteria that may be causing an infection  Ask your healthcare provider how much liquid to drink each day and which liquids are best for you  · Take sitz baths as directed  Fill a bathtub with 4 to 6 inches of warm water  You may also use a sitz bath pan that fits over a toilet  Sit in the sitz bath for 20 minutes  Do this 2 to 3 times a day, or as directed  The warm water can help decrease pain and swelling  Follow up with your healthcare provider as directed:  Write down your questions so you remember to ask them during your visits     ©  [facility] is for End User's use only and may not be sold, redistributed or otherwise used for commercial purposes  All illustrations and images included in CareNotes® are the copyrighted property of A D A M , Inc  or Chriss Landers  The above information is an  only  It is not intended as medical advice for individual conditions or treatments  Talk to your doctor, nurse or pharmacist before following any medical regimen to see if it is safe and effective for you  I have prescribed an antibiotic for the infection  Please take the antibiotic as prescribed and finish the entire prescription  I recommend that the patient takes an over the counter probiotic or eats yogurt with live cultures in it [place]) to keep good bacteria in the gut and help prevent diarrhea  If not improving over the next 3-5 days, follow up with PCP  To present to the ER if symptoms worsen  Chief Complaint     Chief Complaint   Patient presents with   • Possible UTI     Started yesterday         History of Present Illness   Catherine Perkins presents to the clinic c/o    Urinary Frequency   This is a new problem  The current episode started yesterday  The problem occurs every urination  The problem has been unchanged  The quality of the pain is described as burning  The pain is moderate  There has been no fever  Associated symptoms include frequency, hematuria and urgency  Pertinent negatives include no chills, hesitancy, possible pregnancy or vomiting  She has tried nothing for the symptoms  The treatment provided no relief  Review of Systems   Review of Systems   Constitutional: Negative for chills, diaphoresis, fatigue and fever  HENT: Negative for congestion, ear discharge, ear pain and facial swelling  Eyes: Negative for photophobia, pain, discharge, redness, itching and visual disturbance  Respiratory: Negative for apnea, cough, chest tightness, shortness of breath and wheezing      Cardiovascular: Negative for chest pain and palpitations  Gastrointestinal: Negative for abdominal pain and vomiting  Genitourinary: Positive for frequency, hematuria and urgency  Negative for hesitancy  Skin: Negative for color change, rash and wound  Neurological: Negative for dizziness and headaches  Hematological: Negative for adenopathy           Current Medications     Long-Term Medications   Medication Sig Dispense Refill   • ACCU-CHEK FASTCLIX LANCETS MISC by Does not apply route 2 (two) times a day 102 each 5   • b complex vitamins tablet Take 1 tablet by mouth daily     • cyclobenzaprine (FLEXERIL) 5 mg tablet Take 1 tablet (5 mg total) by mouth 3 (three) times a day as needed for muscle spasms for up to 12 doses 12 tablet 0   • ergocalciferol (VITAMIN D2) 50,000 units Take 1 capsule (50,000 Units total) by mouth once a week 4 capsule 3   • fluticasone (FLONASE) 50 mcg/act nasal spray SPRAY 1 SPRAY INTO EACH NOSTRIL EVERY DAY 1 Bottle 3   • fluticasone (FLONASE) 50 mcg/act nasal spray 2 sprays into each nostril daily 11 1 mL 0   • Lancets Misc  (ACCU-CHEK FASTCLIX LANCET) KIT by Does not apply route 2 (two) times a day 1 kit 0   • methocarbamol (ROBAXIN) 750 mg tablet Take 1 tablet (750 mg total) by mouth every 12 (twelve) hours 60 tablet 0   • Multiple Vitamin (MULTIVITAMIN) tablet Take 1 tablet by mouth daily     • vitamin A 2250 MCG (7500 UT) capsule Take 7,500 Units by mouth daily     • Vitamin D, Cholecalciferol, 1000 units CAPS Take 1 tablet by mouth daily         Current Allergies     Allergies as of 03/15/2023 - Reviewed 03/15/2023   Allergen Reaction Noted   • Aspirin Other (See Comments) 12/04/2016   • Nsaids Other (See Comments) 08/01/2017   • Codeine Itching 01/17/2016            The following portions of the patient's history were reviewed and updated as appropriate: allergies, current medications, past family history, past medical history, past social history, past surgical history and problem list   Past Medical History:   Diagnosis Date   • Anxiety    • Arthritis    • Bariatric surgery status    • Carpal tunnel syndrome    • Chronic pain disorder    • DJD (degenerative joint disease)    • GERD (gastroesophageal reflux disease)    • Heartburn    • History of gastric bypass 2014   • Hypertension     Last assessed 2014   • Hypoglycemia after GI (gastrointestinal) surgery    • Migraine    • Obesity     Last assessed 2014   • Postgastrectomy malabsorption    • Restless leg syndrome    • Seizures ([address] )     10yrs ago "low blood sugar"   • Vertigo    • Wears dentures      Past Surgical History:   Procedure Laterality Date   • ABDOMINOPLASTY N/A 2021    Procedure: ABDOMINOPLASTY;  Surgeon: Evelia Long MD;  Location: [address] [place] [address];  Service: Plastics   •  SECTION     •  SECTION N/A 2018    Procedure:  SECTION ();   Surgeon: Zafar Ramirez MD;  [facility]: [facility];  Service: Obstetrics   • CHOLECYSTECTOMY     • GASTRIC BYPASS  2014   • VT CERCLAGE UTERINE CERVIX NONOBSTETRICAL N/A 2017    Procedure: CERCLAGE CERVICAL;  Surgeon: Tal French MD;  Location: [facility];  Service: Obstetrics   • TOOTH EXTRACTION       Social History     Socioeconomic History   • Marital status: /Civil Union     Spouse name: Not on file   • Number of children: Not on file   • Years of education: Not on file   • Highest education level: Not on file   Occupational History   • Not on file   Tobacco Use   • Smoking status: Every Day     Packs/day: 0 25     Types: Cigarettes   • Smokeless tobacco: Never   Vaping Use   • Vaping Use: Never used   Substance and Sexual Activity   • Alcohol use: No   • Drug use: No   • Sexual activity: Yes     Partners: Male   Other Topics Concern   • Not on file   Social History Narrative    Caffeine use     Social Determinants of Health     Financial Resource Strain: Not on file   Food Insecurity: Not on file   Transportation Needs: Not on file   Physical Activity: Not on file   Stress: Not on file   Social Connections: Not on file   Intimate Partner Violence: Not on file   Housing Stability: Not on file       Objective   Pulse 92   Temp 98 °F (36 7 °C)   Resp 18   SpO2 96%      Physical Exam     Physical Exam  Vitals and nursing note reviewed  Constitutional:       General: She is not in acute distress  Appearance: She is well-developed  She is not diaphoretic  HENT:      Head: Normocephalic and atraumatic  Right Ear: Tympanic membrane and external ear normal       Left Ear: Tympanic membrane and external ear normal       Nose: Nose normal       Mouth/Throat:      Mouth: Mucous membranes are moist       Pharynx: No oropharyngeal exudate or posterior oropharyngeal erythema  Eyes:      General: No scleral icterus  Right eye: No discharge  Left eye: No discharge  Conjunctiva/sclera: Conjunctivae normal    Cardiovascular:      Rate and Rhythm: Normal rate and regular rhythm  Heart sounds: Normal heart sounds  No murmur heard  No friction rub  No gallop  Pulmonary:      Effort: Pulmonary effort is normal  No respiratory distress  Breath sounds: Normal breath sounds  No decreased breath sounds, wheezing, rhonchi or rales  Abdominal:      General: Bowel sounds are normal       Palpations: Abdomen is soft  Tenderness: There is abdominal tenderness in the suprapubic area  There is no right CVA tenderness, left CVA tenderness or guarding  Skin:     General: Skin is warm and dry  Coloration: Skin is not pale  Findings: No erythema or rash  Neurological:      Mental Status: She is alert and oriented to person, place, and time  Psychiatric:         Behavior: Behavior normal          Thought Content:  Thought content normal          Judgment: Judgment normal          Virginia Zapata PA-C

## 2023-03-16 LAB — BACTERIA UR CULT: NORMAL

## 2023-03-24 ENCOUNTER — OFFICE VISIT (OUTPATIENT)
Dept: URGENT CARE | Facility: CLINIC | Age: 48
End: 2023-03-24

## 2023-03-24 VITALS
TEMPERATURE: 97.7 F | DIASTOLIC BLOOD PRESSURE: 78 MMHG | HEART RATE: 79 BPM | HEIGHT: 64 IN | RESPIRATION RATE: 18 BRPM | WEIGHT: 198.4 LBS | OXYGEN SATURATION: 100 % | SYSTOLIC BLOOD PRESSURE: 133 MMHG | BODY MASS INDEX: 33.87 KG/M2

## 2023-03-24 DIAGNOSIS — R30.0 DYSURIA: Primary | ICD-10-CM

## 2023-03-24 DIAGNOSIS — R23.8 SKIN PIMPLE: ICD-10-CM

## 2023-03-24 LAB
SL AMB  POCT GLUCOSE, UA: NEGATIVE
SL AMB LEUKOCYTE ESTERASE,UA: ABNORMAL
SL AMB POCT BILIRUBIN,UA: NEGATIVE
SL AMB POCT BLOOD,UA: ABNORMAL
SL AMB POCT CLARITY,UA: ABNORMAL
SL AMB POCT COLOR,UA: ABNORMAL
SL AMB POCT KETONES,UA: NEGATIVE
SL AMB POCT NITRITE,UA: NEGATIVE
SL AMB POCT PH,UA: 5
SL AMB POCT SPECIFIC GRAVITY,UA: 1
SL AMB POCT URINE PROTEIN: ABNORMAL
SL AMB POCT UROBILINOGEN: 0.2

## 2023-03-24 NOTE — PROGRESS NOTES
[facility]    NAME: Belinda Mario is a 50 y o  female  : 1975    MRN: [id number]  DATE: 2023  TIME: 2:17 PM    Assessment and Plan   Dysuria [R30 0]  1  Dysuria  POCT urine dip    Urine culture      2  Skin pimple            Patient Instructions     Patient Instructions   Warm compresses  Will send urine for culture  If positive for infection, will treat  Chief Complaint     Chief Complaint   Patient presents with   • Possible UTI     Started last week was given antibiotic still lower back pain and pressure also has a pimple down in area         History of Present Illness   50year old female here with complaint of ongoing lower abdominal and back pressure  History of UTI last week  Finished antibiotic but is not sure it helped  Now has menses  Also has a pimple in vaginal area that she is concerned about  Review of Systems   Review of Systems   Constitutional: Negative for activity change, appetite change, chills, fatigue and fever  Respiratory: Negative for cough  Cardiovascular: Negative for chest pain  Gastrointestinal: Positive for abdominal pain (lower abdominal pressure)  Negative for constipation, diarrhea, nausea and vomiting  Genitourinary: Positive for dysuria  Negative for difficulty urinating, flank pain, frequency, hematuria and urgency  Musculoskeletal: Positive for back pain  Negative for myalgias  All other systems reviewed and are negative        Current Medications     Current Outpatient Medications:   •  ACCU-CHEK FASTCLIX LANCETS MISC, by Does not apply route 2 (two) times a day, Disp: 102 each, Rfl: 5  •  Alcohol Swabs (ALCOHOL PADS) 70 % PADS, by Does not apply route 2 (two) times a day, Disp: 100 each, Rfl: 5  •  b complex vitamins tablet, Take 1 tablet by mouth daily, Disp: , Rfl:   •  benzonatate (TESSALON) 200 MG capsule, Take 1 capsule (200 mg total) by mouth 3 (three) times a day as needed for cough, Disp: 20 capsule, Rfl: 0  •  Blood Glucose Monitoring Suppl (ACCU-CHEK GUIDE) w/Device KIT, by Does not apply route 2 (two) times a day, Disp: 1 kit, Rfl: 0  •  calcium citrate (CALCITRATE) 950 MG tablet, Take 2 tablets by mouth daily  , Disp: , Rfl:   •  CRANBERRY EXTRACT PO, Take by mouth, Disp: , Rfl:   •  cyclobenzaprine (FLEXERIL) 5 mg tablet, Take 1 tablet (5 mg total) by mouth 3 (three) times a day as needed for muscle spasms for up to 12 doses, Disp: 12 tablet, Rfl: 0  •  ergocalciferol (VITAMIN D2) 50,000 units, Take 1 capsule (50,000 Units total) by mouth once a week, Disp: 4 capsule, Rfl: 3  •  ferrous sulfate 325 (65 Fe) mg tablet, Take 325 mg by mouth daily with breakfast, Disp: , Rfl:   •  fluticasone (FLONASE) 50 mcg/act nasal spray, SPRAY 1 SPRAY INTO EACH NOSTRIL EVERY DAY, Disp: 1 Bottle, Rfl: 3  •  fluticasone (FLONASE) 50 mcg/act nasal spray, 2 sprays into each nostril daily, Disp: 11 1 mL, Rfl: 0  •  glucose blood (Accu-Chek Guide) test strip, Use as instructed, Disp: 100 each, Rfl: 0  •  Lancets Misc  (ACCU-CHEK FASTCLIX LANCET) KIT, by Does not apply route 2 (two) times a day, Disp: 1 kit, Rfl: 0  •  methocarbamol (ROBAXIN) 750 mg tablet, Take 1 tablet (750 mg total) by mouth every 12 (twelve) hours, Disp: 60 tablet, Rfl: 0  •  methylPREDNISolone 4 MG tablet therapy pack, Use as directed on package, Disp: 21 each, Rfl: 0  •  Misc Natural Products (APPLE CIDER VINEGAR DIET PO), Take by mouth, Disp: , Rfl:   •  Multiple Vitamin (MULTIVITAMIN) tablet, Take 1 tablet by mouth daily, Disp: , Rfl:   •  predniSONE 10 mg tablet, Take 3 tabs BID X 2 days, 2 tabs BID X 2 days, 1 tab BID X 2 days, 1 tab daily X 2 days, Disp: 26 tablet, Rfl: 0  •  vitamin A 2250 MCG (7500 UT) capsule, Take 7,500 Units by mouth daily, Disp: , Rfl:   •  Vitamin D, Cholecalciferol, 1000 units CAPS, Take 1 tablet by mouth daily, Disp: , Rfl:     Current Allergies     Allergies as of 03/24/2023 - Reviewed 03/24/2023   Allergen Reaction Noted   • Aspirin Other (See Comments) 2016   • Nsaids Other (See Comments) 2017   • Codeine Itching 2016          The following portions of the patient's history were reviewed and updated as appropriate: allergies, current medications, past family history, past medical history, past social history, past surgical history and problem list    Past Medical History:   Diagnosis Date   • Anxiety    • Arthritis    • Bariatric surgery status    • Carpal tunnel syndrome    • Chronic pain disorder    • DJD (degenerative joint disease)    • GERD (gastroesophageal reflux disease)    • Heartburn    • History of gastric bypass 2014   • Hypertension     Last assessed 2014   • Hypoglycemia after GI (gastrointestinal) surgery    • Migraine    • Obesity     Last assessed 2014   • Postgastrectomy malabsorption    • Restless leg syndrome    • Seizures ([address] )     10yrs ago "low blood sugar"   • Vertigo    • Wears dentures      Past Surgical History:   Procedure Laterality Date   • ABDOMINOPLASTY N/A 2021    Procedure: ABDOMINOPLASTY;  Surgeon: Ilene Manuel MD;  Location: [address] [place] [address];  Service: Plastics   •  SECTION     •  SECTION N/A 2018    Procedure:  SECTION (); Surgeon: Charles Cedeno MD;  Location: [facility];  Service: Obstetrics   • CHOLECYSTECTOMY     • GASTRIC BYPASS  2014   • ID CERCLAGE UTERINE CERVIX NONOBSTETRICAL N/A 2017    Procedure: CERCLAGE CERVICAL;  Surgeon: Dian Yepez MD;  Location: [facility];  Service: Obstetrics   • TOOTH EXTRACTION       Family History   Problem Relation Age of Onset   • Cancer Mother         Myosarcoma of the soft tissue   • Other Mother         Total Thyroidectomy;  Thyroid mass   • Coronary artery disease Father    • No Known Problems Sister    • No Known Problems Sister    • No Known Problems Daughter    • No Known Problems Maternal Grandmother    • No Known Problems Paternal Grandmother    • No Known Problems Maternal Aunt      Social History     Socioeconomic History   • Marital status: /Civil Union     Spouse name: Not on file   • Number of children: Not on file   • Years of education: Not on file   • Highest education level: Not on file   Occupational History   • Not on file   Tobacco Use   • Smoking status: Every Day     Packs/day: 0 25     Types: Cigarettes   • Smokeless tobacco: Never   Vaping Use   • Vaping Use: Never used   Substance and Sexual Activity   • Alcohol use: No   • Drug use: No   • Sexual activity: Yes     Partners: Male   Other Topics Concern   • Not on file   Social History Narrative    Caffeine use     Social Determinants of Health     Financial Resource Strain: Not on file   Food Insecurity: Not on file   Transportation Needs: Not on file   Physical Activity: Not on file   Stress: Not on file   Social Connections: Not on file   Intimate Partner Violence: Not on file   Housing Stability: Not on file     Medications have been verified  Objective   /78   Pulse 79   Temp 97 7 °F (36 5 °C)   Resp 18   Ht 5' 4" (1 626 m)   Wt 90 kg (198 lb 6 4 oz)   SpO2 100%   BMI 34 06 kg/m²      Physical Exam   Physical Exam  Vitals and nursing note reviewed  Constitutional:       General: She is not in acute distress  Appearance: Normal appearance  She is well-developed  HENT:      Head: Normocephalic and atraumatic  Cardiovascular:      Rate and Rhythm: Normal rate and regular rhythm  Heart sounds: Normal heart sounds  No murmur heard  Pulmonary:      Effort: Pulmonary effort is normal  No respiratory distress  Breath sounds: Normal breath sounds  Abdominal:      General: Bowel sounds are normal       Tenderness: There is no abdominal tenderness  Genitourinary:         Comments: Pimple noted at introitus  No erythema  Slightly tender to palpation

## 2023-03-26 LAB — BACTERIA UR CULT: NORMAL

## 2023-05-02 ENCOUNTER — HOSPITAL ENCOUNTER (OUTPATIENT)
Dept: ULTRASOUND IMAGING | Facility: HOSPITAL | Age: 48
Discharge: HOME | End: 2023-05-02

## 2023-05-02 ENCOUNTER — HOSPITAL ENCOUNTER (OUTPATIENT)
Dept: MAMMOGRAPHY | Facility: HOSPITAL | Age: 48
Discharge: HOME | End: 2023-05-02

## 2023-05-02 DIAGNOSIS — R92.8 ABNORMAL MAMMOGRAM: ICD-10-CM

## 2023-06-06 ENCOUNTER — VBI (OUTPATIENT)
Dept: ADMINISTRATIVE | Facility: OTHER | Age: 48
End: 2023-06-06

## 2023-07-09 ENCOUNTER — OFFICE VISIT (OUTPATIENT)
Dept: URGENT CARE | Facility: CLINIC | Age: 48
End: 2023-07-09
Payer: COMMERCIAL

## 2023-07-09 VITALS
WEIGHT: 194 LBS | DIASTOLIC BLOOD PRESSURE: 68 MMHG | HEART RATE: 68 BPM | BODY MASS INDEX: 33.12 KG/M2 | TEMPERATURE: 97.9 F | RESPIRATION RATE: 20 BRPM | OXYGEN SATURATION: 99 % | SYSTOLIC BLOOD PRESSURE: 121 MMHG | HEIGHT: 64 IN

## 2023-07-09 DIAGNOSIS — J01.00 ACUTE NON-RECURRENT MAXILLARY SINUSITIS: Primary | ICD-10-CM

## 2023-07-09 DIAGNOSIS — R09.82 POSTNASAL DRIP: ICD-10-CM

## 2023-07-09 PROCEDURE — 99213 OFFICE O/P EST LOW 20 MIN: CPT

## 2023-07-09 RX ORDER — FLUTICASONE PROPIONATE 50 MCG
2 SPRAY, SUSPENSION (ML) NASAL DAILY
Qty: 18.2 ML | Refills: 0 | Status: SHIPPED | OUTPATIENT
Start: 2023-07-09

## 2023-07-09 NOTE — PROGRESS NOTES
[facility]        NAME: Malick Barnes is a 50 y.o. female  : 1975    MRN: [id number]  DATE: 2023  TIME: 9:51 AM    Assessment and Plan   Acute non-recurrent maxillary sinusitis [J01.00]  1. Acute non-recurrent maxillary sinusitis  Ambulatory Referral to Otolaryngology      2. Postnasal drip  fluticasone (FLONASE) 50 mcg/act nasal spray    Ambulatory Referral to Otolaryngology            Patient Instructions     Continue the use of OTC anti-histamine  Continue the use of nasal rinses  Start Flonase as prescribed  Vitamin D3 2000 IU daily  Vitamin C 1000mg twice per day  Multivitamin daily  Fluids and rest  Over the counter cold medication as needed (EX: Coricidin HBP, tylenol/motrin)  Consider follow-up with allergist / ENT  Follow up with PCP in 3-5 days. Proceed to  ER if symptoms worsen. Chief Complaint     Chief Complaint   Patient presents with   • Sinusitis     Sinus pain and pressure for 2 days, no relief with OTC meds         History of Present Illness       Patient is a 49 yo female with PMHx seasonal allergies presenting in the clinic today for sinus pressure/pain, headache, and post nasal drainage x 2 days. Patient started gradually worsening sxs two days ago. She reports headache feeling like hx of sinus infections and locates to posterior to the R ear. Denies cough, fever, chills, rhinorrhea, chest pain, shortness of breath, or sore throat. Admits the use of OTC sinus medication and nasal saline rinses at home for symptom management without much improvement. Mentions she cannot take NSAIDs or ASA d/t hx of gastric bypass. Allergy to codeine as well. Review of Systems   Review of Systems   Constitutional: Negative for activity change, appetite change, chills, fatigue and fever. HENT: Positive for postnasal drip, sinus pressure and sinus pain. Negative for congestion, ear pain, rhinorrhea and sore throat. Respiratory: Negative for cough and shortness of breath. Cardiovascular: Negative for chest pain. Gastrointestinal: Negative for abdominal pain, diarrhea, nausea and vomiting. Musculoskeletal: Negative for myalgias. Skin: Negative for rash. Allergic/Immunologic: Positive for environmental allergies. Neurological: Positive for headaches. Current Medications       Current Outpatient Medications:   •  ACCU-CHEK FASTCLIX LANCETS MISC, by Does not apply route 2 (two) times a day, Disp: 102 each, Rfl: 5  •  Alcohol Swabs (ALCOHOL PADS) 70 % PADS, by Does not apply route 2 (two) times a day, Disp: 100 each, Rfl: 5  •  b complex vitamins tablet, Take 1 tablet by mouth daily, Disp: , Rfl:   •  benzonatate (TESSALON) 200 MG capsule, Take 1 capsule (200 mg total) by mouth 3 (three) times a day as needed for cough, Disp: 20 capsule, Rfl: 0  •  Blood Glucose Monitoring Suppl (ACCU-CHEK GUIDE) w/Device KIT, by Does not apply route 2 (two) times a day, Disp: 1 kit, Rfl: 0  •  calcium citrate (CALCITRATE) 950 MG tablet, Take 2 tablets by mouth daily. , Disp: , Rfl:   •  CRANBERRY EXTRACT PO, Take by mouth, Disp: , Rfl:   •  cyclobenzaprine (FLEXERIL) 5 mg tablet, Take 1 tablet (5 mg total) by mouth 3 (three) times a day as needed for muscle spasms for up to 12 doses, Disp: 12 tablet, Rfl: 0  •  ergocalciferol (VITAMIN D2) 50,000 units, Take 1 capsule (50,000 Units total) by mouth once a week, Disp: 4 capsule, Rfl: 3  •  ferrous sulfate 325 (65 Fe) mg tablet, Take 325 mg by mouth daily with breakfast, Disp: , Rfl:   •  fluticasone (FLONASE) 50 mcg/act nasal spray, SPRAY 1 SPRAY INTO EACH NOSTRIL EVERY DAY, Disp: 1 Bottle, Rfl: 3  •  fluticasone (FLONASE) 50 mcg/act nasal spray, 2 sprays into each nostril daily, Disp: 11.1 mL, Rfl: 0  •  fluticasone (FLONASE) 50 mcg/act nasal spray, 2 sprays into each nostril daily, Disp: 18.2 mL, Rfl: 0  •  glucose blood (Accu-Chek Guide) test strip, Use as instructed, Disp: 100 each, Rfl: 0  •  Lancets Misc. (ACCU-CHEK FASTCLIX LANCET) KIT, by Does not apply route 2 (two) times a day, Disp: 1 kit, Rfl: 0  •  methocarbamol (ROBAXIN) 750 mg tablet, Take 1 tablet (750 mg total) by mouth every 12 (twelve) hours, Disp: 60 tablet, Rfl: 0  •  methylPREDNISolone 4 MG tablet therapy pack, Use as directed on package, Disp: 21 each, Rfl: 0  •  Misc Natural Products (APPLE CIDER VINEGAR DIET PO), Take by mouth, Disp: , Rfl:   •  Multiple Vitamin (MULTIVITAMIN) tablet, Take 1 tablet by mouth daily, Disp: , Rfl:   •  predniSONE 10 mg tablet, Take 3 tabs BID X 2 days, 2 tabs BID X 2 days, 1 tab BID X 2 days, 1 tab daily X 2 days, Disp: 26 tablet, Rfl: 0  •  vitamin A 2250 MCG (7500 UT) capsule, Take 7,500 Units by mouth daily, Disp: , Rfl:   •  Vitamin D, Cholecalciferol, 1000 units CAPS, Take 1 tablet by mouth daily, Disp: , Rfl:     Current Allergies     Allergies as of 07/09/2023 - Reviewed 07/09/2023   Allergen Reaction Noted   • Aspirin Other (See Comments) 12/04/2016   • Nsaids Other (See Comments) 08/01/2017   • Codeine Itching 01/17/2016            The following portions of the patient's history were reviewed and updated as appropriate: allergies, current medications, past family history, past medical history, past social history, past surgical history and problem list.     Past Medical History:   Diagnosis Date   • Anxiety    • Arthritis    • Bariatric surgery status    • Carpal tunnel syndrome    • Chronic pain disorder    • DJD (degenerative joint disease)    • GERD (gastroesophageal reflux disease)    • Heartburn    • History of gastric bypass 12/2014   • Hypertension     Last assessed 12/18/2014   • Hypoglycemia after GI (gastrointestinal) surgery    • Migraine    • Obesity     Last assessed 12/26/2014   • Postgastrectomy malabsorption    • Restless leg syndrome    • Seizures ([address])     10yrs ago "low blood sugar"   • Vertigo    • Wears dentures        Past Surgical History:   Procedure Laterality Date   • ABDOMINOPLASTY N/A 6/28/2021    Procedure: ABDOMINOPLASTY;  Surgeon: Elke Avery MD;  Location: [address] [place] [address] OR;  Service: Plastics   •  SECTION     •  SECTION N/A 2018    Procedure:  SECTION (); Surgeon: Nina Andrea MD;  Location: [facility];  Service: Obstetrics   • CHOLECYSTECTOMY     • GASTRIC BYPASS  2014   • MI CERCLAGE UTERINE CERVIX NONOBSTETRICAL N/A 2017    Procedure: CERCLAGE CERVICAL;  Surgeon: Cathy Holguin MD;  Location: [facility];  Service: Obstetrics   • TOOTH EXTRACTION         Family History   Problem Relation Age of Onset   • Cancer Mother         Myosarcoma of the soft tissue   • Other Mother         Total Thyroidectomy; Thyroid mass   • Coronary artery disease Father    • No Known Problems Sister    • No Known Problems Sister    • No Known Problems Daughter    • No Known Problems Maternal Grandmother    • No Known Problems Paternal Grandmother    • No Known Problems Maternal Aunt          Medications have been verified. Objective   /68   Pulse 68   Temp 97.9 °F (36.6 °C)   Resp 20   Ht 5' 4" (1.626 m)   Wt 88 kg (194 lb)   SpO2 99%   BMI 33.30 kg/m²        Physical Exam     Physical Exam  Vitals reviewed. Constitutional:       General: She is not in acute distress. Appearance: Normal appearance. She is normal weight. She is not ill-appearing. HENT:      Head: Normocephalic. Right Ear: Hearing, tympanic membrane, ear canal and external ear normal. No middle ear effusion. There is no impacted cerumen. Tympanic membrane is not erythematous or bulging. Left Ear: Hearing, tympanic membrane, ear canal and external ear normal.  No middle ear effusion. There is no impacted cerumen. Tympanic membrane is not erythematous or bulging. Nose: No congestion or rhinorrhea. Right Sinus: Maxillary sinus tenderness present. No frontal sinus tenderness. Left Sinus: Maxillary sinus tenderness present. No frontal sinus tenderness.       Mouth/Throat: Lips: Pink. Mouth: Mucous membranes are moist.      Pharynx: Oropharynx is clear. Uvula midline. Posterior oropharyngeal erythema present. No pharyngeal swelling, oropharyngeal exudate or uvula swelling. Comments: Postnasal drip present  Eyes:      General:         Right eye: No discharge. Left eye: No discharge. Conjunctiva/sclera: Conjunctivae normal.      Pupils: Pupils are equal, round, and reactive to light. Cardiovascular:      Rate and Rhythm: Normal rate and regular rhythm. Pulses: Normal pulses. Heart sounds: Normal heart sounds. No murmur heard. No friction rub. No gallop. Pulmonary:      Effort: Pulmonary effort is normal.      Breath sounds: Normal breath sounds. No wheezing, rhonchi or rales. Musculoskeletal:      Cervical back: Normal range of motion and neck supple. No tenderness. Lymphadenopathy:      Cervical: No cervical adenopathy. Skin:     General: Skin is warm. Findings: No rash. Neurological:      Mental Status: She is alert.    Psychiatric:         Mood and Affect: Mood normal.         Behavior: Behavior normal.

## 2023-07-09 NOTE — PATIENT INSTRUCTIONS
Continue the use of OTC anti-histamine  Continue the use of nasal rinses  Start Flonase as prescribed  Vitamin D3 2000 IU daily  Vitamin C 1000mg twice per day  Multivitamin daily  Fluids and rest  Over the counter cold medication as needed (EX: Coricidin HBP, tylenol/motrin)  Consider follow-up with allergist / ENT  Follow up with PCP in 3-5 days. Proceed to ER if symptoms worsen.

## 2023-07-15 ENCOUNTER — HOSPITAL ENCOUNTER (EMERGENCY)
Facility: HOSPITAL | Age: 48
Discharge: HOME | End: 2023-07-15
Attending: INTERNAL MEDICINE | Admitting: INTERNAL MEDICINE
Payer: COMMERCIAL

## 2023-07-15 VITALS
TEMPERATURE: 97.8 F | DIASTOLIC BLOOD PRESSURE: 80 MMHG | SYSTOLIC BLOOD PRESSURE: 116 MMHG | RESPIRATION RATE: 20 BRPM | HEART RATE: 62 BPM | OXYGEN SATURATION: 99 %

## 2023-07-15 DIAGNOSIS — S90.862A INSECT BITE OF LEFT FOOT, INITIAL ENCOUNTER: Primary | ICD-10-CM
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
## 2023-07-15 PROCEDURE — 99281 EMR DPT VST MAYX REQ PHY/QHP: CPT

## 2023-07-15 PROCEDURE — 96372 THER/PROPH/DIAG INJ SC/IM: CPT

## 2023-07-15 RX ORDER — GABAPENTIN 300 MG/1
300 CAPSULE ORAL 3 TIMES DAILY
Qty: 6 CAPSULE | Refills: 0 | Status: SHIPPED | OUTPATIENT
Start: 2023-07-15

## 2023-07-15 RX ORDER — KETOROLAC TROMETHAMINE 30 MG/ML
15 INJECTION, SOLUTION INTRAMUSCULAR; INTRAVENOUS ONCE
Status: COMPLETED | OUTPATIENT
Start: 2023-07-15 | End: 2023-07-15

## 2023-07-15 RX ADMIN — KETOROLAC TROMETHAMINE 15 MG: 30 INJECTION, SOLUTION INTRAMUSCULAR; INTRAVENOUS at 19:59

## 2023-07-15 NOTE — ED PROVIDER NOTES
History  Chief Complaint   Patient presents with   • Insect Bite     Stepped on hornets nest .  Incident at 120     66-year-old female presents emergency room chief complaint of hornet stings on her left foot. Patient notes she was cutting her lawn in flip-flops and ran over hornets nest and received several hornet stings in her left foot. Patient states she was stung nowhere else and has no other complaints. She complains of left foot pain and swelling. Complains of pain on weightbearing. Patient was started on Augmentin and prednisone yesterday for a sinus infection. She has no other complaints at this time. Denies any chest pain shortness of breath lightheadedness dizziness. Prior to Admission Medications   Prescriptions Last Dose Informant Patient Reported? Taking? ACCU-CHEK FASTCLIX LANCETS MISC  Self No No   Sig: by Does not apply route 2 (two) times a day   Alcohol Swabs (ALCOHOL PADS) 70 % PADS  Self No No   Sig: by Does not apply route 2 (two) times a day   Blood Glucose Monitoring Suppl (ACCU-CHEK GUIDE) w/Device KIT  Self No No   Sig: by Does not apply route 2 (two) times a day   CRANBERRY EXTRACT PO  Self Yes No   Sig: Take by mouth   Lancets Misc. (ACCU-CHEK FASTCLIX LANCET) KIT  Self No No   Sig: by Does not apply route 2 (two) times a day   Misc Natural Products (APPLE CIDER VINEGAR DIET PO)  Self Yes No   Sig: Take by mouth   Multiple Vitamin (MULTIVITAMIN) tablet  Self Yes No   Sig: Take 1 tablet by mouth daily   Vitamin D, Cholecalciferol, 1000 units CAPS  Self Yes No   Sig: Take 1 tablet by mouth daily   b complex vitamins tablet  Self Yes No   Sig: Take 1 tablet by mouth daily   benzonatate (TESSALON) 200 MG capsule  Self No No   Sig: Take 1 capsule (200 mg total) by mouth 3 (three) times a day as needed for cough   calcium citrate (CALCITRATE) 950 MG tablet  Self Yes No   Sig: Take 2 tablets by mouth daily.    cyclobenzaprine (FLEXERIL) 5 mg tablet  Self No No   Sig: Take 1 tablet (5 mg total) by mouth 3 (three) times a day as needed for muscle spasms for up to 12 doses   ergocalciferol (VITAMIN D2) 50,000 units  Self No No   Sig: Take 1 capsule (50,000 Units total) by mouth once a week   ferrous sulfate 325 (65 Fe) mg tablet  Self Yes No   Sig: Take 325 mg by mouth daily with breakfast   fluticasone (FLONASE) 50 mcg/act nasal spray  Self No No   Sig: SPRAY 1 SPRAY INTO EACH NOSTRIL EVERY DAY   fluticasone (FLONASE) 50 mcg/act nasal spray  Self No No   Si sprays into each nostril daily   fluticasone (FLONASE) 50 mcg/act nasal spray   No No   Si sprays into each nostril daily   glucose blood (Accu-Chek Guide) test strip  Self No No   Sig: Use as instructed   methocarbamol (ROBAXIN) 750 mg tablet  Self No No   Sig: Take 1 tablet (750 mg total) by mouth every 12 (twelve) hours   methylPREDNISolone 4 MG tablet therapy pack  Self No No   Sig: Use as directed on package   predniSONE 10 mg tablet  Self No No   Sig: Take 3 tabs BID X 2 days, 2 tabs BID X 2 days, 1 tab BID X 2 days, 1 tab daily X 2 days   vitamin A 2250 MCG (7500 UT) capsule  Self Yes No   Sig: Take 7,500 Units by mouth daily      Facility-Administered Medications: None       Past Medical History:   Diagnosis Date   • Anxiety    • Arthritis    • Bariatric surgery status    • Carpal tunnel syndrome    • Chronic pain disorder    • DJD (degenerative joint disease)    • GERD (gastroesophageal reflux disease)    • Heartburn    • History of gastric bypass 2014   • Hypertension     Last assessed 2014   • Hypoglycemia after GI (gastrointestinal) surgery    • Migraine    • Obesity     Last assessed 2014   • Postgastrectomy malabsorption    • Restless leg syndrome    • Seizures ([address])     10yrs ago "low blood sugar"   • Vertigo    • Wears dentures        Past Surgical History:   Procedure Laterality Date   • ABDOMINOPLASTY N/A 2021    Procedure: ABDOMINOPLASTY;  Surgeon: Chon Sheriff MD;  Location: [address] [place] [address] OR;  Service: Plastics   •  SECTION     •  SECTION N/A 2018    Procedure:  SECTION (); Surgeon: Maria Isabel Mims MD;  Location: [facility];  Service: Obstetrics   • CHOLECYSTECTOMY     • GASTRIC BYPASS  2014   • RI CERCLAGE UTERINE CERVIX NONOBSTETRICAL N/A 2017    Procedure: CERCLAGE CERVICAL;  Surgeon: Festus Castro MD;  Location: [facility];  Service: Obstetrics   • TOOTH EXTRACTION         Family History   Problem Relation Age of Onset   • Cancer Mother         Myosarcoma of the soft tissue   • Other Mother         Total Thyroidectomy; Thyroid mass   • Coronary artery disease Father    • No Known Problems Sister    • No Known Problems Sister    • No Known Problems Daughter    • No Known Problems Maternal Grandmother    • No Known Problems Paternal Grandmother    • No Known Problems Maternal Aunt      I have reviewed and agree with the history as documented. E-Cigarette/Vaping   • E-Cigarette Use Never User      E-Cigarette/Vaping Substances     Social History     Tobacco Use   • Smoking status: Every Day     Packs/day: 0.50     Types: Cigarettes   • Smokeless tobacco: Never   Vaping Use   • Vaping Use: Never used   Substance Use Topics   • Alcohol use: No   • Drug use: No       Review of Systems   Respiratory: Negative. Cardiovascular: Negative. Gastrointestinal: Negative. Musculoskeletal: Positive for gait problem and joint swelling. Skin: Positive for wound. Psychiatric/Behavioral: Negative. Physical Exam  Physical Exam  Vitals and nursing note reviewed. Constitutional:       General: She is not in acute distress. Appearance: Normal appearance. She is not ill-appearing, toxic-appearing or diaphoretic. HENT:      Mouth/Throat:      Mouth: Mucous membranes are moist.   Eyes:      Conjunctiva/sclera: Conjunctivae normal.   Cardiovascular:      Rate and Rhythm: Normal rate and regular rhythm. Pulses: Normal pulses.       Heart sounds: Normal heart sounds. Pulmonary:      Effort: Pulmonary effort is normal.      Breath sounds: Normal breath sounds. Musculoskeletal:      Cervical back: Normal range of motion and neck supple. Skin:     General: Skin is warm. Findings: Erythema present. Comments: Patient has 2 or 3 areas around her dorsum of her foot and her great toe which have mild erythema there is no tracking, there is no significant swelling that I note the patient states it feels swollen. Pulses are palpable sensation is intact capillary refills intact. Neurological:      Mental Status: She is alert. Psychiatric:         Mood and Affect: Mood normal.         Thought Content: Thought content normal.         Vital Signs  ED Triage Vitals [07/15/23 1814]   Temperature Pulse Respirations Blood Pressure SpO2   97.8 °F (36.6 °C) 62 20 116/80 99 %      Temp src Heart Rate Source Patient Position - Orthostatic VS BP Location FiO2 (%)   -- -- -- -- --      Pain Score       10 - Worst Possible Pain           Vitals:    07/15/23 1814   BP: 116/80   Pulse: 62         Visual Acuity      ED Medications  Medications   ketorolac (TORADOL) injection 15 mg (has no administration in time range)       Diagnostic Studies  Results Reviewed     None                 No orders to display              Procedures  Procedures         ED Course                                             Medical Decision Making  Patient was cutting her lawn and ran over hornets nest reports 2-3 stings in the dorsum of her left foot 2 on the dorsum of her left foot and 1 by her great toe in between her great toe and second toe. I could not visualize any stingers there is some localized erythema. Patient was placed on Augmentin and prednisone yesterday for a sinus infection we will continue this therapy. Patient complaining of significant pain with Will prescribe gabapentin 6 pills. Risk  Prescription drug management.           Disposition  Final diagnoses:   Insect bite of left foot, initial encounter     Time reflects when diagnosis was documented in both MDM as applicable and the Disposition within this note     Time User Action Codes Description Comment    7/15/2023  7:53 PM Roy Baumgarten Add [R26.600Z,  W57. XXXA] Insect bite of left foot, initial encounter       ED Disposition     None      Follow-up Information     Follow up With Specialties Details Why [address] [facility], [address] [facility], Nurse Practitioner In 3 days  [address] [facility]  [phone]            Patient's Medications   Discharge Prescriptions    GABAPENTIN (NEURONTIN) 300 MG CAPSULE    Take 1 capsule (300 mg total) by mouth 3 (three) times a day For post-herpetic neuralgia: Take 1 tablet on day 1,  Then take 2 tablets on day 2, Then take 3 tablets on day 3 and every day after that as instructed by your doctor. Start Date: 7/15/2023 End Date: --       Order Dose: 300 mg       Quantity: 6 capsule    Refills: 0       No discharge procedures on file.     PDMP Review       Value Time User    PDMP Reviewed  Yes 2/22/2021 11:21 AM Xiomara Gabriel [facility]	[place]          ED Provider  Electronically Signed by           Roy Baumgarten, MD  07/15/23 1954

## 2023-08-09 ENCOUNTER — OFFICE VISIT (OUTPATIENT)
Dept: NEUROLOGY | Facility: CLINIC | Age: 48
End: 2023-08-09
Payer: COMMERCIAL

## 2023-08-09 VITALS
BODY MASS INDEX: 34.72 KG/M2 | HEART RATE: 58 BPM | DIASTOLIC BLOOD PRESSURE: 60 MMHG | SYSTOLIC BLOOD PRESSURE: 118 MMHG | WEIGHT: 202.3 LBS

## 2023-08-09 DIAGNOSIS — R51.9 HEAD PAIN: Primary | ICD-10-CM

## 2023-08-09 DIAGNOSIS — G25.0 ESSENTIAL TREMOR: ICD-10-CM

## 2023-08-09 PROCEDURE — 99204 OFFICE O/P NEW MOD 45 MIN: CPT | Performed by: PSYCHIATRY & NEUROLOGY

## 2023-08-09 NOTE — PROGRESS NOTES
Patient ID: Dallas Kumar is a 50 y.o. female. Assessment/Plan:  Dallas Kumar is a 50 y.o. female with a PMHx of gastric bypass who presents today for tremor evaluation. Tremor started around 1 year ago. Tremor is a slowly progressive and is postural and action in nature in the BUE and also has a "yes-yes" head tremor. The pt does not have any parkinsonian symptoms, and her exam and consistent with a diagnosis of essential tremor. We discussed the slowly progressive nature of this condition, its pathology, and medication treatment options. Pt also reports having right occiput area, she also reports having "jerking" movements randomly and periodically in her right upper extremity. Plan  - Patient declined medication at this time, if in the future the patient requires medication could consider primidone given her sleeping issues  - Will obtain an MRI brain to evaluate any structural causes of the patient's tremor, right occiput pain, and the jerking movements that she is having  - Discussed the current impressions, and provided patient and family education  - All questions were address and patient/guardian verbalized understanding  - Pt advised to call the office with any questions of concerns  - Pt is to follow up with us as needed     Diagnoses and all orders for this visit:    Head pain  -     MRI brain without contrast; Future    Essential tremor  -     Ambulatory Referral to Neurology  -     MRI brain without contrast; Future           Subjective:  Dallas Kumar is a 50 y.o.  right handed  female with a PMHx of gastric bypass who presents today for evaluation for a tremor. Pt reports that symptoms initially started: at least 1 year ago, initial symptoms included head tremor and bilateral hand tremors. Notices it most while sitting. Worsens with stress. Effects on daily life/work: Self conscious but not significant, does drop stuff once in awhile.   Previous neurology evaluation: No  Previous medication trials: No  Current medications: Supplements due to history of gastric bypass    Regarding motor symptoms:   Tremor: Head and BUE  Slowness: No  Stiffness: No  Changes in facial expression: No  Changes in voice: No  Changes in writing: Mildly  Changes in gait: No  Falls: No  Freezing: No  Trouble with swallowing: No  Clumsiness/dexterity: Everyone in awhile she will drop something    Regarding non-motor symptoms:   Constipation: Yes, but has long history of it  Urinary sx: No  Dry eyes: No  Dry mouth: No  Lightheadedness: No  Changes in Mood: No  Memory trouble: No  Hallucinations:  No    Worsens or Improves with:  Coffee: Yes, 2x 24 oz - Does not notice any change with consumption  Alcohol: No    Pertinent Family History:   Paternal great grandmother had a tremor as did her maternal grandmother but she is not sure what kind    Pertinent social hx  Illicit Drugs: denies  Alcohol/tobacco: Denies alcohol use, Tobacco use: Smoked 1/5 packs per day for 34 years  Work: Owns a cleaning business  Education: Some college  Lives with lives with their family    Sleep   Averages: 8 hours a night  Problems falling asleep?:   No  Problems staying asleep?:  Yes, will sleep for an hour then wake up then will sleep for an hour and this type of cycle repeats itself  How often do you get up at night? Multiple times  Do you sore while asleep? Not consistently, only every once in awhile when exhausted    Previous workup:  TSH 1/2023: Normal    History obtained from patient and  as well as available medical record review. The following portions of the patient's history were reviewed and updated as appropriate: allergies, current medications, past family history, past medical history, past social history, past surgical history and problem list.     Objective:    Weight 91.8 kg (202 lb 4.8 oz), not currently breastfeeding. Physical Exam  Vitals and nursing note reviewed.    Constitutional: General: She is not in acute distress. Appearance: She is not ill-appearing, toxic-appearing or diaphoretic. HENT:      Head: Normocephalic and atraumatic. Nose: Nose normal.      Mouth/Throat:      Mouth: Mucous membranes are moist.      Pharynx: Oropharynx is clear. No oropharyngeal exudate. Eyes:      General: Lids are normal. No scleral icterus. Right eye: No discharge. Left eye: No discharge. Extraocular Movements: Extraocular movements intact. Pupils: Pupils are equal, round, and reactive to light. Cardiovascular:      Rate and Rhythm: Normal rate. Pulses: Normal pulses. Pulmonary:      Effort: Pulmonary effort is normal.   Abdominal:      General: Abdomen is flat. Musculoskeletal:         General: No swelling or deformity. Skin:     General: Skin is warm and dry. Neurological:      Mental Status: She is alert. Psychiatric:         Mood and Affect: Mood normal.         Speech: Speech normal.         Behavior: Behavior normal.         Neurological Exam  Mental Status  Alert. Recent and remote memory are intact. Speech is normal. Language is fluent with no aphasia. Attention and concentration are normal.    Cranial Nerves  CN II: Visual fields full to confrontation. CN III, IV, VI: Extraocular movements intact bilaterally. Normal lids and orbits bilaterally. Pupils equal round and reactive to light bilaterally. CN V: Facial sensation is normal.  CN VII: Full and symmetric facial movement. CN VIII: Hearing is normal.  CN IX, X: Palate elevates symmetrically  CN XI: Shoulder shrug strength is normal.  CN XII: Tongue midline without atrophy or fasciculations. Motor  Normal muscle bulk throughout. No fasciculations present. Normal muscle tone. No abnormal involuntary movements. BUE action tremor, "yes-yes" head tremor. Strength is 5/5 in all four extremities except as noted. Sensory  Light touch is normal in upper and lower extremities. Reflexes  Deep tendon reflexes are 2+ and symmetric except as noted. Coordination  Right: Finger-to-nose normal.Left: Finger-to-nose normal.    Gait  Casual gait is normal including stance, stride, and arm swing. ROS:    Review of Systems   Constitutional: Negative for appetite change, fatigue and fever. HENT: Negative. Negative for hearing loss, tinnitus, trouble swallowing and voice change. Eyes: Negative. Negative for photophobia, pain and visual disturbance. Respiratory: Negative. Negative for shortness of breath. Cardiovascular: Negative. Negative for palpitations. Gastrointestinal: Negative. Negative for nausea and vomiting. Endocrine: Negative. Negative for cold intolerance. Genitourinary: Negative. Negative for dysuria, frequency and urgency. Musculoskeletal: Positive for neck pain. Negative for back pain, gait problem and myalgias. Hip pain     Skin: Negative. Negative for rash. Allergic/Immunologic: Negative. Neurological: Positive for tremors (Head and Hands) and headaches (believes it is coming from neck pain). Negative for dizziness, seizures, syncope, facial asymmetry, speech difficulty, weakness, light-headedness and numbness. Hematological: Negative. Does not bruise/bleed easily. Psychiatric/Behavioral: Positive for sleep disturbance. Negative for confusion and hallucinations. All other systems reviewed and are negative.

## 2023-08-22 ENCOUNTER — TELEPHONE (OUTPATIENT)
Dept: HEMATOLOGY ONCOLOGY | Facility: CLINIC | Age: 48
End: 2023-08-22

## 2023-08-22 NOTE — TELEPHONE ENCOUNTER
Attempted to call the patient regarding her missed appointment today with Dr. Jd Sommers. They phone just rang and then it said the call could not be complete. Will try to send the patient a [employer] message.

## 2023-11-09 ENCOUNTER — HOSPITAL ENCOUNTER (EMERGENCY)
Facility: HOSPITAL | Age: 48
Discharge: HOME | End: 2023-11-09
Attending: EMERGENCY MEDICINE
Payer: COMMERCIAL

## 2023-11-09 ENCOUNTER — TELEPHONE (OUTPATIENT)
Dept: BARIATRICS | Facility: CLINIC | Age: 48
End: 2023-11-09

## 2023-11-09 ENCOUNTER — APPOINTMENT (EMERGENCY)
Dept: CT IMAGING | Facility: HOSPITAL | Age: 48
End: 2023-11-09
Payer: COMMERCIAL

## 2023-11-09 ENCOUNTER — APPOINTMENT (EMERGENCY)
Dept: RADIOLOGY | Facility: HOSPITAL | Age: 48
End: 2023-11-09
Payer: COMMERCIAL

## 2023-11-09 VITALS
OXYGEN SATURATION: 99 % | RESPIRATION RATE: 14 BRPM | DIASTOLIC BLOOD PRESSURE: 67 MMHG | HEART RATE: 62 BPM | SYSTOLIC BLOOD PRESSURE: 129 MMHG | TEMPERATURE: 97.1 F

## 2023-11-09 DIAGNOSIS — R10.9 ABDOMINAL PAIN: ICD-10-CM

## 2023-11-09 DIAGNOSIS — J98.4 LUNG CYST: ICD-10-CM

## 2023-11-09 DIAGNOSIS — R00.2 PALPITATIONS: Primary | ICD-10-CM

## 2023-11-09 LAB
2HR DELTA HS TROPONIN: 0 NG/L
ALBUMIN SERPL BCP-MCNC: 4 G/DL (ref 3.5–5)
ALP SERPL-CCNC: 83 U/L (ref 34–104)
ALT SERPL W P-5'-P-CCNC: 19 U/L (ref 7–52)
ANION GAP SERPL CALCULATED.3IONS-SCNC: 7 MMOL/L
AST SERPL W P-5'-P-CCNC: 21 U/L (ref 13–39)
BASOPHILS # BLD AUTO: 0.02 THOUSANDS/ÂΜL (ref 0–0.1)
BASOPHILS NFR BLD AUTO: 1 % (ref 0–1)
BILIRUB SERPL-MCNC: 0.4 MG/DL (ref 0.2–1)
BILIRUB UR QL STRIP: NEGATIVE
BNP SERPL-MCNC: 55 PG/ML (ref 0–100)
BUN SERPL-MCNC: 13 MG/DL (ref 5–25)
CALCIUM SERPL-MCNC: 9.2 MG/DL (ref 8.4–10.2)
CARDIAC TROPONIN I PNL SERPL HS: 2 NG/L
CARDIAC TROPONIN I PNL SERPL HS: 2 NG/L
CHLORIDE SERPL-SCNC: 107 MMOL/L (ref 96–108)
CLARITY UR: CLEAR
CO2 SERPL-SCNC: 28 MMOL/L (ref 21–32)
COLOR UR: YELLOW
CREAT SERPL-MCNC: 0.76 MG/DL (ref 0.6–1.3)
EOSINOPHIL # BLD AUTO: 0.09 THOUSAND/ÂΜL (ref 0–0.61)
EOSINOPHIL NFR BLD AUTO: 2 % (ref 0–6)
ERYTHROCYTE [DISTWIDTH] IN BLOOD BY AUTOMATED COUNT: 12.3 % (ref 11.6–15.1)
EXT PREGNANCY TEST URINE: NEGATIVE
EXT. CONTROL: NORMAL
GFR SERPL CREATININE-BSD FRML MDRD: 93 ML/MIN/1.73SQ M
GLUCOSE SERPL-MCNC: 104 MG/DL (ref 65–140)
GLUCOSE UR STRIP-MCNC: NEGATIVE MG/DL
HCT VFR BLD AUTO: 41.4 % (ref 34.8–46.1)
HGB BLD-MCNC: 13.2 G/DL (ref 11.5–15.4)
HGB UR QL STRIP.AUTO: NEGATIVE
IMM GRANULOCYTES # BLD AUTO: 0.01 THOUSAND/UL (ref 0–0.2)
IMM GRANULOCYTES NFR BLD AUTO: 0 % (ref 0–2)
KETONES UR STRIP-MCNC: NEGATIVE MG/DL
LEUKOCYTE ESTERASE UR QL STRIP: NEGATIVE
LIPASE SERPL-CCNC: 32 U/L (ref 11–82)
LYMPHOCYTES # BLD AUTO: 1.13 THOUSANDS/ÂΜL (ref 0.6–4.47)
LYMPHOCYTES NFR BLD AUTO: 27 % (ref 14–44)
MAGNESIUM SERPL-MCNC: 2.1 MG/DL (ref 1.9–2.7)
MCH RBC QN AUTO: 29.2 PG (ref 26.8–34.3)
MCHC RBC AUTO-ENTMCNC: 31.9 G/DL (ref 31.4–37.4)
MCV RBC AUTO: 92 FL (ref 82–98)
MONOCYTES # BLD AUTO: 0.25 THOUSAND/ÂΜL (ref 0.17–1.22)
MONOCYTES NFR BLD AUTO: 6 % (ref 4–12)
NEUTROPHILS # BLD AUTO: 2.69 THOUSANDS/ÂΜL (ref 1.85–7.62)
NEUTS SEG NFR BLD AUTO: 64 % (ref 43–75)
NITRITE UR QL STRIP: NEGATIVE
NRBC BLD AUTO-RTO: 0 /100 WBCS
PH UR STRIP.AUTO: 6 [PH]
PLATELET # BLD AUTO: 254 THOUSANDS/UL (ref 149–390)
PMV BLD AUTO: 9.5 FL (ref 8.9–12.7)
POTASSIUM SERPL-SCNC: 4 MMOL/L (ref 3.5–5.3)
PROT SERPL-MCNC: 7.1 G/DL (ref 6.4–8.4)
PROT UR STRIP-MCNC: NEGATIVE MG/DL
RBC # BLD AUTO: 4.52 MILLION/UL (ref 3.81–5.12)
SODIUM SERPL-SCNC: 142 MMOL/L (ref 135–147)
SP GR UR STRIP.AUTO: <=1.005
TSH SERPL DL<=0.05 MIU/L-ACNC: 3.14 UIU/ML (ref 0.45–4.5)
UROBILINOGEN UR QL STRIP.AUTO: 0.2 E.U./DL
WBC # BLD AUTO: 4.19 THOUSAND/UL (ref 4.31–10.16)

## 2023-11-09 PROCEDURE — 71045 X-RAY EXAM CHEST 1 VIEW: CPT

## 2023-11-09 PROCEDURE — 99285 EMERGENCY DEPT VISIT HI MDM: CPT

## 2023-11-09 PROCEDURE — 81025 URINE PREGNANCY TEST: CPT

## 2023-11-09 PROCEDURE — 36415 COLL VENOUS BLD VENIPUNCTURE: CPT

## 2023-11-09 PROCEDURE — 74177 CT ABD & PELVIS W/CONTRAST: CPT

## 2023-11-09 PROCEDURE — 84443 ASSAY THYROID STIM HORMONE: CPT

## 2023-11-09 PROCEDURE — 83880 ASSAY OF NATRIURETIC PEPTIDE: CPT

## 2023-11-09 PROCEDURE — 85025 COMPLETE CBC W/AUTO DIFF WBC: CPT

## 2023-11-09 PROCEDURE — 80053 COMPREHEN METABOLIC PANEL: CPT

## 2023-11-09 PROCEDURE — 51798 US URINE CAPACITY MEASURE: CPT

## 2023-11-09 PROCEDURE — 81003 URINALYSIS AUTO W/O SCOPE: CPT

## 2023-11-09 PROCEDURE — 83735 ASSAY OF MAGNESIUM: CPT

## 2023-11-09 PROCEDURE — 93005 ELECTROCARDIOGRAM TRACING: CPT

## 2023-11-09 PROCEDURE — 84484 ASSAY OF TROPONIN QUANT: CPT

## 2023-11-09 PROCEDURE — 83690 ASSAY OF LIPASE: CPT

## 2023-11-09 RX ORDER — SODIUM CHLORIDE 9 MG/ML
3 INJECTION INTRAVENOUS
Status: DISCONTINUED | OUTPATIENT
Start: 2023-11-09 | End: 2023-11-09 | Stop reason: HOSPADM

## 2023-11-09 RX ORDER — METRONIDAZOLE 65 MG/5G
GEL TOPICAL
COMMUNITY
Start: 2023-08-08

## 2023-11-09 RX ADMIN — IOHEXOL 100 ML: 350 INJECTION, SOLUTION INTRAVENOUS at 10:50

## 2023-11-09 NOTE — ED NOTES
Bladder scan reveals 7ml urine in bladder. Pt refusing ivf at this time.      Linda Villalobos RN  11/09/23 [id number]

## 2023-11-09 NOTE — TELEPHONE ENCOUNTER
Patient seen in the ER this morning for issues - states feeling of fullness and esophageal spasms. Patient has not been seen in office since 1/15/2019. Patient has her own cleaning business and is unable to accommodate a 60 minute appointment in office due to , patient is requesting whether an OD Annual/issue appointment could be made virtually due to issues? Please review and advise. Thank you!

## 2023-11-09 NOTE — ED PROVIDER NOTES
History  Chief Complaint   Patient presents with    Palpitations     Patient reports "fluttering" that started yesterday. Patient reports she also feels "full". Patient arriving for evaluation of full feeling in her abdomen, and a full sensation in her epigastric area, as well as for sensation of shortness of breath with exertion and palpitations. "Full" abdomen feeling has been ongoing for the past few days. Patient states that starting this morning she noted that she has palpitations. Patient states that she has also noticed that she is short of breath when she goes to climb stairs or exert herself. Patient states that she has a history of iron deficiency anemia and is secondary to her gastric bypass. Patient denies any overt abdominal pain, but states that she has had a full feeling and feels bloated. Patient denies any history of small bowel or large bowel obstruction. Patient reports that she has had gastric bypass and multiple C-sections. Patient denies any dark or tarry stools. Patient states last iron infusion was in February, follows with heme-onc for this. Patient states she noticed these episodes of palpitations and was attributing them to gastric emptying after eating. Prior to Admission Medications   Prescriptions Last Dose Informant Patient Reported? Taking?    ACCU-CHEK FASTCLIX LANCETS MISC  Self No No   Sig: by Does not apply route 2 (two) times a day   Blood Glucose Monitoring Suppl (ACCU-CHEK GUIDE) w/Device KIT  Self No No   Sig: by Does not apply route 2 (two) times a day   Lancets Misc. (ACCU-CHEK FASTCLIX LANCET) KIT  Self No No   Sig: by Does not apply route 2 (two) times a day   Misc Natural Products (APPLE CIDER VINEGAR DIET PO)  Self Yes No   Sig: Take by mouth   Multiple Vitamin (MULTIVITAMIN) tablet  Self Yes No   Sig: Take 1 tablet by mouth daily   Vitamin D, Cholecalciferol, 1000 units CAPS  Self Yes No   Sig: Take 1 tablet by mouth daily   b complex vitamins tablet  Self Yes No   Sig: Take 1 tablet by mouth daily   benzonatate (TESSALON) 200 MG capsule  Self No No   Sig: Take 1 capsule (200 mg total) by mouth 3 (three) times a day as needed for cough   calcium citrate (CALCITRATE) 950 MG tablet  Self Yes No   Sig: Take 2 tablets by mouth daily. ferrous sulfate 325 (65 Fe) mg tablet  Self Yes No   Sig: Take 325 mg by mouth daily with breakfast   fluticasone (FLONASE) 50 mcg/act nasal spray  Self No No   Sig: SPRAY 1 SPRAY INTO EACH NOSTRIL EVERY DAY   fluticasone (FLONASE) 50 mcg/act nasal spray  Self No No   Si sprays into each nostril daily   fluticasone (FLONASE) 50 mcg/act nasal spray   No No   Si sprays into each nostril daily   glucose blood (Accu-Chek Guide) test strip  Self No No   Sig: Use as instructed   vitamin A 2250 MCG (7500 UT) capsule  Self Yes No   Sig: Take 7,500 Units by mouth daily      Facility-Administered Medications: None       Past Medical History:   Diagnosis Date    Anxiety     Arthritis     Bariatric surgery status     Carpal tunnel syndrome     Chronic pain disorder     DJD (degenerative joint disease)     GERD (gastroesophageal reflux disease)     Heartburn     History of gastric bypass 2014    Hypertension     Last assessed 2014    Hypoglycemia after GI (gastrointestinal) surgery     Migraine     Obesity     Last assessed 2014    Postgastrectomy malabsorption     Restless leg syndrome     Seizures ([address])     10yrs ago "low blood sugar"    Vertigo     Wears dentures        Past Surgical History:   Procedure Laterality Date    ABDOMINOPLASTY N/A 2021    Procedure: ABDOMINOPLASTY;  Surgeon: John Lopez MD;  Location: [address] [place] [address] MAIN OR;  Service: Plastics     SECTION       SECTION N/A 2018    Procedure:  SECTION ();   Surgeon: Emily Will MD;  Location: [facility];  Service: Obstetrics    CHOLECYSTECTOMY      GASTRIC BYPASS  2014    MD CERCLAGE UTERINE CERVIX NONOBSTETRICAL N/A 12/12/2017    Procedure: CERCLAGE CERVICAL;  Surgeon: Rigoberto Caban MD;  Location: [facility];  Service: Obstetrics    TOOTH EXTRACTION         Family History   Problem Relation Age of Onset    Cancer Mother         Myosarcoma of the soft tissue    Other Mother         Total Thyroidectomy; Thyroid mass    Coronary artery disease Father     No Known Problems Sister     No Known Problems Sister     No Known Problems Daughter     No Known Problems Maternal Grandmother     No Known Problems Paternal Grandmother     No Known Problems Maternal Aunt      I have reviewed and agree with the history as documented. E-Cigarette/Vaping    E-Cigarette Use Never User      E-Cigarette/Vaping Substances     Social History     Tobacco Use    Smoking status: Every Day     Packs/day: 0.25     Types: Cigarettes    Smokeless tobacco: Never   Vaping Use    Vaping Use: Never used   Substance Use Topics    Alcohol use: No    Drug use: No       Review of Systems   Constitutional: Negative. HENT: Negative. Eyes: Negative. Respiratory:  Positive for shortness of breath. Cardiovascular:  Positive for palpitations. Negative for chest pain and leg swelling. Gastrointestinal:  Positive for abdominal pain. Negative for abdominal distention, constipation and diarrhea. Endocrine: Negative. Genitourinary: Negative. Musculoskeletal: Negative. Skin: Negative. Allergic/Immunologic: Negative. Neurological: Negative. Hematological: Negative. Psychiatric/Behavioral: Negative. All other systems reviewed and are negative. Physical Exam  Physical Exam  Vitals and nursing note reviewed. Constitutional:       Appearance: Normal appearance. She is normal weight. HENT:      Head: Normocephalic. Right Ear: External ear normal.      Left Ear: External ear normal.      Nose: Nose normal.      Mouth/Throat:      Mouth: Mucous membranes are moist.      Pharynx: Oropharynx is clear.  No oropharyngeal exudate or posterior oropharyngeal erythema. Eyes:      General:         Right eye: No discharge. Left eye: No discharge. Extraocular Movements: Extraocular movements intact. Conjunctiva/sclera: Conjunctivae normal.      Pupils: Pupils are equal, round, and reactive to light. Cardiovascular:      Rate and Rhythm: Normal rate and regular rhythm. Pulses: Normal pulses. Heart sounds: Normal heart sounds. Pulmonary:      Effort: Pulmonary effort is normal.      Breath sounds: Normal breath sounds. Abdominal:      General: Abdomen is flat. Bowel sounds are normal. There is no distension. Palpations: Abdomen is soft. There is no mass. Tenderness: There is no abdominal tenderness. There is no right CVA tenderness, left CVA tenderness, guarding or rebound. Hernia: No hernia is present. Musculoskeletal:      Cervical back: Normal range of motion and neck supple. Skin:     General: Skin is warm. Capillary Refill: Capillary refill takes less than 2 seconds. Coloration: Skin is not jaundiced. Neurological:      General: No focal deficit present. Mental Status: She is alert and oriented to person, place, and time. Mental status is at baseline. GCS: GCS eye subscore is 4. GCS verbal subscore is 5. GCS motor subscore is 6. Cranial Nerves: Cranial nerves 2-12 are intact. Sensory: Sensation is intact. Psychiatric:         Mood and Affect: Mood normal.         Behavior: Behavior normal.         Thought Content:  Thought content normal.         Judgment: Judgment normal.         Vital Signs  ED Triage Vitals   Temperature Pulse Respirations Blood Pressure SpO2   11/09/23 0840 11/09/23 0840 11/09/23 0840 11/09/23 0840 11/09/23 0840   (!) 97.1 °F (36.2 °C) 69 18 151/82 99 %      Temp Source Heart Rate Source Patient Position - Orthostatic VS BP Location FiO2 (%)   11/09/23 0840 11/09/23 0930 -- -- --   Tympanic Monitor         Pain Score       11/09/23 0840 No Pain           Vitals:    11/09/23 0840 11/09/23 0930 11/09/23 1245   BP: 151/82 129/67    Pulse: 69 60 62         Visual Acuity      ED Medications  Medications   iohexol (OMNIPAQUE) 350 MG/ML injection (MULTI-DOSE) 100 mL (100 mL Intravenous Given 11/9/23 1050)   iohexol (OMNIPAQUE) 240 MG/ML solution 50 mL (50 mL Oral Given 11/9/23 1050)       Diagnostic Studies  Results Reviewed       Procedure Component Value Units Date/Time    HS Troponin I 2hr [[id number]]  (Normal) Collected: 11/09/23 1316    Lab Status: Final result Specimen: Blood from Arm, Right Updated: 11/09/23 1403     hs TnI 2hr 2 ng/L      Delta 2hr hsTnI 0 ng/L     B-Type Natriuretic Peptide(BNP) [[id number]]  (Normal) Collected: 11/09/23 0902    Lab Status: Final result Specimen: Blood from Arm, Right Updated: 11/09/23 0946     BNP 55 pg/mL     HS Troponin 0hr (reflex protocol) [[id number]]  (Normal) Collected: 11/09/23 0859    Lab Status: Final result Specimen: Blood from Arm, Right Updated: 11/09/23 0942     hs TnI 0hr 2 ng/L     TSH, 3rd generation [[id number]]  (Normal) Collected: 11/09/23 0859    Lab Status: Final result Specimen: Blood from Arm, Right Updated: 11/09/23 0941     TSH 3RD GENERATON 3.136 uIU/mL     Magnesium [[id number]]  (Normal) Collected: 11/09/23 0859    Lab Status: Final result Specimen: Blood from Arm, Right Updated: 11/09/23 0926     Magnesium 2.1 mg/dL     Lipase [[id number]]  (Normal) Collected: 11/09/23 0859    Lab Status: Final result Specimen: Blood from Arm, Right Updated: 11/09/23 0926     Lipase 32 u/L     Comprehensive metabolic panel [[id number]] Collected: 11/09/23 0859    Lab Status: Final result Specimen: Blood from Arm, Right Updated: 11/09/23 0926     Sodium 142 mmol/L      Potassium 4.0 mmol/L      Chloride 107 mmol/L      CO2 28 mmol/L      ANION GAP 7 mmol/L      BUN 13 mg/dL      Creatinine 0.76 mg/dL      Glucose 104 mg/dL      Calcium 9.2 mg/dL      AST 21 U/L      ALT 19 U/L      Alkaline Phosphatase 83 U/L      Total Protein 7.1 g/dL      Albumin 4.0 g/dL      Total Bilirubin 0.40 mg/dL      eGFR 93 ml/min/1.73sq m     Narrative:      National Kidney Disease Foundation guidelines for Chronic Kidney Disease (CKD):     Stage 1 with normal or high GFR (GFR > 90 mL/min/1.73 square meters)    Stage 2 Mild CKD (GFR = 60-89 mL/min/1.73 square meters)    Stage 3A Moderate CKD (GFR = 45-59 mL/min/1.73 square meters)    Stage 3B Moderate CKD (GFR = 30-44 mL/min/1.73 square meters)    Stage 4 Severe CKD (GFR = 15-29 mL/min/1.73 square meters)    Stage 5 End Stage CKD (GFR <15 mL/min/1.73 square meters)  Note: GFR calculation is accurate only with a steady state creatinine    UA w Reflex to Microscopic w Reflex to Culture [[id number]]  (Abnormal) Collected: 11/09/23 0908    Lab Status: Final result Specimen: Urine, Clean Catch Updated: 11/09/23 0919     Color, UA Yellow     Clarity, UA Clear     Specific Gravity, UA <=1.005     pH, UA 6.0     Leukocytes, UA Negative     Nitrite, UA Negative     Protein, UA Negative mg/dl      Glucose, UA Negative mg/dl      Ketones, UA Negative mg/dl      Urobilinogen, UA 0.2 E.U./dl      Bilirubin, UA Negative     Occult Blood, UA Negative    POCT pregnancy, urine [[id number]]  (Normal) Resulted: 11/09/23 0915    Lab Status: Final result Updated: 11/09/23 0915     EXT Preg Test, Ur Negative     Control Valid    CBC and differential [[id number]]  (Abnormal) Collected: 11/09/23 0859    Lab Status: Final result Specimen: Blood from Arm, Right Updated: 11/09/23 0911     WBC 4.19 Thousand/uL      RBC 4.52 Million/uL      Hemoglobin 13.2 g/dL      Hematocrit 41.4 %      MCV 92 fL      MCH 29.2 pg      MCHC 31.9 g/dL      RDW 12.3 %      MPV 9.5 fL      Platelets 129 Thousands/uL      nRBC 0 /100 WBCs      Neutrophils Relative 64 %      Immat GRANS % 0 %      Lymphocytes Relative 27 %      Monocytes Relative 6 %      Eosinophils Relative 2 %      Basophils Relative 1 %      Neutrophils Absolute 2.69 Thousands/µL      Immature Grans Absolute 0.01 Thousand/uL      Lymphocytes Absolute 1.13 Thousands/µL      Monocytes Absolute 0.25 Thousand/µL      Eosinophils Absolute 0.09 Thousand/µL      Basophils Absolute 0.02 Thousands/µL                    CT abdomen pelvis with contrast   Final Result by Tiffanie Elias MD (11/09 1118)      Prominent bladder distention without wall thickening. No acute inflammatory changes in the abdomen or pelvis. Workstation performed: [id number]         X-ray chest 1 view portable   Final Result by Michelle Kuhn MD (11/09 1012)      No acute cardiopulmonary disease.                   Workstation performed: [id number]                    Procedures  ECG 12 Lead Documentation Only    Date/Time: 11/9/2023 8:44 AM    Performed by: MAXIMINO Nicholas  Authorized by: MAXIMINO Nicholas    Indications / Diagnosis:  Palpitations  ECG reviewed by me, the ED Provider: yes    Patient location:  ED  Interpretation:     Interpretation: normal    Rate:     ECG rate:  64    ECG rate assessment: normal    Rhythm:     Rhythm: sinus rhythm    Ectopy:     Ectopy: none    QRS:     QRS axis:  Normal  Conduction:     Conduction: normal    ST segments:     ST segments:  Normal  T waves:     T waves: normal    ECG 12 Lead Documentation Only    Date/Time: 11/9/2023 11:00 AM    Performed by: MAXIMINO Nicholas  Authorized by: MAXIMINO Nicholas    Indications / Diagnosis:  Repeat 2H  ECG reviewed by me, the ED Provider: yes    Patient location:  ED  Interpretation:     Interpretation: normal    Rate:     ECG rate:  46    ECG rate assessment: normal    Rhythm:     Rhythm: sinus bradycardia    Ectopy:     Ectopy: none    QRS:     QRS axis:  Normal  Conduction:     Conduction: normal    ST segments:     ST segments:  Normal  T waves:     T waves: normal             ED Course  ED Course as of 11/09/23 1834   Thu Nov 09, 2023   0932 CBC and differential(!)  No anemia, mild leukopenia noted. 0932 UA w Reflex to Microscopic w Reflex to Culture(!)  No evidence of UTI.    0943 TSH 3RD GENERATON: 3.136  WNL   0943 Comprehensive metabolic panel  No charissa, no electrolyte abnormality, no hyper/hypoglycemia, LFT WNL. 1204 Per RN 7 mL on post void at this time. [address] [facility] hsTnI: 0             HEART Risk Score      Flowsheet Row Most Recent Value   Heart Score Risk Calculator    History 0 Filed at: 11/09/2023 1408   ECG 0 Filed at: 11/09/2023 1408   Age 1 Filed at: 11/09/2023 1408   Risk Factors 1 Filed at: 11/09/2023 1408   Troponin 0 Filed at: 11/09/2023 1408   HEART Score 2 Filed at: 11/09/2023 1408                  PERC Rule for PE      Flowsheet Row Most Recent Value   PERC Rule for PE    Age >=50 0 Filed at: 11/09/2023 0853   HR >=100 0 Filed at: 11/09/2023 0853   O2 Sat on room air < 95% 0 Filed at: 11/09/2023 0853   History of PE or DVT 0 Filed at: 11/09/2023 0290   Recent trauma or surgery 0 Filed at: 11/09/2023 0853   Hemoptysis 0 Filed at: 11/09/2023 2601   Exogenous estrogen 0 Filed at: 11/09/2023 0853   Unilateral leg swelling 0 Filed at: 11/09/2023 0853   PERC Rule for PE Results 0 Filed at: 11/09/2023 8929                SBIRT 22yo+      Flowsheet Row Most Recent Value   Initial Alcohol Screen: US AUDIT-C     1. How often do you have a drink containing alcohol? 0 Filed at: 11/09/2023 0840   2. How many drinks containing alcohol do you have on a typical day you are drinking? 0 Filed at: 11/09/2023 0840   3a. Male UNDER 65: How often do you have five or more drinks on one occasion? 0 Filed at: 11/09/2023 0840   3b. FEMALE Any Age, or MALE 65+: How often do you have 4 or more drinks on one occassion? 0 Filed at: 11/09/2023 0840   Audit-C Score 0 Filed at: 11/09/2023 0840   ELMO: How many times in the past year have you. .. Used an illegal drug or used a prescription medication for non-medical reasons?  Never Filed at: 11/09/2023 0840 Medical Decision Making  Differential diagnosis including thyroid disorder, electrolyte abnormality, anemia, small bowel obstruction  Patient arriving for evaluation of full, bloated feeling, epigastric bloated feeling as well as palpitations, and shortness of breath with exertion. Patient noticing palpations are correlated just after eating. Patient reporting increase bloated feeling over the past couple days. Patient has a history of gastric bypass surgery. Will check cardiac work up as well as CT a/p. Patient PERC negative. Lab work as documented in course notes. BNP is negative, no acute findings on chest xray. Had 2 troponins within delta of 0. Heart score of 2. Patient is stable for outpatient follow up as ACS ruled out. Patient provided with cardiology follow up. Reviewed with patient to follow up with her bariatric surgeon of "full" feeling and feelings of discomfort after eating. CT findings appreciated, patient had a bladder scan of 7 mL after urinating. No evidence of UTI on UA. Discussed finding with patient, although appears to have completely emptied her bladder after CT scan. TSH WNL. Reviewed incidental finding of lung cyst and to follow up with PCP. Discussed that without appropriate follow up diagnosis such as but not limited to cancer can be missed. Patient stable for discharge. Patient tolerating PO. Amount and/or Complexity of Data Reviewed  Labs: ordered. Decision-making details documented in ED Course. Radiology: ordered. Risk  Prescription drug management.              Disposition  Final diagnoses:   Palpitations   Abdominal pain   Lung cyst     Time reflects when diagnosis was documented in both MDM as applicable and the Disposition within this note       Time User Action Codes Description Comment    11/9/2023  2:08 PM Gisela Johne Add [R00.2] Palpitations     11/9/2023  2:08 PM Gisela Dremonteze Add [R10.9] Abdominal pain     11/9/2023  2:15 PM Garland Mayo [address] [facility] [J82.2] Lung cyst           ED Disposition       ED Disposition   Discharge    Condition   Stable    Date/Time   Thu Nov 9, 2023 [address] discharge to home/self care. Follow-up Information       Follow up With Specialties Details Why Contact Info Additional [address], [address], Nurse Practitioner Schedule an appointment as soon as possible for a visit in 2 days For further evaluation of symptoms [address]  [address] [place]       [address] [facility] Cardiology Schedule an appointment as soon as possible for a visit in 1 day For further evaluation of symptoms [address] [facility] [id number]  [address] [facility] Cardiology 201 [facility], [address], [place], [place], [address], 300 [facility] Emergency Department Emergency Medicine Go to  If symptoms worsen [address] [phone] 2500 [facility] Emergency Department, [address] [facility], 800 [facility]            Discharge Medication List as of 11/9/2023  2:16 PM        CONTINUE these medications which have NOT CHANGED    Details   ACCU-CHEK FASTCLIX LANCETS MISC by Does not apply route 2 (two) times a day, Starting Tue 2/26/2019, Normal      b complex vitamins tablet Take 1 tablet by mouth daily, Historical Med      benzonatate (TESSALON) 200 MG capsule Take 1 capsule (200 mg total) by mouth 3 (three) times a day as needed for cough, Starting Tue 12/13/2022, Normal      Blood Glucose Monitoring Suppl (ACCU-CHEK GUIDE) w/Device KIT by Does not apply route 2 (two) times a day, Starting Tue 2/26/2019, Normal      calcium citrate (CALCITRATE) 950 MG tablet Take 2 tablets by mouth daily. , Historical Med      ferrous sulfate 325 (65 Fe) mg tablet Take 325 mg by mouth daily with breakfast, Historical Med      !! fluticasone (FLONASE) 50 mcg/act nasal spray SPRAY 1 SPRAY INTO EACH NOSTRIL EVERY DAY, Normal      !! fluticasone (FLONASE) 50 mcg/act nasal spray 2 sprays into each nostril daily, Starting Tue 12/13/2022, Normal      !! fluticasone (FLONASE) 50 mcg/act nasal spray 2 sprays into each nostril daily, Starting Sun 7/9/2023, Normal      glucose blood (Accu-Chek Guide) test strip Use as instructed, Normal      Lancets Misc. (ACCU-CHEK FASTCLIX LANCET) KIT by Does not apply route 2 (two) times a day, Starting Tue 2/26/2019, Normal      Misc Natural Products (APPLE CIDER VINEGAR DIET PO) Take by mouth, Historical Med      Multiple Vitamin (MULTIVITAMIN) tablet Take 1 tablet by mouth daily, Historical Med      vitamin A 2250 MCG (7500 UT) capsule Take 7,500 Units by mouth daily, Historical Med      Vitamin D, Cholecalciferol, 1000 units CAPS Take 1 tablet by mouth daily, Historical Med       !! - Potential duplicate medications found. Please discuss with provider.               PDMP Review         Value Time User    PDMP Reviewed  Yes 2/22/2021 11:21 AM Stoney Medel, [place]            ED Provider  Electronically Signed by             MAXIMINO Ding  11/09/23 [address] [place], [place] [address]tian Beard  11/09/23 7245

## 2023-11-09 NOTE — DISCHARGE INSTRUCTIONS
Follow up with your gastric surgeon for your abdominal full feeling, and the sensation after eating. .  Follow-up with cardiology for the palpitations. A referral was placed. Number listed below. Follow-up with your PCP for seen lung cyst, without proper follow-up diagnosis such as but not limited to cancer can be missed.

## 2023-11-10 LAB
ATRIAL RATE: 46 BPM
ATRIAL RATE: 47 BPM
ATRIAL RATE: 51 BPM
ATRIAL RATE: 64 BPM
P AXIS: 37 DEGREES
P AXIS: 47 DEGREES
P AXIS: 47 DEGREES
P AXIS: 51 DEGREES
PR INTERVAL: 184 MS
PR INTERVAL: 186 MS
PR INTERVAL: 190 MS
PR INTERVAL: 196 MS
QRS AXIS: 66 DEGREES
QRS AXIS: 74 DEGREES
QRS AXIS: 75 DEGREES
QRS AXIS: 76 DEGREES
QRSD INTERVAL: 88 MS
QRSD INTERVAL: 88 MS
QRSD INTERVAL: 92 MS
QRSD INTERVAL: 96 MS
QT INTERVAL: 388 MS
QT INTERVAL: 412 MS
QT INTERVAL: 436 MS
QT INTERVAL: 442 MS
QTC INTERVAL: 379 MS
QTC INTERVAL: 381 MS
QTC INTERVAL: 391 MS
QTC INTERVAL: 400 MS
T WAVE AXIS: 35 DEGREES
T WAVE AXIS: 41 DEGREES
T WAVE AXIS: 43 DEGREES
T WAVE AXIS: 49 DEGREES
VENTRICULAR RATE: 46 BPM
VENTRICULAR RATE: 47 BPM
VENTRICULAR RATE: 51 BPM
VENTRICULAR RATE: 64 BPM

## 2023-11-10 PROCEDURE — 93010 ELECTROCARDIOGRAM REPORT: CPT | Performed by: INTERNAL MEDICINE

## 2023-11-13 ENCOUNTER — CONSULT (OUTPATIENT)
Dept: PULMONOLOGY | Facility: CLINIC | Age: 48
End: 2023-11-13
Payer: COMMERCIAL

## 2023-11-13 VITALS
HEIGHT: 64 IN | BODY MASS INDEX: 35.34 KG/M2 | TEMPERATURE: 97.8 F | OXYGEN SATURATION: 98 % | WEIGHT: 207 LBS | HEART RATE: 70 BPM | SYSTOLIC BLOOD PRESSURE: 128 MMHG | DIASTOLIC BLOOD PRESSURE: 72 MMHG

## 2023-11-13 DIAGNOSIS — J98.4 MULTIPLE IDIOPATHIC CYSTS OF LUNG: Primary | ICD-10-CM

## 2023-11-13 DIAGNOSIS — E66.09 CLASS 2 OBESITY DUE TO EXCESS CALORIES WITHOUT SERIOUS COMORBIDITY WITH BODY MASS INDEX (BMI) OF 35.0 TO 35.9 IN ADULT: ICD-10-CM

## 2023-11-13 DIAGNOSIS — R06.09 DOE (DYSPNEA ON EXERTION): ICD-10-CM

## 2023-11-13 PROCEDURE — 99244 OFF/OP CNSLTJ NEW/EST MOD 40: CPT | Performed by: INTERNAL MEDICINE
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
## 2023-11-17 ENCOUNTER — OFFICE VISIT (OUTPATIENT)
Dept: INTERNAL MEDICINE CLINIC | Facility: CLINIC | Age: 48
End: 2023-11-17
Payer: COMMERCIAL

## 2023-11-17 VITALS
DIASTOLIC BLOOD PRESSURE: 74 MMHG | OXYGEN SATURATION: 98 % | WEIGHT: 209.6 LBS | TEMPERATURE: 98.5 F | HEART RATE: 75 BPM | HEIGHT: 64 IN | BODY MASS INDEX: 35.78 KG/M2 | SYSTOLIC BLOOD PRESSURE: 128 MMHG

## 2023-11-17 DIAGNOSIS — J98.4 LUNG CYST: Primary | ICD-10-CM

## 2023-11-17 DIAGNOSIS — R42 LIGHTHEADEDNESS: ICD-10-CM

## 2023-11-17 PROBLEM — R39.9 UTI SYMPTOMS: Status: RESOLVED | Noted: 2023-02-10 | Resolved: 2023-11-17

## 2023-11-17 PROCEDURE — 99213 OFFICE O/P EST LOW 20 MIN: CPT | Performed by: NURSE PRACTITIONER

## 2023-11-17 NOTE — PROGRESS NOTES
Name: Hobart Landau      : 1975      MRN: [id number]  Encounter Provider: MAXIMINO Gomes  Encounter Date: 2023   Encounter department: [address] stable. BP and HR stable. Did advise drinking more fluids during the day and stay well hydrated. Did advise checking her BP and sugar when out. Will follow up in the new year for her wellness. 1. Lung cyst    2. Demetria Thakkar is for an ER follow up. She was in the ER on the  with abdominal fullness and discomfort. She states she is feeling much better and her daughter recently did have a GI bug and they all have been feeling under the weather. She at times when is out shopping is having some lightheadedness and feels very warm. She has not checked her BP or sugar when out. She states it did happen a couple of times. She denies any fever. She did have an EKG done and did have imaging done showing lung cysts. She did see pulmonary and she is having PFTs done and a CT of the chest. She offers no other issues. Review of Systems   Gastrointestinal:  Positive for diarrhea and nausea. All other systems reviewed and are negative. Current Outpatient Medications on File Prior to Visit   Medication Sig    ACCU-CHEK FASTCLIX LANCETS MISC by Does not apply route 2 (two) times a day    b complex vitamins tablet Take 1 tablet by mouth daily    Blood Glucose Monitoring Suppl (ACCU-CHEK GUIDE) w/Device KIT by Does not apply route 2 (two) times a day    calcium citrate (CALCITRATE) 950 MG tablet Take 2 tablets by mouth daily.     ferrous sulfate 325 (65 Fe) mg tablet Take 325 mg by mouth daily with breakfast    fluticasone (FLONASE) 50 mcg/act nasal spray 2 sprays into each nostril daily    glucose blood (Accu-Chek Guide) test strip Use as instructed    Lancets Misc. (ACCU-CHEK FASTCLIX LANCET) KIT by Does not apply route 2 (two) times a day    Multiple Vitamin (MULTIVITAMIN) tablet Take 1 tablet by mouth daily    vitamin A 2250 MCG (7500 UT) capsule Take 7,500 Units by mouth daily    Vitamin D, Cholecalciferol, 1000 units CAPS Take 1 tablet by mouth daily    benzonatate (TESSALON) 200 MG capsule Take 1 capsule (200 mg total) by mouth 3 (three) times a day as needed for cough (Patient not taking: Reported on 11/17/2023)    Nuvessa 1.3 % GEL  (Patient not taking: Reported on 11/13/2023)    [DISCONTINUED] fluticasone (FLONASE) 50 mcg/act nasal spray SPRAY 1 SPRAY INTO EACH NOSTRIL EVERY DAY (Patient not taking: Reported on 11/13/2023)    [DISCONTINUED] fluticasone (FLONASE) 50 mcg/act nasal spray 2 sprays into each nostril daily (Patient not taking: Reported on 11/13/2023)    [DISCONTINUED] Misc Natural Products (APPLE CIDER VINEGAR DIET PO) Take by mouth (Patient not taking: Reported on 11/17/2023)       Objective     /74   Pulse 75   Temp 98.5 °F (36.9 °C) (Temporal)   Ht 5' 4" (1.626 m)   Wt 95.1 kg (209 lb 9.6 oz)   SpO2 98%   BMI 35.98 kg/m²     Physical Exam  Vitals reviewed. Constitutional:       Appearance: Normal appearance. She is normal weight. HENT:      Head: Normocephalic and atraumatic. Right Ear: Tympanic membrane, ear canal and external ear normal.      Left Ear: Tympanic membrane, ear canal and external ear normal.      Nose: Nose normal.      Mouth/Throat:      Mouth: Mucous membranes are moist.      Pharynx: Oropharynx is clear. Eyes:      Extraocular Movements: Extraocular movements intact. Conjunctiva/sclera: Conjunctivae normal.      Pupils: Pupils are equal, round, and reactive to light. Cardiovascular:      Rate and Rhythm: Normal rate and regular rhythm. Pulses: Normal pulses. Heart sounds: Normal heart sounds. Pulmonary:      Effort: Pulmonary effort is normal.      Breath sounds: Normal breath sounds. Abdominal:      General: Abdomen is flat.  Bowel sounds are normal.      Palpations: Abdomen is soft.   Skin:     General: Skin is warm and dry. Capillary Refill: Capillary refill takes less than 2 seconds. Neurological:      General: No focal deficit present. Mental Status: She is alert and oriented to person, place, and time. Mental status is at baseline. Psychiatric:         Mood and Affect: Mood normal.         Behavior: Behavior normal.         Thought Content:  Thought content normal.         Judgment: Judgment normal.       Reyes Lowing, CRNP

## 2023-11-27 ENCOUNTER — OFFICE VISIT (OUTPATIENT)
Dept: BARIATRICS | Facility: CLINIC | Age: 48
End: 2023-11-27
Payer: COMMERCIAL

## 2023-11-27 VITALS
DIASTOLIC BLOOD PRESSURE: 74 MMHG | BODY MASS INDEX: 33.99 KG/M2 | SYSTOLIC BLOOD PRESSURE: 122 MMHG | HEIGHT: 65 IN | WEIGHT: 204 LBS | TEMPERATURE: 97.5 F | HEART RATE: 89 BPM

## 2023-11-27 DIAGNOSIS — R10.13 EPIGASTRIC PRESSURE: ICD-10-CM

## 2023-11-27 DIAGNOSIS — R63.5 ABNORMAL WEIGHT GAIN: ICD-10-CM

## 2023-11-27 DIAGNOSIS — E66.9 OBESITY, CLASS I, BMI 30-34.9: ICD-10-CM

## 2023-11-27 DIAGNOSIS — Z98.84 BARIATRIC SURGERY STATUS: ICD-10-CM

## 2023-11-27 DIAGNOSIS — K91.2 POSTSURGICAL MALABSORPTION: ICD-10-CM

## 2023-11-27 DIAGNOSIS — Z48.815 ENCOUNTER FOR SURGICAL AFTERCARE FOLLOWING SURGERY OF DIGESTIVE SYSTEM: Primary | ICD-10-CM

## 2023-11-27 PROCEDURE — 99203 OFFICE O/P NEW LOW 30 MIN: CPT | Performed by: NURSE PRACTITIONER

## 2023-11-27 RX ORDER — OMEPRAZOLE 20 MG/1
20 CAPSULE, DELAYED RELEASE ORAL 2 TIMES DAILY
Qty: 60 CAPSULE | Refills: 3 | Status: SHIPPED | OUTPATIENT
Start: 2023-11-27

## 2023-11-27 NOTE — PROGRESS NOTES
Assessment/Plan:     Patient ID: Isabela Garcia is a 50 y.o. female. Bariatric Surgery Status/Epigastric pressure    -s/p Iglesia-En-Y Gastric Bypass with Dr. Nereida Ruvalcaba on 12/16/2014. Presents to the office today for OD annual. Overall doing fair. Here with concerns with increase fullness and bloating with associated feelings of "bubbling and fluttering. " Symptoms started a few months ago, and has been intermittent. Worsens with food intake but unsure what triggers this. She did went to the ER on 11/09/2023 as she thought it was cardiac related. Cardiac testing all negative. Had CT scan of chest/abdominal region with no acute inflammatory changes in the abdomen. She is currently taking omeprazole PRN and notices with omeprazole use her symptoms improve. Denies any abdominal pain, N/V, regurgitation, reflux or dysphagia. Chronically has diarrhea and constipation on and off intermittently. - smoking intermittently about 5 cigarettes per day; on and off throughout the months. - Takes NSAIDs occasionally. - coffee - about 16 oz - 20 oz of caffeine coffee. - occasional steroid use for ear infections. - denies ETOH use. Abnormal weight gain - having weight gain after pregnancy a few years ago. She is interested in help with furthering weight loss. Eats her proteins, currently does not track caloric intake or perform 30/60 minute rule. 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 PLAN:     - may suggest ulcer/gastritis. Discussed smoking cessation education and ulcer risks associated with smoking, caffeine use, NSAIDs use, and prednisone use. Recommended to stop smoking, cut down caffeine, stop NSAIDs use and steroids (only use if warranted)  - start on omeprazole 20 mg PO BID for now. Will plan to taper to once daily at next visit. - UGI to evaluate anatomy.   - Routine follow up in 6 weeks to reevalaute her epigastric pressure/fullness.  If symptoms persist will consider EGD for further evaluation.   - Recommended to food log and track if symptoms worsen with certain food. - Continue with healthy lifestyle, adequate protein intake of 60 gm, fluid intake of at least 64 oz. Encouraged starting 30/30 minute rule. - Refer to [facility] to help further assist with weight loss. - Continue with MVI daily. - Activity as tolerated. - Labs ordered and will adjust accordingly if any deficiency. - Follow up with RD and SW as needed. Continued/Maintain healthy weight loss with good nutrition intakes. Adequate hydration with at least 64oz. fluid intake. Follow diet as discussed. Follow vitamin and mineral recommendations as reviewed with you. Exercise as tolerated. Colonoscopy referral made: due - has cologaleonardo ordered  Mammogram - UTD    Follow-up in 6 weeks. We kindly ask that your arrive 15 minutes before your scheduled appointment time with your provider to allow our staff to room you, get your vital signs and update your chart. Get lab work done prior to annual visit. Please call the office if you need a script. It is recommended to check with your insurance BEFORE getting labs done to make sure they are covered by your policy. Call our office if you have any problems with abdominal pain especially associated with fever, chills, nausea, vomiting or any other concerns. All  Post-bariatric surgery patients should be aware that very small quantities of any alcohol can cause impairment and it is very possible not to feel the effect. The effect can be in the system for several hours. It is also a stomach irritant. It is advised to AVOID alcohol, Nonsteroidal antiinflammatory drugs (NSAIDS) and nicotine of all forms . Any of these can cause stomach irritation/pain. Discussed the effects of alcohol on a bariatric patient and the increased impairment risk. Keep up the good work!      Postsurgical Malabsorption   -At risk for malabsorption of vitamins/minerals secondary to malabsorption and restriction of intake from bariatric surgery  -NOT Currently taking adequate postop bariatric surgery vitamin supplementation  - Sees hematology for low iron levels. -Next set of bariatric labs ordered for approximately 2 weeks  -Patient received education about the importance of adhering to a lifelong supplementation regimen to avoid vitamin/mineral deficiencies      Diagnoses and all orders for this visit:    Encounter for surgical aftercare following surgery of digestive system  -     omeprazole (PriLOSEC) 20 mg delayed release capsule; Take 1 capsule (20 mg total) by mouth 2 (two) times a day  -     CBC and Platelet; Future  -     PTH, intact; Future  -     TIBC Panel (incl. Iron, TIBC, % Iron Saturation); Future  -     Vitamin A; Future  -     Vitamin B1, whole blood; Future  -     Vitamin D 25 hydroxy; Future  -     Zinc; Future    Bariatric surgery status  -     FL UPPER GI UGI; Future  -     omeprazole (PriLOSEC) 20 mg delayed release capsule; Take 1 capsule (20 mg total) by mouth 2 (two) times a day  -     CBC and Platelet; Future  -     PTH, intact; Future  -     TIBC Panel (incl. Iron, TIBC, % Iron Saturation); Future  -     Vitamin A; Future  -     Vitamin B1, whole blood; Future  -     Vitamin D 25 hydroxy; Future  -     Zinc; Future    Postsurgical malabsorption  -     CBC and Platelet; Future  -     PTH, intact; Future  -     TIBC Panel (incl. Iron, TIBC, % Iron Saturation); Future  -     Vitamin A; Future  -     Vitamin B1, whole blood; Future  -     Vitamin D 25 hydroxy; Future  -     Zinc; Future    Obesity, Class I, BMI 30-34.9  -     CBC and Platelet; Future  -     PTH, intact; Future  -     TIBC Panel (incl. Iron, TIBC, % Iron Saturation); Future  -     Vitamin A; Future  -     Vitamin B1, whole blood; Future  -     Vitamin D 25 hydroxy;  Future  -     Zinc; Future    Epigastric pressure  -     FL UPPER GI UGI; Future  -     omeprazole (PriLOSEC) 20 mg delayed release capsule; Take 1 capsule (20 mg total) by mouth 2 (two) times a day    Abnormal weight gain         Subjective:      Patient ID: Kb Thurman is a 50 y.o. female. -s/p Iglesia-En-Y Gastric Bypass with Dr. Ana Paula Rosado on 12/16/2014. Presents to the office today for OD annual. Overall doing fair. Here with concerns with increase fullness and bloating with associated feelings of "bubbling and fluttering. "    Initial: 288 lbs  Current: 204 lbs  EWL: (Weight loss is ahead of schedule at this post surgical period.)  Erlin: 160 lbs   Current BMI is Body mass index is 33.95 kg/m². Tolerating a regular diet-yes  Eating at least 60 grams of protein per day-yes  Following 30/60 minute rule with liquids-no - advised to start this. Drinking at least 64 ounces of fluid per day-yes  Drinking carbonated beverages-no  Sufficient exercise-no but is very active with her job. Using NSAIDs regularly-occasionally. Using nicotine-yes - occasionally - 5 cigarettes per day but her smoking habits are not consistent. Using alcohol-no  Supplements:  regular women's one a day, iron, Vitamin D3 , B-complex. - sees hematology for iron infusions; due for labs    EWL is 62%, which places the patient ahead of schedule for expected post surgical weight loss at this time. The following portions of the patient's history were reviewed and updated as appropriate: allergies, current medications, past family history, past medical history, past social history, past surgical history and problem list.    Review of Systems   Constitutional:  Positive for fatigue and unexpected weight change. Respiratory: Negative. Cardiovascular: Negative. Gastrointestinal:  Positive for constipation and diarrhea. Epigastric pressure and increase fullness with associated bloating   Musculoskeletal: Negative. Neurological: Negative. Psychiatric/Behavioral: Negative.            Objective:    /74   Pulse 89   Temp 97.5 °F (36.4 °C) (Tympanic) Ht 5' 5" (1.651 m)   Wt 92.5 kg (204 lb)   BMI 33.95 kg/m²      Physical Exam  Vitals and nursing note reviewed. Constitutional:       Appearance: Normal appearance. She is obese. Cardiovascular:      Rate and Rhythm: Normal rate and regular rhythm. Pulses: Normal pulses. Heart sounds: Normal heart sounds. Pulmonary:      Effort: Pulmonary effort is normal.      Breath sounds: Normal breath sounds. Abdominal:      General: Bowel sounds are normal.      Palpations: Abdomen is soft. Tenderness: There is no abdominal tenderness. Musculoskeletal:         General: Normal range of motion. Skin:     General: Skin is warm and dry. Neurological:      General: No focal deficit present. Mental Status: She is alert and oriented to person, place, and time. Psychiatric:         Mood and Affect: Mood normal.         Behavior: Behavior normal.         Thought Content:  Thought content normal.         Judgment: Judgment normal.

## 2023-12-01 ENCOUNTER — APPOINTMENT (OUTPATIENT)
Dept: LAB | Facility: CLINIC | Age: 48
End: 2023-12-01
Payer: COMMERCIAL

## 2023-12-01 DIAGNOSIS — D50.8 OTHER IRON DEFICIENCY ANEMIA: ICD-10-CM

## 2023-12-01 DIAGNOSIS — K91.2 POSTSURGICAL MALABSORPTION: ICD-10-CM

## 2023-12-01 DIAGNOSIS — Z98.84 BARIATRIC SURGERY STATUS: ICD-10-CM

## 2023-12-01 DIAGNOSIS — E53.8 VITAMIN B 12 DEFICIENCY: ICD-10-CM

## 2023-12-01 DIAGNOSIS — E66.9 OBESITY, CLASS I, BMI 30-34.9: ICD-10-CM

## 2023-12-01 DIAGNOSIS — Z48.815 ENCOUNTER FOR SURGICAL AFTERCARE FOLLOWING SURGERY OF DIGESTIVE SYSTEM: ICD-10-CM

## 2023-12-01 LAB
25(OH)D3 SERPL-MCNC: 48.1 NG/ML (ref 30–100)
ERYTHROCYTE [DISTWIDTH] IN BLOOD BY AUTOMATED COUNT: 12.5 % (ref 11.6–15.1)
HCT VFR BLD AUTO: 43.8 % (ref 34.8–46.1)
HGB BLD-MCNC: 14 G/DL (ref 11.5–15.4)
IRON SATN MFR SERPL: 14 % (ref 15–50)
IRON SERPL-MCNC: 48 UG/DL (ref 50–212)
MCH RBC QN AUTO: 28.9 PG (ref 26.8–34.3)
MCHC RBC AUTO-ENTMCNC: 32 G/DL (ref 31.4–37.4)
MCV RBC AUTO: 90 FL (ref 82–98)
PLATELET # BLD AUTO: 285 THOUSANDS/UL (ref 149–390)
PMV BLD AUTO: 9.8 FL (ref 8.9–12.7)
PTH-INTACT SERPL-MCNC: 56.5 PG/ML (ref 12–88)
RBC # BLD AUTO: 4.85 MILLION/UL (ref 3.81–5.12)
TIBC SERPL-MCNC: 355 UG/DL (ref 250–450)
UIBC SERPL-MCNC: 307 UG/DL (ref 155–355)
WBC # BLD AUTO: 4.21 THOUSAND/UL (ref 4.31–10.16)

## 2023-12-01 PROCEDURE — 83550 IRON BINDING TEST: CPT

## 2023-12-01 PROCEDURE — 83540 ASSAY OF IRON: CPT

## 2023-12-01 PROCEDURE — 82306 VITAMIN D 25 HYDROXY: CPT

## 2023-12-01 PROCEDURE — 83970 ASSAY OF PARATHORMONE: CPT

## 2023-12-01 PROCEDURE — 85027 COMPLETE CBC AUTOMATED: CPT

## 2023-12-01 PROCEDURE — 84630 ASSAY OF ZINC: CPT

## 2023-12-01 PROCEDURE — 84425 ASSAY OF VITAMIN B-1: CPT

## 2023-12-01 PROCEDURE — 84590 ASSAY OF VITAMIN A: CPT

## 2023-12-01 PROCEDURE — 36415 COLL VENOUS BLD VENIPUNCTURE: CPT

## 2023-12-05 ENCOUNTER — VBI (OUTPATIENT)
Dept: ADMINISTRATIVE | Facility: OTHER | Age: 48
End: 2023-12-05

## 2023-12-06 LAB
VIT A SERPL-MCNC: 52.5 UG/DL (ref 20.1–62)
VIT B1 BLD-SCNC: 138.1 NMOL/L (ref 66.5–200)

## 2023-12-12 LAB — ZINC SERPL-MCNC: 85 UG/DL (ref 44–115)

## 2024-01-18 ENCOUNTER — TELEPHONE (OUTPATIENT)
Dept: INTERNAL MEDICINE CLINIC | Facility: CLINIC | Age: 49
End: 2024-01-18

## 2024-01-18 DIAGNOSIS — R39.9 UTI SYMPTOMS: Primary | ICD-10-CM

## 2024-01-18 NOTE — TELEPHONE ENCOUNTER
Patient called stating that she thinks she has a UTI.  Symptoms:  pressure, back pain, cloudy urine x2days.

## 2024-02-17 ENCOUNTER — OFFICE VISIT (OUTPATIENT)
Dept: URGENT CARE | Facility: CLINIC | Age: 49
End: 2024-02-17
Payer: COMMERCIAL

## 2024-02-17 VITALS
TEMPERATURE: 97.5 F | RESPIRATION RATE: 18 BRPM | DIASTOLIC BLOOD PRESSURE: 68 MMHG | SYSTOLIC BLOOD PRESSURE: 119 MMHG | OXYGEN SATURATION: 98 % | HEART RATE: 77 BPM

## 2024-02-17 DIAGNOSIS — J02.9 SORE THROAT: Primary | ICD-10-CM

## 2024-02-17 DIAGNOSIS — H92.01 OTALGIA OF RIGHT EAR: ICD-10-CM

## 2024-02-17 LAB — S PYO AG THROAT QL: NEGATIVE

## 2024-02-17 PROCEDURE — 99213 OFFICE O/P EST LOW 20 MIN: CPT | Performed by: PHYSICIAN ASSISTANT

## 2024-02-17 PROCEDURE — 87880 STREP A ASSAY W/OPTIC: CPT | Performed by: PHYSICIAN ASSISTANT

## 2024-02-17 NOTE — PROGRESS NOTES
[facility]        NAME: Catherine Terry is a 48 y.o. female  : 1975    MRN: [id number]  DATE: 2024  TIME: 9:03 AM    Assessment and Plan   Sore throat [J02.9]  1. Sore throat  POCT rapid strep A      2. Otalgia of right ear              Patient Instructions     Patient Instructions   Rapid strep negative.  Discussed symptoms are most likely viral in nature and to continue supportive care.  Continue over-the-counter Tylenol and discussed over-the-counter antihistamines.  Can restart prescribed Flonase.  All patient's questions answered and is agreeable with this plan.      Follow up with PCP in 3-5 days.  Proceed to  ER if symptoms worsen.    Chief Complaint     Chief Complaint   Patient presents with    Earache     Pt c/o right ear pain, sore throat and fatigue sicne Monday.          History of Present Illness       Patient is a 48-year-old female presenting today with sore throat and ear pain x 3 days.  Patient is also here with her daughter who is being evaluated as well.  Notes that over the last few days she has had a slight sore throat, congestion and right ear discomfort.  Has been taking Tylenol Cold and sinus but notes no real change or improvement of her symptoms.  Notes that her daughter is currently being treated for strep and wants to make sure she does not have it as well.  Denies fever, ear discharge or drainage, hearing loss, trouble swallowing, voice change.        Review of Systems   Review of Systems   Constitutional:  Negative for chills, fatigue and fever.   HENT:  Positive for congestion, ear pain and sore throat. Negative for postnasal drip.    Eyes:  Negative for redness and itching.   Respiratory:  Negative for cough, chest tightness and shortness of breath.    Cardiovascular:  Negative for chest pain.   Gastrointestinal:  Negative for diarrhea, nausea and vomiting.   Musculoskeletal:  Negative for arthralgias and myalgias.   Neurological:  Positive for  headaches. Negative for light-headedness.         Current Medications       Current Outpatient Medications:     ACCU-CHEK FASTCLIX LANCETS MISC, by Does not apply route 2 (two) times a day, Disp: 102 each, Rfl: 5    b complex vitamins tablet, Take 1 tablet by mouth daily, Disp: , Rfl:     benzonatate (TESSALON) 200 MG capsule, Take 1 capsule (200 mg total) by mouth 3 (three) times a day as needed for cough (Patient not taking: Reported on 11/17/2023), Disp: 20 capsule, Rfl: 0    Blood Glucose Monitoring Suppl (ACCU-CHEK GUIDE) w/Device KIT, by Does not apply route 2 (two) times a day, Disp: 1 kit, Rfl: 0    calcium citrate (CALCITRATE) 950 MG tablet, Take 2 tablets by mouth daily., Disp: , Rfl:     ferrous sulfate 325 (65 Fe) mg tablet, Take 325 mg by mouth daily with breakfast, Disp: , Rfl:     fluticasone (FLONASE) 50 mcg/act nasal spray, 2 sprays into each nostril daily, Disp: 11.1 mL, Rfl: 0    glucose blood (Accu-Chek Guide) test strip, Use as instructed, Disp: 100 each, Rfl: 0    Lancets Misc. (ACCU-CHEK FASTCLIX LANCET) KIT, by Does not apply route 2 (two) times a day, Disp: 1 kit, Rfl: 0    Multiple Vitamin (MULTIVITAMIN) tablet, Take 1 tablet by mouth daily, Disp: , Rfl:     Nuvessa 1.3 % GEL, , Disp: , Rfl:     omeprazole (PriLOSEC) 20 mg delayed release capsule, Take 1 capsule (20 mg total) by mouth 2 (two) times a day, Disp: 60 capsule, Rfl: 3    vitamin A 2250 MCG (7500 UT) capsule, Take 7,500 Units by mouth daily, Disp: , Rfl:     Vitamin D, Cholecalciferol, 1000 units CAPS, Take 1 tablet by mouth daily, Disp: , Rfl:     Current Allergies     Allergies as of 02/17/2024 - Reviewed 02/17/2024   Allergen Reaction Noted    Aspirin Other (See Comments) 12/04/2016    Nsaids Other (See Comments) 08/01/2017    Codeine Itching 01/17/2016            The following portions of the patient's history were reviewed and updated as appropriate: allergies, current medications, past family history, past medical history,  "past social history, past surgical history and problem list.     Past Medical History:   Diagnosis Date    Allergic rhinitis     Anxiety     Arthritis     Bariatric surgery status     Carpal tunnel syndrome     Chronic pain disorder     DJD (degenerative joint disease)     GERD (gastroesophageal reflux disease)     Heartburn     History of gastric bypass 2014    Hypertension     Last assessed 2014    Hypoglycemia after GI (gastrointestinal) surgery     Migraine     Obesity     Last assessed 2014    Postgastrectomy malabsorption     Restless leg syndrome     Seizures (HCC)     10yrs ago \"low blood sugar\"    Vertigo     Wears dentures        Past Surgical History:   Procedure Laterality Date    ABDOMINOPLASTY N/A 2021    Procedure: ABDOMINOPLASTY;  Surgeon: Dominick Hammond MD;  Location: [facility];  Service: Plastics     SECTION       SECTION N/A 2018    Procedure:  SECTION ();  Surgeon: Stevan Chapman MD;  Location: [facility];  Service: Obstetrics    CHOLECYSTECTOMY      GASTRIC BYPASS  2014    DC CERCLAGE UTERINE CERVIX NONOBSTETRICAL N/A 2017    Procedure: CERCLAGE CERVICAL;  Surgeon: Cooper Lisa MD;  Location: [facility];  Service: Obstetrics    TOOTH EXTRACTION         Family History   Problem Relation Age of Onset    Cancer Mother         Myosarcoma of the soft tissue    Other Mother         Total Thyroidectomy; Thyroid mass    Coronary artery disease Father     No Known Problems Sister     No Known Problems Sister     No Known Problems Daughter     No Known Problems Maternal Grandmother     No Known Problems Paternal Grandmother     No Known Problems Maternal Aunt          Medications have been verified.        Objective   /68   Pulse 77   Temp 97.5 °F (36.4 °C)   Resp 18   SpO2 98%        Physical Exam     Physical Exam  Vitals reviewed.   Constitutional:       General: She is not in acute distress.     Appearance: Normal " appearance. She is not ill-appearing.   HENT:      Head: Normocephalic.      Right Ear: Tympanic membrane, ear canal and external ear normal.      Left Ear: Tympanic membrane, ear canal and external ear normal.      Nose: Congestion present.      Mouth/Throat:      Mouth: Mucous membranes are moist.      Pharynx: Oropharynx is clear. No oropharyngeal exudate or posterior oropharyngeal erythema.   Eyes:      Conjunctiva/sclera: Conjunctivae normal.      Pupils: Pupils are equal, round, and reactive to light.   Cardiovascular:      Rate and Rhythm: Normal rate and regular rhythm.      Pulses: Normal pulses.      Heart sounds: Normal heart sounds.   Pulmonary:      Effort: Pulmonary effort is normal.      Breath sounds: Normal breath sounds.   Lymphadenopathy:      Cervical: No cervical adenopathy.   Skin:     General: Skin is warm.      Capillary Refill: Capillary refill takes less than 2 seconds.   Neurological:      Mental Status: She is alert.

## 2024-02-17 NOTE — PATIENT INSTRUCTIONS
Rapid strep negative.  Discussed symptoms are most likely viral in nature and to continue supportive care.  Continue over-the-counter Tylenol and discussed over-the-counter antihistamines.  Can restart prescribed Flonase.  All patient's questions answered and is agreeable with this plan.

## 2025-04-15 NOTE — RESULT NOTES
Verified Results  (1) VITAMIN B12 20Jan2016 09:11PM Emiliano Sor     Test Name Result Flag Reference   VITAMIN B12 809 pg/mL  100-900     (1) FERRITIN 20Jan2016 06:32PM Emiliano Sor     Test Name Result Flag Reference   FERRITIN 8 ng/mL  8-388     (1) FOLATE [id number] 06:32PM Emiliano Sor     Test Name Result Flag Reference   FOLATE >20 0 ng/mL H 3 1-17 5     (1) IRON SATURATION %, TIBC 20Jan2016 06:32PM Imtiaz Palacios     Test Name Result Flag Reference   IRON SATURATION 9 %     TOTAL IRON BINDING CAPACITY 407 ug/dL  250-450   IRON 38 ug/dL L      (1) VITAMIN D 25-HYDROXY 20Jan2016 04:52PM Imtiaz Palacios     Test Name Result Flag Reference   VIT D 25-HYDROX 32 9 ng/mL  30 0-100 0     (1) PTH N-TERMINAL (INTACT) 20Jan2016 04:51PM Emiliano Leigh     Test Name Result Flag Reference   PARATHYROID HORMONE INTACT 65 8 pg/mL  14 0-72 0     (1) COMPREHENSIVE METABOLIC PANEL [id number] 64:46AU Nara Palacios Kidney Disease Education Program recommendations are as follows:  GFR calculation is accurate only with a steady state creatinine  Chronic Kidney disease less than 60 ml/min/1 73 sq  meters  Kidney failure less than 15 ml/min/1 73 sq  meters  Test Name Result Flag Reference   GLUCOSE,RANDM 90 mg/dL     If the patient is fasting, the ADA then defines impaired fasting glucose as > 100 mg/dL and diabetes as > or equal to 123 mg/dL     SODIUM 141 mmol/L  136-145   POTASSIUM 4 0 mmol/L  3 5-5 3   CHLORIDE 105 mmol/L  100-108   CARBON DIOXIDE 27 mmol/L  21-32   ANION GAP (CALC) 9 mmol/L  4-13   BLOOD UREA NITROGEN 18 mg/dL  5-25   CREATININE 0 69 mg/dL  0 60-1 30   Standardized to IDMS reference method   CALCIUM 8 7 mg/dL  8 3-10 1   BILI, TOTAL 0 43 mg/dL  0 20-1 00   ALK PHOSPHATAS 60 U/L     ALT (SGPT) 52 U/L  12-78   AST(SGOT) 24 U/L  5-45   ALBUMIN 3 9 g/dL  3 5-5 0   TOTAL PROTEIN 6 9 g/dL  6 4-8 2   eGFR Non-African American      >60 0 ml/min/1 73sq m     (1) LIPID PANEL, FASTING 20Jan2016 02:54PM [facility]   Triglyceride:         Normal              <150 mg/dl       Borderline High    150-199 mg/dl       High               200-499 mg/dl       Very High          >499 mg/dl  Cholesterol:         Desirable        <200 mg/dl      Borderline High  200-239 mg/dl      High             >239 mg/dl  HDL Cholesterol:        High    >59 mg/dL      Low     <41 mg/dL  LDL CALCULATED:    This screening LDL is a calculated result  It does not have the accuracy of the Direct Measured LDL in the monitoring of patients with hyperlipidemia and/or statin therapy  Direct Measure LDL (SHJ094) must be ordered separately in these patients       Test Name Result Flag Reference   CHOLESTEROL 124 mg/dL     HDL,DIRECT 48 mg/dL  40-60   LDL CHOLESTEROL CALCULATED 60 mg/dL  0-100   TRIGLYCERIDES 82 mg/dL  <=150     (1) CBC/ PLT (NO DIFF) [id number] 01:42PM Dennis Haggis     Test Name Result Flag Reference   HEMATOCRIT 42 3 %  34 8-46 1   HEMOGLOBIN 13 6 g/dL  11 5-15 4   MCHC 32 2 g/dL  31 4-37 4   MCH 27 7 pg  26 8-34 3   MCV 86 2 fL  82 0-98 0   PLATELET COUNT 053 Thousands/uL  149-390   RBC COUNT 4 91 Million/uL  3 81-5 12   RDW 14 8 %  11 6-15 1   WBC COUNT 4 08 Thousand/uL L 4 31-10 16   MPV 11 1 fL  8 9-12 7     (1) VITAMIN A 20Jan2016 07:42AM Jonel Palacios   Performed at:  [address]  [id number]  : Rikki Diego MD, Phone:  [phone]     Test Name Result Flag Reference   VITAMIN A 9 ug/dL L 18 - 77     (1) VITAMIN B1, WHOLE BLOOD [id number] 07:42AM Dennisluisa Rochaamena   Performed at:  [address]  [id number]  : Rikki Diego MD, Phone:  [phone]     Test Name Result Flag Reference   VITAMIN B1, WHOLE BLOOD 191 8 nmol/L  66 5 - 200 0       Plan   Carpal tunnel syndrome    · EMG TWO EXTREMITIES WITH OR W/O RELATED PARASPINAL AREAS; Status:Active; Requested for:[id number]; 2   EXTREMITY TWO : RUE  EXTREMITY ONE : LUE2   Health Maintenance, Low iron, Postgastrectomy malabsorption, S/P gastric bypass,  Vitamin A deficiency    · (1) CBC/ PLT (NO DIFF); Status:Active; Requested for:[id number]; 1    · (1) FERRITIN; Status:Active; Requested for:25Apr2016; 1    · (1) VITAMIN A; Status:Active; Requested for:25Apr2016; 1     Carpal tunnel syndrome (354 0) (G56 00)      1 Amended By: Raffaele Harris; Jan 25 2016 2:46 PM EST   2 Amended By: Deann Forrest; Jan 26 2016 7:53 AM EST    Discussion/Summary  Your results show some abnormalities  1/20/15 labs reviewed  Your vitamin B1 is higher normal at 191  8-this could be higher because you started taking the extra supplement before you got your labs done, but since it is so high in the normal range, I do NOT think you have a deficiency  You can continue to take 100 mg of extra thiamine daily as this will not hurt you to do so  Your vitamin B12 is well within normal range at 809  You can decrease back to an extra 1000 mcg of vitamin B12 as it does NOT look like you have a deficiency of vitamins-this extra 1000 should be more than enough and cannot hurt you  Keep the planned follow-up with your PCP as I would think you may need nerve studies  Your vitamin A level is very low at 9-Your vitamin A level is very low, which can affect your night vision  IF there is any chance you could be pregnant, you should check a pregnancy test first since high dose vitamin A can be toxic to a baby/fetus  Recommend that you take 20,000 to 25,000 IU of retinyl acetate or retinyl palmitate ( Vitamin A) per day for 2 weeks  It is important that you take an actual vitamin A supplement for repletion, and not a carotene based supplement  After 2 weeks, decrease to taking 10,000 IU of retinyl acetate or retinyl plamitate ( Vitamin A) per day for 8 weeks   After 8 weeks, discontinue the vitamin A supplement and recheck your lab level  A lab slip is enclosed to recheck your level again in 3 months  Long term vitamin A supplementation can be toxic, so it is important to discontinue your vitamin A supplementation after 10 weeks, until your level can be reassessed  Your iron stores are very low normal at 8 and your serum iron is low at 38-    Iron is important for energy and to prevent anemia  Iron can be constipating, so also take a daily stool softener OR MIRALAX daily  Please start taking a high potency iron (we recommend Vitron C-65 mg) which is found over-the -counter  Take this for 2 weeks and if tolerated, then increase to twice a day  Take this 2 hours apart from any calcium since iron and calcium fight for absorption  If you are already taking this-then try to increase to three times per day (otherwise stay on twice a day if tolerated)    I will recheck your vitamin A and iron studies in 3 months-but stop the vitamin A when advised as noted above        Signatures   Electronically signed by : AMBERLY Ahmadi; Jan 25 2016  2:47PM EST                       (Author)    Electronically signed by : DAVID Rosario ; Jan 26 2016  7:53AM EST                       (Co-author) show

## (undated) DEVICE — SUT ETHILON 4-0 P-S 18 IN 699H

## (undated) DEVICE — SINGLE PORT MANIFOLD: Brand: NEPTUNE 2

## (undated) DEVICE — ARGYLE YANKAUER BULB TIP, NO VENT WITH TUBING 1/4” X 6’ (6 MM X 1.8 M): Brand: ARGYLE

## (undated) DEVICE — SYRINGE 50ML LL

## (undated) DEVICE — ABDOMINAL PAD: Brand: DERMACEA

## (undated) DEVICE — PENCIL ELECTROSURG E-Z CLEAN -0035H

## (undated) DEVICE — SPONGE LAP 18 X 18 IN STRL RFD

## (undated) DEVICE — SYRINGE 10ML LL CONTROL TOP

## (undated) DEVICE — NEEDLE BLUNT 18 G X 1 1/2IN

## (undated) DEVICE — STERILE POLYISOPRENE POWDER-FREE SURGICAL GLOVES WITH EMOLLIENT COATING: Brand: PROTEXIS

## (undated) DEVICE — STERILE POLYISOPRENE POWDER-FREE SURGICAL GLOVES: Brand: PROTEXIS

## (undated) DEVICE — 3M™ STERI-STRIP™ REINFORCED ADHESIVE SKIN CLOSURES, R1547, 1/2 IN X 4 IN (12 MM X 100 MM), 6 STRIPS/ENVELOPE: Brand: 3M™ STERI-STRIP™

## (undated) DEVICE — GLOVE SURG DERMASSURE LF 6.5

## (undated) DEVICE — SUT VICRYL 2-0 CT-1 36 IN J945H

## (undated) DEVICE — SUT VICRYL 0 CTX 36 IN J978H

## (undated) DEVICE — SUT MONOCRYL 4-0 PS-2 27 IN Y426H

## (undated) DEVICE — ABG MICROSTICKS SAFETY

## (undated) DEVICE — SCD SEQUENTIAL COMPRESSION COMFORT SLEEVE MEDIUM KNEE LENGTH: Brand: KENDALL SCD

## (undated) DEVICE — TUBING SUCTION 5MM X 12 FT

## (undated) DEVICE — SUT ETHILON 3-0 PS-1 18 IN 1663G

## (undated) DEVICE — DRESSING TELFA 2 X 3 IN STRL

## (undated) DEVICE — JP CHAN DRN SIL HUBLESS 15FR W/TRO: Brand: CARDINAL HEALTH

## (undated) DEVICE — PACK C-SECTION PBDS

## (undated) DEVICE — SKIN MARKER DUAL TIP WITH RULER CAP, FLEXIBLE RULER AND LABELS: Brand: DEVON

## (undated) DEVICE — DRESSING XEROFORM 5 X 9

## (undated) DEVICE — SUT VICRYL 4-0 P-3 18 IN J494G

## (undated) DEVICE — X-RAY DETECTABLE SPONGES,16 PLY: Brand: VISTEC

## (undated) DEVICE — SUT VICRYL 3-0 SH 27 IN J416H

## (undated) DEVICE — SUT SILK 2-0 FS 18 IN 685G

## (undated) DEVICE — SWABSTCK, BENZOIN TINCTURE, 1/PK, STRL: Brand: APLICARE

## (undated) DEVICE — CHLORAPREP HI-LITE 26ML ORANGE

## (undated) DEVICE — JACKSON-PRATT 100CC BULB RESERVOIR: Brand: CARDINAL HEALTH

## (undated) DEVICE — 1820 FOAM BLOCK NEEDLE COUNTER: Brand: DEVON

## (undated) DEVICE — NEEDLE 25G X 1 1/2

## (undated) DEVICE — SYRINGE 10ML LL

## (undated) DEVICE — GLOVE SRG LF STRL BGL SKNSNS 7 PF

## (undated) DEVICE — 3M™ STERI-STRIP™ REINFORCED ADHESIVE SKIN CLOSURES, R1542, 1/4 IN X 1-1/2 IN (6 MM X 38 MM), 6 STRIPS/ENVELOPE: Brand: 3M™ STERI-STRIP™

## (undated) DEVICE — TRAY FOLEY 16FR URIMETER SURESTEP

## (undated) DEVICE — INTENDED FOR TISSUE SEPARATION, AND OTHER PROCEDURES THAT REQUIRE A SHARP SURGICAL BLADE TO PUNCTURE OR CUT.: Brand: BARD-PARKER SAFETY BLADES SIZE 15, STERILE

## (undated) DEVICE — VIOLET BRAIDED (POLYGLACTIN 910), SYNTHETIC ABSORBABLE SUTURE: Brand: COATED VICRYL

## (undated) DEVICE — GAUZE SPONGES,16 PLY: Brand: CURITY

## (undated) DEVICE — SUT VICRYL 0 CT-1 36 IN J946H

## (undated) DEVICE — INTENDED FOR TISSUE SEPARATION, AND OTHER PROCEDURES THAT REQUIRE A SHARP SURGICAL BLADE TO PUNCTURE OR CUT.: Brand: BARD-PARKER SAFETY BLADES SIZE 10, STERILE

## (undated) DEVICE — SYRINGE 30ML LL

## (undated) DEVICE — SUT SILK 3-0 FS-1 684G

## (undated) DEVICE — STANDARD SURGICAL GOWN, L: Brand: CONVERTORS

## (undated) DEVICE — ASTOUND STANDARD SURGICAL GOWN, XL: Brand: CONVERTORS

## (undated) DEVICE — SUT VICRYL UNDYED 2-0 FSL 27IN J589H

## (undated) DEVICE — PACK UNIVERSAL DRAPES SUB-Q ICD

## (undated) DEVICE — ELECTRODE NEEDLE MOD E-Z CLEAN 2.75IN 7CM -0013M

## (undated) DEVICE — BINDER ABDOMINAL 46-62 IN